# Patient Record
Sex: FEMALE | Race: BLACK OR AFRICAN AMERICAN | NOT HISPANIC OR LATINO | Employment: UNEMPLOYED | URBAN - METROPOLITAN AREA
[De-identification: names, ages, dates, MRNs, and addresses within clinical notes are randomized per-mention and may not be internally consistent; named-entity substitution may affect disease eponyms.]

---

## 2017-01-15 ENCOUNTER — HOSPITAL ENCOUNTER (EMERGENCY)
Facility: OTHER | Age: 52
Discharge: HOME | End: 2017-01-15
Attending: EMERGENCY MEDICINE
Payer: COMMERCIAL

## 2017-01-15 VITALS
DIASTOLIC BLOOD PRESSURE: 66 MMHG | OXYGEN SATURATION: 97 % | SYSTOLIC BLOOD PRESSURE: 141 MMHG | HEART RATE: 92 BPM | WEIGHT: 191 LBS | TEMPERATURE: 98 F | RESPIRATION RATE: 18 BRPM | HEIGHT: 64 IN | BODY MASS INDEX: 32.61 KG/M2

## 2017-01-15 DIAGNOSIS — M54.2 NECK PAIN: Primary | ICD-10-CM

## 2017-01-15 DIAGNOSIS — R07.9 CHEST PAIN: ICD-10-CM

## 2017-01-15 LAB
ALBUMIN SERPL BCP-MCNC: 4.1 G/DL
ALP SERPL-CCNC: 93 U/L
ALT SERPL W/O P-5'-P-CCNC: 28 U/L
ANION GAP SERPL CALC-SCNC: 12 MMOL/L
AST SERPL-CCNC: 20 U/L
BASOPHILS # BLD AUTO: 0.04 K/UL
BASOPHILS NFR BLD: 0.5 %
BILIRUB SERPL-MCNC: 0.4 MG/DL
BUN SERPL-MCNC: 9 MG/DL
CALCIUM SERPL-MCNC: 10 MG/DL
CHLORIDE SERPL-SCNC: 107 MMOL/L
CO2 SERPL-SCNC: 22 MMOL/L
CREAT SERPL-MCNC: 0.7 MG/DL
DIFFERENTIAL METHOD: ABNORMAL
EOSINOPHIL # BLD AUTO: 0.1 K/UL
EOSINOPHIL NFR BLD: 1.5 %
ERYTHROCYTE [DISTWIDTH] IN BLOOD BY AUTOMATED COUNT: 14.7 %
EST. GFR  (AFRICAN AMERICAN): >60 ML/MIN/1.73 M^2
EST. GFR  (NON AFRICAN AMERICAN): >60 ML/MIN/1.73 M^2
GLUCOSE SERPL-MCNC: 87 MG/DL
HCT VFR BLD AUTO: 40.7 %
HGB BLD-MCNC: 13.5 G/DL
LYMPHOCYTES # BLD AUTO: 4.1 K/UL
LYMPHOCYTES NFR BLD: 47.7 %
MCH RBC QN AUTO: 28.2 PG
MCHC RBC AUTO-ENTMCNC: 33.2 %
MCV RBC AUTO: 85 FL
MONOCYTES # BLD AUTO: 0.4 K/UL
MONOCYTES NFR BLD: 5.1 %
NEUTROPHILS # BLD AUTO: 3.8 K/UL
NEUTROPHILS NFR BLD: 44.8 %
PLATELET # BLD AUTO: 306 K/UL
PMV BLD AUTO: 9.1 FL
POTASSIUM SERPL-SCNC: 3.4 MMOL/L
PROT SERPL-MCNC: 8.2 G/DL
RBC # BLD AUTO: 4.79 M/UL
SODIUM SERPL-SCNC: 141 MMOL/L
TROPONIN I SERPL DL<=0.01 NG/ML-MCNC: <0.006 NG/ML
WBC # BLD AUTO: 8.51 K/UL

## 2017-01-15 PROCEDURE — 93005 ELECTROCARDIOGRAM TRACING: CPT

## 2017-01-15 PROCEDURE — 63600175 PHARM REV CODE 636 W HCPCS: Performed by: PHYSICIAN ASSISTANT

## 2017-01-15 PROCEDURE — 93010 ELECTROCARDIOGRAM REPORT: CPT | Mod: ,,, | Performed by: INTERNAL MEDICINE

## 2017-01-15 PROCEDURE — 80053 COMPREHEN METABOLIC PANEL: CPT

## 2017-01-15 PROCEDURE — 99284 EMERGENCY DEPT VISIT MOD MDM: CPT | Mod: 25

## 2017-01-15 PROCEDURE — 25000003 PHARM REV CODE 250: Performed by: PHYSICIAN ASSISTANT

## 2017-01-15 PROCEDURE — 84484 ASSAY OF TROPONIN QUANT: CPT

## 2017-01-15 PROCEDURE — 85025 COMPLETE CBC W/AUTO DIFF WBC: CPT

## 2017-01-15 PROCEDURE — 96374 THER/PROPH/DIAG INJ IV PUSH: CPT

## 2017-01-15 RX ORDER — ASPIRIN 325 MG
325 TABLET, DELAYED RELEASE (ENTERIC COATED) ORAL
Status: DISCONTINUED | OUTPATIENT
Start: 2017-01-15 | End: 2017-01-15 | Stop reason: HOSPADM

## 2017-01-15 RX ORDER — HYDROCODONE BITARTRATE AND ACETAMINOPHEN 5; 325 MG/1; MG/1
1 TABLET ORAL
Status: COMPLETED | OUTPATIENT
Start: 2017-01-15 | End: 2017-01-15

## 2017-01-15 RX ORDER — KETOROLAC TROMETHAMINE 30 MG/ML
15 INJECTION, SOLUTION INTRAMUSCULAR; INTRAVENOUS
Status: COMPLETED | OUTPATIENT
Start: 2017-01-15 | End: 2017-01-15

## 2017-01-15 RX ORDER — NAPROXEN 375 MG/1
375 TABLET ORAL 2 TIMES DAILY PRN
Qty: 30 TABLET | Refills: 0 | Status: SHIPPED | OUTPATIENT
Start: 2017-01-15 | End: 2018-07-18

## 2017-01-15 RX ADMIN — HYDROCODONE BITARTRATE AND ACETAMINOPHEN 1 TABLET: 5; 325 TABLET ORAL at 06:01

## 2017-01-15 RX ADMIN — KETOROLAC TROMETHAMINE 15 MG: 30 INJECTION, SOLUTION INTRAMUSCULAR at 06:01

## 2017-01-15 NOTE — ED PROVIDER NOTES
Encounter Date: 1/15/2017       History     Chief Complaint   Patient presents with    Chest Pain     pt with  pain in chest  right arm and  shoulder  x one week.     Review of patient's allergies indicates:   Allergen Reactions    Morphine Itching     HPI Comments: Patient is a 51-year-old female with history of diabetes, hypertension, and hyperlipidemia who presents to the emergency department with chest pain.  Patient states over the last stay she has had a pain in the left side of her neck.  Patient states the pain radiates down her left shoulder.  Patient states the pain radiates into her chest.  Patient states the chest pain feels like a pulsatile feeling that is worse with movement of her neck.  Patient rates the pain 8 out of 10.  Patient denies any associated shortness of breath.  Patient states she did have one episode of sweating today, that she attributed to hot flashes with going through menopause.  Patient denies any lower extremity edema.  Patient denies recent travel, recent surgery, previous blood clot, or exogenous estrogen.  Patient states she went to urgent care who instructed her to come here for a cardiac workup.  Patient denies any recent illness.  Patient denies coughing.  Patient denies any fevers or chills.  Patient states she had a stress test last year and was told everything went well.    The history is provided by the patient.     Past Medical History   Diagnosis Date    Diabetes mellitus     Glaucoma 2012    Hypertension     Retinal tear of right eye      Past Medical History Pertinent Negatives   Diagnosis Date Noted    Abnormal Pap smear of vagina 7/16/2015     Past Surgical History   Procedure Laterality Date    Eye surgery      Hysterectomy       NILES, BSO secondary to ovarian cyst     Family History   Problem Relation Age of Onset    Ovarian cancer Other     Breast cancer Neg Hx     Colon cancer Neg Hx      Social History   Substance Use Topics    Smoking status: Never  Smoker    Smokeless tobacco: None    Alcohol use 0.0 oz/week     0 Standard drinks or equivalent per week      Comment: occasional     Review of Systems   Constitutional: Negative for activity change, appetite change, chills, fatigue and fever.   HENT: Negative for congestion, ear discharge, ear pain, postnasal drip, rhinorrhea, sore throat and trouble swallowing.    Eyes: Negative for photophobia and visual disturbance.   Respiratory: Negative for cough and shortness of breath.    Cardiovascular: Positive for chest pain.   Gastrointestinal: Negative for abdominal pain, blood in stool, constipation, diarrhea, nausea and vomiting.   Genitourinary: Negative for dysuria, flank pain and hematuria.   Musculoskeletal: Negative for back pain, neck pain and neck stiffness.   Skin: Negative for rash and wound.   Neurological: Negative for dizziness, seizures, syncope, speech difficulty, weakness, light-headedness, numbness and headaches.       Physical Exam   Initial Vitals   BP Pulse Resp Temp SpO2   01/15/17 1717 01/15/17 1717 01/15/17 1717 01/15/17 1717 01/15/17 1717   177/89 100 18 97.9 °F (36.6 °C) 98 %     Physical Exam    Nursing note and vitals reviewed.  Constitutional: She appears well-developed and well-nourished. She is not diaphoretic.  Non-toxic appearance. No distress.   HENT:   Head: Normocephalic.   Right Ear: Hearing and external ear normal.   Left Ear: Hearing and external ear normal.   Nose: Nose normal.   Mouth/Throat: Uvula is midline and oropharynx is clear and moist. No trismus in the jaw. No uvula swelling. No oropharyngeal exudate.   Eyes: Conjunctivae are normal. Pupils are equal, round, and reactive to light.   Neck: Normal range of motion. Neck supple. Muscular tenderness present. No spinous process tenderness present. Normal range of motion present. No rigidity.   Pain with flexion and extension   Cardiovascular: Normal rate, regular rhythm and normal heart sounds. Exam reveals no gallop and  no friction rub.    No murmur heard.  No lower extremity edema   Pulmonary/Chest: Breath sounds normal. No respiratory distress. She has no wheezes. She has no rhonchi. She has no rales. She exhibits tenderness (over sternum).   Abdominal: Soft. Bowel sounds are normal. She exhibits no distension and no mass. There is no tenderness. There is no rebound and no guarding.   Lymphadenopathy:     She has no cervical adenopathy.   Neurological: She is alert and oriented to person, place, and time. She has normal strength.   Skin: Skin is warm and dry.   Psychiatric: She has a normal mood and affect.         ED Course   Procedures  Labs Reviewed   CBC W/ AUTO DIFFERENTIAL - Abnormal; Notable for the following:        Result Value    RDW 14.7 (*)     MPV 9.1 (*)     All other components within normal limits   COMPREHENSIVE METABOLIC PANEL - Abnormal; Notable for the following:     Potassium 3.4 (*)     CO2 22 (*)     All other components within normal limits   TROPONIN I     EKG Readings: (Independently Interpreted)   Initial Reading: No STEMI. Rhythm: Normal Sinus Rhythm. Heart Rate: 92.       X-Rays:   Independently Interpreted Readings:   Other Readings:  No acute findings    Imaging Results         X-Ray Chest PA And Lateral (Final result) Result time:  01/15/17 17:52:28    Final result by Tristin Thomas MD (01/15/17 17:52:28)    Impression:        No acute cardiothoracic disease evident.       Electronically signed by: Dr. Tristin Thomas MD  Date:     01/15/17  Time:    17:52     Narrative:    TWO VIEW CHEST:     Comparison: None.    Findings:    The cardiac silhouette and the pulmonary vasculature are normal in size.  The mediastinal and hilar contours are unremarkable.  There is no pneumothorax.  No pleural effusion.  The lungs are clear.  No acute bony abnormality.              Medical Decision Making:   Initial Assessment:   Urgent evaluation of a 51-year-old female with history of diabetes, hypertension, and  hyperlipidemia who presents to the emergency department with neck pain and chest pain.  Patient is afebrile, nontoxic appearing, and hemodynamically stable.  On exam, there is tenderness over the sternum.  There is also tenderness in the left paraspinal region cervical spine.  Pain with extension and flexion of neck.  Patient's pain is reproducible on exam.  I suspect cervical radiculopathy.  With patient's coronary artery disease risk factors, I will perform cardiac workup.  EKG is obtained and shows no signs of acute ischemia.  Clinical Tests:   Lab Tests: Ordered and Reviewed  Radiological Study: Ordered and Reviewed  ED Management:  7:03 PM  Labs reveal significant abnormality.  Troponin is negative.  Chest x-ray is unremarkable.  I suspect patient is experiencing cervical radiculopathy.  Patient will be discharged with anti-inflammatories.  Patient is advised to follow-up with PCP or return to the emergency department with any worsening symptoms or concerns.  Other:   I have discussed this case with another health care provider.       <> Summary of the Discussion: Dr. Villarreal                   ED Course     Clinical Impression:   The primary encounter diagnosis was Neck pain. A diagnosis of Chest pain was also pertinent to this visit.          Lucille Marley PA-C  01/15/17 1904

## 2017-01-15 NOTE — ED AVS SNAPSHOT
[facility]  [address]  [facility] [address]               Shu Mendoza   1/15/2017  5:27 PM   ED    Description:  Female : 1965   Department:  [facility]           Your Care was Coordinated By:     Provider Role From To    Ashwini Villarreal MD Attending Provider 01/15/17 5242 --    Lucille Marley PA-C Physician Assistant 01/15/17 418 --      Reason for Visit     Chest Pain           Diagnoses this Visit        Comments    Neck pain    -  Primary     Chest pain           ED Disposition     None           To Do List           Follow-up Information     Follow up with Diane Laughlin MD In 2 days.    Specialty:  Internal Medicine    Contact information:    [facility] [address]  [phone]         These Medications        Disp Refills Start End    naproxen (NAPROSYN) 375 MG tablet 30 tablet 0 1/15/2017     Take 1 tablet (375 mg total) by mouth 2 (two) times daily as needed (pain). - Oral    [facility] [address] [place] [address] [facility]  AT [facility] Ph #: [phone]         [facility]     [facility] Care [facility] -  Assistance  Registered nurses in the [facility] provide clinical advisement, health education, appointment booking, and other advisory services.  Call for this free service at [phone].             Medications           Message regarding Medications     Verify the changes and/or additions to your medication regime listed below are the same as discussed with your clinician today.  If any of these changes or additions are incorrect, please notify your healthcare provider.        START taking these NEW medications        Refills    naproxen (NAPROSYN) 375 MG tablet 0    Sig: Take 1 tablet (375 mg total) by mouth 2 (two) times daily as needed (pain).    Class: Print    Route: Oral      These medications were  "administered today        Dose Freq    aspirin EC tablet 325 mg 325 mg ED 1 Time    Sig: Take 1 tablet (325 mg total) by mouth ED 1 Time.    Class: Normal    Route: Oral    ketorolac injection 15 mg 15 mg ED 1 Time    Sig: Inject 15 mg into the vein ED 1 Time.    Class: Normal    Route: Intravenous    hydrocodone-acetaminophen 5-325mg per tablet 1 tablet 1 tablet ED 1 Time    Sig: Take 1 tablet by mouth ED 1 Time.    Class: Normal    Route: Oral           Verify that the below list of medications is an accurate representation of the medications you are currently taking.  If none reported, the list may be blank. If incorrect, please contact your healthcare provider. Carry this list with you in case of emergency.           Current Medications     amlodipine-benazepril (LOTREL) 5-40 mg per capsule Take 1 capsule by mouth once daily.    atorvastatin (LIPITOR) 10 MG tablet Take 1 tablet (10 mg total) by mouth once daily.    dorzolamide (TRUSOPT) 2 % ophthalmic solution 1 drop 3 (three) times daily.    estradiol (VIVELLE-DOT) 0.025 mg/24 hr APPLY 1 PATCH ON SKIN TWICE A WEEK    fluticasone (FLONASE) 50 mcg/actuation nasal spray 1 spray by Each Nare route once daily.    hydrochlorothiazide (HYDRODIURIL) 12.5 MG Tab Take 12.5 mg by mouth once daily.    latanoprost 0.005 % ophthalmic solution 1 drop every evening.    metformin (GLUCOPHAGE) 500 MG tablet Take 500 mg by mouth 2 (two) times daily with meals.    aspirin EC tablet 325 mg Take 1 tablet (325 mg total) by mouth ED 1 Time.    naproxen (NAPROSYN) 375 MG tablet Take 1 tablet (375 mg total) by mouth 2 (two) times daily as needed (pain).           Clinical Reference Information           Your Vitals Were     BP Pulse Temp Resp Height Weight    165/80 86 97.9 °F (36.6 °C) (Oral) 20 5' 4" (1.626 m) 86.6 kg (191 lb)    SpO2 BMI             99% 32.79 kg/m2         Allergies as of 1/15/2017        Reactions    Morphine Itching      Immunizations Administered on Date of " Encounter - 1/15/2017     None      ED Micro, Lab, POCT     Start Ordered       Status Ordering Provider    01/15/17 1741 01/15/17 1741  CBC auto differential  STAT      Final result     01/15/17 1741 01/15/17 1741  Comprehensive metabolic panel  STAT      Final result     01/15/17 1741 01/15/17 1741  Troponin I  STAT      Final result       ED Imaging Orders     Start Ordered       Status Ordering Provider    01/15/17 1741 01/15/17 1741  X-Ray Chest PA And Lateral  1 time imaging      Final result         Discharge Instructions         Noncardiac Chest Pain    Based on your visit today, the health care provider doesnt know what is causing your chest pain. In most cases, people who come to the emergency department with chest pain dont have a problem with their heart. Instead, the pain is caused by other conditions. These may be problems with the lungs, muscles, bones, digestive tract, nerves, or mental health.  Lung problems  · Inflammation around the lungs (pleurisy)  · Collapsed lung (pneumothorax)  · Fluid around the lungs (pleural effusion)  · Lung cancer. This is a rare cause of chest pain.  Muscle or bone problems  · Inflamed cartilage between the ribs (pleurisy)  · Fibromyalgia  · Rheumatoid arthritis  Digestive system problems  · Reflux  · Stomach ulcer  · Spasms of the esophagus  · Gall stones  · Gallbladder inflammation  Mental health conditions  · Panic or anxiety attacks  · Emotional distress  Your condition doesnt seem serious and your pain doesnt appear to be coming from your heart. But sometimes the signs of a serious problem take more time to appear. Watch for the warning signs listed below.  Home care  Follow these guidelines when caring for yourself at home:  · Rest today and avoid strenuous activity.  · Take any prescribed medicine as directed.  Follow-up care  Follow up with your health care provider, or as advised, if you dont start to feel better within 24 hours.  When to seek medical  advice  Call your health care provider right away if any of these occur:  · A change in the type of pain. Call if it feels different, becomes more serious, lasts longer, or begins to spread into your shoulder, arm, neck, jaw, or back.  · Shortness of breath  · You feel more pain when you breathe  · Cough with dark-colored mucus or blood  · Weakness, dizziness, or fainting  · Fever of 100.4ºF (38ºC) or higher, or as directed by your health care provider  · Swelling, pain, or redness in one leg  © 7083-7285 [employer]. [address] [place] [address]. All rights reserved. This information is not intended as a substitute for professional medical care. Always follow your healthcare professional's instructions.          Neck Pain    There are several possible causes of neck pain when there is no injury:  · You can get a minor ligament sprain or muscle strain from a sudden minor neck movement. Sleeping with your neck in an awkward position can also cause this.  · Some people respond to emotional stress by tensing the muscles of their neck, shoulders, and upper back. Chronic spasm in these muscles can cause neck pain and sometimes headaches.  · Gradual wear and tear of the joints in the spine can cause degenerative arthritis. This can be a source of occasional or chronic neck pain.  · The spinal disks may bulge and put pressure on a nearby spinal nerve. This can happen as a natural result of aging or repeated small injuries to the neck. The spinal disks are the cushions between each spinal bone. This causes tingling, pain, or numbness that spreads from the neck to the shoulder, arm, or hand on one side.  Acute neck pain usually gets better in 1 to 2 weeks. Neck pain related to disk disease, arthritis in the spinal joints, or spinal stenosis can become chronic and last for months or years. Spinal stenosis is narrowing of the spinal canal.  X-rays are usually not ordered for the initial evaluation of  neck pain. However, X-rays may be done if you had a forceful physical injury, such as a car accident or fall. If pain continues and doesnt respond to medical treatment, X-rays and other tests may be done at a later time.  Home care  · Rest and relax the muscles. Use a comfortable pillow that supports the head. It should also help keep the spine in a neutral position. The position of the head should not be tilted forward or backward. A rolled up towel may help for a custom fit.  · Some people find relief with heat. Heat can be applied with either a warm shower or bath or a moist towel heated in the microwave and massage. Others prefer cold packs. You can make an ice pack by filling a plastic bag that seals at the top with ice cubes or crushed ice and then wrapping it with a thin towel. Try both and use the method that feels best for 15 to 20 minutes, several times a day.  · Whether using ice or heat, be careful that you do not injure your skin. Never put ice directly on the skin. Always wrap the ice in a towel or other type of cloth.This is very important, especially in people with poor skin sensations.   · Try to reduce your stress level. Emotional stress can lead to neck muscle tension and get in the way of or delay the healing process.  · You may use over-the-counter pain medicine to control pain, unless another medicine was prescribed. If you have chronic liver or kidney disease or ever had a stomach ulcer or GI bleeding, talk with your healthcare provider before using these medicines.  Follow-up care  Follow up with your healthcare provider if your symptoms do not show signs of improvement after one week. Physical therapy or further tests may be needed.  If X-rays, CT scans, or MRI scans were taken, you will be told of any new findings that may affect your care.  Call 911  Call 911 if you have:  · Sudden weakness or numbness in one or both arms  · Neck swelling, difficulty or painful swallowing  · Difficulty  breathing  · Chest pain  When to seek medical advice  Call your healthcare provider right away if any of these occur:  · Pain becomes worse or spreads into one or both arm  · Increasing headache  · Fever of 100.4°F (38°C) or above lasting for 24 to 48 hours  © 0621-2761 [employer]. [address] [place] [address]. All rights reserved. This information is not intended as a substitute for professional medical care. Always follow your healthcare professional's instructions.          Discharge References/Attachments     CERVICAL RADICULOPATHY, UNDERSTANDING (ENGLISH)       [facility] complies with applicable Federal civil rights laws and does not discriminate on the basis of race, color, national origin, age, disability, or sex.        Language Assistance Services     ATTENTION: Language assistance services are available, free of charge. Please call [phone].      ATENCIÓN: Si habla zina, tiene a hardwick disposición servicios gratuitos de asistencia lingüística. Llame al [phone].     CHÚ Ý: N?u b?n nói Ti?ng Vi?t, có các d?ch v? h? tr? ngôn ng? mi?n phí dành cho b?n. G?i s? [phone].

## 2017-01-16 NOTE — ED NOTES
Bedside report received from BLAINE May. Rounding on the patient has been done. she has been updated on the plan of care and her current status. Pt denies chest pain at this time, states left arm and shoulder pain is 8/10. Comfort positioning and restroom needs were addressed. Necessary items were placed with in her reach and she was advised when a reassessment would take place. The call bell remains at the bedside for any additional patient needs. The patient is resting comfortably on the stretcher, respirations are even and unlabored, skin warm and dry. Will continue to monitor.

## 2017-01-16 NOTE — DISCHARGE INSTRUCTIONS
Noncardiac Chest Pain    Based on your visit today, the health care provider doesnt know what is causing your chest pain. In most cases, people who come to the emergency department with chest pain dont have a problem with their heart. Instead, the pain is caused by other conditions. These may be problems with the lungs, muscles, bones, digestive tract, nerves, or mental health.  Lung problems  · Inflammation around the lungs (pleurisy)  · Collapsed lung (pneumothorax)  · Fluid around the lungs (pleural effusion)  · Lung cancer. This is a rare cause of chest pain.  Muscle or bone problems  · Inflamed cartilage between the ribs (pleurisy)  · Fibromyalgia  · Rheumatoid arthritis  Digestive system problems  · Reflux  · Stomach ulcer  · Spasms of the esophagus  · Gall stones  · Gallbladder inflammation  Mental health conditions  · Panic or anxiety attacks  · Emotional distress  Your condition doesnt seem serious and your pain doesnt appear to be coming from your heart. But sometimes the signs of a serious problem take more time to appear. Watch for the warning signs listed below.  Home care  Follow these guidelines when caring for yourself at home:  · Rest today and avoid strenuous activity.  · Take any prescribed medicine as directed.  Follow-up care  Follow up with your health care provider, or as advised, if you dont start to feel better within 24 hours.  When to seek medical advice  Call your health care provider right away if any of these occur:  · A change in the type of pain. Call if it feels different, becomes more serious, lasts longer, or begins to spread into your shoulder, arm, neck, jaw, or back.  · Shortness of breath  · You feel more pain when you breathe  · Cough with dark-colored mucus or blood  · Weakness, dizziness, or fainting  · Fever of 100.4ºF (38ºC) or higher, or as directed by your health care provider  · Swelling, pain, or redness in one leg  © 8653-9754 [employer] [address]  [place] [address]. All rights reserved. This information is not intended as a substitute for professional medical care. Always follow your healthcare professional's instructions.          Neck Pain    There are several possible causes of neck pain when there is no injury:  · You can get a minor ligament sprain or muscle strain from a sudden minor neck movement. Sleeping with your neck in an awkward position can also cause this.  · Some people respond to emotional stress by tensing the muscles of their neck, shoulders, and upper back. Chronic spasm in these muscles can cause neck pain and sometimes headaches.  · Gradual wear and tear of the joints in the spine can cause degenerative arthritis. This can be a source of occasional or chronic neck pain.  · The spinal disks may bulge and put pressure on a nearby spinal nerve. This can happen as a natural result of aging or repeated small injuries to the neck. The spinal disks are the cushions between each spinal bone. This causes tingling, pain, or numbness that spreads from the neck to the shoulder, arm, or hand on one side.  Acute neck pain usually gets better in 1 to 2 weeks. Neck pain related to disk disease, arthritis in the spinal joints, or spinal stenosis can become chronic and last for months or years. Spinal stenosis is narrowing of the spinal canal.  X-rays are usually not ordered for the initial evaluation of neck pain. However, X-rays may be done if you had a forceful physical injury, such as a car accident or fall. If pain continues and doesnt respond to medical treatment, X-rays and other tests may be done at a later time.  Home care  · Rest and relax the muscles. Use a comfortable pillow that supports the head. It should also help keep the spine in a neutral position. The position of the head should not be tilted forward or backward. A rolled up towel may help for a custom fit.  · Some people find relief with heat. Heat can be applied  with either a warm shower or bath or a moist towel heated in the microwave and massage. Others prefer cold packs. You can make an ice pack by filling a plastic bag that seals at the top with ice cubes or crushed ice and then wrapping it with a thin towel. Try both and use the method that feels best for 15 to 20 minutes, several times a day.  · Whether using ice or heat, be careful that you do not injure your skin. Never put ice directly on the skin. Always wrap the ice in a towel or other type of cloth.This is very important, especially in people with poor skin sensations.   · Try to reduce your stress level. Emotional stress can lead to neck muscle tension and get in the way of or delay the healing process.  · You may use over-the-counter pain medicine to control pain, unless another medicine was prescribed. If you have chronic liver or kidney disease or ever had a stomach ulcer or GI bleeding, talk with your healthcare provider before using these medicines.  Follow-up care  Follow up with your healthcare provider if your symptoms do not show signs of improvement after one week. Physical therapy or further tests may be needed.  If X-rays, CT scans, or MRI scans were taken, you will be told of any new findings that may affect your care.  Call 911  Call 911 if you have:  · Sudden weakness or numbness in one or both arms  · Neck swelling, difficulty or painful swallowing  · Difficulty breathing  · Chest pain  When to seek medical advice  Call your healthcare provider right away if any of these occur:  · Pain becomes worse or spreads into one or both arm  · Increasing headache  · Fever of 100.4°F (38°C) or above lasting for 24 to 48 hours  © 3071-9643 [employer]. [address] [place] [address]. All rights reserved. This information is not intended as a substitute for professional medical care. Always follow your healthcare professional's instructions.

## 2017-07-23 RX ORDER — ESTRADIOL 0.03 MG/D
FILM, EXTENDED RELEASE TRANSDERMAL
Qty: 8 PATCH | Refills: 0 | Status: SHIPPED | OUTPATIENT
Start: 2017-07-23 | End: 2018-07-18

## 2017-08-16 ENCOUNTER — HOSPITAL ENCOUNTER (EMERGENCY)
Facility: OTHER | Age: 52
Discharge: HOME | End: 2017-08-16
Attending: EMERGENCY MEDICINE
Payer: MEDICAID

## 2017-08-16 VITALS
SYSTOLIC BLOOD PRESSURE: 179 MMHG | HEART RATE: 88 BPM | HEIGHT: 64 IN | WEIGHT: 195 LBS | TEMPERATURE: 98 F | OXYGEN SATURATION: 99 % | RESPIRATION RATE: 18 BRPM | BODY MASS INDEX: 33.29 KG/M2 | DIASTOLIC BLOOD PRESSURE: 81 MMHG

## 2017-08-16 DIAGNOSIS — R21 RASH AND NONSPECIFIC SKIN ERUPTION: Primary | ICD-10-CM

## 2017-08-16 PROCEDURE — 99283 EMERGENCY DEPT VISIT LOW MDM: CPT

## 2017-08-16 RX ORDER — MULTIVITAMIN
1 TABLET ORAL DAILY
COMMUNITY

## 2017-08-16 NOTE — ED TRIAGE NOTES
Pt presents to the ED with c/o rash near her left inner ankle x2 days. Pt states initially it went away but has come back. The only thing new that she is taking is vitamin E and fish oil supplements. Denies itching at site. Pt also states she has a hx of restless leg syndrome and does not know if that is a contributing factor.     LOC: The patient is awake, alert and aware of environment with an appropriate affect, the patient is oriented x 4 with appropriate speech.  APPEARANCE: Patient sitting comfortably and in no acute distress, clean and well groomed.  SKIN: The skin is warm and dry, patient has normal skin turgor, skin is intact, no breakdown or brusing noted. Rash near inner left ankle.   MUSKULOSKELETAL: Patient observed ambulating with a steady gait. Patient moving all extremities well, no obvious deformities noted.  RESPIRATORY: Airway is open and patent, respirations are spontaneous, patient has a normal effort and rate.   CARDIAC: Heart rate normal, capillary refill < 3 seconds, no edema noted.

## 2017-08-17 NOTE — ED PROVIDER NOTES
Encounter Date: 8/16/2017       History     Chief Complaint   Patient presents with    Rash     pt reports when waking up this morning with a rash to left lower extremity and some starting on right lower extremity from unknown source     Patient is 52-year-old female who presents with complaints of rash to left lower extremity that has been present for 2 days prior to arrival.  She reports rash started off small and has progressively enlarged over the course of 2 days.  She does admit that now she is seeing a similar rash develop on her right leg.  She has no report of pain, itching, skin sloughing, bleeding or purulence to the use lesions.  She has no report of leg swelling, trauma or injury.  No history of skin rashes.  Denies bleeding, fever, chills, nausea, vomiting, chest pain or shortness of breath.  Denies new foods or detergents.  Currently accompanied by her  is at bedside.  Of note, patient admittedly is concerned that she has a blood clot.          Review of patient's allergies indicates:   Allergen Reactions    Morphine Itching     Past Medical History:   Diagnosis Date    Diabetes mellitus     Glaucoma 2012    Hypertension     Retinal tear of right eye      Past Surgical History:   Procedure Laterality Date    EYE SURGERY      HYSTERECTOMY      NILES, BSO secondary to ovarian cyst     Family History   Problem Relation Age of Onset    Ovarian cancer Other     Breast cancer Neg Hx     Colon cancer Neg Hx      Social History   Substance Use Topics    Smoking status: Never Smoker    Smokeless tobacco: Never Used    Alcohol use 0.0 oz/week      Comment: occasional     Review of Systems   Constitutional: Negative for fever.   HENT: Negative for sore throat.    Respiratory: Negative for shortness of breath.    Cardiovascular: Negative for chest pain.   Gastrointestinal: Negative for nausea.   Genitourinary: Negative for dysuria.   Musculoskeletal: Negative for back pain.   Skin: Positive for  rash.   Neurological: Negative for weakness.   Hematological: Does not bruise/bleed easily.       Physical Exam     Initial Vitals [08/16/17 1756]   BP Pulse Resp Temp SpO2   (!) 180/86 88 18 98.4 °F (36.9 °C) 98 %      MAP       117.33         Physical Exam    Nursing note and vitals reviewed.  Constitutional: She appears well-developed and well-nourished. She is not diaphoretic. No distress.   Healthy appearing -American female in no acute distress or apparent pain.  She makes good eye contact, speaks in clear full sentences and ambulates with ease.   HENT:   Head: Normocephalic and atraumatic.   Eyes: Conjunctivae and EOM are normal. Pupils are equal, round, and reactive to light. Right eye exhibits no discharge. Left eye exhibits no discharge. No scleral icterus.   Neck: Normal range of motion.   Cardiovascular: Normal rate, regular rhythm and normal heart sounds. Exam reveals no gallop and no friction rub.    No murmur heard.  Pulmonary/Chest: Breath sounds normal. She has no wheezes. She has no rhonchi. She has no rales.   Abdominal: Soft. Bowel sounds are normal. There is no tenderness. There is no rebound and no guarding.   Musculoskeletal: Normal range of motion. She exhibits no edema or tenderness.   Lymphadenopathy:     She has no cervical adenopathy.   Neurological: She is alert and oriented to person, place, and time. She has normal strength. No cranial nerve deficit or sensory deficit.   Skin: Skin is warm and dry. Capillary refill takes less than 2 seconds. Rash noted. No abscess noted. No erythema.   As seen in photo attached to chart which was obtained with consent from patient.   Psychiatric: She has a normal mood and affect. Her behavior is normal. Thought content normal.         ED Course   Procedures  Labs Reviewed - No data to display          Medical Decision Making:   ED Management:  Urgent evaluation a 52-year-old female who presents with complaints of menstrual erythematous macular  rash to the medial aspect of the left  Lower leg without clear etiology.  She is afebrile, nontoxic appearing, hemodynamically stable.  Physical exam outlined above and reveals patchy erythematous macular rash that is blanchable with no bleeding, skin sloughing, vesicular lesions or purulence.  No induration tenderness or itching.  Do not suspect cellulitis or abscess. See photo. Do not suspect DVT especially in the setting of no pain or swelling.  No intervention felt warranted at this time.  She is reassured that this does not appear to be in acute dermatological emergency including SJS or TEN. Patient encouraged to follow-up with primary care provider in one to 2 days for symptom recheck.  She is educated him return precautions and verbalizes understanding.  Case discussed with attending who agrees with plan.  Other:   I have discussed this case with another health care provider.       <> Summary of the Discussion: [place]                    ED Course     Clinical Impression:   The encounter diagnosis was Rash and nonspecific skin eruption.                          The above photo represents Left lower extremity medial aspect        Above photo represents medial aspect of the right lower extremity     Elena Ontiveros PA-C  08/16/17 [id number]

## 2017-09-10 RX ORDER — ESTRADIOL 0.03 MG/D
FILM, EXTENDED RELEASE TRANSDERMAL
Qty: 8 PATCH | Refills: 0 | Status: SHIPPED | OUTPATIENT
Start: 2017-09-10 | End: 2018-07-18

## 2017-10-16 RX ORDER — ESTRADIOL 0.03 MG/D
FILM, EXTENDED RELEASE TRANSDERMAL
Qty: 8 PATCH | Refills: 0 | Status: SHIPPED | OUTPATIENT
Start: 2017-10-16 | End: 2018-07-18

## 2017-11-14 RX ORDER — ESTRADIOL 0.03 MG/D
FILM, EXTENDED RELEASE TRANSDERMAL
Qty: 8 PATCH | Refills: 0 | Status: SHIPPED | OUTPATIENT
Start: 2017-11-14 | End: 2018-07-18

## 2017-12-12 RX ORDER — ESTRADIOL 0.03 MG/D
FILM, EXTENDED RELEASE TRANSDERMAL
Qty: 8 PATCH | Refills: 0 | Status: SHIPPED | OUTPATIENT
Start: 2017-12-12 | End: 2018-07-18

## 2018-04-18 ENCOUNTER — HOSPITAL ENCOUNTER (OUTPATIENT)
Dept: RADIOLOGY | Facility: OTHER | Age: 53
Discharge: HOME | End: 2018-04-18
Attending: ORTHOPAEDIC SURGERY
Payer: COMMERCIAL

## 2018-04-18 DIAGNOSIS — M25.512 LEFT SHOULDER PAIN: ICD-10-CM

## 2018-04-18 DIAGNOSIS — M19.012 ARTHRITIS OF LEFT SHOULDER REGION: ICD-10-CM

## 2018-04-18 PROCEDURE — 73221 MRI JOINT UPR EXTREM W/O DYE: CPT | Mod: 26,LT,, | Performed by: RADIOLOGY

## 2018-04-18 PROCEDURE — 73221 MRI JOINT UPR EXTREM W/O DYE: CPT | Mod: TC,LT

## 2018-06-03 RX ORDER — ESTRADIOL 0.03 MG/D
FILM, EXTENDED RELEASE TRANSDERMAL
Qty: 8 PATCH | Refills: 0 | Status: SHIPPED | OUTPATIENT
Start: 2018-06-03 | End: 2018-07-18

## 2018-07-10 RX ORDER — ESTRADIOL 0.03 MG/D
FILM, EXTENDED RELEASE TRANSDERMAL
Qty: 8 PATCH | Refills: 0 | Status: SHIPPED | OUTPATIENT
Start: 2018-07-10 | End: 2018-07-18

## 2018-07-18 ENCOUNTER — OFFICE VISIT (OUTPATIENT)
Dept: UROLOGY | Facility: CLINIC | Age: 53
End: 2018-07-18
Attending: UROLOGY
Payer: COMMERCIAL

## 2018-07-18 VITALS
SYSTOLIC BLOOD PRESSURE: 161 MMHG | WEIGHT: 192 LBS | HEART RATE: 94 BPM | DIASTOLIC BLOOD PRESSURE: 93 MMHG | HEIGHT: 64 IN | BODY MASS INDEX: 32.78 KG/M2

## 2018-07-18 DIAGNOSIS — R31.29 MICROHEMATURIA: ICD-10-CM

## 2018-07-18 DIAGNOSIS — R35.0 FREQUENCY OF URINATION: ICD-10-CM

## 2018-07-18 DIAGNOSIS — N32.81 OAB (OVERACTIVE BLADDER): Primary | ICD-10-CM

## 2018-07-18 LAB
BILIRUB SERPL-MCNC: ABNORMAL MG/DL
BLOOD URINE, POC: 250
COLOR, POC UA: YELLOW
GLUCOSE UR QL STRIP: 500
KETONES UR QL STRIP: ABNORMAL
LEUKOCYTE ESTERASE URINE, POC: ABNORMAL
NITRITE, POC UA: ABNORMAL
PH, POC UA: 5
PROTEIN, POC: ABNORMAL
SPECIFIC GRAVITY, POC UA: 1.01
UROBILINOGEN, POC UA: ABNORMAL

## 2018-07-18 PROCEDURE — 99244 OFF/OP CNSLTJ NEW/EST MOD 40: CPT | Mod: 25,S$GLB,, | Performed by: UROLOGY

## 2018-07-18 PROCEDURE — 81002 URINALYSIS NONAUTO W/O SCOPE: CPT | Mod: S$GLB,,, | Performed by: UROLOGY

## 2018-07-18 RX ORDER — AMLODIPINE AND BENAZEPRIL HYDROCHLORIDE 5; 40 MG/1; MG/1
CAPSULE ORAL
COMMUNITY
Start: 2018-05-03 | End: 2018-07-18

## 2018-07-18 RX ORDER — OXYBUTYNIN CHLORIDE 5 MG/1
5 TABLET, EXTENDED RELEASE ORAL DAILY
Qty: 30 TABLET | Refills: 11 | Status: SHIPPED | OUTPATIENT
Start: 2018-07-18 | End: 2018-08-29

## 2018-07-18 RX ORDER — ATORVASTATIN CALCIUM 10 MG/1
1 TABLET, FILM COATED ORAL
COMMUNITY
Start: 2018-03-19 | End: 2018-07-18

## 2018-07-18 RX ORDER — FLUTICASONE PROPIONATE 50 MCG
SPRAY, SUSPENSION (ML) NASAL
COMMUNITY
Start: 2018-03-19 | End: 2018-07-18

## 2018-07-18 RX ORDER — ESTRADIOL 0.03 MG/D
FILM, EXTENDED RELEASE TRANSDERMAL
COMMUNITY
End: 2018-07-18 | Stop reason: SDUPTHER

## 2018-07-18 RX ORDER — TRAVOPROST OPHTHALMIC SOLUTION 0.04 MG/ML
SOLUTION OPHTHALMIC
COMMUNITY
End: 2019-04-25

## 2018-07-18 RX ORDER — EMPAGLIFLOZIN 10 MG/1
TABLET, FILM COATED ORAL
Refills: 3 | COMMUNITY
Start: 2018-06-06 | End: 2018-08-29 | Stop reason: SDUPTHER

## 2018-07-18 RX ORDER — METFORMIN HYDROCHLORIDE 500 MG/1
TABLET, EXTENDED RELEASE ORAL
Refills: 2 | COMMUNITY
Start: 2018-07-03 | End: 2019-04-25

## 2018-07-18 NOTE — PROGRESS NOTES
"  Subjective:       Shu Mendoza is a 53 y.o. female who is a new patient who was referred by Dr Laughlin for evaluation of OAB, renal cysts.      She is a previous pt of Dr Shepherd and Lorraine. She presents with c/o urge/freq. [facility] records show long standing microhematuria s/p workup. Prior h/o recurrent UTIs, used to take Macrobid prophy. Last UTI in 12/16 per her report. S/p NILES-BSO. Multiple reports of flank pain in the past.     She reports her bladder is "overactive" for about 3-4 months. Frequency up to [id number]. She reports starting Jardiance around that time.     Abd US from  (7/15) - parapelvic cyst with calcification, stable since 2010 per their records.     Significant glucosuria today (on Jardiance). She reports glucose control as fair - last A1c 8.0 per her report. Also takes HCTZ.    PVR (bladder scan) today - 31cc      The following portions of the patient's history were reviewed and updated as appropriate: allergies, current medications, past family history, past medical history, past social history, past surgical history and problem list.    Review of Systems  Constitutional: no fever or chills  ENT: no nasal congestion or sore throat  Respiratory: no cough or shortness of breath  Cardiovascular: no chest pain or palpitations  Gastrointestinal: no nausea or vomiting, tolerating diet  Genitourinary: as per HPI  Hematologic/Lymphatic: no easy bruising or lymphadenopathy  Musculoskeletal: no arthralgias or myalgias  Skin: no rashes or lesions  Neurological: no seizures or tremors  Behavioral/Psych: no auditory or visual hallucinations        Objective:    Vitals: BP (!) 161/93 (BP Location: Right arm, Patient Position: Sitting, BP Method: Large (Automatic))   Pulse 94   Ht 5' 4" (1.626 m)   Wt 87.1 kg (192 lb)   BMI 32.96 kg/m²     Physical Exam   General: well developed, well nourished in no acute distress  Head: normocephalic, atraumatic  Neck: supple, trachea midline, no obvious " enlargement of thyroid  HEENT: EOMI, mucus membranes moist, sclera anicteric, no hearing impairment  Lungs: symmetric expansion, non-labored breathing  Cardiovascular: regular rate and rhythm, normal pulses  Abdomen: soft, non tender, non distended, no palpable masses, no hepatosplenomegaly, no hernias, no CVA tenderness  Musculoskeletal: no peripheral edema, normal ROM in bilateral upper and lower extremities  Lymphatics: no cervical or inguinal lymphadenopathy  Skin: no rashes or lesions  Neuro: alert and oriented x 3, no gross deficits  Psych: normal judgment and insight, normal mood/affect and non-anxious  Genitourinary:   patient declined exam      Lab Review   Urine analysis today in clinic shows positive for 250 red blood cells, glucose 500    Lab Results   Component Value Date    WBC 8.51 01/15/2017    HGB 13.5 01/15/2017    HCT 40.7 01/15/2017    MCV 85 01/15/2017     01/15/2017     Lab Results   Component Value Date    CREATININE 0.7 01/15/2017    BUN 9 01/15/2017       Imaging  Images and reports were personally reviewed by me and discussed with patient       Assessment/Plan:      1. OAB (overactive bladder)    - Behavioral changes, PFPT, anticholinergics, mirabegron. Botox/InterStim for refractory UUI.   - Likely worsened by Jardiance and HCTZ   - Trial Ditropan 5mg     2. Microhematuria    - Long history   - Prior workup   - Will monitor, okay to hold on repeat w/u     3. Frequency of urination    - Ditropan         Follow up in 6 weeks with [facility]

## 2018-08-01 DIAGNOSIS — Z12.31 ENCOUNTER FOR SCREENING MAMMOGRAM FOR BREAST CANCER: Primary | ICD-10-CM

## 2018-08-11 RX ORDER — ESTRADIOL 0.03 MG/D
FILM, EXTENDED RELEASE TRANSDERMAL
Qty: 8 PATCH | Refills: 0 | Status: SHIPPED | OUTPATIENT
Start: 2018-08-11 | End: 2018-08-29

## 2018-08-13 ENCOUNTER — TELEPHONE (OUTPATIENT)
Dept: OBSTETRICS AND GYNECOLOGY | Facility: CLINIC | Age: 53
End: 2018-08-13

## 2018-08-13 NOTE — TELEPHONE ENCOUNTER
Hello, this is Nida calling from the OBGYN clinic at [facility]. Returning your call to schedule annual exam. Can you come in on 9/20 at 10:30AM? (No answer, left VM message for the pt to call the clinic back.)

## 2018-08-13 NOTE — TELEPHONE ENCOUNTER
Returned the pt's call to the clinic. Pt agreed to an appointment on 9/20 at 10:30AM. Pt verbalized understanding.

## 2018-08-13 NOTE — TELEPHONE ENCOUNTER
----- Message from Nohemi Cordon sent at 8/13/2018  3:04 PM CDT -----  Contact: SHALINI DIANE [[id number]]      Name of Who is Calling: SHALINI DIANE [[id number]]    What is the request in detail: Returning a call.    Can the clinic reply by [facility]: no    [facility] [phone]

## 2018-08-13 NOTE — TELEPHONE ENCOUNTER
----- Message from Mariel Melgoza sent at 8/13/2018  2:38 PM CDT -----  Name of Who is Calling: SHALINI DIANE [[id number]]    What is the request in detail: Pt would like to schedule an annual visit       Can the clinic reply by [facility]:  Yes       [facility][phone]

## 2018-08-15 ENCOUNTER — HOSPITAL ENCOUNTER (OUTPATIENT)
Dept: RADIOLOGY | Facility: OTHER | Age: 53
Discharge: HOME | End: 2018-08-15
Attending: INTERNAL MEDICINE
Payer: COMMERCIAL

## 2018-08-15 VITALS — HEIGHT: 64 IN | WEIGHT: 192 LBS | BODY MASS INDEX: 32.78 KG/M2

## 2018-08-15 DIAGNOSIS — Z12.31 ENCOUNTER FOR SCREENING MAMMOGRAM FOR BREAST CANCER: ICD-10-CM

## 2018-08-15 PROCEDURE — 77063 BREAST TOMOSYNTHESIS BI: CPT | Mod: 26,,, | Performed by: RADIOLOGY

## 2018-08-15 PROCEDURE — 77067 SCR MAMMO BI INCL CAD: CPT | Mod: 26,,, | Performed by: RADIOLOGY

## 2018-08-15 PROCEDURE — 77067 SCR MAMMO BI INCL CAD: CPT | Mod: TC

## 2018-08-29 ENCOUNTER — OFFICE VISIT (OUTPATIENT)
Dept: UROLOGY | Facility: CLINIC | Age: 53
End: 2018-08-29
Attending: UROLOGY
Payer: COMMERCIAL

## 2018-08-29 VITALS
HEART RATE: 74 BPM | HEIGHT: 64 IN | WEIGHT: 192 LBS | BODY MASS INDEX: 32.78 KG/M2 | SYSTOLIC BLOOD PRESSURE: 132 MMHG | DIASTOLIC BLOOD PRESSURE: 75 MMHG

## 2018-08-29 DIAGNOSIS — N32.81 OAB (OVERACTIVE BLADDER): Primary | ICD-10-CM

## 2018-08-29 DIAGNOSIS — R35.0 FREQUENCY OF URINATION: ICD-10-CM

## 2018-08-29 DIAGNOSIS — R31.29 MICROHEMATURIA: ICD-10-CM

## 2018-08-29 LAB
BILIRUB SERPL-MCNC: ABNORMAL MG/DL
BLOOD URINE, POC: 50
COLOR, POC UA: YELLOW
GLUCOSE UR QL STRIP: 1000
KETONES UR QL STRIP: ABNORMAL
LEUKOCYTE ESTERASE URINE, POC: ABNORMAL
NITRITE, POC UA: ABNORMAL
PH, POC UA: 6
POC RESIDUAL URINE VOLUME: 64 ML (ref 0–100)
PROTEIN, POC: ABNORMAL
SPECIFIC GRAVITY, POC UA: 1.01
UROBILINOGEN, POC UA: ABNORMAL

## 2018-08-29 PROCEDURE — 81002 URINALYSIS NONAUTO W/O SCOPE: CPT | Mod: S$GLB,,, | Performed by: UROLOGY

## 2018-08-29 PROCEDURE — 99214 OFFICE O/P EST MOD 30 MIN: CPT | Mod: 25,S$GLB,, | Performed by: UROLOGY

## 2018-08-29 PROCEDURE — 3008F BODY MASS INDEX DOCD: CPT | Mod: CPTII,S$GLB,, | Performed by: UROLOGY

## 2018-08-29 RX ORDER — FLUTICASONE PROPIONATE 50 MCG
2 SPRAY, SUSPENSION (ML) NASAL
COMMUNITY
Start: 2018-03-19 | End: 2018-09-18

## 2018-08-29 RX ORDER — ESTRADIOL 0.03 MG/D
1 FILM, EXTENDED RELEASE TRANSDERMAL
COMMUNITY
End: 2019-04-25

## 2018-08-29 RX ORDER — AMLODIPINE AND BENAZEPRIL HYDROCHLORIDE 5; 40 MG/1; MG/1
CAPSULE ORAL
COMMUNITY
Start: 2018-08-08 | End: 2019-04-25

## 2018-08-29 RX ORDER — OXYBUTYNIN CHLORIDE 5 MG/1
5 TABLET, EXTENDED RELEASE ORAL DAILY
Qty: 90 TABLET | Refills: 3 | Status: SHIPPED | OUTPATIENT
Start: 2018-08-29 | End: 2019-10-04 | Stop reason: SDUPTHER

## 2018-08-29 RX ORDER — OXYBUTYNIN CHLORIDE 5 MG/1
1 TABLET, EXTENDED RELEASE ORAL
COMMUNITY
End: 2018-08-29 | Stop reason: SDUPTHER

## 2018-08-29 RX ORDER — ASPIRIN 81 MG/1
1 TABLET ORAL
COMMUNITY
Start: 2018-07-31 | End: 2019-01-28

## 2018-08-29 NOTE — PROGRESS NOTES
"  Subjective:       Shu Mendoza is a 53 y.o. female who is an established patient who was referred by Dr Laughlin for evaluation of OAB, renal cysts.      She is a previous pt of Dr Shepherd and Lorraine. She presents with c/o urge/freq. [facility] records show long standing microhematuria s/p workup. Prior h/o recurrent UTIs, used to take Macrobid prophy. Last UTI in 12/16 per her report. S/p NILES-BSO. Multiple reports of flank pain in the past.     She reports her bladder is "overactive" for about 3-4 months. Frequency up to [id number]. She reports starting Jardiance around that time.     Abd US from  (7/15) - parapelvic cyst with calcification, stable since 2010 per their records.     Significant glucosuria today (on Jardiance). She reports glucose control as fair - last A1c 8.0 per her report. Also takes HCTZ.    PVR (bladder scan) initial visit - 31cc    She was given trial of Ditropan - she has been doing great with this. Now able to hold for q3hrs (rather than [id number]). Nocturia x 0 (was x 2).       The following portions of the patient's history were reviewed and updated as appropriate: allergies, current medications, past family history, past medical history, past social history, past surgical history and problem list.    Review of Systems  Constitutional: no fever or chills  ENT: no nasal congestion or sore throat  Respiratory: no cough or shortness of breath  Cardiovascular: no chest pain or palpitations  Gastrointestinal: no nausea or vomiting, tolerating diet  Genitourinary: as per HPI  Hematologic/Lymphatic: no easy bruising or lymphadenopathy  Musculoskeletal: no arthralgias or myalgias  Skin: no rashes or lesions  Neurological: no seizures or tremors  Behavioral/Psych: no auditory or visual hallucinations        Objective:    Vitals: /75 (BP Location: Left arm, Patient Position: Sitting, BP Method: Large (Automatic))   Pulse 74   Ht 5' 4" (1.626 m)   Wt 87.1 kg (192 lb 0.3 oz)   BMI 32.96 kg/m² "     Physical Exam   General: well developed, well nourished in no acute distress  Head: normocephalic, atraumatic  Neck: supple, trachea midline, no obvious enlargement of thyroid  HEENT: EOMI, mucus membranes moist, sclera anicteric, no hearing impairment  Lungs: symmetric expansion, non-labored breathing  Cardiovascular: regular rate and rhythm, normal pulses  Abdomen: soft, non tender, non distended, no palpable masses, no hepatosplenomegaly, no hernias, no CVA tenderness  Musculoskeletal: no peripheral edema, normal ROM in bilateral upper and lower extremities  Lymphatics: no cervical or inguinal lymphadenopathy  Skin: no rashes or lesions  Neuro: alert and oriented x 3, no gross deficits  Psych: normal judgment and insight, normal mood/affect and non-anxious  Genitourinary:   patient declined exam      Lab Review   Urine analysis today in clinic shows positive for 50 red blood cells, glucose 1000    Lab Results   Component Value Date    WBC 8.51 01/15/2017    HGB 13.5 01/15/2017    HCT 40.7 01/15/2017    MCV 85 01/15/2017     01/15/2017     Lab Results   Component Value Date    CREATININE 0.7 01/15/2017    BUN 9 01/15/2017       Imaging  Images and reports were personally reviewed by me and discussed with patient       Assessment/Plan:      1. OAB (overactive bladder)    - Behavioral changes, PFPT, anticholinergics, mirabegron. Botox/InterStim for refractory UUI.   - Likely worsened by Jardiance and HCTZ   - Ditropan 5mg     2. Microhematuria    - Long history   - Prior workup   - Will monitor, okay to hold on repeat w/u     3. Frequency of urination    - Ditropan - doing great         Follow up in 6 months with P[facility]

## 2018-12-26 RX ORDER — ESTRADIOL 0.03 MG/D
FILM, EXTENDED RELEASE TRANSDERMAL
Qty: 8 PATCH | Refills: 0 | Status: SHIPPED | OUTPATIENT
Start: 2018-12-26 | End: 2019-05-30

## 2018-12-28 ENCOUNTER — OFFICE VISIT (OUTPATIENT)
Dept: URGENT CARE | Facility: CLINIC | Age: 53
End: 2018-12-28
Payer: COMMERCIAL

## 2018-12-28 VITALS
OXYGEN SATURATION: 96 % | SYSTOLIC BLOOD PRESSURE: 172 MMHG | HEART RATE: 94 BPM | DIASTOLIC BLOOD PRESSURE: 96 MMHG | RESPIRATION RATE: 18 BRPM | TEMPERATURE: 98 F

## 2018-12-28 DIAGNOSIS — R05.9 COUGH: ICD-10-CM

## 2018-12-28 DIAGNOSIS — J02.9 SORE THROAT: ICD-10-CM

## 2018-12-28 DIAGNOSIS — J06.9 UPPER RESPIRATORY TRACT INFECTION, UNSPECIFIED TYPE: Primary | ICD-10-CM

## 2018-12-28 LAB
CTP QC/QA: YES
CTP QC/QA: YES
FLUAV AG NPH QL: NEGATIVE
FLUBV AG NPH QL: NEGATIVE
S PYO RRNA THROAT QL PROBE: NEGATIVE

## 2018-12-28 PROCEDURE — 87880 STREP A ASSAY W/OPTIC: CPT | Mod: QW,S$GLB,, | Performed by: NURSE PRACTITIONER

## 2018-12-28 PROCEDURE — 99214 PR OFFICE/OUTPT VISIT, EST, LEVL IV, 30-39 MIN: ICD-10-PCS | Mod: S$GLB,,, | Performed by: NURSE PRACTITIONER

## 2018-12-28 PROCEDURE — 99214 OFFICE O/P EST MOD 30 MIN: CPT | Mod: S$GLB,,, | Performed by: NURSE PRACTITIONER

## 2018-12-28 PROCEDURE — 87804 INFLUENZA ASSAY W/OPTIC: CPT | Mod: QW,S$GLB,, | Performed by: NURSE PRACTITIONER

## 2018-12-28 PROCEDURE — 87880 POCT RAPID STREP A: ICD-10-PCS | Mod: QW,S$GLB,, | Performed by: NURSE PRACTITIONER

## 2018-12-28 PROCEDURE — 87804 POCT INFLUENZA A/B: ICD-10-PCS | Mod: 59,QW,S$GLB, | Performed by: NURSE PRACTITIONER

## 2018-12-29 NOTE — PATIENT INSTRUCTIONS
Viral Upper Respiratory Illness (Adult)  You have a viral upper respiratory illness (URI), which is another term for the common cold. This illness is contagious during the first few days. It is spread through the air by coughing and sneezing. It may also be spread by direct contact (touching the sick person and then touching your own eyes, nose, or mouth). Frequent handwashing will decrease risk of spread. Most viral illnesses go away within 7 to 10 days with rest and simple home remedies. Sometimes the illness may last for several weeks. Antibiotics will not kill a virus, and they are generally not prescribed for this condition.    Home care  · If symptoms are severe, rest at home for the first 2 to 3 days. When you resume activity, don't let yourself get too tired.  · Avoid being exposed to cigarette smoke (yours or others).  · You may use acetaminophen or ibuprofen to control pain and fever, unless another medicine was prescribed. (Note: If you have chronic liver or kidney disease, have ever had a stomach ulcer or gastrointestinal bleeding, or are taking blood-thinning medicines, talk with your healthcare provider before using these medicines.) Aspirin should never be given to anyone under 18 years of age who is ill with a viral infection or fever. It may cause severe liver or brain damage.  · Your appetite may be poor, so a light diet is fine. Avoid dehydration by drinking 6 to 8 glasses of fluids per day (water, soft drinks, juices, tea, or soup). Extra fluids will help loosen secretions in the nose and lungs.  · Over-the-counter cold medicines will not shorten the length of time youre sick, but they may be helpful for the following symptoms: cough, sore throat, and nasal and sinus congestion. (Note: Do not use decongestants if you have high blood pressure.)  Follow-up care  Follow up with your healthcare provider, or as advised.  When to seek medical advice  Call your healthcare provider right away if any  "of these occur:  · Cough with lots of colored sputum (mucus)  · Severe headache; face, neck, or ear pain  · Difficulty swallowing due to throat pain  · Fever of 100.4°F (38°C)  Call 911, or get immediate medical care  Call emergency services right away if any of these occur:  · Chest pain, shortness of breath, wheezing, or difficulty breathing  · Coughing up blood  · Inability to swallow due to throat pain  Date Last Reviewed: 9/13/2015 © 2000-2017 [employer]. [address] [place] [address]. All rights reserved. This information is not intended as a substitute for professional medical care. Always follow your healthcare professional's instructions.    Symptomatic treatment:    Tylenol every 4 hours  Ibuprofen ever 6 hours  salt water gargles  Cold-eeze helps to reduce the duration of sore throat symptoms  Cepachol helps to numb the discomfort  Chloroseptic spray  Nasal saline spray reduces inflammation and dryness  Warm face compresses as often as you can  Vicks vapor rub at night  Flonase OTC or Nasacort OTC  Simple foods like chicken noodle soup help  Delsym helps with coughing at night  Zyrtec/Claritin during the day and Benadryl at night may help if allergy component   Rest as much as you can                                                          If we discussed that I think your illness is viral it will not respond to antibiotics and it will last 10-14 days.  Flonase (fluticasone) is a nasal spray which is available over the counter and may help with your symptoms   Zyrtec D, Claritin D or allegra D can also help with symptoms of congestion and drainage.   If you have hypertension avoid using the "D" which is the decongestant   If you just have drainage you can take plain zyrtec, claritin or allegra   If you just have a congested feeling you can take pseudoephedrine (unless you have high blood pressure) which you have to sign for behind the counter. Do not buy the phenylephrine " which is on the shelf as it is not effective   Tylenol or ibuprofen can also be used as directed for pain unless you have an allergy to them or medical condition such as stomach ulcers, kidney or liver disease or blood thinners etc for which you should not be taking these type of medications.   If you are flying in the next few days Afrin nose drops for the airplane flight upon take off and landing may help. Other than at those times refrain from using afrin.       Please arrange follow up with your primary medical clinic as soon as possible. You must understand that you've received an Urgent Care treatment only and that you may be released before all of your medical problems are known or treated. You, the patient, will arrange for follow up as instructed. If your symptoms worsen or fail to improve you should go to the Emergency Room.

## 2018-12-29 NOTE — PROGRESS NOTES
Subjective:       Patient ID: Shu Mendoza is a 53 y.o. female.    Vitals:  oral temperature is 97.6 °F (36.4 °C). Her blood pressure is 172/96 (abnormal) and her pulse is 94. Her respiration is 18 and oxygen saturation is 96%.     Chief Complaint: Cough    Patient presents with c/o cough x 1 day. Cough started yesterday and is non productive. Patient notes sore throat, runny nose, and congestion. Patient with chills, fever, and body aches. Patient did get her flu shot this season.       Cough   This is a new problem. The problem has been unchanged. The problem occurs every few minutes. The cough is non-productive. Associated symptoms include chills, ear pain, a fever, myalgias, a sore throat and wheezing. Pertinent negatives include no eye redness, hemoptysis, rash or shortness of breath. Treatments tried: tylenol. The treatment provided no relief. There is no history of asthma.       Constitution: Positive for chills, sweating, fatigue and fever.   HENT: Positive for ear pain, congestion, sinus pain, sinus pressure, sore throat and voice change.    Neck: Positive for painful lymph nodes.   Eyes: Negative for eye redness.   Respiratory: Positive for cough and wheezing. Negative for chest tightness, sputum production, bloody sputum, COPD, shortness of breath, stridor and asthma.    Gastrointestinal: Positive for nausea. Negative for vomiting.   Musculoskeletal: Positive for muscle ache.   Skin: Negative for rash.   Allergic/Immunologic: Negative for seasonal allergies and asthma.   Hematologic/Lymphatic: Positive for swollen lymph nodes.       Objective:      Physical Exam   Constitutional: She is oriented to person, place, and time. She appears well-developed and well-nourished. She is cooperative.  Non-toxic appearance. She does not appear ill. No distress.   HENT:   Head: Normocephalic and atraumatic.   Right Ear: Hearing, tympanic membrane, external ear and ear canal normal.   Left Ear: Hearing, tympanic  membrane, external ear and ear canal normal.   Nose: Nose normal. No mucosal edema, rhinorrhea or nasal deformity. No epistaxis. Right sinus exhibits no maxillary sinus tenderness and no frontal sinus tenderness. Left sinus exhibits no maxillary sinus tenderness and no frontal sinus tenderness.   Mouth/Throat: Uvula is midline and mucous membranes are normal. No trismus in the jaw. Normal dentition. No uvula swelling. Posterior oropharyngeal erythema present.   Eyes: Conjunctivae and lids are normal. No scleral icterus.   Sclera clear bilat   Neck: Trachea normal, full passive range of motion without pain and phonation normal. Neck supple.   Cardiovascular: Normal rate, regular rhythm, normal heart sounds, intact distal pulses and normal pulses.   Pulmonary/Chest: Effort normal and breath sounds normal. No respiratory distress.   Abdominal: Soft. Normal appearance and bowel sounds are normal. She exhibits no distension. There is no tenderness.   Musculoskeletal: Normal range of motion. She exhibits no edema or deformity.   Neurological: She is alert and oriented to person, place, and time. She exhibits normal muscle tone. Coordination normal.   Skin: Skin is warm, dry and intact. She is not diaphoretic. No pallor.   Psychiatric: She has a normal mood and affect. Her speech is normal and behavior is normal. Judgment and thought content normal. Cognition and memory are normal.   Nursing note and vitals reviewed.      Assessment:       1. Upper respiratory tract infection, unspecified type    2. Cough    3. Sore throat        Plan:         Upper respiratory tract infection, unspecified type    Cough  -     POCT Influenza A/B    Sore throat  -     POCT rapid strep A  -     CULTURE, STREP A,  THROAT

## 2019-01-01 LAB — S PYO THROAT QL CULT: NEGATIVE

## 2019-01-02 ENCOUNTER — TELEPHONE (OUTPATIENT)
Dept: URGENT CARE | Facility: CLINIC | Age: 54
End: 2019-01-02

## 2019-01-03 NOTE — TELEPHONE ENCOUNTER
----- Message from Susan Mcintosh NP sent at 1/2/2019  3:28 PM CST -----  Please call the patient regarding her normal result. Strep culture negative/

## 2019-01-04 ENCOUNTER — TELEPHONE (OUTPATIENT)
Dept: URGENT CARE | Facility: CLINIC | Age: 54
End: 2019-01-04

## 2019-01-04 NOTE — TELEPHONE ENCOUNTER
Spoke with patient who is feeling better but still has cough, advise to follow up with PCP----- Message from Susan Mcintosh NP sent at 1/2/2019  3:28 PM CST -----  Please call the patient regarding her normal result. Strep culture negative/

## 2019-03-16 ENCOUNTER — HOSPITAL ENCOUNTER (EMERGENCY)
Facility: OTHER | Age: 54
Discharge: HOME | End: 2019-03-16
Attending: EMERGENCY MEDICINE
Payer: COMMERCIAL

## 2019-03-16 VITALS
SYSTOLIC BLOOD PRESSURE: 150 MMHG | HEIGHT: 64 IN | RESPIRATION RATE: 17 BRPM | DIASTOLIC BLOOD PRESSURE: 91 MMHG | WEIGHT: 190 LBS | BODY MASS INDEX: 32.44 KG/M2 | OXYGEN SATURATION: 99 % | TEMPERATURE: 98 F | HEART RATE: 89 BPM

## 2019-03-16 DIAGNOSIS — H92.02 LEFT EAR PAIN: ICD-10-CM

## 2019-03-16 DIAGNOSIS — B34.9 ACUTE VIRAL SYNDROME: Primary | ICD-10-CM

## 2019-03-16 DIAGNOSIS — R51.9 NONINTRACTABLE HEADACHE, UNSPECIFIED CHRONICITY PATTERN, UNSPECIFIED HEADACHE TYPE: ICD-10-CM

## 2019-03-16 DIAGNOSIS — J02.9 PHARYNGITIS, UNSPECIFIED ETIOLOGY: ICD-10-CM

## 2019-03-16 LAB — DEPRECATED S PYO AG THROAT QL EIA: NEGATIVE

## 2019-03-16 PROCEDURE — 96372 THER/PROPH/DIAG INJ SC/IM: CPT

## 2019-03-16 PROCEDURE — 25000003 PHARM REV CODE 250: Performed by: NURSE PRACTITIONER

## 2019-03-16 PROCEDURE — 87880 STREP A ASSAY W/OPTIC: CPT

## 2019-03-16 PROCEDURE — 99284 EMERGENCY DEPT VISIT MOD MDM: CPT | Mod: 25

## 2019-03-16 PROCEDURE — 87081 CULTURE SCREEN ONLY: CPT

## 2019-03-16 PROCEDURE — 63600175 PHARM REV CODE 636 W HCPCS: Performed by: NURSE PRACTITIONER

## 2019-03-16 RX ORDER — METOCLOPRAMIDE HYDROCHLORIDE 5 MG/ML
10 INJECTION INTRAMUSCULAR; INTRAVENOUS
Status: COMPLETED | OUTPATIENT
Start: 2019-03-16 | End: 2019-03-16

## 2019-03-16 RX ORDER — METOCLOPRAMIDE 10 MG/1
10 TABLET ORAL EVERY 6 HOURS PRN
Qty: 20 TABLET | Refills: 0 | Status: SHIPPED | OUTPATIENT
Start: 2019-03-16 | End: 2019-04-25

## 2019-03-16 RX ORDER — ETODOLAC 200 MG/1
200 CAPSULE ORAL 2 TIMES DAILY PRN
Qty: 30 CAPSULE | Refills: 0 | Status: SHIPPED | OUTPATIENT
Start: 2019-03-16 | End: 2019-04-25

## 2019-03-16 RX ORDER — KETOROLAC TROMETHAMINE 30 MG/ML
15 INJECTION, SOLUTION INTRAMUSCULAR; INTRAVENOUS
Status: COMPLETED | OUTPATIENT
Start: 2019-03-16 | End: 2019-03-16

## 2019-03-16 RX ORDER — CETIRIZINE HYDROCHLORIDE 10 MG/1
10 TABLET ORAL DAILY
Qty: 30 TABLET | Refills: 0 | Status: SHIPPED | OUTPATIENT
Start: 2019-03-16 | End: 2020-02-06

## 2019-03-16 RX ORDER — DIPHENHYDRAMINE HCL 25 MG
25 CAPSULE ORAL
Status: COMPLETED | OUTPATIENT
Start: 2019-03-16 | End: 2019-03-16

## 2019-03-16 RX ADMIN — DIPHENHYDRAMINE HYDROCHLORIDE 25 MG: 25 CAPSULE ORAL at 02:03

## 2019-03-16 RX ADMIN — KETOROLAC TROMETHAMINE 15 MG: 30 INJECTION, SOLUTION INTRAMUSCULAR; INTRAVENOUS at 03:03

## 2019-03-16 RX ADMIN — METOCLOPRAMIDE 10 MG: 5 INJECTION, SOLUTION INTRAMUSCULAR; INTRAVENOUS at 03:03

## 2019-03-16 NOTE — ED PROVIDER NOTES
"Encounter Date: 3/16/2019       History     Chief Complaint   Patient presents with    Otalgia     L side, since Thursday    Sore Throat     since Thursday, denies any fever    Headache     and neck pain since Wednesday     The patient is a 53 year-old female with a past medical history of diabetes, hypertension, and glaucoma who presents to the ED complaining of left ear pain, sore throat, and headache since Monday.  The patient is afebrile, non-toxic appearing, and hemodynamically stable in triage.  Elevated blood pressure noted, however patient states she did not yet take her blood pressure medication today.  Patient describes left ear pain as "pressure" with decreased hearing.  She believes that it is "clogged" and "is afraid something might have crawled up there".  Denies fever and ear drainage.  Endorses "tender glands" in her neck and pain when swallowing, but no throat swelling.  Headache is frontal "in my temples" and is characteristic of one of her typical headaches.  Headache has been constant since Wednesday.  Transient nausea with no vomiting.  No visual changes.  Patient reports that her most recent H A1c was 7.3.  She has tried Allegra, Flonase, and Naproxen without relief of symptoms.      The history is provided by the patient.     Review of patient's allergies indicates:   Allergen Reactions    Morphine Itching    Percocet [oxycodone-acetaminophen]      Past Medical History:   Diagnosis Date    Diabetes mellitus     Glaucoma 2012    Hypertension     Renal cyst     Retinal tear of right eye      Past Surgical History:   Procedure Laterality Date    EYE SURGERY      HYSTERECTOMY      NILES, BSO secondary to ovarian cyst    OOPHORECTOMY       Family History   Problem Relation Age of Onset    Ovarian cancer Other     Breast cancer Neg Hx     Colon cancer Neg Hx      Social History     Tobacco Use    Smoking status: Never Smoker    Smokeless tobacco: Never Used   Substance Use Topics    " Alcohol use: Yes     Alcohol/week: 0.0 oz     Comment: occasional    Drug use: No     Review of Systems   Constitutional: Negative for chills and fever.   HENT: Positive for ear pain and sore throat.    Respiratory: Negative for shortness of breath.    Cardiovascular: Negative for chest pain.   Gastrointestinal: Positive for nausea (resolved). Negative for abdominal pain and vomiting.   Genitourinary: Negative for dysuria.   Musculoskeletal: Negative for back pain and gait problem.   Skin: Negative for rash.   Neurological: Positive for headaches. Negative for dizziness and weakness.   Hematological: Does not bruise/bleed easily.       Physical Exam     Initial Vitals [03/16/19 1258]   BP Pulse Resp Temp SpO2   (!) 191/90 100 16 97.5 °F (36.4 °C) 97 %      MAP       --         Physical Exam    Nursing note and vitals reviewed.  Constitutional: She appears well-developed and well-nourished. She is Obese .  Non-toxic appearance. No distress.   Patient is alert, oriented, and speaking in complete sentences.  No apparent distress.   HENT:   Head: Normocephalic and atraumatic.   Right Ear: Hearing, tympanic membrane, external ear and ear canal normal.   Left Ear: Hearing, external ear and ear canal normal.   Nose: Nose abnormal.   No facial asymmetry. No tenderness to left ear on exam.  No drainage, erythema, or swelling noted to left ear.  Left ear canal appears curved making visualization of TM difficult.  No oropharyngeal edema or erythema.   Eyes: Conjunctivae and EOM are normal. Right eye exhibits normal extraocular motion and no nystagmus. Left eye exhibits normal extraocular motion and no nystagmus. Right pupil is round and reactive. Left pupil is round and reactive. Pupils are equal.   Neck: Full passive range of motion without pain. Neck supple. No neck rigidity.   No meningismus   Cardiovascular: Normal rate, normal heart sounds and normal pulses. Exam reveals no gallop and no friction rub.    No murmur  heard.  Pulses:       Radial pulses are 2+ on the right side, and 2+ on the left side.        Dorsalis pedis pulses are 2+ on the right side, and 2+ on the left side.   Pulmonary/Chest: Effort normal. No respiratory distress. She has no decreased breath sounds. She has no wheezes. She has no rhonchi. She has no rales.   Abdominal: Soft. Bowel sounds are normal. She exhibits no distension. There is no tenderness. There is no rigidity, no rebound and no guarding.   Musculoskeletal: Normal range of motion.   Lymphadenopathy:     She has cervical adenopathy (Bilateral).   Neurological: She is alert and oriented to person, place, and time. She has normal strength. No cranial nerve deficit or sensory deficit. She exhibits normal muscle tone. She displays no seizure activity. Gait normal. GCS eye subscore is 4. GCS verbal subscore is 5. GCS motor subscore is 6.   No sensory deficits.  Muscular strength is normal and equal bilaterally. No pronator drift.  Gait is steady and even.   Skin: Skin is warm, dry and intact. Capillary refill takes less than 2 seconds. No rash noted.   Psychiatric: She has a normal mood and affect. Her speech is normal and behavior is normal. Judgment and thought content normal. Cognition and memory are normal.         ED Course   Procedures  Labs Reviewed   THROAT SCREEN, RAPID   CULTURE, STREP A,  THROAT          Imaging Results    None          Medical Decision Making:   History:   Old Medical Records: I decided to obtain old medical records.  ED Management:  Symptoms improved following Reglan, Toradol, and Benadryl administration.  The patient appears to have the flu or another viral syndrome.  Based upon the history and physical exam the patient does not appear to have a serious bacterial infection such as pneumonia, acute abdomen, severe dehdyration, sepsis, celllulitis, pyelonephritis, otitis media, bacterial sinusitis, strep pharyngitis, parapharyngeal or peritonsillar abscess, or  meningitis.  I do not think the patient needs antibiotics as their illness likely has a viral etiology.  Patient appears very well and I have given specific return precautions to the patient and/or family members.  I have prescribed Zyrtec, Lodine, and Reglan for symptomatic relief.  Patient instructed to follow up with her primary care provider in the coming week.  All questions answered.  Return precautions given.  Case discussed with supervising physician who agrees with plan.                      Clinical Impression:       ICD-10-CM ICD-9-CM   1. Acute viral syndrome B34.9 079.99   2. Left ear pain H92.02 388.70   3. Pharyngitis, unspecified etiology J02.9 462   4. Nonintractable headache, unspecified chronicity pattern, unspecified headache type R51 784.0                                Tena Cameron NP  03/16/19 5251

## 2019-03-16 NOTE — ED NOTES
Pt to er with c/o left ear thoat pain x three days the patient aaox3 skin warm and dry pt has taken allegra , Flonase and throat lozenges with limited relief.  Pt resperation unlabored pt with siligt nasal sound to voice.

## 2019-03-16 NOTE — DISCHARGE INSTRUCTIONS
Thank you for allowing me to care for you today.  I hope our treatment plan will make you feel better in the next few days.  In order for me to take better care of my future patients and improve our Emergency Department, I would appreciate if you can provide us with feedback.  In the next few days, you may receive a survey in the mail.  If you do, it would mean a great deal to me if you would please take the time to complete it.    Thank you and I hope you feel better.  Tena Cameron NP

## 2019-03-18 LAB — BACTERIA THROAT CULT: NORMAL

## 2019-04-25 ENCOUNTER — OFFICE VISIT (OUTPATIENT)
Dept: OBSTETRICS AND GYNECOLOGY | Facility: CLINIC | Age: 54
End: 2019-04-25
Payer: COMMERCIAL

## 2019-04-25 VITALS
WEIGHT: 196.44 LBS | BODY MASS INDEX: 33.54 KG/M2 | DIASTOLIC BLOOD PRESSURE: 82 MMHG | HEIGHT: 64 IN | SYSTOLIC BLOOD PRESSURE: 120 MMHG

## 2019-04-25 DIAGNOSIS — Z01.419 WELL WOMAN EXAM WITH ROUTINE GYNECOLOGICAL EXAM: ICD-10-CM

## 2019-04-25 DIAGNOSIS — N60.19 FIBROCYSTIC BREAST CHANGES, UNSPECIFIED LATERALITY: Primary | ICD-10-CM

## 2019-04-25 DIAGNOSIS — N63.20 LEFT BREAST MASS: ICD-10-CM

## 2019-04-25 DIAGNOSIS — E89.41 HOT FLASHES DUE TO SURGICAL MENOPAUSE: ICD-10-CM

## 2019-04-25 PROCEDURE — 99999 PR PBB SHADOW E&M-EST. PATIENT-LVL III: ICD-10-PCS | Mod: PBBFAC,,, | Performed by: OBSTETRICS & GYNECOLOGY

## 2019-04-25 PROCEDURE — 99396 PR PREVENTIVE VISIT,EST,40-64: ICD-10-PCS | Mod: 25,S$GLB,, | Performed by: OBSTETRICS & GYNECOLOGY

## 2019-04-25 PROCEDURE — 99999 PR PBB SHADOW E&M-EST. PATIENT-LVL III: CPT | Mod: PBBFAC,,, | Performed by: OBSTETRICS & GYNECOLOGY

## 2019-04-25 PROCEDURE — 99396 PREV VISIT EST AGE 40-64: CPT | Mod: 25,S$GLB,, | Performed by: OBSTETRICS & GYNECOLOGY

## 2019-04-25 RX ORDER — AMLODIPINE AND BENAZEPRIL HYDROCHLORIDE 5; 40 MG/1; MG/1
CAPSULE ORAL
COMMUNITY
Start: 2019-04-16 | End: 2022-08-02

## 2019-04-25 RX ORDER — METFORMIN HYDROCHLORIDE 500 MG/1
2 TABLET, EXTENDED RELEASE ORAL
COMMUNITY
Start: 2019-03-29 | End: 2019-06-11 | Stop reason: SDUPTHER

## 2019-04-25 RX ORDER — ATORVASTATIN CALCIUM 10 MG/1
TABLET, FILM COATED ORAL
COMMUNITY
Start: 2019-03-11 | End: 2022-08-02 | Stop reason: SDUPTHER

## 2019-04-25 RX ORDER — LATANOPROST 50 UG/ML
SOLUTION/ DROPS OPHTHALMIC
Refills: 11 | COMMUNITY
Start: 2019-04-16

## 2019-04-25 RX ORDER — HYDROCHLOROTHIAZIDE 12.5 MG/1
CAPSULE ORAL
Refills: 2 | COMMUNITY
Start: 2019-02-23 | End: 2022-08-02 | Stop reason: SDUPTHER

## 2019-04-25 NOTE — PROGRESS NOTES
SUBJECTIVE:     Chief Complaint: Well Woman       History of Present Illness:  Annual Exam  Patient presents for annual exam.   She c/o hot flashes today. She was on -.025mg vivelle dot for years but then stopped it and recently restarted one week ago but has not seen effect yet. She would like to increase dose to 0.050mg.  Also c/o left breast lump.  LMP:  s/p NILES   She denies any vd, vb, dyspareunia, dysuria, depression, anxiety.  Last pap was neg per patient.   Birth Control:     GYN screening history: denies  Mammogram history: neg   Colonoscopy history: abnl, f/u with Dr. Galan   Dexa history: na    FH:   Breast cancer: none  Colon cancer: none  Ovarian cancer: maternal great ga    Review of patient's allergies indicates:   Allergen Reactions    Morphine Itching    Percocet [oxycodone-acetaminophen]        Past Medical History:   Diagnosis Date    Diabetes mellitus     Glaucoma     Hypertension     Renal cyst     Retinal tear of right eye      Past Surgical History:   Procedure Laterality Date    EYE SURGERY      HERNIA REPAIR      HYSTERECTOMY      NILES, BSO secondary to ovarian cyst    OOPHORECTOMY       OB History        3    Para   3    Term   3            AB        Living   3       SAB        TAB        Ectopic        Multiple        Live Births   3               Family History   Problem Relation Age of Onset    Ovarian cancer Other     Breast cancer Neg Hx     Colon cancer Neg Hx      Social History     Tobacco Use    Smoking status: Never Smoker    Smokeless tobacco: Never Used   Substance Use Topics    Alcohol use: Yes     Alcohol/week: 0.0 oz     Comment: occasional    Drug use: No       Current Outpatient Medications   Medication Sig    amlodipine-benazepril (LOTREL) 5-40 mg per capsule TAKE 1 CAPSULE BY ORAL ROUTE EVERY DAY    atorvastatin (LIPITOR) 10 MG tablet TAKE 1 TABLET BY ORAL ROUTE EVERY DAY    cetirizine (ZYRTEC) 10 MG tablet Take  1 tablet (10 mg total) by mouth once daily.    dorzolamide (TRUSOPT) 2 % ophthalmic solution 1 drop 3 (three) times daily.    empagliflozin (JARDIANCE) 10 mg Tab TAKE 1 TABLET BY ORAL ROUTE EVERY DAY IN THE MORNING    ERGOCALCIFEROL, VITAMIN D2, (VITAMIN D ORAL) Take by mouth.    estradiol (VIVELLE-DOT) 0.025 mg/24 hr APPLY 1 PATCH ON SKIN TWICE A WEEK    hydroCHLOROthiazide (MICROZIDE) 12.5 mg capsule TK 1 C PO D    latanoprost 0.005 % ophthalmic solution INT 1 GTT IN OU QD HS    metFORMIN (GLUCOPHAGE-XR) 500 MG 24 hr tablet Take 2 tablets by mouth.    multivitamin (ONE DAILY MULTIVITAMIN) per tablet Take 1 tablet by mouth once daily.    oxybutynin (DITROPAN-XL) 5 MG TR24 Take 1 tablet (5 mg total) by mouth once daily.     No current facility-administered medications for this visit.        Review of Systems:  GENERAL: No fever, chills, fatigability or weight loss. + Hot flashes, worse at night.   CARDIOVASCULAR: No chest pain. No palpitations.  RESPIRATORY: No SOB, no wheezing.  BREAST: Denies pain. + lumps. No discharge.  VULVAR: No pain, no lesions and no itching.  VAGINAL: No relaxation, no itching, no discharge, no abnormal bleeding and no lesions.  ABDOMEN: No abdominal pain. Denies nausea. Denies vomiting. No diarrhea. No constipation  URINARY: No incontinence, no nocturia, no frequency and no dysuria.  NEUROLOGICAL: No headaches. No vision changes.       OBJECTIVE:     Vitals:    04/25/19 1444   BP: 120/82       Physical Exam:  Gen: NAD, well developed, well-nourished  HEENT: Normocephalic, atraumatic  Eyes: EOM nl, conjuntivae normal  Neck: ROM normal, no thyromegaly  Respiratory: Effort normal   Abd: soft, nontender, no masses palpated  Breast: fibrocystic tissue palpated BL no masses, lesions or tenderness. No nipple discharge on expression, no lymphadenopathy bilaterally.  SSE:  Vulva: no lesions or rashes  Cervix: No lesions noted, nonfriable, no vaginal discharge or vaginal bleeding  noted  BME:   Cervix: No CMT  Adnexa: nl bilaterally, no masses or fullness palpated  Uterus: normal, nonenlarged  Musculoskeletal: normal ROM  Neuro: alert, AAOx3  Skin: warm and dry  Psych: mood/affect nl, behavior normal, judgement normal, thought content normal        ASSESSMENT:       ICD-10-CM ICD-9-CM    1. Fibrocystic breast changes, unspecified laterality N60.19 610.1 Mammo Digital Diagnostic Bilateral With CAD   2. Left breast mass N63.20 611.72 Mammo Digital Diagnostic Bilateral With CAD   3. Well woman exam with routine gynecological exam Z01.419 V72.31    4. Hot flashes due to surgical menopause E89.41 627.4           Plan:      Shu was seen today for well woman.    Diagnoses and all orders for this visit:    Fibrocystic breast changes, unspecified laterality  -     Mammo Digital Diagnostic Bilateral With CAD; Future    Left breast mass  -     Mammo Digital Diagnostic Bilateral With CAD; Future    Well woman exam with routine gynecological exam    Hot flashes due to surgical menopause        Orders Placed This Encounter   Procedures    Mammo Digital Diagnostic Bilateral With CAD     Mammo ordered to assess left breast and do screening on right as almost due for annual. Discussed that she should give this dose of vivelle at least 2 more weeks to work. She will message after 2 weeks and let us know how she's doing. Risks of hormone replacement discussed. All questions answered.   Follow up in one year for annual, or prn.    Julie R Jeansonne

## 2019-05-20 ENCOUNTER — HOSPITAL ENCOUNTER (OUTPATIENT)
Dept: RADIOLOGY | Facility: OTHER | Age: 54
Discharge: HOME | End: 2019-05-20
Attending: OBSTETRICS & GYNECOLOGY
Payer: COMMERCIAL

## 2019-05-20 DIAGNOSIS — Z12.39 BREAST CANCER SCREENING: Primary | ICD-10-CM

## 2019-05-20 DIAGNOSIS — N60.19 FIBROCYSTIC BREAST CHANGES, UNSPECIFIED LATERALITY: ICD-10-CM

## 2019-05-20 DIAGNOSIS — N63.20 LEFT BREAST MASS: ICD-10-CM

## 2019-05-20 PROCEDURE — 77061 BREAST TOMOSYNTHESIS UNI: CPT | Mod: 26,LT,, | Performed by: RADIOLOGY

## 2019-05-20 PROCEDURE — 77065 DX MAMMO INCL CAD UNI: CPT | Mod: 26,LT,, | Performed by: RADIOLOGY

## 2019-05-20 PROCEDURE — 77065 MAMMO DIGITAL DIAGNOSTIC LEFT WITH TOMOSYNTHESIS_CAD: ICD-10-PCS | Mod: 26,LT,, | Performed by: RADIOLOGY

## 2019-05-20 PROCEDURE — 77061 MAMMO DIGITAL DIAGNOSTIC LEFT WITH TOMOSYNTHESIS_CAD: ICD-10-PCS | Mod: 26,LT,, | Performed by: RADIOLOGY

## 2019-05-20 PROCEDURE — 77065 DX MAMMO INCL CAD UNI: CPT | Mod: TC,LT

## 2019-05-30 ENCOUNTER — PATIENT MESSAGE (OUTPATIENT)
Dept: OBSTETRICS AND GYNECOLOGY | Facility: CLINIC | Age: 54
End: 2019-05-30

## 2019-05-30 RX ORDER — ESTRADIOL 0.05 MG/D
1 FILM, EXTENDED RELEASE TRANSDERMAL
Qty: 8 PATCH | Refills: 11 | Status: SHIPPED | OUTPATIENT
Start: 2019-05-30 | End: 2019-06-19 | Stop reason: SDUPTHER

## 2019-06-11 ENCOUNTER — HOSPITAL ENCOUNTER (EMERGENCY)
Facility: OTHER | Age: 54
Discharge: HOME | End: 2019-06-11
Attending: EMERGENCY MEDICINE
Payer: COMMERCIAL

## 2019-06-11 VITALS
RESPIRATION RATE: 20 BRPM | SYSTOLIC BLOOD PRESSURE: 166 MMHG | WEIGHT: 201.06 LBS | HEIGHT: 64 IN | BODY MASS INDEX: 34.33 KG/M2 | HEART RATE: 106 BPM | OXYGEN SATURATION: 99 % | DIASTOLIC BLOOD PRESSURE: 79 MMHG | TEMPERATURE: 98 F

## 2019-06-11 DIAGNOSIS — R73.9 HYPERGLYCEMIA: ICD-10-CM

## 2019-06-11 DIAGNOSIS — N64.52 NIPPLE DISCHARGE, BLOODY: Primary | ICD-10-CM

## 2019-06-11 LAB
ALBUMIN SERPL BCP-MCNC: 3.9 G/DL (ref 3.5–5.2)
ALP SERPL-CCNC: 86 U/L (ref 55–135)
ALT SERPL W/O P-5'-P-CCNC: 28 U/L (ref 10–44)
ANION GAP SERPL CALC-SCNC: 8 MMOL/L (ref 8–16)
APTT BLDCRRT: 30.1 SEC (ref 21–32)
AST SERPL-CCNC: 21 U/L (ref 10–40)
BASOPHILS # BLD AUTO: 0.02 K/UL (ref 0–0.2)
BASOPHILS NFR BLD: 0.3 % (ref 0–1.9)
BILIRUB SERPL-MCNC: 0.2 MG/DL (ref 0.1–1)
BUN SERPL-MCNC: 16 MG/DL (ref 6–20)
CALCIUM SERPL-MCNC: 10 MG/DL (ref 8.7–10.5)
CHLORIDE SERPL-SCNC: 107 MMOL/L (ref 95–110)
CO2 SERPL-SCNC: 28 MMOL/L (ref 23–29)
CREAT SERPL-MCNC: 0.8 MG/DL (ref 0.5–1.4)
DIFFERENTIAL METHOD: ABNORMAL
EOSINOPHIL # BLD AUTO: 0.1 K/UL (ref 0–0.5)
EOSINOPHIL NFR BLD: 1.7 % (ref 0–8)
ERYTHROCYTE [DISTWIDTH] IN BLOOD BY AUTOMATED COUNT: 15 % (ref 11.5–14.5)
EST. GFR  (AFRICAN AMERICAN): >60 ML/MIN/1.73 M^2
EST. GFR  (NON AFRICAN AMERICAN): >60 ML/MIN/1.73 M^2
GLUCOSE SERPL-MCNC: 285 MG/DL (ref 70–110)
HCT VFR BLD AUTO: 40.1 % (ref 37–48.5)
HGB BLD-MCNC: 13 G/DL (ref 12–16)
INR PPP: 0.9 (ref 0.8–1.2)
LYMPHOCYTES # BLD AUTO: 3.4 K/UL (ref 1–4.8)
LYMPHOCYTES NFR BLD: 43.5 % (ref 18–48)
MAGNESIUM SERPL-MCNC: 2.2 MG/DL (ref 1.6–2.6)
MCH RBC QN AUTO: 28.2 PG (ref 27–31)
MCHC RBC AUTO-ENTMCNC: 32.4 G/DL (ref 32–36)
MCV RBC AUTO: 87 FL (ref 82–98)
MONOCYTES # BLD AUTO: 0.4 K/UL (ref 0.3–1)
MONOCYTES NFR BLD: 4.6 % (ref 4–15)
NEUTROPHILS # BLD AUTO: 3.9 K/UL (ref 1.8–7.7)
NEUTROPHILS NFR BLD: 49.5 % (ref 38–73)
PHOSPHATE SERPL-MCNC: 2.8 MG/DL (ref 2.7–4.5)
PLATELET # BLD AUTO: 311 K/UL (ref 150–350)
PMV BLD AUTO: 9.1 FL (ref 9.2–12.9)
POCT GLUCOSE: 189 MG/DL (ref 70–110)
POTASSIUM SERPL-SCNC: 3.6 MMOL/L (ref 3.5–5.1)
PROT SERPL-MCNC: 7.6 G/DL (ref 6–8.4)
PROTHROMBIN TIME: 10 SEC (ref 9–12.5)
RBC # BLD AUTO: 4.61 M/UL (ref 4–5.4)
SODIUM SERPL-SCNC: 143 MMOL/L (ref 136–145)
WBC # BLD AUTO: 7.82 K/UL (ref 3.9–12.7)

## 2019-06-11 PROCEDURE — 99284 EMERGENCY DEPT VISIT MOD MDM: CPT | Mod: 25

## 2019-06-11 PROCEDURE — 96360 HYDRATION IV INFUSION INIT: CPT

## 2019-06-11 PROCEDURE — 85025 COMPLETE CBC W/AUTO DIFF WBC: CPT

## 2019-06-11 PROCEDURE — 83735 ASSAY OF MAGNESIUM: CPT

## 2019-06-11 PROCEDURE — 85730 THROMBOPLASTIN TIME PARTIAL: CPT

## 2019-06-11 PROCEDURE — 80053 COMPREHEN METABOLIC PANEL: CPT

## 2019-06-11 PROCEDURE — 25000003 PHARM REV CODE 250: Performed by: EMERGENCY MEDICINE

## 2019-06-11 PROCEDURE — 84100 ASSAY OF PHOSPHORUS: CPT

## 2019-06-11 PROCEDURE — 82962 GLUCOSE BLOOD TEST: CPT

## 2019-06-11 PROCEDURE — 85610 PROTHROMBIN TIME: CPT

## 2019-06-11 RX ORDER — METFORMIN HYDROCHLORIDE 500 MG/1
1000 TABLET, EXTENDED RELEASE ORAL 2 TIMES DAILY WITH MEALS
Qty: 120 TABLET | Refills: 6 | Status: SHIPPED | OUTPATIENT
Start: 2019-06-11 | End: 2022-08-02

## 2019-06-11 RX ADMIN — SODIUM CHLORIDE 1000 ML: 0.9 INJECTION, SOLUTION INTRAVENOUS at 10:06

## 2019-06-12 NOTE — ED TRIAGE NOTES
Pt presents with c/o nipple discharge onset today. Pt states she was watching TV and experienced some sharp pain then noted bleeding from nipple. Pt describes discharge as dark red. while in shower pt states she was able to express some blood. Pt reports cough, states took zyrtec for it. Pt reports fatigue, states she took 5 hour nap today. Pt reports hx of DM, not taking metformin at this time because she thought the pills smelled bad. Pt states saw Dr avendaño for nodule a few months back and had mammogram 2.5 weeks ago.  Pt states she was told mammogram was unremarkable.

## 2019-06-12 NOTE — DISCHARGE INSTRUCTIONS
Called the breast Center tomorrow to schedule follow-up appointment.  If you have any trouble, call Dr. Laughlin to facilitate or return to the ED for new or worsening symptoms. Take your metformin as prescribed.  Follow diabetic diet.

## 2019-06-12 NOTE — ED PROVIDER NOTES
"Encounter Date: 6/11/2019    SCRIBE #1 NOTE: I, Rosemary Eisenberg, am scribing for, and in the presence of, Dr. Schaefer .       History     Chief Complaint   Patient presents with    nipple bleeding     L breast nipple w/ sudden bleeding     Time seen by provider: 9:52 PM    This is a 53 y.o. female with a hx of DM  presents with complaint of left breast pain intermittently since 3 months ago.  She saw Dr. Laughlin at that time and she felt a lump on left breast. She was then referred to get a mammogram. Patient reports normal results of mammogram.She reports that the left breast has been "bleeding a dark red color" from the nipple tonight which brought her to the ED. She rates the pain at a 2/10.  Pt reports feeling thirst and fatigue, and admits she has not been taking her prescribed metformin because the last time I filled the prescription the pills smelled funny.  She admits to frequent urination but denies painful urination. Pt has not experienced runny nose, coughing, or sore throat. She has been taking Zyrtec for allergies. She does not smoke or drink.     The history is provided by the patient.     Review of patient's allergies indicates:   Allergen Reactions    Morphine Itching    Percocet [oxycodone-acetaminophen]      Past Medical History:   Diagnosis Date    Diabetes mellitus     Glaucoma 2012    Hypertension     Renal cyst     Retinal tear of right eye      Past Surgical History:   Procedure Laterality Date    EYE SURGERY      HERNIA REPAIR      HYSTERECTOMY      NILES, BSO secondary to ovarian cyst    OOPHORECTOMY       Family History   Problem Relation Age of Onset    Ovarian cancer Other     Breast cancer Neg Hx     Colon cancer Neg Hx      Social History     Tobacco Use    Smoking status: Never Smoker    Smokeless tobacco: Never Used   Substance Use Topics    Alcohol use: Yes     Alcohol/week: 0.0 oz     Comment: occasional    Drug use: No     Review of Systems   Constitutional: Positive " for fatigue. Negative for chills and fever.   HENT: Negative for congestion, rhinorrhea and sore throat.    Eyes: Negative for visual disturbance.   Respiratory: Negative for cough and shortness of breath.    Cardiovascular: Negative for chest pain and palpitations.   Gastrointestinal: Negative for abdominal pain, diarrhea and vomiting.   Endocrine: Positive for polydipsia.   Genitourinary: Negative for decreased urine volume, difficulty urinating, dysuria and vaginal discharge.   Musculoskeletal: Negative for joint swelling, neck pain and neck stiffness.   Skin:        Left breast with bloody discharge from the nipple tonight.   Neurological: Negative for weakness, numbness and headaches.   Psychiatric/Behavioral: Negative for confusion.       Physical Exam     Initial Vitals [06/11/19 2107]   BP Pulse Resp Temp SpO2   (!) 178/84 106 20 98.1 °F (36.7 °C) (!) 94 %      MAP       --         Physical Exam    Nursing note and vitals reviewed.  Constitutional: She appears well-developed and well-nourished. No distress.   Dry blood in patients bra.    HENT:   Head: Normocephalic and atraumatic.   Mouth/Throat: Oropharynx is clear and moist.   Eyes: Conjunctivae and EOM are normal. Pupils are equal, round, and reactive to light.   Neck: Normal range of motion. Neck supple.   Cardiovascular:   Tachycardia. No murmur, regular rhythm.    Pulmonary/Chest: Breath sounds normal. No respiratory distress. She has no wheezes. She has no rhonchi. She has no rales.   Lungs are clear.    Abdominal: Soft. Bowel sounds are normal. There is no tenderness.   Musculoskeletal: Normal range of motion.   Neurological: She is alert and oriented to person, place, and time. She has normal strength. No cranial nerve deficit or sensory deficit.   Skin: Skin is warm and dry. No rash noted.   Tenderness to palpation and palpable nodule at 2 oclock on left subareola area.    Psychiatric: She has a normal mood and affect. Her behavior is normal.          ED Course   Procedures  Labs Reviewed   CBC W/ AUTO DIFFERENTIAL - Abnormal; Notable for the following components:       Result Value    RDW 15.0 (*)     MPV 9.1 (*)     All other components within normal limits   COMPREHENSIVE METABOLIC PANEL - Abnormal; Notable for the following components:    Glucose 285 (*)     All other components within normal limits   POCT GLUCOSE - Abnormal; Notable for the following components:    POCT Glucose 189 (*)     All other components within normal limits   MAGNESIUM   PHOSPHORUS   PROTIME-INR   APTT          Imaging Results    None          Medical Decision Making:   ED Management:  Emergent evaluation 53-year-old female who presents with complaint of bloody nipple discharge from the left breast tonight, breast pain times months.  She also reports noncompliance with diabetic medications and symptoms of hyperglycemia.  Vital signs reveal mild tachycardia, afebrile.  Physical exam is benign other than a palpable tender nodule underneath the left subareolar area, no overlying skin changes to suggest cellulitis or abscess.  Exam is otherwise normal.  Workup reveals hyperglycemia but no ketosis.  Tachycardia resolved with IV fluids.  She will follow up at the breast center for further evaluation.  She is also encouraged to follow closely with her PCP or to return here for new or worsening symptoms.            Scribe Attestation:   Scribe #1: I performed the above scribed service and the documentation accurately describes the services I performed. I attest to the accuracy of the note.    Attending Attestation:           Physician Attestation for Scribe:  Physician Attestation Statement for Scribe #1: I, Dr. Schaefer , reviewed documentation, as scribed by Rosemary Eisenberg  in my presence, and it is both accurate and complete.                    Clinical Impression:   The primary encounter diagnosis was Nipple discharge, bloody. A diagnosis of Hyperglycemia was also pertinent to this  visit.                               Shikha Schaefer MD  06/12/19 9584

## 2019-06-12 NOTE — ED NOTES
NEURO: Pt AAO x 4. Behavior and speech appropriate to situation.   CARDIAC: Regular rhythm auscultated, tachy cardic  RESPIRATORY: Respirations even and unlabored. Breath sounds clear to all lung fields.   MUSCULOSKELETAL: Active ROM noted to extremities    Tenderness to palpation, nodule to 2 oclock region outside areola. No expressible material. Dried blood noted in pts bra.

## 2019-06-17 ENCOUNTER — TELEPHONE (OUTPATIENT)
Dept: SURGERY | Facility: CLINIC | Age: 54
End: 2019-06-17

## 2019-06-17 NOTE — TELEPHONE ENCOUNTER
Returned the patient call regarding the message below.  The patient is rescheduled to be seen on Wednesday 6/19/19 at 9:30 am with Pilar Corey PA-C at [facility].  The patient voiced understanding of appointment date, time, and location.  Reminder letter mailed to the patient.  My name and direct number given to the patient.

## 2019-06-17 NOTE — TELEPHONE ENCOUNTER
----- Message from Diane Florence RN sent at 6/17/2019  8:55 AM CDT -----  Contact: Pt.Self   Please call patient to schedule.  Bloody nipple discharge.  Negative mammo 5-20-19 after c/o left breast mass and pain.  Went to ED for nipple dc.  Can see Pilar for work up, but will probably need to see a surgeon for duct excision.  Could see Mahendra next week if she is willing to wait until then.    Diane    ----- Message -----  From: Tena Rojsa  Sent: 6/17/2019   8:35 AM  To: Mahendra [facility] Staff    Patient Requesting Sooner Appointment.     Reason for sooner appt.:F/U from ED    When is the first available appointment?  no access to schedule @ time of call     Communication Preference:  [phone] (Work available until 5:00Pm) when calling please let  know it is a urgent call so they can transfer call directly to pt. Desk phone     [phone] (Pt.Cell leave please leave detailed voicemail if no answer)     Additional Information:  Pt. Would like soonest available appt. If possible     Thank You

## 2019-06-19 ENCOUNTER — OFFICE VISIT (OUTPATIENT)
Dept: SURGERY | Facility: CLINIC | Age: 54
End: 2019-06-19
Attending: SURGERY
Payer: COMMERCIAL

## 2019-06-19 VITALS
HEART RATE: 78 BPM | WEIGHT: 195.88 LBS | TEMPERATURE: 97 F | HEIGHT: 64 IN | BODY MASS INDEX: 33.44 KG/M2 | DIASTOLIC BLOOD PRESSURE: 67 MMHG | SYSTOLIC BLOOD PRESSURE: 155 MMHG

## 2019-06-19 DIAGNOSIS — N64.52 BLOODY DISCHARGE FROM LEFT NIPPLE: Primary | ICD-10-CM

## 2019-06-19 PROCEDURE — 99203 OFFICE O/P NEW LOW 30 MIN: CPT | Mod: S$GLB,,, | Performed by: PHYSICIAN ASSISTANT

## 2019-06-19 PROCEDURE — 3008F PR BODY MASS INDEX (BMI) DOCUMENTED: ICD-10-PCS | Mod: CPTII,S$GLB,, | Performed by: PHYSICIAN ASSISTANT

## 2019-06-19 PROCEDURE — 3008F BODY MASS INDEX DOCD: CPT | Mod: CPTII,S$GLB,, | Performed by: PHYSICIAN ASSISTANT

## 2019-06-19 PROCEDURE — 99203 PR OFFICE/OUTPT VISIT, NEW, LEVL III, 30-44 MIN: ICD-10-PCS | Mod: S$GLB,,, | Performed by: PHYSICIAN ASSISTANT

## 2019-06-19 RX ORDER — ESTRADIOL 0.03 MG/D
FILM, EXTENDED RELEASE TRANSDERMAL
Refills: 1 | COMMUNITY
Start: 2019-06-12 | End: 2019-08-27

## 2019-06-19 NOTE — PROGRESS NOTES
[facility]  [facility]  6/19/2019      REFERRING PROVIDER: Shikha Schaefer MD  [address] [place] [address]  [place] [address]    SUBJECTIVE:   Ms. Shu Mendoza is a 53 y.o. year old female who presents today with her mother, Keila, complaining of bloody nipple discharge at her LEFT breast.      History of Present Illness: Patient states she first noticed the bloody nipple discharge from her LEFT breast on 6/11/19 and presented to the [facility].  A mammogram was ordered and nothing concerning was seen (BIRADS 1).    She describes the discharge as dark red, occurring spontaneously, in a pea size amount.  She has had only 2 episodes of this discharge in total.  It is associated with a left breast heaviness and fullness.  She has also had LEFT breast pain for the past 3 months that occurs intermittently.  Nothing seems to make it better or worse.  She has a caffeine intake of 2-3 sodas per day.      She denies any history of breast biopsies.  She denies palpating any breast masses bilaterally.     Past Medical History:   Diagnosis Date    Diabetes mellitus     Glaucoma 2012    Hypertension     Renal cyst     Retinal tear of right eye      Past Surgical History:   Procedure Laterality Date    EYE SURGERY      HERNIA REPAIR      HYSTERECTOMY      NILES, BSO secondary to ovarian cyst    OOPHORECTOMY       Social History     Socioeconomic History    Marital status: Single     Spouse name: Not on file    Number of children: Not on file    Years of education: Not on file    Highest education level: Not on file   Occupational History    Not on file   Social Needs    Financial resource strain: Not on file    Food insecurity:     Worry: Not on file     Inability: Not on file    Transportation needs:     Medical: Not on file     Non-medical: Not on file   Tobacco Use    Smoking status: Never Smoker    Smokeless tobacco: Never Used   Substance and Sexual Activity    Alcohol  use: Yes     Alcohol/week: 0.0 oz     Comment: occasional    Drug use: No    Sexual activity: Yes     Partners: Male     Birth control/protection: Post-menopausal, Surgical   Lifestyle    Physical activity:     Days per week: Not on file     Minutes per session: Not on file    Stress: Not on file   Relationships    Social connections:     Talks on phone: Not on file     Gets together: Not on file     Attends Jainism service: Not on file     Active member of club or organization: Not on file     Attends meetings of clubs or organizations: Not on file     Relationship status: Not on file   Other Topics Concern    Not on file   Social History Narrative    Not on file     Review of patient's allergies indicates:   Allergen Reactions    Morphine Itching    Percocet [oxycodone-acetaminophen]      Family History   Problem Relation Age of Onset    Ovarian cancer Other     Breast cancer Neg Hx     Colon cancer Neg Hx      Tyrer-Cuzick Score: 9.6%    Review of Systems: Denies any fever, chills, chest pain, or shortness of breath.  See HPI for other systems reviewed.      OBJECTIVE:   Vitals:    06/19/19 0909   BP: (!) 155/67   Pulse: 78   Temp: 97.2 °F (36.2 °C)      Physical Exam:  HEENT: Normocephalic, atraumatic.    General: alert and oriented; no acute distress.    Breast: ~.5 cm mobile, subdominant, tender mass at the 1 o'clock position of the left breast adjacent to the nipple areolar complex.  Tenderness along the entire inframammary fold and lower left breast. No masses present bilaterally.  Normal color and contour of right and left breast.  No nipple inversion bilaterally.  No nipple discharge expressed on left when pressure was applied.   Lymph: No palpable adjacent axillary lymph nodes.      ASSESSMENT:  Ms. Shu Mendoza is a 53 y.o. year old female with 2 episodes of bloody nipple discharge at her LEFT breast and left breast pain.      PLAN:   We discussed that the discharge is likely attributed  to duct ectasia or an intraductal papilloma.  We will further assess this as well as the small lesion palpated with an ultrasound of the left breast.  I also recommended that she try reducing her caffeine intake to alleviate her breast pain.  If this doesn't help she can try over the counter evening primrose oil and flaxseed tablets.  I will call her with the ultrasound results and she can see me back in clinic as needed.    ~Pilar Corey PA-C      Surgical Oncology            6/19/2019

## 2019-06-19 NOTE — LETTER
June 19, 2019      Shikha Schaefer MD  [address] [place] [address]           [place] [address], [address]  [place] [address]  Phone: [phone]  Fax: [phone]          Patient: Shu Mendoza   MR Number: [id number]   YOB: 1965   Date of Visit: 6/19/2019       Dear Dr. Shikha Schaefer:    Thank you for referring Shu Mendoza to me for evaluation. Attached you will find relevant portions of my assessment and plan of care.    If you have questions, please do not hesitate to call me. I look forward to following Shu Mendoza along with you.    Sincerely,    NED Hendricks    Enclosure  CC:  No Recipients    If you would like to receive this communication electronically, please contact [email or web address] or [phone] to request more information on [facility] access.    For providers and/or their staff who would like to refer a patient to [facility], please contact us through our one-stop-shop provider referral line, [facility], at [phone].    If you feel you have received this communication in error or would no longer like to receive these types of communications, please e-mail [email or web address]

## 2019-06-27 ENCOUNTER — HOSPITAL ENCOUNTER (OUTPATIENT)
Dept: RADIOLOGY | Facility: OTHER | Age: 54
Discharge: HOME | End: 2019-06-27
Attending: PHYSICIAN ASSISTANT
Payer: COMMERCIAL

## 2019-06-27 DIAGNOSIS — N64.52 BLOODY DISCHARGE FROM LEFT NIPPLE: ICD-10-CM

## 2019-06-27 PROCEDURE — 76642 ULTRASOUND BREAST LIMITED: CPT | Mod: TC,LT

## 2019-06-27 PROCEDURE — 76642 US BREAST LEFT LIMITED: ICD-10-PCS | Mod: 26,LT,, | Performed by: RADIOLOGY

## 2019-06-27 PROCEDURE — 76642 ULTRASOUND BREAST LIMITED: CPT | Mod: 26,LT,, | Performed by: RADIOLOGY

## 2019-07-08 ENCOUNTER — HOSPITAL ENCOUNTER (OUTPATIENT)
Dept: RADIOLOGY | Facility: OTHER | Age: 54
Discharge: HOME | End: 2019-07-08
Attending: PHYSICIAN ASSISTANT
Payer: COMMERCIAL

## 2019-07-08 DIAGNOSIS — N63.0 LUMP OR MASS IN BREAST: Primary | ICD-10-CM

## 2019-07-08 DIAGNOSIS — R92.8 ABNORMAL MAMMOGRAM: ICD-10-CM

## 2019-07-08 PROCEDURE — 25000003 PHARM REV CODE 250: Performed by: PHYSICIAN ASSISTANT

## 2019-07-08 PROCEDURE — 19083 BX BREAST 1ST LESION US IMAG: CPT | Mod: LT,,, | Performed by: RADIOLOGY

## 2019-07-08 PROCEDURE — 88305 TISSUE EXAM BY PATHOLOGIST: CPT | Mod: 26,,, | Performed by: PATHOLOGY

## 2019-07-08 PROCEDURE — 88305 TISSUE EXAM BY PATHOLOGIST: CPT | Performed by: PATHOLOGY

## 2019-07-08 PROCEDURE — 19083 US BREAST BIOPSY WITH IMAGING 1ST SITE LEFT: ICD-10-PCS | Mod: LT,,, | Performed by: RADIOLOGY

## 2019-07-08 PROCEDURE — 88360 TISSUE SPECIMEN TO PATHOLOGY, RADIOLOGY: ICD-10-PCS | Mod: 26,,, | Performed by: PATHOLOGY

## 2019-07-08 PROCEDURE — 27201068 US BREAST BIOPSY WITH IMAGING 1ST SITE LEFT

## 2019-07-08 PROCEDURE — 88305 TISSUE SPECIMEN TO PATHOLOGY, RADIOLOGY: ICD-10-PCS | Mod: 26,,, | Performed by: PATHOLOGY

## 2019-07-08 PROCEDURE — 88342 TISSUE SPECIMEN TO PATHOLOGY, RADIOLOGY: ICD-10-PCS | Mod: 26,59,, | Performed by: PATHOLOGY

## 2019-07-08 PROCEDURE — 88360 TUMOR IMMUNOHISTOCHEM/MANUAL: CPT | Mod: 26,,, | Performed by: PATHOLOGY

## 2019-07-08 PROCEDURE — 88342 IMHCHEM/IMCYTCHM 1ST ANTB: CPT | Mod: 26,59,, | Performed by: PATHOLOGY

## 2019-07-08 PROCEDURE — 88360 TUMOR IMMUNOHISTOCHEM/MANUAL: CPT | Mod: 59 | Performed by: PATHOLOGY

## 2019-07-08 RX ORDER — LIDOCAINE HCL/EPINEPHRINE/PF 2%-1:200K
10 VIAL (ML) INJECTION ONCE
Status: COMPLETED | OUTPATIENT
Start: 2019-07-08 | End: 2019-07-08

## 2019-07-08 RX ORDER — LIDOCAINE HYDROCHLORIDE 10 MG/ML
5 INJECTION INFILTRATION; PERINEURAL ONCE
Status: COMPLETED | OUTPATIENT
Start: 2019-07-08 | End: 2019-07-08

## 2019-07-08 RX ADMIN — LIDOCAINE HYDROCHLORIDE,EPINEPHRINE BITARTRATE 10 ML: 20; .005 INJECTION, SOLUTION EPIDURAL; INFILTRATION; INTRACAUDAL; PERINEURAL at 09:07

## 2019-07-08 RX ADMIN — LIDOCAINE HYDROCHLORIDE 5 ML: 10 INJECTION, SOLUTION INFILTRATION; PERINEURAL at 09:07

## 2019-07-09 ENCOUNTER — TELEPHONE (OUTPATIENT)
Dept: RADIOLOGY | Facility: OTHER | Age: 54
End: 2019-07-09

## 2019-07-09 NOTE — TELEPHONE ENCOUNTER
Patient notified of left breast biopsy results per pathology reported on 7/9/19. Diagnosis: INVASIVE MAMMARY CARCINOMA. Explained to patient results are positive for breast cancer. Instructed on need for consultation with breast surgeon. Questions answered. Appointment with breast surgeon confirmed 7/18/19 4686 at [facility] [place], operated by [facility]. Patient voices understanding.

## 2019-07-18 ENCOUNTER — OFFICE VISIT (OUTPATIENT)
Dept: SURGERY | Facility: CLINIC | Age: 54
End: 2019-07-18
Attending: SURGERY
Payer: COMMERCIAL

## 2019-07-18 ENCOUNTER — LAB VISIT (OUTPATIENT)
Dept: LAB | Facility: OTHER | Age: 54
End: 2019-07-18
Attending: SURGERY
Payer: COMMERCIAL

## 2019-07-18 ENCOUNTER — SURGICAL CONSULT (OUTPATIENT)
Dept: PLASTIC SURGERY | Facility: CLINIC | Age: 54
End: 2019-07-18
Attending: PLASTIC SURGERY
Payer: COMMERCIAL

## 2019-07-18 VITALS
HEIGHT: 64 IN | SYSTOLIC BLOOD PRESSURE: 180 MMHG | HEART RATE: 74 BPM | BODY MASS INDEX: 33.46 KG/M2 | DIASTOLIC BLOOD PRESSURE: 84 MMHG | WEIGHT: 196 LBS

## 2019-07-18 VITALS
SYSTOLIC BLOOD PRESSURE: 180 MMHG | BODY MASS INDEX: 33.46 KG/M2 | DIASTOLIC BLOOD PRESSURE: 84 MMHG | HEART RATE: 74 BPM | WEIGHT: 196 LBS | HEIGHT: 64 IN

## 2019-07-18 DIAGNOSIS — Z17.0 MALIGNANT NEOPLASM OF CENTRAL PORTION OF LEFT BREAST IN FEMALE, ESTROGEN RECEPTOR POSITIVE: ICD-10-CM

## 2019-07-18 DIAGNOSIS — N64.52 BLOODY DISCHARGE FROM LEFT NIPPLE: Primary | ICD-10-CM

## 2019-07-18 DIAGNOSIS — Z17.0 MALIGNANT NEOPLASM OF CENTRAL PORTION OF LEFT BREAST IN FEMALE, ESTROGEN RECEPTOR POSITIVE: Primary | ICD-10-CM

## 2019-07-18 DIAGNOSIS — C50.112 MALIGNANT NEOPLASM OF CENTRAL PORTION OF LEFT BREAST IN FEMALE, ESTROGEN RECEPTOR POSITIVE: Primary | ICD-10-CM

## 2019-07-18 DIAGNOSIS — C50.112 MALIGNANT NEOPLASM OF CENTRAL PORTION OF LEFT BREAST IN FEMALE, ESTROGEN RECEPTOR POSITIVE: ICD-10-CM

## 2019-07-18 PROCEDURE — 99215 OFFICE O/P EST HI 40 MIN: CPT | Mod: S$GLB,,, | Performed by: SURGERY

## 2019-07-18 PROCEDURE — 36415 COLL VENOUS BLD VENIPUNCTURE: CPT

## 2019-07-18 PROCEDURE — 99243 PR OFFICE CONSULTATION,LEVEL III: ICD-10-PCS | Mod: S$GLB,,, | Performed by: PLASTIC SURGERY

## 2019-07-18 PROCEDURE — 99215 PR OFFICE/OUTPT VISIT, EST, LEVL V, 40-54 MIN: ICD-10-PCS | Mod: S$GLB,,, | Performed by: SURGERY

## 2019-07-18 PROCEDURE — 99243 OFF/OP CNSLTJ NEW/EST LOW 30: CPT | Mod: S$GLB,,, | Performed by: PLASTIC SURGERY

## 2019-07-18 PROCEDURE — 3008F PR BODY MASS INDEX (BMI) DOCUMENTED: ICD-10-PCS | Mod: CPTII,S$GLB,, | Performed by: SURGERY

## 2019-07-18 PROCEDURE — 3008F BODY MASS INDEX DOCD: CPT | Mod: CPTII,S$GLB,, | Performed by: SURGERY

## 2019-07-18 RX ORDER — FLAXSEED OIL 1000 MG
CAPSULE ORAL
COMMUNITY
Start: 2019-06-19 | End: 2019-08-27

## 2019-07-18 RX ORDER — EMPAGLIFLOZIN 10 MG/1
10 TABLET, FILM COATED ORAL DAILY
COMMUNITY
Start: 2019-06-20 | End: 2022-06-14

## 2019-07-18 NOTE — PROGRESS NOTES
This patient was referred by  for breast reconstruction.  She has unilateral breast cancer in the sub areolar area.  She is considering a mastectomy.  Genetic testing is to be done although there is no strong family history.  The patient was seen and examined. The patient would desire autogenous reconstruction.  The abdomen would be the most optimal donor site. Preoperative imaging would be requested.  We would need to coordinate the reconstruction with the breast surgeon.  If I am not available 1 of my associates could do the reconstruction or I could possibly assist.    Marquita will help coordinate the management of this patient.

## 2019-07-18 NOTE — PROGRESS NOTES
New Breast Cancer  History and Physical  [facility]  Department of Surgery    REFERRING PROVIDER: Aaareferral Self  No address on file    CHIEF COMPLAINT: left breast cancer    Subjective:      Suh Mendoza is a 54 y.o. postmenopausal female referred for evaluation of recently diagnosed carcinoma of the left breast. The patient was initially referred for surgical evaluation of nipple discharge first noted 2019. Does report left breast pain  Follow-up imaging showed mass in the left breast subareolar. A ultrasound guided biopsy was performed on 2019 with pathology revealing infiltrating ductal carcinoma of the breast.     Patient does routinely do self breast exams.  Patient has noted a change on breast exam.  Patient reports nipple discharge. Patient denies to previous breast biopsy. Patient denies a personal history of breast cancer.    Findings at that time were the following:   Tumor size: 1 cm   Tumor rdgrdrrdarddrderd:rd rd3rd Estrogen Receptor: +   Progesterone Receptor: +   Her-2 royal: 1+, negative   Lymph node status: clinically negative       GYN History:  Age of menarche was 11. Age of menopause was 34.  Patient reports hormonal therapy. Patient is . Age of first live birth was 16. Patient did not breast feed.    FAMILY History:  Maternal GGM ovarian cancer  Unknown paternal history    Past Medical History:   Diagnosis Date    Diabetes mellitus     Glaucoma     Hypertension     Renal cyst     Retinal tear of right eye      Past Surgical History:   Procedure Laterality Date    BREAST BIOPSY      EYE SURGERY      HERNIA REPAIR  2009    HYSTERECTOMY  2000    NILES, BSO secondary to ovarian cyst    OOPHORECTOMY       Current Outpatient Medications on File Prior to Visit   Medication Sig Dispense Refill    amlodipine-benazepril (LOTREL) 5-40 mg per capsule TAKE 1 CAPSULE BY ORAL ROUTE EVERY DAY      atorvastatin (LIPITOR) 10 MG tablet TAKE 1 TABLET BY ORAL ROUTE EVERY DAY      cetirizine  (ZYRTEC) 10 MG tablet Take 1 tablet (10 mg total) by mouth once daily. 30 tablet 0    dorzolamide (TRUSOPT) 2 % ophthalmic solution 1 drop 3 (three) times daily.      empagliflozin (JARDIANCE) 10 mg Tab TAKE 1 TABLET BY ORAL ROUTE EVERY DAY IN THE MORNING      ERGOCALCIFEROL, VITAMIN D2, (VITAMIN D ORAL) Take by mouth.      flaxseed oil 1,000 mg Cap       hydroCHLOROthiazide (MICROZIDE) 12.5 mg capsule TK 1 C PO D  2    latanoprost 0.005 % ophthalmic solution INT 1 GTT IN OU QD HS  11    metFORMIN (GLUCOPHAGE-XR) 500 MG 24 hr tablet Take 2 tablets (1,000 mg total) by mouth 2 (two) times daily with meals. 120 tablet 6    multivitamin (ONE DAILY MULTIVITAMIN) per tablet Take 1 tablet by mouth once daily.      oxybutynin (DITROPAN-XL) 5 MG TR24 Take 1 tablet (5 mg total) by mouth once daily. 90 tablet 3    estradiol (VIVELLE-DOT) 0.025 mg/24 hr ELROY 1 PATCH ON SKIN TWICE A WEEK  1     No current facility-administered medications on file prior to visit.      Social History     Socioeconomic History    Marital status: Single     Spouse name: Not on file    Number of children: Not on file    Years of education: Not on file    Highest education level: Not on file   Occupational History    Not on file   Social Needs    Financial resource strain: Not on file    Food insecurity:     Worry: Not on file     Inability: Not on file    Transportation needs:     Medical: Not on file     Non-medical: Not on file   Tobacco Use    Smoking status: Never Smoker    Smokeless tobacco: Never Used   Substance and Sexual Activity    Alcohol use: Yes     Alcohol/week: 0.0 oz     Comment: occasional    Drug use: No    Sexual activity: Yes     Partners: Male     Birth control/protection: Post-menopausal, Surgical   Lifestyle    Physical activity:     Days per week: Not on file     Minutes per session: Not on file    Stress: Not on file   Relationships    Social connections:     Talks on phone: Not on file     Gets  "together: Not on file     Attends Mormonism service: Not on file     Active member of club or organization: Not on file     Attends meetings of clubs or organizations: Not on file     Relationship status: Not on file   Other Topics Concern    Not on file   Social History Narrative    Not on file     Family History   Problem Relation Age of Onset    Ovarian cancer Other     Breast cancer Neg Hx     Colon cancer Neg Hx         Review of Systems  Review of Systems   Constitutional: Negative for fatigue and fever.   HENT: Negative for sore throat and trouble swallowing.    Eyes: Negative for visual disturbance.   Respiratory: Negative for cough and shortness of breath.    Cardiovascular: Negative for chest pain and palpitations.   Gastrointestinal: Negative for abdominal pain, constipation, diarrhea and nausea.   Genitourinary: Negative for difficulty urinating and dysuria.   Musculoskeletal: Negative for arthralgias and back pain.   Neurological: Negative for dizziness, weakness and headaches.   Hematological: Negative for adenopathy.        Objective:   PHYSICAL EXAM:  BP (!) 180/84 (BP Location: Right arm, Patient Position: Sitting, BP Method: Large (Automatic))   Pulse 74   Ht 5' 4" (1.626 m)   Wt 88.9 kg (195 lb 15.8 oz)   LMP  (LMP Unknown)   BMI 33.64 kg/m²     Physical Exam   Constitutional: She is oriented to person, place, and time. She appears well-developed.   HENT:   Head: Normocephalic.   Eyes: Pupils are equal, round, and reactive to light.   Neck: Normal range of motion.   Cardiovascular: Normal rate.    Pulmonary/Chest: Effort normal. She exhibits no tenderness and no deformity. Right breast exhibits no inverted nipple, no mass, no nipple discharge, no skin change and no tenderness. Left breast exhibits mass. Left breast exhibits no inverted nipple, no nipple discharge, no skin change and no tenderness.       Abdominal: Soft. She exhibits no distension.   Musculoskeletal: She exhibits no " edema.   Lymphadenopathy:     She has no cervical adenopathy.     She has no axillary adenopathy.        Right: No supraclavicular adenopathy present.        Left: No supraclavicular adenopathy present.   Neurological: She is alert and oriented to person, place, and time.   Psychiatric: She has a normal mood and affect.         Radiology review: Images personally reviewed by me in the clinic.       Assessment:      Shu Mendoza is a 54 y.o. postmenopausal female with recently diagnosed carcinoma of the left breast.      Plan:    Options for management were discussed with the patient and her family. We reviewed the existing data noting the equivalency of breast conserving surgery with radiation therapy and mastectomy. We also reviewed the guidelines of the National Comprehensive Cancer Network for Stage 1 breast carcinoma. We discussed the need for lumpectomy margins to be negative for carcinoma, the necessity for postoperative radiation therapy after breast conservation in most cases, the possibility of a failed or false negative sentinel lymph node biopsy and the potential need for complete lymphadenectomy for a failed or positive sentinel lymph node biopsy were fully discussed. In the setting of mastectomy, delayed or immediate reconstruction options are available and were discussed.     In the setting of lumpectomy, radiation therapy would be recommended majority of the time.  The duration and treatment side effects were discussed with the patient.  This will coordinated with the radiation oncologist pending final pathology.    We also discussed the role of systemic therapy in the treatment of early stage breast cancer.  We discussed that this is based on tumor biology and joaquin status and will be determined based on final pathology.  We discussed that if the cancer is hormone positive, endocrine therapy would be recommended in most cases and its use can reduce the risk of recurrence as well as improve  survival. Side effects of treatment were briefly discussed. We also discussed the potential role for chemotherapy based on a number of factors such as tumor phenotype (ER+ vs. triple negative vs. Ihk5pah+) and this would be determined in coordination with the medical oncologist.    Discussed central lumpectomy with SLNB vs. Mastectomy.  Given nipple discharge and proximity of mass to the nipple, I do think the nipple areolar complex should be removed with lumpectomy.    Discussed genetics and will do today  Discussed role of oncotype pending final pathology.    Will have her see plastic surgery to discuss reconstruction options.    Patient was educated on breast cancer, receptors, wire localization lumpectomy, mastectomy, sentinel lymph node mapping and biopsy, axillary lymph node dissection, reconstruction, breast prosthesis with post-mastectomy bra and radiation therapy. Patient was given patient information binder [facility].  All her questions were answered.    Total time spent with the patient: 60 minutes.  45 minutes of face to face consultation and 15 minutes of chart review and coordination of care.

## 2019-07-19 DIAGNOSIS — C50.112 MALIGNANT NEOPLASM OF CENTRAL PORTION OF LEFT BREAST IN FEMALE, ESTROGEN RECEPTOR POSITIVE: Primary | ICD-10-CM

## 2019-07-19 DIAGNOSIS — Z17.0 MALIGNANT NEOPLASM OF CENTRAL PORTION OF LEFT BREAST IN FEMALE, ESTROGEN RECEPTOR POSITIVE: Primary | ICD-10-CM

## 2019-07-22 NOTE — PROGRESS NOTES
OUTPATIENT PHYSICAL THERAPY   EVALUATION    Name: Shu Mendoza  Clinic Number: [id number]    Therapy Diagnosis:   Encounter Diagnoses   Name Primary?    Malignant neoplasm of central portion of left breast in female, estrogen receptor positive     At risk for lymphedema      Physician: Shikha Mccray MD    Physician Orders: PT Eval and Treat   Medical Diagnosis: Left breast cancer  Evaluation Date: 7/23/2019  Authorization period Expiration: 12/31/19  Plan of Care Certification Period: 7/23/19  Insurance: [facility]     Visit #: 1/ Visits authorized: 20  Time In:3:25 PM  Time Out: 4:10 PM  Total Billable Time: 45 minutes    Precautions: cancer    History   History of Present Illness: Shu is a 54 y.o. female that presents to  [facility] at the [facility] secondary to dx of left breast cancer.    Dx: Left breast IDC, grade 2, ER (+), MT (+), HER2 (-)  Surgery date: pending-left mastectomy with SALEEM Flap reconstruction      Pt presents today for baseline measurements to aid in the early detection of lymphedema, UE muscle testing, postural and ROM assessment along with education of risk of lymphedema and surgical precautions post surgery. Circumferential measurements will be taken today of BL UEs for early detection of lymphedema post surgery. Pt will also be instructed in exercises to perform pre and post-surgery to insure best outcomes.     Past Medical History:   Past Medical History:   Diagnosis Date    Diabetes mellitus     Glaucoma 2012    Hypertension     Malignant neoplasm of central portion of left breast in female, estrogen receptor positive 7/23/2019    Renal cyst     Retinal tear of right eye        Past Surgical History:   Shu Mendoza  has a past surgical history that includes Eye surgery; Oophorectomy; Breast biopsy; Hysterectomy (2000); and Hernia repair (2009).    Medications:  Shu has a current medication list which includes the following  "prescription(s): amlodipine-benazepril, atorvastatin, cetirizine, dorzolamide, empagliflozin, ergocalciferol (vitamin d2), estradiol, flaxseed oil, hydrochlorothiazide, latanoprost, metformin, multivitamin, and oxybutynin.    Allergies:  Review of patient's allergies indicates:   Allergen Reactions    Morphine Itching    Percocet [oxycodone-acetaminophen] Itching          Hand dominance: right handed  Prior Therapy: left shoulder pain about a year ago  Nutrition:  Overweight  Social History: Lives with fiance  Place of Residence (Steps/Adaptations): one story  Current functional status:  Independent with all aDL's  Exercise routine prior to onset : Walking  DME owned: None  Work:  /coordinator at [employer]                         Subjective   Pt states: states having slight pain inleft breast  Pain: 1/10 on VAS.     Objective   Mental status :oriented    Posture/Alignment   Postural examination/scapula alignment: forward head and rounded shoulders  Joint integrity: WFLs  Skin integrity: intact  Edema: none noted    Sensation: Light Touch: Intact           Proprioception: Intact  - appearance: well groomed     ROM:   UPPER EXTREMITY--AROM/PROM  (R) UE: WNLs  (L) UE: WNLs     Shoulder Range of Motion:   ACTIVE ROM LEFT RIGHT   Flexion 150 170   Abduction 150 170   Horizontal Abd 60 60   Extension 65 65   IR/90deg 85 85   ER/90deg 90 90     Strength: manual muscle test grades below   Upper Extremity Strength   (L) UE (R) UE   Shoulder flexion: 5/5 5/5   Shoulder Abduction: 5/5 5/5   Shoulder IR 5/5 5/5   Shoulder ER 5/5 5/5   Elbow flexion: 5/5 5/5   Elbow extension: 5/5 5/5   Lower Trap: 5/5 5/5   Middle Trap: 5/5 5/5    5/5 5/5       Baseline Measurements of BL UE's for early detection of Lymphedema:     LANDMARK RIGHT UE LEFT UE DIFFERENCE   E + 8" 39.5 cm 40 cm 0.5 cm   E + 6" 36 cm 37 cm 1 cm   E + 4" 34 cm 35 cm 1 cm   E + 2" 30 cm 31 cm 1 cm   Elbow 28 cm 28 cm 0 cm   W+ 8" 28.5 " "cm 28 cm 0.5 cm   W +  6" 26 cm 26 cm 0 cm   W + 4" 22 cm 22 cm 0 cm   Wrist 18 cm 17 cm 1 cm   DPC 21 cm 20 cm 1 cm   IP Thumb 6.5 cm 6.5 cm 0 cm         Coordination:   - fine motor: WFL  - UE coordination: intact     - LE coordination:  Not tested     Functional Mobility (Bed mobility, transfers)  Bed mobility: I =  independent   Roll to left: I  Roll to right: I  Supine to prone: I  Scooting to edge of bed: I  Supine to sit: I  Sit to supine: I  Transfers to bed: I  Transfers to toilet: I  Sit to stand:  I  Stand pivot:  I  Car transfers: I      ADL's:  Feeding: I = independent   Grooming: I  Hygiene: I  UB Dressing: I  LB Dressing: I  Toileting: I  Bathing: I    Gait Assessment:   - AD used: none  - Assistance: independent  - Distance: community distances       Endurance Deficit: none      Patient Education   - role of PT in multi - disciplinary team, goals for PT  - Pt was educated in lymphedema etiology and management plans.    - Pt was provided with written risk reductions and precautions for managing lymphedema.   - Reviewed TERRY drain care instructions.     ROM/lifting Precautions post surgery discussed -  until drains have been removed:  - do not lift affected arm above 90 degrees of shoulder flexion  - do not lift over 5 lbs  - do not pull or push heavy objects  - do not sleep on your stomach or surgery side     Written Home Exercises Provided and Patient Education: Handouts given   Pt was instructed in and performed therapeutic exercise for postural correction and alignment, stretching and soft tissue mobility, and strengthening.     Exercises included: handout given    - exaggerated deep breathing and relaxation  - scapular retractions  - wrist circles  - elbow flexion/extension      Pt was able to demonstrate and report understanding and performance    Pt has no cultural, educational or language barriers to learning provided.    Functional Limitations Reporting     Category: mobility  Score: 15% " Limitation       Assessment   This is a 54 y.o. female referred to outpatient physical therapy and presents with a medical diagnosis of left breast cancer and was seen today pre-operatively to assess strength and ROM of BL UEs, to take baseline circumferential measurements of BL UEs to aid in the early detection of lymphedema and provide pt education on exercises/precations post breast surgery. Pt does exhibit slight decreased ROM with flexion and abduction compared to right shoulder.  Pt educated in lymphedema risks/precautions as well as ROM/lifting precautions post surgery - pt demonstrated/verbalized understanding. No goals established this visit as goals for PT will be established post surgery at follow up.      Anticipated barriers to physical therapy: None     Pt's spiritual, cultural and educational needs considered and pt agreeable to plan of care and goals as stated below:     Medical necessity is demonstrated by the following IMPAIRMENTS/PROMBLEM LIST:  History  Co-morbidities and personal factors that may impact the plan of care Co-morbidities:   history of cancer    Personal Factors:   no deficits     moderate   Examination  Body Structures and Functions, activity limitations and participation restrictions that may impact the plan of care Body Regions:   upper extremities    Body Systems:    ROM    Participation Restrictions:   No Deficits    Activity limitations:   Learning and applying knowledge  no deficits    General Tasks and Commands  no deficits    Communication  no deficits    Mobility  no deficits    Self care  no deficits    Domestic Life  no deficits    Interactions/Relationships  no deficits    Life Areas  no deficits    Community and Social Life  no deficits         low   Clinical Presentation stable and uncomplicated low   Decision Making/ Complexity Score: low           Plan   Schedule patient for follow up with Physical therapy post surgery. Goals for therapy post surgery will be  established at that time.     Therapist: Lolly Torres, PT  7/23/2019

## 2019-07-23 ENCOUNTER — CLINICAL SUPPORT (OUTPATIENT)
Dept: REHABILITATION | Facility: HOSPITAL | Age: 54
End: 2019-07-23
Attending: SURGERY
Payer: COMMERCIAL

## 2019-07-23 DIAGNOSIS — C50.112 MALIGNANT NEOPLASM OF CENTRAL PORTION OF LEFT BREAST IN FEMALE, ESTROGEN RECEPTOR POSITIVE: ICD-10-CM

## 2019-07-23 DIAGNOSIS — Z17.0 MALIGNANT NEOPLASM OF CENTRAL PORTION OF LEFT BREAST IN FEMALE, ESTROGEN RECEPTOR POSITIVE: ICD-10-CM

## 2019-07-23 DIAGNOSIS — Z91.89 AT RISK FOR LYMPHEDEMA: ICD-10-CM

## 2019-07-23 PROCEDURE — 97161 PT EVAL LOW COMPLEX 20 MIN: CPT | Performed by: PHYSICAL MEDICINE & REHABILITATION

## 2019-07-23 NOTE — PATIENT INSTRUCTIONS
PRE/POST OP PATIENT EDUCATION    Post Operative Instructions     Range of Motion/lifting Precautions post surgery  The following activities should be avoided until your drain(s) have been removed  - do not lift affected arm above 90 degrees of shoulder flexion  - do not lift over 5 lbs  - do not pull or push heavy objects  - do not sleep on your stomach or surgery side       After surgery, you may begin self-care tasks including grooming, dressing, feeding and simple hygiene as soon as you feel up to it.    Schedule your post-op therapy follow-up after your drains have been removed     When to call your doctor   - if any part of your affected arm or axilla feels hot, is reddened or has increased swelling   - if you develop a temperature over 101 degrees Fahrenheit      Lymphedema - Identification and Prevention     Lymphedema - is the swelling of a body area or extremity caused by the accumulation of lymphatic fluid.  There is a risk for lymphedema with the removal of lymph nodes, trauma or radiation therapy.  Treatment of breast cancer often involves surgery: mastectomy or lumpectomy. Some of the lymph nodes in the underarm (called axillary lymph nodes) may be removed and checked to see if they contain cancer cells.     During breast surgery when axillary lymph nodes are removed (with sentinel node biopsy or axillary dissection) or are treated with radiation therapy, the lymphatic system may become impaired. This may prevent lymphatic fluid from leaving the area therefore, causing lymphedema.     Lymphatic fluid is a normal part of the circulatory system. Its function is to remove waste products and to produce cells vital to fighting infection. Swelling occurs when the vessels become restricted and the lymphatic fluid is unable to freely flow through them.  If lymphedema is left untreated, the affected limb could progressively become more swollen, which could lead to hardening  of the skin, bulkiness in the limb, infection and impaired wound healing.         There are things you can do to decrease the chance of developing lymphedema.                                          [email or web address]                                                                                                                                                  The information presented is intended for general information and educational purposes. It is not intended to replace the  advice of your health care provider. Contact your health care provider if you believe you have a health problem.                                                    POST OP EXERCISES - SAFE TO DO THE FIRST 2 WEEKS AFTER SURGERY UNTIL YOUR FOLLOW UP APPOINTMENT WITH PHYSICAL/OCCUPATIONAL THERAPY    Scapular Retraction (Standing)    With arms at sides, squeeze shoulder blades together. Do not shrug shoulders and do not hold your breath. Hold 5 seconds. Repeat 10 times 1 sessions 1-2 x day.       Exaggerated Breathing and Relaxation      Practice deep breathing frequently in the first few days following surgery even before you begin exercising. This exercise helps with tissue extensibility in the chest wall.  Inhale slowly and deeply through the nose and exhale through pursed lips. Concentrate on relaxing as you let the air out of your lungs. Repeat three (3) to four (4) times, remembering to breath in deeply and then relaxing. This exercise helps to ease the sensation of pulling and discomfort that may be experienced while exercising.      Ball Squeeze OR Hand pumps       Perform this exercise three (3) times a day for 10 reps.    The ball squeeze or hand pumps helps to prevent or reduce temporary swelling that may occur in the affected arm. This exercise may be performed standing, sitting or while lying in bed. During this exercise the affected arm should be slightly bent and held upward. Support your arm with a pillow if you  are uncomfortable holding it up.    a. Hold a rubber ball in your hand on the affected side and lift your arm upward.  b. Alternate squeezing and relaxing the ball.              AROM: Elbow Flexion / Extension        With left hand palm up, gently bend elbow as far as possible. Then straighten arm as far as possible. Do this in standing.   Repeat 10 times per set. Do 1 sets per session. Do 1-3 sessions per day.

## 2019-07-30 ENCOUNTER — HOSPITAL ENCOUNTER (OUTPATIENT)
Dept: RADIOLOGY | Facility: OTHER | Age: 54
Discharge: HOME | End: 2019-07-30
Attending: PLASTIC SURGERY
Payer: COMMERCIAL

## 2019-07-30 DIAGNOSIS — Z00.00 HEALTH MAINTENANCE EXAMINATION: Primary | ICD-10-CM

## 2019-07-30 DIAGNOSIS — C50.912 BREAST CANCER, LEFT: ICD-10-CM

## 2019-07-30 PROCEDURE — 25500020 PHARM REV CODE 255: Performed by: PLASTIC SURGERY

## 2019-07-30 PROCEDURE — 74174 CTA ABD&PLVS W/CONTRAST: CPT | Mod: 26,,, | Performed by: RADIOLOGY

## 2019-07-30 PROCEDURE — 74174 CTA ABD&PLVS W/CONTRAST: CPT | Mod: TC

## 2019-07-30 PROCEDURE — 74174: ICD-10-PCS | Mod: 26,,, | Performed by: RADIOLOGY

## 2019-07-30 RX ADMIN — IOHEXOL 100 ML: 350 INJECTION, SOLUTION INTRAVENOUS at 03:07

## 2019-08-06 DIAGNOSIS — C50.112 MALIGNANT NEOPLASM OF CENTRAL PORTION OF LEFT BREAST IN FEMALE, ESTROGEN RECEPTOR POSITIVE: Primary | ICD-10-CM

## 2019-08-06 DIAGNOSIS — Z17.0 MALIGNANT NEOPLASM OF CENTRAL PORTION OF LEFT BREAST IN FEMALE, ESTROGEN RECEPTOR POSITIVE: Primary | ICD-10-CM

## 2019-08-07 ENCOUNTER — TELEPHONE (OUTPATIENT)
Dept: SURGERY | Facility: CLINIC | Age: 54
End: 2019-08-07

## 2019-08-07 DIAGNOSIS — Z01.818 PRE-OP TESTING: Primary | ICD-10-CM

## 2019-08-07 NOTE — TELEPHONE ENCOUNTER
Left VM with my direct contact information, patient advised to give a return call with any questions or concerns rt confirmation of surgery date of 9-11-19

## 2019-08-07 NOTE — TELEPHONE ENCOUNTER
Spoke with patient regarding surgery date of 9-11-19, sx date scheduled and confirmed, pre-op scheduled and confirmed, all questions answered at this time

## 2019-08-09 ENCOUNTER — TELEPHONE (OUTPATIENT)
Dept: SURGERY | Facility: CLINIC | Age: 54
End: 2019-08-09

## 2019-08-09 NOTE — TELEPHONE ENCOUNTER
Spoke with patient regarding [facility] genetic test results, results negative, all questions answered at this time, patient mailed a hard copy of test results

## 2019-08-13 ENCOUNTER — HOSPITAL ENCOUNTER (OUTPATIENT)
Dept: PREADMISSION TESTING | Facility: OTHER | Age: 54
Discharge: HOME | End: 2019-08-13
Attending: PLASTIC SURGERY
Payer: COMMERCIAL

## 2019-08-13 ENCOUNTER — ANESTHESIA EVENT (OUTPATIENT)
Dept: SURGERY | Facility: OTHER | Age: 54
DRG: 580 | End: 2019-08-13
Payer: COMMERCIAL

## 2019-08-13 VITALS
TEMPERATURE: 98 F | HEIGHT: 64 IN | HEART RATE: 78 BPM | SYSTOLIC BLOOD PRESSURE: 133 MMHG | WEIGHT: 195 LBS | BODY MASS INDEX: 33.29 KG/M2 | OXYGEN SATURATION: 98 % | DIASTOLIC BLOOD PRESSURE: 76 MMHG

## 2019-08-13 DIAGNOSIS — Z01.818 PRE-OP TESTING: ICD-10-CM

## 2019-08-13 LAB
ALBUMIN SERPL BCP-MCNC: 4.1 G/DL (ref 3.5–5.2)
ALP SERPL-CCNC: 80 U/L (ref 55–135)
ALT SERPL W/O P-5'-P-CCNC: 30 U/L (ref 10–44)
ANION GAP SERPL CALC-SCNC: 10 MMOL/L (ref 8–16)
AST SERPL-CCNC: 22 U/L (ref 10–40)
BASOPHILS # BLD AUTO: 0.06 K/UL (ref 0–0.2)
BASOPHILS NFR BLD: 0.7 % (ref 0–1.9)
BILIRUB SERPL-MCNC: 0.6 MG/DL (ref 0.1–1)
BUN SERPL-MCNC: 11 MG/DL (ref 6–20)
CALCIUM SERPL-MCNC: 10.2 MG/DL (ref 8.7–10.5)
CHLORIDE SERPL-SCNC: 103 MMOL/L (ref 95–110)
CO2 SERPL-SCNC: 28 MMOL/L (ref 23–29)
CREAT SERPL-MCNC: 0.7 MG/DL (ref 0.5–1.4)
DIFFERENTIAL METHOD: ABNORMAL
EOSINOPHIL # BLD AUTO: 0.1 K/UL (ref 0–0.5)
EOSINOPHIL NFR BLD: 1.5 % (ref 0–8)
ERYTHROCYTE [DISTWIDTH] IN BLOOD BY AUTOMATED COUNT: 15.2 % (ref 11.5–14.5)
EST. GFR  (AFRICAN AMERICAN): >60 ML/MIN/1.73 M^2
EST. GFR  (NON AFRICAN AMERICAN): >60 ML/MIN/1.73 M^2
GLUCOSE SERPL-MCNC: 87 MG/DL (ref 70–110)
HCT VFR BLD AUTO: 39.7 % (ref 37–48.5)
HGB BLD-MCNC: 13 G/DL (ref 12–16)
IMM GRANULOCYTES # BLD AUTO: 0.03 K/UL (ref 0–0.04)
IMM GRANULOCYTES NFR BLD AUTO: 0.4 % (ref 0–0.5)
LYMPHOCYTES # BLD AUTO: 3.2 K/UL (ref 1–4.8)
LYMPHOCYTES NFR BLD: 39.2 % (ref 18–48)
MCH RBC QN AUTO: 27.9 PG (ref 27–31)
MCHC RBC AUTO-ENTMCNC: 32.7 G/DL (ref 32–36)
MCV RBC AUTO: 85 FL (ref 82–98)
MONOCYTES # BLD AUTO: 0.5 K/UL (ref 0.3–1)
MONOCYTES NFR BLD: 5.9 % (ref 4–15)
NEUTROPHILS # BLD AUTO: 4.3 K/UL (ref 1.8–7.7)
NEUTROPHILS NFR BLD: 52.3 % (ref 38–73)
NRBC BLD-RTO: 0 /100 WBC
PLATELET # BLD AUTO: 314 K/UL (ref 150–350)
PMV BLD AUTO: 9.5 FL (ref 9.2–12.9)
POTASSIUM SERPL-SCNC: 3.8 MMOL/L (ref 3.5–5.1)
PROT SERPL-MCNC: 7.7 G/DL (ref 6–8.4)
RBC # BLD AUTO: 4.66 M/UL (ref 4–5.4)
SODIUM SERPL-SCNC: 141 MMOL/L (ref 136–145)
WBC # BLD AUTO: 8.25 K/UL (ref 3.9–12.7)

## 2019-08-13 PROCEDURE — 85025 COMPLETE CBC W/AUTO DIFF WBC: CPT

## 2019-08-13 PROCEDURE — 36415 COLL VENOUS BLD VENIPUNCTURE: CPT

## 2019-08-13 PROCEDURE — 93005 ELECTROCARDIOGRAM TRACING: CPT

## 2019-08-13 PROCEDURE — 93010 ELECTROCARDIOGRAM REPORT: CPT | Mod: ,,, | Performed by: INTERNAL MEDICINE

## 2019-08-13 PROCEDURE — 80053 COMPREHEN METABOLIC PANEL: CPT

## 2019-08-13 PROCEDURE — 93010 EKG 12-LEAD: ICD-10-PCS | Mod: ,,, | Performed by: INTERNAL MEDICINE

## 2019-08-13 RX ORDER — ACETAMINOPHEN 500 MG
1000 TABLET ORAL
Status: CANCELLED | OUTPATIENT
Start: 2019-08-13 | End: 2019-08-13

## 2019-08-13 RX ORDER — FAMOTIDINE 20 MG/1
20 TABLET, FILM COATED ORAL
Status: CANCELLED | OUTPATIENT
Start: 2019-08-13 | End: 2019-08-13

## 2019-08-13 RX ORDER — PREGABALIN 75 MG/1
75 CAPSULE ORAL
Status: CANCELLED | OUTPATIENT
Start: 2019-08-13 | End: 2019-08-13

## 2019-08-13 RX ORDER — FLUTICASONE PROPIONATE 50 MCG
1 SPRAY, SUSPENSION (ML) NASAL NIGHTLY PRN
COMMUNITY

## 2019-08-13 RX ORDER — SODIUM CHLORIDE, SODIUM LACTATE, POTASSIUM CHLORIDE, CALCIUM CHLORIDE 600; 310; 30; 20 MG/100ML; MG/100ML; MG/100ML; MG/100ML
INJECTION, SOLUTION INTRAVENOUS CONTINUOUS
Status: CANCELLED | OUTPATIENT
Start: 2019-08-13

## 2019-08-13 NOTE — DISCHARGE INSTRUCTIONS
PRE-ADMIT TESTING -  [phone]    [address]  [place] [facility]          Your surgery has been scheduled at [facility]. We are pleased to have the opportunity to serve you. For Further Information please call [phone].    On the day of surgery please report to the Information Desk on the 1st floor.    · CONTACT YOUR PHYSICIAN'S OFFICE THE DAY PRIOR TO YOUR SURGERY TO OBTAIN YOUR ARRIVAL TIME.     · The evening before surgery do not eat anything after 9 p.m. ( this includes hard candy, chewing gum and mints).  You may only have GATORADE, POWERADE AND WATER  from 9 p.m. until you leave your home.   DO NOT DRINK ANY LIQUIDS ON THE WAY TO THE HOSPITAL.      SPECIAL MEDICATION INSTRUCTIONS: TAKE medications checked off by the Anesthesiologist on your Medication List.    Angiogram Patients: Take medications as instructed by your physician, including aspirin.     Surgery Patients:    If you take ASPIRIN - Your PHYSICIAN/SURGEON will need to inform you IF/OR when you need to stop taking aspirin prior to your surgery.     Do Not take any medications containing IBUPROFEN.  Do Not Wear any make-up or dark nail polish   (especially eye make-up) to surgery. If you come to surgery with makeup on you will be required to remove the makeup or nail polish.    Do not shave your surgical area at least 5 days prior to your surgery. The surgical prep will be performed at the hospital according to Infection Control regulations.    Leave all valuables at home.   Do Not wear any jewelry or watches, including any metal in body piercings. Jewelry must be removed prior to coming to the hospital.  There is a possibility that rings that are unable to be removed may be cut off if they are on the surgical extremity.    Contact Lens must be removed before surgery. Either do not wear the contact lens or bring a case and solution for storage.  Please bring a container for eyeglasses or dentures as required.  Bring  any paperwork your physician has provided, such as consent forms,  history and physicals, doctor's orders, etc.   Bring comfortable clothes that are loose fitting to wear upon discharge. Take into consideration the type of surgery being performed.  Maintain your diet as advised per your physician the day prior to surgery.      Adequate rest the night before surgery is advised.   Park in the Parking lot behind the hospital or in the [facility] across the street from the parking lot. Parking is complimentary.  If you will be discharged the same day as your procedure, please arrange for a responsible adult to drive you home or to accompany you if traveling by taxi.   YOU WILL NOT BE PERMITTED TO DRIVE OR TO LEAVE THE HOSPITAL ALONE AFTER SURGERY.   It is strongly recommended that you arrange for someone to remain with you for the first 24 hrs following your surgery.    The Surgeon will speak to your family/visitor after your surgery regarding the outcome of your surgery and post op care.  The Surgeon may speak to you after your surgery, but there is a possibility you may not remember the details.  Please check with your family members regarding the conversation with the Surgeon.    We strongly recommend whoever is bringing you home be present for discharge instructions.  This will ensure a thorough understanding for your post op home care.      Thank you for your cooperation.  The Staff of [facility].                Bathing Instructions with Hibiclens     Shower the evening before and morning of your procedure with Hibiclens:   Wash your face with water and your regular face wash/soap   Apply Hibiclens directly on your skin or on a wet washcloth and wash gently. When showering: Move away from the shower stream when applying Hibiclens to avoid rinsing off too soon.   Rinse thoroughly with warm water   Do not dilute Hibiclens         Dry off as usual, do not use any deodorant,  powder, body lotions, perfume, after shave or cologne.

## 2019-08-13 NOTE — ANESTHESIA PREPROCEDURE EVALUATION
08/13/2019  Shu Mendoza is a 54 y.o., female.    Anesthesia Evaluation    I have reviewed the Patient Summary Reports.    I have reviewed the Nursing Notes.   I have reviewed the Medications.     Review of Systems  Anesthesia Hx:  No problems with previous Anesthesia  Denies Family Hx of Anesthesia complications.   Denies Personal Hx of Anesthesia complications.   Social:  Non-Smoker    Hematology/Oncology:  Hematology Normal      Current/Recent Cancer. Breast left   Cardiovascular:   Hypertension, well controlled    Pulmonary:  Pulmonary Normal    Renal/:  Renal/ Normal     Hepatic/GI:  Hepatic/GI Normal    Musculoskeletal:  Musculoskeletal Normal    Neurological:   Diabetic neuropathy of left foot   Endocrine:   Diabetes, well controlled, type 2        Physical Exam  General:  Well nourished    Airway/Jaw/Neck:  Airway Findings: Mouth Opening: Normal Tongue: Normal  General Airway Assessment: Adult  Mallampati: II  TM Distance: Normal, at least 6 cm         Dental:  Dental Findings: In tact             Anesthesia Plan  Type of Anesthesia, risks & benefits discussed:  Anesthesia Type:  general  Patient's Preference:   Intra-op Monitoring Plan: standard ASA monitors  Intra-op Monitoring Plan Comments:   Post Op Pain Control Plan: multimodal analgesia  Post Op Pain Control Plan Comments:   Induction:   IV  Beta Blocker:         Informed Consent: Patient understands risks and agrees with Anesthesia plan.  Questions answered. Anesthesia consent signed with patient.  ASA Score: 3     Day of Surgery Review of History & Physical:    H&P update referred to the surgeon.     Anesthesia Plan Notes: IV and  devices on right    Day of surgery update: recent labs WNL, glucose 121 today        Ready For Surgery From Anesthesia Perspective.

## 2019-08-20 ENCOUNTER — TELEPHONE (OUTPATIENT)
Dept: UROLOGY | Facility: CLINIC | Age: 54
End: 2019-08-20

## 2019-08-20 LAB — INTEGRATED BRAC ANALYSIS: NORMAL

## 2019-08-20 NOTE — TELEPHONE ENCOUNTER
----- Message from Carolina Feng sent at 8/20/2019  4:19 PM CDT -----  Contact: Patient   Type:  Sooner Appointment Request    Patient is requesting a sooner appointment.  Patient declined first available appointment listed as well as another facility and provider .  Patient will not accept being placed on the waitlist and is requesting a message be sent to doctor.    Name of Caller: Patient     When is the first available appointment? 10/9/2019    Symptoms: Medical Clearance    Would the patient rather a call back or a response via [facility]? Call back     Best Call Back Number: [phone]    Work: [phone]    Additional Information:

## 2019-08-26 NOTE — PROGRESS NOTES
"Subjective:      Shu Mendoza is a 54 y.o. female who returns today to "check her urine." She is an established patient of Dr. Almanzar; however has not been seen since 2018.     The patient has a history of recurrent UTIs, OAB, and microhematuria.     Scheduled for mastectomy on September 11th and states that she wants to check her urine before surgery. Denies bothersome urinary symptoms today including dysuria, frequency and urgency. Doing well on ditropan 5 mg XL, denies SE. Denies flank pain and gross hematuria. No recent UTIs.      The following portions of the patient's history were reviewed and updated as appropriate: allergies, current medications, past family history, past medical history, past social history, past surgical history and problem list.    Review of Systems  Constitutional: no fever or chills  ENT: no nasal congestion or sore throat  Respiratory: no cough or shortness of breath  Cardiovascular: no chest pain or palpitations  Gastrointestinal: no nausea or vomiting, tolerating diet  Genitourinary: as per HPI  Hematologic/Lymphatic: no easy bruising or lymphadenopathy  Musculoskeletal: no arthralgias or myalgias  Neurological: no seizures or tremors  Behavioral/Psych: no auditory or visual hallucinations     Objective:   Vital Signs:   Vitals:    08/27/19 0834   BP: (!) 153/88   Pulse: 84       Physical Exam   General: alert and oriented, no acute distress  Head: normocephalic, atraumatic  Neck: supple, no lymphadenopathy, normal ROM, no masses  Respiratory: Symmetric expansion, non-labored breathing  Cardiovascular: regular rate and rhythm, nomal pulses, no peripheral edema  Abdomen: soft, non tender, non distended, no palpable masses, no hernias, no hepatomegaly or splenomegaly  Pelvic: not examined   Lymphatic: no inguinal nodes  Skin: normal coloration and turgor, no rashes, no suspicious skin lesions noted  Neuro: alert and oriented x3, no gross deficits  Psych: normal judgment and " insight, normal mood/affect and non-anxious  No CVA tenderness    Lab Review   Urinalysis demonstrates positive for red blood cells (250 hailey/mL), glucose (500 mg /dL)  Lab Results   Component Value Date    WBC 8.25 08/13/2019    HGB 13.0 08/13/2019    HCT 39.7 08/13/2019    MCV 85 08/13/2019     08/13/2019     Lab Results   Component Value Date    CREATININE 0.7 08/13/2019    BUN 11 08/13/2019     Imaging   None    Assessment:   Abnormal urinalysis  Microhematuria   OAB    Plan:   Shu was seen today for follow-up.    Diagnoses and all orders for this visit:    Abnormal urinalysis  -     Urine culture  -     Urinalysis Microscopic    Microhematuria    OAB (overactive bladder)    Plan:  --Urine culture and micro UA today  --If >3 RBC/HPF may proceed with repeat hematuria workup   --Continue ditropan   --Will call with results and determine plan

## 2019-08-27 ENCOUNTER — OFFICE VISIT (OUTPATIENT)
Dept: UROLOGY | Facility: CLINIC | Age: 54
End: 2019-08-27
Payer: COMMERCIAL

## 2019-08-27 VITALS
HEIGHT: 64 IN | WEIGHT: 190 LBS | SYSTOLIC BLOOD PRESSURE: 153 MMHG | HEART RATE: 84 BPM | DIASTOLIC BLOOD PRESSURE: 88 MMHG | BODY MASS INDEX: 32.44 KG/M2

## 2019-08-27 DIAGNOSIS — R31.29 MICROHEMATURIA: ICD-10-CM

## 2019-08-27 DIAGNOSIS — R82.90 ABNORMAL URINALYSIS: Primary | ICD-10-CM

## 2019-08-27 DIAGNOSIS — N32.81 OAB (OVERACTIVE BLADDER): ICD-10-CM

## 2019-08-27 LAB
BACTERIA #/AREA URNS AUTO: NORMAL /HPF
MICROSCOPIC COMMENT: NORMAL
RBC #/AREA URNS AUTO: 1 /HPF (ref 0–4)
SQUAMOUS #/AREA URNS AUTO: 0 /HPF
WBC #/AREA URNS AUTO: 0 /HPF (ref 0–5)

## 2019-08-27 PROCEDURE — 81001 URINALYSIS AUTO W/SCOPE: CPT

## 2019-08-27 PROCEDURE — 3008F BODY MASS INDEX DOCD: CPT | Mod: CPTII,S$GLB,, | Performed by: NURSE PRACTITIONER

## 2019-08-27 PROCEDURE — 99213 PR OFFICE/OUTPT VISIT, EST, LEVL III, 20-29 MIN: ICD-10-PCS | Mod: S$GLB,,, | Performed by: NURSE PRACTITIONER

## 2019-08-27 PROCEDURE — 87086 URINE CULTURE/COLONY COUNT: CPT

## 2019-08-27 PROCEDURE — 3008F PR BODY MASS INDEX (BMI) DOCUMENTED: ICD-10-PCS | Mod: CPTII,S$GLB,, | Performed by: NURSE PRACTITIONER

## 2019-08-27 PROCEDURE — 99213 OFFICE O/P EST LOW 20 MIN: CPT | Mod: S$GLB,,, | Performed by: NURSE PRACTITIONER

## 2019-08-28 LAB — BACTERIA UR CULT: NORMAL

## 2019-09-10 ENCOUNTER — TELEPHONE (OUTPATIENT)
Dept: SURGERY | Facility: CLINIC | Age: 54
End: 2019-09-10

## 2019-09-10 NOTE — TELEPHONE ENCOUNTER
Spoke with pt regarding surgery, pt advised to arrive to [facility] at 0600 for 0800 surgery, pt verbalized understanding, pre-op education reinforced, all questions answered at this time, pt given reassurance

## 2019-09-11 ENCOUNTER — HOSPITAL ENCOUNTER (OUTPATIENT)
Dept: RADIOLOGY | Facility: OTHER | Age: 54
Discharge: HOME | DRG: 580 | End: 2019-09-11
Attending: SURGERY | Admitting: PLASTIC SURGERY
Payer: COMMERCIAL

## 2019-09-11 ENCOUNTER — ANESTHESIA (OUTPATIENT)
Dept: SURGERY | Facility: OTHER | Age: 54
DRG: 580 | End: 2019-09-11
Payer: COMMERCIAL

## 2019-09-11 ENCOUNTER — HOSPITAL ENCOUNTER (INPATIENT)
Facility: OTHER | Age: 54
LOS: 3 days | Discharge: HOME | DRG: 580 | End: 2019-09-14
Attending: PLASTIC SURGERY | Admitting: PLASTIC SURGERY
Payer: COMMERCIAL

## 2019-09-11 DIAGNOSIS — C50.112 MALIGNANT NEOPLASM OF CENTRAL PORTION OF LEFT BREAST IN FEMALE, ESTROGEN RECEPTOR POSITIVE: ICD-10-CM

## 2019-09-11 DIAGNOSIS — C50.912 MALIGNANT NEOPLASM OF LEFT FEMALE BREAST, UNSPECIFIED ESTROGEN RECEPTOR STATUS, UNSPECIFIED SITE OF BREAST: Primary | ICD-10-CM

## 2019-09-11 DIAGNOSIS — Z17.0 MALIGNANT NEOPLASM OF CENTRAL PORTION OF LEFT BREAST IN FEMALE, ESTROGEN RECEPTOR POSITIVE: ICD-10-CM

## 2019-09-11 LAB
POCT GLUCOSE: 121 MG/DL (ref 70–110)
POCT GLUCOSE: 132 MG/DL (ref 70–110)

## 2019-09-11 PROCEDURE — 88341 IMHCHEM/IMCYTCHM EA ADD ANTB: CPT | Mod: 26,59,, | Performed by: PATHOLOGY

## 2019-09-11 PROCEDURE — 88342 IMHCHEM/IMCYTCHM 1ST ANTB: CPT | Mod: 26,59,, | Performed by: PATHOLOGY

## 2019-09-11 PROCEDURE — 37000008 HC ANESTHESIA 1ST 15 MINUTES: Performed by: PLASTIC SURGERY

## 2019-09-11 PROCEDURE — 63600175 PHARM REV CODE 636 W HCPCS: Performed by: ANESTHESIOLOGY

## 2019-09-11 PROCEDURE — S2068 BREAST DIEP OR SIEA FLAP: HCPCS | Mod: 82,LT,, | Performed by: PLASTIC SURGERY

## 2019-09-11 PROCEDURE — S2068 PR  BREAST DIEP OR SIEA FLAP: ICD-10-PCS | Mod: 82,LT,, | Performed by: PLASTIC SURGERY

## 2019-09-11 PROCEDURE — 88360 PR  TUMOR IMMUNOHISTOCHEM/MANUAL: ICD-10-PCS | Mod: 26,,, | Performed by: PATHOLOGY

## 2019-09-11 PROCEDURE — 88360 TUMOR IMMUNOHISTOCHEM/MANUAL: CPT | Mod: 26,,, | Performed by: PATHOLOGY

## 2019-09-11 PROCEDURE — 88331 PATH CONSLTJ SURG 1 BLK 1SPC: CPT | Mod: 26,,, | Performed by: PATHOLOGY

## 2019-09-11 PROCEDURE — C1729 CATH, DRAINAGE: HCPCS | Performed by: PLASTIC SURGERY

## 2019-09-11 PROCEDURE — 11000001 HC ACUTE MED/SURG PRIVATE ROOM

## 2019-09-11 PROCEDURE — 27201423 OPTIME MED/SURG SUP & DEVICES STERILE SUPPLY: Performed by: PLASTIC SURGERY

## 2019-09-11 PROCEDURE — 63600175 PHARM REV CODE 636 W HCPCS: Performed by: SURGERY

## 2019-09-11 PROCEDURE — 27800903 OPTIME MED/SURG SUP & DEVICES OTHER IMPLANTS: Performed by: PLASTIC SURGERY

## 2019-09-11 PROCEDURE — 38900 IO MAP OF SENT LYMPH NODE: CPT | Mod: LT,,, | Performed by: SURGERY

## 2019-09-11 PROCEDURE — 36000709 HC OR TIME LEV III EA ADD 15 MIN: Performed by: PLASTIC SURGERY

## 2019-09-11 PROCEDURE — 88342 IMHCHEM/IMCYTCHM 1ST ANTB: CPT | Performed by: PATHOLOGY

## 2019-09-11 PROCEDURE — C9290 INJ, BUPIVACAINE LIPOSOME: HCPCS | Performed by: PLASTIC SURGERY

## 2019-09-11 PROCEDURE — 25000003 PHARM REV CODE 250: Performed by: ANESTHESIOLOGY

## 2019-09-11 PROCEDURE — 19307 MAST MOD RAD: CPT | Mod: LT,,, | Performed by: SURGERY

## 2019-09-11 PROCEDURE — 25000003 PHARM REV CODE 250: Performed by: PLASTIC SURGERY

## 2019-09-11 PROCEDURE — 25000003 PHARM REV CODE 250: Performed by: NURSE ANESTHETIST, CERTIFIED REGISTERED

## 2019-09-11 PROCEDURE — 82962 GLUCOSE BLOOD TEST: CPT | Performed by: PLASTIC SURGERY

## 2019-09-11 PROCEDURE — 88307 TISSUE EXAM BY PATHOLOGIST: CPT | Mod: 26,,, | Performed by: PATHOLOGY

## 2019-09-11 PROCEDURE — 88341 TISSUE SPECIMEN TO PATHOLOGY - SURGERY: ICD-10-PCS | Mod: 26,59,, | Performed by: PATHOLOGY

## 2019-09-11 PROCEDURE — 19307 PR MASTECTOMY, MODIFIED RADICAL: ICD-10-PCS | Mod: LT,,, | Performed by: SURGERY

## 2019-09-11 PROCEDURE — 88305 TISSUE SPECIMEN TO PATHOLOGY - SURGERY: ICD-10-PCS | Mod: 26,,, | Performed by: PATHOLOGY

## 2019-09-11 PROCEDURE — 27000221 HC OXYGEN, UP TO 24 HOURS

## 2019-09-11 PROCEDURE — 63600175 PHARM REV CODE 636 W HCPCS: Performed by: NURSE ANESTHETIST, CERTIFIED REGISTERED

## 2019-09-11 PROCEDURE — 25000003 PHARM REV CODE 250: Performed by: SURGERY

## 2019-09-11 PROCEDURE — 88305 TISSUE EXAM BY PATHOLOGIST: CPT | Performed by: PATHOLOGY

## 2019-09-11 PROCEDURE — 88305 TISSUE EXAM BY PATHOLOGIST: CPT | Mod: 26,,, | Performed by: PATHOLOGY

## 2019-09-11 PROCEDURE — 63600175 PHARM REV CODE 636 W HCPCS: Performed by: PLASTIC SURGERY

## 2019-09-11 PROCEDURE — 88307 TISSUE SPECIMEN TO PATHOLOGY - SURGERY: ICD-10-PCS | Mod: 26,,, | Performed by: PATHOLOGY

## 2019-09-11 PROCEDURE — 88331 TISSUE SPECIMEN TO PATHOLOGY - SURGERY: ICD-10-PCS | Mod: 26,,, | Performed by: PATHOLOGY

## 2019-09-11 PROCEDURE — 88342 TISSUE SPECIMEN TO PATHOLOGY - SURGERY: ICD-10-PCS | Mod: 26,59,, | Performed by: PATHOLOGY

## 2019-09-11 PROCEDURE — 71000033 HC RECOVERY, INTIAL HOUR: Performed by: PLASTIC SURGERY

## 2019-09-11 PROCEDURE — 63600175 PHARM REV CODE 636 W HCPCS: Mod: JG | Performed by: SURGERY

## 2019-09-11 PROCEDURE — A9520 TC99 TILMANOCEPT DIAG 0.5MCI: HCPCS

## 2019-09-11 PROCEDURE — 38900 PR INTRAOPERATIVE SENTINEL LYMPH NODE ID W DYE INJECTION: ICD-10-PCS | Mod: LT,,, | Performed by: SURGERY

## 2019-09-11 PROCEDURE — 94761 N-INVAS EAR/PLS OXIMETRY MLT: CPT

## 2019-09-11 PROCEDURE — 37000009 HC ANESTHESIA EA ADD 15 MINS: Performed by: PLASTIC SURGERY

## 2019-09-11 PROCEDURE — 94799 UNLISTED PULMONARY SVC/PX: CPT

## 2019-09-11 PROCEDURE — 88331 PATH CONSLTJ SURG 1 BLK 1SPC: CPT | Performed by: PATHOLOGY

## 2019-09-11 PROCEDURE — P9045 ALBUMIN (HUMAN), 5%, 250 ML: HCPCS | Mod: JG | Performed by: NURSE ANESTHETIST, CERTIFIED REGISTERED

## 2019-09-11 PROCEDURE — 36000708 HC OR TIME LEV III 1ST 15 MIN: Performed by: PLASTIC SURGERY

## 2019-09-11 PROCEDURE — 71000039 HC RECOVERY, EACH ADD'L HOUR: Performed by: PLASTIC SURGERY

## 2019-09-11 DEVICE — COUPLER MICROVAS ANSTMS 2.5MM: Type: IMPLANTABLE DEVICE | Site: BREAST | Status: FUNCTIONAL

## 2019-09-11 RX ORDER — ESMOLOL HYDROCHLORIDE 10 MG/ML
INJECTION INTRAVENOUS
Status: DISCONTINUED | OUTPATIENT
Start: 2019-09-11 | End: 2019-09-11

## 2019-09-11 RX ORDER — ACETAMINOPHEN 500 MG
1000 TABLET ORAL
Status: COMPLETED | OUTPATIENT
Start: 2019-09-11 | End: 2019-09-11

## 2019-09-11 RX ORDER — GLYCOPYRROLATE 0.2 MG/ML
INJECTION INTRAMUSCULAR; INTRAVENOUS
Status: DISCONTINUED | OUTPATIENT
Start: 2019-09-11 | End: 2019-09-11

## 2019-09-11 RX ORDER — SODIUM CHLORIDE 0.9 % (FLUSH) 0.9 %
3 SYRINGE (ML) INJECTION
Status: DISCONTINUED | OUTPATIENT
Start: 2019-09-11 | End: 2019-09-11

## 2019-09-11 RX ORDER — SODIUM CHLORIDE, SODIUM LACTATE, POTASSIUM CHLORIDE, CALCIUM CHLORIDE 600; 310; 30; 20 MG/100ML; MG/100ML; MG/100ML; MG/100ML
INJECTION, SOLUTION INTRAVENOUS CONTINUOUS
Status: DISCONTINUED | OUTPATIENT
Start: 2019-09-11 | End: 2019-09-11

## 2019-09-11 RX ORDER — CEFAZOLIN SODIUM 2 G/50ML
2 SOLUTION INTRAVENOUS
Status: COMPLETED | OUTPATIENT
Start: 2019-09-11 | End: 2019-09-12

## 2019-09-11 RX ORDER — GLUCAGON 1 MG
1 KIT INJECTION
Status: DISCONTINUED | OUTPATIENT
Start: 2019-09-11 | End: 2019-09-16 | Stop reason: HOSPADM

## 2019-09-11 RX ORDER — DORZOLAMIDE HCL 20 MG/ML
1 SOLUTION/ DROPS OPHTHALMIC 3 TIMES DAILY
Status: DISCONTINUED | OUTPATIENT
Start: 2019-09-11 | End: 2019-09-16 | Stop reason: HOSPADM

## 2019-09-11 RX ORDER — HYDROXYZINE HYDROCHLORIDE 25 MG/1
25 TABLET, FILM COATED ORAL 3 TIMES DAILY PRN
Status: DISCONTINUED | OUTPATIENT
Start: 2019-09-11 | End: 2019-09-16 | Stop reason: HOSPADM

## 2019-09-11 RX ORDER — ACETAMINOPHEN 500 MG
1000 TABLET ORAL EVERY 8 HOURS
Status: DISCONTINUED | OUTPATIENT
Start: 2019-09-11 | End: 2019-09-16 | Stop reason: HOSPADM

## 2019-09-11 RX ORDER — ROCURONIUM BROMIDE 10 MG/ML
INJECTION, SOLUTION INTRAVENOUS
Status: DISCONTINUED | OUTPATIENT
Start: 2019-09-11 | End: 2019-09-11

## 2019-09-11 RX ORDER — FENTANYL CITRATE 50 UG/ML
INJECTION, SOLUTION INTRAMUSCULAR; INTRAVENOUS
Status: DISCONTINUED | OUTPATIENT
Start: 2019-09-11 | End: 2019-09-11

## 2019-09-11 RX ORDER — PROPOFOL 10 MG/ML
VIAL (ML) INTRAVENOUS
Status: DISCONTINUED | OUTPATIENT
Start: 2019-09-11 | End: 2019-09-11

## 2019-09-11 RX ORDER — ISOSULFAN BLUE 50 MG/5ML
INJECTION, SOLUTION SUBCUTANEOUS
Status: DISCONTINUED | OUTPATIENT
Start: 2019-09-11 | End: 2019-09-11 | Stop reason: HOSPADM

## 2019-09-11 RX ORDER — ATORVASTATIN CALCIUM 10 MG/1
10 TABLET, FILM COATED ORAL NIGHTLY
Status: DISCONTINUED | OUTPATIENT
Start: 2019-09-11 | End: 2019-09-16 | Stop reason: HOSPADM

## 2019-09-11 RX ORDER — CEFAZOLIN SODIUM 1 G/3ML
2 INJECTION, POWDER, FOR SOLUTION INTRAMUSCULAR; INTRAVENOUS
Status: COMPLETED | OUTPATIENT
Start: 2019-09-11 | End: 2019-09-11

## 2019-09-11 RX ORDER — NEOSTIGMINE METHYLSULFATE 1 MG/ML
INJECTION, SOLUTION INTRAVENOUS
Status: DISCONTINUED | OUTPATIENT
Start: 2019-09-11 | End: 2019-09-11

## 2019-09-11 RX ORDER — HYDROMORPHONE HYDROCHLORIDE 2 MG/ML
0.4 INJECTION, SOLUTION INTRAMUSCULAR; INTRAVENOUS; SUBCUTANEOUS EVERY 5 MIN PRN
Status: DISCONTINUED | OUTPATIENT
Start: 2019-09-11 | End: 2019-09-11 | Stop reason: HOSPADM

## 2019-09-11 RX ORDER — INSULIN ASPART 100 [IU]/ML
0-5 INJECTION, SOLUTION INTRAVENOUS; SUBCUTANEOUS
Status: DISCONTINUED | OUTPATIENT
Start: 2019-09-11 | End: 2019-09-16 | Stop reason: HOSPADM

## 2019-09-11 RX ORDER — DIPHENHYDRAMINE HYDROCHLORIDE 50 MG/ML
12.5 INJECTION INTRAMUSCULAR; INTRAVENOUS EVERY 30 MIN PRN
Status: COMPLETED | OUTPATIENT
Start: 2019-09-11 | End: 2019-09-11

## 2019-09-11 RX ORDER — ALBUMIN HUMAN 50 G/1000ML
SOLUTION INTRAVENOUS CONTINUOUS PRN
Status: DISCONTINUED | OUTPATIENT
Start: 2019-09-11 | End: 2019-09-11

## 2019-09-11 RX ORDER — LIDOCAINE HYDROCHLORIDE AND EPINEPHRINE 10; 10 MG/ML; UG/ML
INJECTION, SOLUTION INFILTRATION; PERINEURAL
Status: DISCONTINUED | OUTPATIENT
Start: 2019-09-11 | End: 2019-09-11 | Stop reason: HOSPADM

## 2019-09-11 RX ORDER — PAPAVERINE HYDROCHLORIDE 30 MG/ML
INJECTION INTRAMUSCULAR; INTRAVENOUS
Status: DISCONTINUED | OUTPATIENT
Start: 2019-09-11 | End: 2019-09-11 | Stop reason: HOSPADM

## 2019-09-11 RX ORDER — LABETALOL HYDROCHLORIDE 5 MG/ML
INJECTION, SOLUTION INTRAVENOUS
Status: DISCONTINUED | OUTPATIENT
Start: 2019-09-11 | End: 2019-09-11

## 2019-09-11 RX ORDER — CYCLOBENZAPRINE HCL 10 MG
10 TABLET ORAL 3 TIMES DAILY
Status: DISCONTINUED | OUTPATIENT
Start: 2019-09-11 | End: 2019-09-16 | Stop reason: HOSPADM

## 2019-09-11 RX ORDER — HYDROCODONE BITARTRATE AND ACETAMINOPHEN 5; 325 MG/1; MG/1
1 TABLET ORAL
Status: DISCONTINUED | OUTPATIENT
Start: 2019-09-11 | End: 2019-09-11 | Stop reason: HOSPADM

## 2019-09-11 RX ORDER — HYDROMORPHONE HYDROCHLORIDE 1 MG/ML
1 INJECTION, SOLUTION INTRAMUSCULAR; INTRAVENOUS; SUBCUTANEOUS EVERY 4 HOURS PRN
Status: DISCONTINUED | OUTPATIENT
Start: 2019-09-11 | End: 2019-09-16 | Stop reason: HOSPADM

## 2019-09-11 RX ORDER — MIDAZOLAM HYDROCHLORIDE 1 MG/ML
INJECTION INTRAMUSCULAR; INTRAVENOUS
Status: DISCONTINUED | OUTPATIENT
Start: 2019-09-11 | End: 2019-09-11

## 2019-09-11 RX ORDER — MUPIROCIN 20 MG/G
1 OINTMENT TOPICAL 2 TIMES DAILY
Status: DISCONTINUED | OUTPATIENT
Start: 2019-09-11 | End: 2019-09-16 | Stop reason: HOSPADM

## 2019-09-11 RX ORDER — LIDOCAINE HCL/PF 100 MG/5ML
SYRINGE (ML) INTRAVENOUS
Status: DISCONTINUED | OUTPATIENT
Start: 2019-09-11 | End: 2019-09-11

## 2019-09-11 RX ORDER — ONDANSETRON 2 MG/ML
4 INJECTION INTRAMUSCULAR; INTRAVENOUS EVERY 12 HOURS PRN
Status: DISCONTINUED | OUTPATIENT
Start: 2019-09-11 | End: 2019-09-16 | Stop reason: HOSPADM

## 2019-09-11 RX ORDER — OXYCODONE HYDROCHLORIDE 5 MG/1
5 TABLET ORAL EVERY 4 HOURS PRN
Status: DISCONTINUED | OUTPATIENT
Start: 2019-09-11 | End: 2019-09-16 | Stop reason: HOSPADM

## 2019-09-11 RX ORDER — IBUPROFEN 200 MG
16 TABLET ORAL
Status: DISCONTINUED | OUTPATIENT
Start: 2019-09-11 | End: 2019-09-16 | Stop reason: HOSPADM

## 2019-09-11 RX ORDER — ONDANSETRON 2 MG/ML
4 INJECTION INTRAMUSCULAR; INTRAVENOUS DAILY PRN
Status: DISCONTINUED | OUTPATIENT
Start: 2019-09-11 | End: 2019-09-11 | Stop reason: HOSPADM

## 2019-09-11 RX ORDER — BACITRACIN 50000 [IU]/1
INJECTION, POWDER, FOR SOLUTION INTRAMUSCULAR
Status: DISCONTINUED | OUTPATIENT
Start: 2019-09-11 | End: 2019-09-11 | Stop reason: HOSPADM

## 2019-09-11 RX ORDER — MEPERIDINE HYDROCHLORIDE 25 MG/ML
12.5 INJECTION INTRAMUSCULAR; INTRAVENOUS; SUBCUTANEOUS ONCE AS NEEDED
Status: DISCONTINUED | OUTPATIENT
Start: 2019-09-11 | End: 2019-09-11 | Stop reason: HOSPADM

## 2019-09-11 RX ORDER — HEPARIN SODIUM 10000 [USP'U]/ML
INJECTION, SOLUTION INTRAVENOUS; SUBCUTANEOUS
Status: DISCONTINUED | OUTPATIENT
Start: 2019-09-11 | End: 2019-09-11 | Stop reason: HOSPADM

## 2019-09-11 RX ORDER — IBUPROFEN 200 MG
24 TABLET ORAL
Status: DISCONTINUED | OUTPATIENT
Start: 2019-09-11 | End: 2019-09-16 | Stop reason: HOSPADM

## 2019-09-11 RX ORDER — PREGABALIN 75 MG/1
75 CAPSULE ORAL
Status: COMPLETED | OUTPATIENT
Start: 2019-09-11 | End: 2019-09-11

## 2019-09-11 RX ORDER — AMOXICILLIN 250 MG
1 CAPSULE ORAL 2 TIMES DAILY
Status: DISCONTINUED | OUTPATIENT
Start: 2019-09-11 | End: 2019-09-16 | Stop reason: HOSPADM

## 2019-09-11 RX ORDER — FAMOTIDINE 20 MG/1
20 TABLET, FILM COATED ORAL
Status: COMPLETED | OUTPATIENT
Start: 2019-09-11 | End: 2019-09-11

## 2019-09-11 RX ORDER — LATANOPROST 50 UG/ML
1 SOLUTION/ DROPS OPHTHALMIC NIGHTLY
Status: DISCONTINUED | OUTPATIENT
Start: 2019-09-11 | End: 2019-09-16 | Stop reason: HOSPADM

## 2019-09-11 RX ORDER — SODIUM CHLORIDE, SODIUM LACTATE, POTASSIUM CHLORIDE, CALCIUM CHLORIDE 600; 310; 30; 20 MG/100ML; MG/100ML; MG/100ML; MG/100ML
INJECTION, SOLUTION INTRAVENOUS CONTINUOUS
Status: DISCONTINUED | OUTPATIENT
Start: 2019-09-11 | End: 2019-09-12

## 2019-09-11 RX ORDER — OXYBUTYNIN CHLORIDE 5 MG/1
5 TABLET, EXTENDED RELEASE ORAL DAILY
Status: DISCONTINUED | OUTPATIENT
Start: 2019-09-12 | End: 2019-09-16 | Stop reason: HOSPADM

## 2019-09-11 RX ADMIN — HYDROMORPHONE HYDROCHLORIDE 1 MG: 1 INJECTION, SOLUTION INTRAMUSCULAR; INTRAVENOUS; SUBCUTANEOUS at 06:09

## 2019-09-11 RX ADMIN — ROCURONIUM BROMIDE 20 MG: 10 INJECTION INTRAVENOUS at 08:09

## 2019-09-11 RX ADMIN — FENTANYL CITRATE 100 MCG: 50 INJECTION, SOLUTION INTRAMUSCULAR; INTRAVENOUS at 08:09

## 2019-09-11 RX ADMIN — ALBUMIN (HUMAN): 2.5 SOLUTION INTRAVENOUS at 10:09

## 2019-09-11 RX ADMIN — ROCURONIUM BROMIDE 20 MG: 10 INJECTION INTRAVENOUS at 09:09

## 2019-09-11 RX ADMIN — PREGABALIN 75 MG: 75 CAPSULE ORAL at 06:09

## 2019-09-11 RX ADMIN — FENTANYL CITRATE 50 MCG: 50 INJECTION, SOLUTION INTRAMUSCULAR; INTRAVENOUS at 09:09

## 2019-09-11 RX ADMIN — FENTANYL CITRATE 50 MCG: 50 INJECTION, SOLUTION INTRAMUSCULAR; INTRAVENOUS at 10:09

## 2019-09-11 RX ADMIN — FENTANYL CITRATE 50 MCG: 50 INJECTION, SOLUTION INTRAMUSCULAR; INTRAVENOUS at 08:09

## 2019-09-11 RX ADMIN — MIDAZOLAM HYDROCHLORIDE 2 MG: 1 INJECTION, SOLUTION INTRAMUSCULAR; INTRAVENOUS at 07:09

## 2019-09-11 RX ADMIN — CEFAZOLIN 2 G: 330 INJECTION, POWDER, FOR SOLUTION INTRAMUSCULAR; INTRAVENOUS at 08:09

## 2019-09-11 RX ADMIN — PROPOFOL 40 MG: 10 INJECTION, EMULSION INTRAVENOUS at 09:09

## 2019-09-11 RX ADMIN — ROCURONIUM BROMIDE 10 MG: 10 INJECTION INTRAVENOUS at 08:09

## 2019-09-11 RX ADMIN — PROPOFOL 50 MG: 10 INJECTION, EMULSION INTRAVENOUS at 10:09

## 2019-09-11 RX ADMIN — HYDROMORPHONE HYDROCHLORIDE 0.4 MG: 2 INJECTION, SOLUTION INTRAMUSCULAR; INTRAVENOUS; SUBCUTANEOUS at 02:09

## 2019-09-11 RX ADMIN — DIPHENHYDRAMINE HYDROCHLORIDE 12.5 MG: 50 INJECTION INTRAMUSCULAR; INTRAVENOUS at 04:09

## 2019-09-11 RX ADMIN — PROPOFOL 200 MG: 10 INJECTION, EMULSION INTRAVENOUS at 08:09

## 2019-09-11 RX ADMIN — ESMOLOL HYDROCHLORIDE 20 MG: 10 INJECTION INTRAVENOUS at 09:09

## 2019-09-11 RX ADMIN — FENTANYL CITRATE 50 MCG: 50 INJECTION, SOLUTION INTRAMUSCULAR; INTRAVENOUS at 11:09

## 2019-09-11 RX ADMIN — CEFAZOLIN 2 G: 330 INJECTION, POWDER, FOR SOLUTION INTRAMUSCULAR; INTRAVENOUS at 10:09

## 2019-09-11 RX ADMIN — ROCURONIUM BROMIDE 20 MG: 10 INJECTION INTRAVENOUS at 10:09

## 2019-09-11 RX ADMIN — LIDOCAINE HYDROCHLORIDE 50 MG: 20 INJECTION, SOLUTION INTRAVENOUS at 08:09

## 2019-09-11 RX ADMIN — CARBOXYMETHYLCELLULOSE SODIUM 2 DROP: 2.5 SOLUTION/ DROPS OPHTHALMIC at 08:09

## 2019-09-11 RX ADMIN — ACETAMINOPHEN 1000 MG: 500 TABLET, FILM COATED ORAL at 06:09

## 2019-09-11 RX ADMIN — ROCURONIUM BROMIDE 20 MG: 10 INJECTION INTRAVENOUS at 11:09

## 2019-09-11 RX ADMIN — LATANOPROST 1 DROP: 50 SOLUTION OPHTHALMIC at 08:09

## 2019-09-11 RX ADMIN — HYDROMORPHONE HYDROCHLORIDE 1 MG: 1 INJECTION, SOLUTION INTRAMUSCULAR; INTRAVENOUS; SUBCUTANEOUS at 10:09

## 2019-09-11 RX ADMIN — MUPIROCIN 1 G: 20 OINTMENT TOPICAL at 08:09

## 2019-09-11 RX ADMIN — SODIUM CHLORIDE, SODIUM LACTATE, POTASSIUM CHLORIDE, AND CALCIUM CHLORIDE: .6; .31; .03; .02 INJECTION, SOLUTION INTRAVENOUS at 06:09

## 2019-09-11 RX ADMIN — HYDROMORPHONE HYDROCHLORIDE 0.4 MG: 2 INJECTION, SOLUTION INTRAMUSCULAR; INTRAVENOUS; SUBCUTANEOUS at 03:09

## 2019-09-11 RX ADMIN — ATORVASTATIN CALCIUM 10 MG: 10 TABLET, FILM COATED ORAL at 08:09

## 2019-09-11 RX ADMIN — HYDROXYZINE HYDROCHLORIDE 25 MG: 25 TABLET, FILM COATED ORAL at 06:09

## 2019-09-11 RX ADMIN — GLYCOPYRROLATE 0.8 MG: 0.2 INJECTION, SOLUTION INTRAMUSCULAR; INTRAVENOUS at 12:09

## 2019-09-11 RX ADMIN — DIPHENHYDRAMINE HYDROCHLORIDE 12.5 MG: 50 INJECTION INTRAMUSCULAR; INTRAVENOUS at 03:09

## 2019-09-11 RX ADMIN — NEOSTIGMINE METHYLSULFATE 5 MG: 1 INJECTION INTRAVENOUS at 12:09

## 2019-09-11 RX ADMIN — ACETAMINOPHEN 1000 MG: 500 TABLET ORAL at 09:09

## 2019-09-11 RX ADMIN — ALBUMIN (HUMAN): 2.5 SOLUTION INTRAVENOUS at 08:09

## 2019-09-11 RX ADMIN — CYCLOBENZAPRINE HYDROCHLORIDE 10 MG: 10 TABLET, FILM COATED ORAL at 08:09

## 2019-09-11 RX ADMIN — DORZOLAMIDE HYDROCHLORIDE 1 DROP: 20 SOLUTION/ DROPS OPHTHALMIC at 09:09

## 2019-09-11 RX ADMIN — CEFAZOLIN SODIUM 2 G: 2 SOLUTION INTRAVENOUS at 06:09

## 2019-09-11 RX ADMIN — FENTANYL CITRATE 100 MCG: 50 INJECTION, SOLUTION INTRAMUSCULAR; INTRAVENOUS at 01:09

## 2019-09-11 RX ADMIN — SODIUM CHLORIDE, SODIUM LACTATE, POTASSIUM CHLORIDE, AND CALCIUM CHLORIDE: 600; 310; 30; 20 INJECTION, SOLUTION INTRAVENOUS at 07:09

## 2019-09-11 RX ADMIN — FAMOTIDINE 20 MG: 20 TABLET, FILM COATED ORAL at 06:09

## 2019-09-11 RX ADMIN — LABETALOL HYDROCHLORIDE 10 MG: 5 INJECTION, SOLUTION INTRAVENOUS at 10:09

## 2019-09-11 RX ADMIN — SENNOSIDES,DOCUSATE SODIUM 1 TABLET: 8.6; 5 TABLET, FILM COATED ORAL at 08:09

## 2019-09-11 RX ADMIN — OXYCODONE HYDROCHLORIDE 5 MG: 5 TABLET ORAL at 08:09

## 2019-09-11 RX ADMIN — PROPOFOL 50 MG: 10 INJECTION, EMULSION INTRAVENOUS at 12:09

## 2019-09-11 RX ADMIN — SODIUM CHLORIDE, SODIUM LACTATE, POTASSIUM CHLORIDE, AND CALCIUM CHLORIDE: 600; 310; 30; 20 INJECTION, SOLUTION INTRAVENOUS at 12:09

## 2019-09-11 RX ADMIN — PROPOFOL 50 MG: 10 INJECTION, EMULSION INTRAVENOUS at 08:09

## 2019-09-11 NOTE — BRIEF OP NOTE
"[facility]  Surgery Department  Operative Note    SUMMARY     Date of Procedure: 9/11/2019     Procedure: Procedure(s) (LRB):  RECONSTRUCTION, BREAST, USING SALEEM FREE FLAP - UNILATERAL (Left)  MASTECTOMY (Left)  MASTECTOMY, WITH SENTINEL NODE BIOPSY AND AXILLARY LYMPHADENECTOMY LEFT (Left)     Surgeon(s) and Role:  Panel 1:     * Derek Colin MD - Primary     * Joe Holden MD - Assisting     * Vee Angel MD - Resident - Assisting  Panel 2:     * Shikha Mccray MD - Primary        Pre-Operative Diagnosis: Malignant neoplasm of left female breast, unspecified estrogen receptor status, unspecified site of breast [C50.912]    Post-Operative Diagnosis: Post-Op Diagnosis Codes:     * Malignant neoplasm of left female breast, unspecified estrogen receptor status, unspecified site of breast [C50.912]    Anesthesia: General    Technical Procedures Used: open/micro    Description of the Findings of the Procedure:   Micro Information    For each flap (any arterial anastomosis is a new flap):    · Recipient site (eg "Right Breast"): Left breast   · Weight: 858 g   · Number of perforators: 1  · Name arterial pedicle: DIEA  · Recipient artery: ZIYAD  · Estimate size of arterial anastomosis: 3mm  · Pedicle vein, donor/ recipien,  sizet: Donor -DIEV, Recipient - IMV, 2.5mm  · Additional veins, donor/recipient: Donor - DIEV, Recipient - IMV, 2.5mm        Significant Surgical Tasks Conducted by the Assistant(s), if Applicable: n/a    Complications: No    Estimated Blood Loss (EBL): 100 mL           Implants:   Implant Name Type Inv. Item Serial No.  Lot No. LRB No. Used    MICROVAS ANSTMS 2.5MM - [id number]   MICROVAS ANSTMS 2.5MM  BreadS MICRO COMPANIES [id number] Left 2       Specimens:   Specimen (12h ago, onward)    Start     Ordered    09/11/19 1019  Specimen to Pathology - Surgery  Once     Comments:  4) Left Axillary Contents     Start Status     09/11/19 " 1019 Collected (09/11/19 1019) Order ID: [id number]       09/11/19 1019    09/11/19 0954  Specimen to Pathology - Surgery  Once     Comments:  3) Left Breast, Short Superior, Long Lateral (Fresh)     Start Status     09/11/19 0954 Collected (09/11/19 0953) Order ID: [id number]       09/11/19 0953 09/11/19 0847  Specimen to Pathology - Surgery  Once     Comments:  1) Left Axillary [place] Lymph Node # 1 Hot, Blue, 416 (Frozen)2) Left Axillary [place] Lymph Node # 2 Hot, Blue 329 (Frozen)     Start Status     09/11/19 0847 Collected (09/11/19 0854) Order ID: [id number]       09/11/19 0853                  Condition: Good    Disposition: PACU - hemodynamically stable.    Attestation: I was present and scrubbed for the entire procedure.

## 2019-09-11 NOTE — ANESTHESIA POSTPROCEDURE EVALUATION
Anesthesia Post Evaluation    Patient: Shu Mendoza    Procedure(s) Performed: Procedure(s) (LRB):  RECONSTRUCTION, BREAST, USING SALEEM FREE FLAP - UNILATERAL (Left)  MASTECTOMY (Left)  MASTECTOMY, WITH SENTINEL NODE BIOPSY AND AXILLARY LYMPHADENECTOMY LEFT (Left)    Final Anesthesia Type: general  Patient location during evaluation: PACU  Patient participation: Yes- Able to Participate  Level of consciousness: awake and alert  Post-procedure vital signs: reviewed and stable  Pain management: adequate  Airway patency: patent  PONV status at discharge: No PONV  Anesthetic complications: no      Cardiovascular status: blood pressure returned to baseline  Respiratory status: unassisted  Hydration status: euvolemic  Follow-up not needed.          Vitals Value Taken Time   /58 9/11/2019  2:57 PM   Temp 36.8 °C (98.3 °F) 9/11/2019  1:44 PM   Pulse 100 9/11/2019  3:01 PM   Resp 12 9/11/2019  2:15 PM   SpO2 98 % 9/11/2019  3:01 PM   Vitals shown include unvalidated device data.      No case tracking events are documented in the log.      Pain/Min Score: Pain Rating Prior to Med Admin: 8 (9/11/2019  2:47 PM)  Min Score: 8 (9/11/2019  2:14 PM)

## 2019-09-11 NOTE — NURSING
Received patient to room 387 from Recovery. Pt AAO x 4. Resp. Even and unlabored. Dermabond and abd pads CDI to Lt breast. Flap soft with strong doppler noted. Dermabond and abd pads and abd binder CDI to abdomen. F/C patent and draining clear green urine to g/u bag. SCD's in place bilateral. Family @ bedside. Safety maintained. Call light in reach.

## 2019-09-11 NOTE — TRANSFER OF CARE
"Anesthesia Transfer of Care Note    Patient: Shu Mendoza    Procedure(s) Performed: Procedure(s) (LRB):  RECONSTRUCTION, BREAST, USING SALEEM FREE FLAP - UNILATERAL (Left)  MASTECTOMY (Left)  MASTECTOMY, WITH SENTINEL NODE BIOPSY AND AXILLARY LYMPHADENECTOMY LEFT (Left)    Patient location: PACU    Anesthesia Type: general    Transport from OR: Transported from OR on 2-3 L/min O2 by NC with adequate spontaneous ventilation    Post pain: adequate analgesia    Post assessment: no apparent anesthetic complications    Post vital signs: stable    Level of consciousness: awake    Nausea/Vomiting: no nausea/vomiting    Complications: none    Transfer of care protocol was followed      Last vitals:   Visit Vitals  BP (!) 166/94   Pulse 90   Temp 36.7 °C (98 °F)   Resp 18   Ht 5' 4" (1.626 m)   Wt 88.5 kg (195 lb 1.7 oz)   LMP  (LMP Unknown)   SpO2 98%   Breastfeeding? No   BMI 33.49 kg/m²     "

## 2019-09-12 LAB
ALBUMIN SERPL BCP-MCNC: 3.7 G/DL (ref 3.5–5.2)
ALP SERPL-CCNC: 57 U/L (ref 55–135)
ALT SERPL W/O P-5'-P-CCNC: 29 U/L (ref 10–44)
ANION GAP SERPL CALC-SCNC: 8 MMOL/L (ref 8–16)
AST SERPL-CCNC: 30 U/L (ref 10–40)
BILIRUB SERPL-MCNC: 0.6 MG/DL (ref 0.1–1)
BUN SERPL-MCNC: 11 MG/DL (ref 6–20)
CALCIUM SERPL-MCNC: 8.9 MG/DL (ref 8.7–10.5)
CHLORIDE SERPL-SCNC: 104 MMOL/L (ref 95–110)
CO2 SERPL-SCNC: 27 MMOL/L (ref 23–29)
CREAT SERPL-MCNC: 0.8 MG/DL (ref 0.5–1.4)
EST. GFR  (AFRICAN AMERICAN): >60 ML/MIN/1.73 M^2
EST. GFR  (NON AFRICAN AMERICAN): >60 ML/MIN/1.73 M^2
GLUCOSE SERPL-MCNC: 151 MG/DL (ref 70–110)
POCT GLUCOSE: 111 MG/DL (ref 70–110)
POCT GLUCOSE: 111 MG/DL (ref 70–110)
POCT GLUCOSE: 161 MG/DL (ref 70–110)
POCT GLUCOSE: 168 MG/DL (ref 70–110)
POTASSIUM SERPL-SCNC: 3.7 MMOL/L (ref 3.5–5.1)
PROT SERPL-MCNC: 6.5 G/DL (ref 6–8.4)
SODIUM SERPL-SCNC: 139 MMOL/L (ref 136–145)

## 2019-09-12 PROCEDURE — 99900035 HC TECH TIME PER 15 MIN (STAT)

## 2019-09-12 PROCEDURE — 80053 COMPREHEN METABOLIC PANEL: CPT

## 2019-09-12 PROCEDURE — 63600175 PHARM REV CODE 636 W HCPCS: Performed by: SURGERY

## 2019-09-12 PROCEDURE — 25000003 PHARM REV CODE 250: Performed by: SURGERY

## 2019-09-12 PROCEDURE — 94761 N-INVAS EAR/PLS OXIMETRY MLT: CPT

## 2019-09-12 PROCEDURE — 27000221 HC OXYGEN, UP TO 24 HOURS

## 2019-09-12 PROCEDURE — 11000001 HC ACUTE MED/SURG PRIVATE ROOM

## 2019-09-12 PROCEDURE — 36415 COLL VENOUS BLD VENIPUNCTURE: CPT

## 2019-09-12 PROCEDURE — 94799 UNLISTED PULMONARY SVC/PX: CPT

## 2019-09-12 RX ORDER — ENOXAPARIN SODIUM 100 MG/ML
40 INJECTION SUBCUTANEOUS EVERY 24 HOURS
Status: DISCONTINUED | OUTPATIENT
Start: 2019-09-12 | End: 2019-09-13

## 2019-09-12 RX ADMIN — HYDROXYZINE HYDROCHLORIDE 25 MG: 25 TABLET, FILM COATED ORAL at 04:09

## 2019-09-12 RX ADMIN — ACETAMINOPHEN 1000 MG: 500 TABLET ORAL at 06:09

## 2019-09-12 RX ADMIN — HYDROMORPHONE HYDROCHLORIDE 1 MG: 1 INJECTION, SOLUTION INTRAMUSCULAR; INTRAVENOUS; SUBCUTANEOUS at 12:09

## 2019-09-12 RX ADMIN — LATANOPROST 1 DROP: 50 SOLUTION OPHTHALMIC at 09:09

## 2019-09-12 RX ADMIN — SODIUM CHLORIDE, SODIUM LACTATE, POTASSIUM CHLORIDE, AND CALCIUM CHLORIDE: .6; .31; .03; .02 INJECTION, SOLUTION INTRAVENOUS at 02:09

## 2019-09-12 RX ADMIN — DORZOLAMIDE HYDROCHLORIDE 1 DROP: 20 SOLUTION/ DROPS OPHTHALMIC at 02:09

## 2019-09-12 RX ADMIN — HYDROXYZINE HYDROCHLORIDE 25 MG: 25 TABLET, FILM COATED ORAL at 08:09

## 2019-09-12 RX ADMIN — MUPIROCIN 1 G: 20 OINTMENT TOPICAL at 09:09

## 2019-09-12 RX ADMIN — CYCLOBENZAPRINE HYDROCHLORIDE 10 MG: 10 TABLET, FILM COATED ORAL at 09:09

## 2019-09-12 RX ADMIN — OXYCODONE HYDROCHLORIDE 5 MG: 5 TABLET ORAL at 03:09

## 2019-09-12 RX ADMIN — HYDROMORPHONE HYDROCHLORIDE 1 MG: 1 INJECTION, SOLUTION INTRAMUSCULAR; INTRAVENOUS; SUBCUTANEOUS at 08:09

## 2019-09-12 RX ADMIN — ACETAMINOPHEN 1000 MG: 500 TABLET ORAL at 01:09

## 2019-09-12 RX ADMIN — CYCLOBENZAPRINE HYDROCHLORIDE 10 MG: 10 TABLET, FILM COATED ORAL at 08:09

## 2019-09-12 RX ADMIN — SENNOSIDES,DOCUSATE SODIUM 1 TABLET: 8.6; 5 TABLET, FILM COATED ORAL at 08:09

## 2019-09-12 RX ADMIN — HYDROXYZINE HYDROCHLORIDE 25 MG: 25 TABLET, FILM COATED ORAL at 09:09

## 2019-09-12 RX ADMIN — MUPIROCIN 1 G: 20 OINTMENT TOPICAL at 08:09

## 2019-09-12 RX ADMIN — ACETAMINOPHEN 1000 MG: 500 TABLET ORAL at 09:09

## 2019-09-12 RX ADMIN — SENNOSIDES,DOCUSATE SODIUM 1 TABLET: 8.6; 5 TABLET, FILM COATED ORAL at 09:09

## 2019-09-12 RX ADMIN — HYDROMORPHONE HYDROCHLORIDE 1 MG: 1 INJECTION, SOLUTION INTRAMUSCULAR; INTRAVENOUS; SUBCUTANEOUS at 04:09

## 2019-09-12 RX ADMIN — DORZOLAMIDE HYDROCHLORIDE 1 DROP: 20 SOLUTION/ DROPS OPHTHALMIC at 09:09

## 2019-09-12 RX ADMIN — DORZOLAMIDE HYDROCHLORIDE 1 DROP: 20 SOLUTION/ DROPS OPHTHALMIC at 08:09

## 2019-09-12 RX ADMIN — CEFAZOLIN SODIUM 2 G: 2 SOLUTION INTRAVENOUS at 03:09

## 2019-09-12 RX ADMIN — HYDROXYZINE HYDROCHLORIDE 25 MG: 25 TABLET, FILM COATED ORAL at 01:09

## 2019-09-12 RX ADMIN — ENOXAPARIN SODIUM 40 MG: 100 INJECTION SUBCUTANEOUS at 08:09

## 2019-09-12 RX ADMIN — ATORVASTATIN CALCIUM 10 MG: 10 TABLET, FILM COATED ORAL at 09:09

## 2019-09-12 RX ADMIN — OXYCODONE HYDROCHLORIDE 5 MG: 5 TABLET ORAL at 04:09

## 2019-09-12 RX ADMIN — OXYCODONE HYDROCHLORIDE 5 MG: 5 TABLET ORAL at 09:09

## 2019-09-12 RX ADMIN — OXYBUTYNIN CHLORIDE 5 MG: 5 TABLET, EXTENDED RELEASE ORAL at 08:09

## 2019-09-12 RX ADMIN — CYCLOBENZAPRINE HYDROCHLORIDE 10 MG: 10 TABLET, FILM COATED ORAL at 02:09

## 2019-09-12 NOTE — PLAN OF CARE
Off the unit  Will assess later today or tomorrow       09/12/19 6291   Discharge Assessment   Assessment Type Discharge Planning Assessment   Confirmed/corrected address and phone number on facesheet? No   Assessment information obtained from? Medical Record   Patient/Family in Agreement with Plan unable to assess

## 2019-09-12 NOTE — PLAN OF CARE
Problem: Adult Inpatient Plan of Care  Goal: Plan of Care Review  Outcome: Ongoing (interventions implemented as appropriate)        Plan of Care reviewed with patient and questions answered. Pt up with assist in room. Pt voiding yellow urine without difficulty noted.  Pt free from falls or injury. Pain controlled with iv/po pain medications as ordered. Flap checks performed q4h as ordered. Lt breast soft and warm. Flap with strong doppler noted. Abd pads and dermabond CDI to Lt breast and abdomen. Abd binder in place. Blood sugars monitored and sliding scale insulin given as needed. Purposeful rounding performed. O2@2L/NC. Tolerating well. Tolerating diet well. Safety maintained. Bed in lowest position and locked. Call light in reach. No acute distress noted. Family @ bedside. VSS.          Georgina Mckoy RN

## 2019-09-12 NOTE — PLAN OF CARE
Problem: Adult Inpatient Plan of Care  Goal: Plan of Care Review  Outcome: Ongoing (interventions implemented as appropriate)  Patient on 2L nc.  Sats 99%. IS done. Pt. in no distress, will continue to monitor.

## 2019-09-12 NOTE — PROGRESS NOTES
[facility]  Plastic Surgery  Progress Note    Subjective:     Interval History:   Pod 1 flap,   IS < 500 still on O2  Great uop from colon  Vs stable    Post-Op Info:  Procedure(s) (LRB):  RECONSTRUCTION, BREAST, USING SALEEM FREE FLAP - UNILATERAL (Left)  MASTECTOMY (Left)  MASTECTOMY, WITH SENTINEL NODE BIOPSY AND AXILLARY LYMPHADENECTOMY LEFT (Left)   1 Day Post-Op      Medications:  Continuous Infusions:  Scheduled Meds:   acetaminophen  1,000 mg Oral Q8H    atorvastatin  10 mg Oral QHS    cyclobenzaprine  10 mg Oral TID    dorzolamide  1 drop Both Eyes TID    latanoprost  1 drop Left Eye QHS    mupirocin  1 g Nasal BID    oxybutynin  5 mg Oral Daily    senna-docusate 8.6-50 mg  1 tablet Oral BID     PRN Meds:dextrose 50%, dextrose 50%, glucagon (human recombinant), glucose, glucose, HYDROmorphone, hydrOXYzine HCl, insulin aspart U-100, ondansetron, oxyCODONE     Objective:     Vital Signs (Most Recent):  Temp: 98.5 °F (36.9 °C) (09/12/19 0351)  Pulse: 69 (09/12/19 0414)  Resp: 18 (09/12/19 0351)  BP: (!) 141/63 (09/12/19 0414)  SpO2: (!) 94 % (09/12/19 0601) Vital Signs (24h Range):  Temp:  [97.9 °F (36.6 °C)-98.8 °F (37.1 °C)] 98.5 °F (36.9 °C)  Pulse:  [] 69  Resp:  [12-18] 18  SpO2:  [94 %-100 %] 94 %  BP: (129-177)/(58-77) 141/63       Intake/Output Summary (Last 24 hours) at 9/12/2019 0655  Last data filed at 9/12/2019 0636  Gross per 24 hour   Intake 5106.25 ml   Output 3880 ml   Net 1226.25 ml       Physical Exam  Left flap warm well perfused, strong signal of artery  Drains thin sanguinous    Significant Labs:  CBC: No results for input(s): WBC, RBC, HGB, HCT, PLT, MCV, MCH, MCHC in the last 48 hours.  CMP:   Recent Labs   Lab 09/12/19  0409   *   CALCIUM 8.9   ALBUMIN 3.7   PROT 6.5      K 3.7   CO2 27      BUN 11   CREATININE 0.8   ALKPHOS 57   ALT 29   AST 30   BILITOT 0.6       Significant Diagnostics:  None    Assessment/Plan:     Active Diagnoses:     Diagnosis Date Noted POA    Malignant neoplasm of left female breast [C50.912] 09/11/2019 Yes      Problems Resolved During this Admission:     Diet, INT fluids  Po pain meds  IS pulm toilet  oob to chair today  Dc colon  Ancef off  Daily lovenox  glc control with SSi  Re-introduce antihypertensives once bp allows    Vee Angel MD [facility]  [employer] [facility], Fellow  [phone]  6:55 AM

## 2019-09-12 NOTE — PLAN OF CARE
Problem: Adult Inpatient Plan of Care  Goal: Plan of Care Review  Outcome: Ongoing (interventions implemented as appropriate)  Patient in no apparent distress. Sat's  99 % on 2 lpm. IS done per patient . Will continue to monitor.

## 2019-09-13 LAB
POCT GLUCOSE: 116 MG/DL (ref 70–110)
POCT GLUCOSE: 136 MG/DL (ref 70–110)
POCT GLUCOSE: 137 MG/DL (ref 70–110)
POCT GLUCOSE: 234 MG/DL (ref 70–110)

## 2019-09-13 PROCEDURE — 25000003 PHARM REV CODE 250: Performed by: SURGERY

## 2019-09-13 PROCEDURE — 94799 UNLISTED PULMONARY SVC/PX: CPT

## 2019-09-13 PROCEDURE — 63600175 PHARM REV CODE 636 W HCPCS: Performed by: SURGERY

## 2019-09-13 PROCEDURE — 27000221 HC OXYGEN, UP TO 24 HOURS

## 2019-09-13 PROCEDURE — 11000001 HC ACUTE MED/SURG PRIVATE ROOM

## 2019-09-13 PROCEDURE — 94761 N-INVAS EAR/PLS OXIMETRY MLT: CPT

## 2019-09-13 RX ORDER — ENOXAPARIN SODIUM 100 MG/ML
40 INJECTION SUBCUTANEOUS EVERY 24 HOURS
Status: DISCONTINUED | OUTPATIENT
Start: 2019-09-14 | End: 2019-09-16 | Stop reason: HOSPADM

## 2019-09-13 RX ORDER — BENAZEPRIL HYDROCHLORIDE 10 MG/1
40 TABLET ORAL DAILY
Status: DISCONTINUED | OUTPATIENT
Start: 2019-09-13 | End: 2019-09-16 | Stop reason: HOSPADM

## 2019-09-13 RX ORDER — AMLODIPINE BESYLATE 5 MG/1
5 TABLET ORAL DAILY
Status: DISCONTINUED | OUTPATIENT
Start: 2019-09-13 | End: 2019-09-16 | Stop reason: HOSPADM

## 2019-09-13 RX ORDER — HYDROCHLOROTHIAZIDE 12.5 MG/1
12.5 TABLET ORAL DAILY
Status: DISCONTINUED | OUTPATIENT
Start: 2019-09-13 | End: 2019-09-16 | Stop reason: HOSPADM

## 2019-09-13 RX ADMIN — OXYCODONE HYDROCHLORIDE 5 MG: 5 TABLET ORAL at 02:09

## 2019-09-13 RX ADMIN — ENOXAPARIN SODIUM 40 MG: 100 INJECTION SUBCUTANEOUS at 08:09

## 2019-09-13 RX ADMIN — OXYCODONE HYDROCHLORIDE 5 MG: 5 TABLET ORAL at 03:09

## 2019-09-13 RX ADMIN — AMLODIPINE BESYLATE 5 MG: 5 TABLET ORAL at 08:09

## 2019-09-13 RX ADMIN — LATANOPROST 1 DROP: 50 SOLUTION OPHTHALMIC at 09:09

## 2019-09-13 RX ADMIN — DORZOLAMIDE HYDROCHLORIDE 1 DROP: 20 SOLUTION/ DROPS OPHTHALMIC at 09:09

## 2019-09-13 RX ADMIN — INSULIN ASPART 1 UNITS: 100 INJECTION, SOLUTION INTRAVENOUS; SUBCUTANEOUS at 10:09

## 2019-09-13 RX ADMIN — CYCLOBENZAPRINE HYDROCHLORIDE 10 MG: 10 TABLET, FILM COATED ORAL at 09:09

## 2019-09-13 RX ADMIN — ACETAMINOPHEN 1000 MG: 500 TABLET ORAL at 09:09

## 2019-09-13 RX ADMIN — OXYBUTYNIN CHLORIDE 5 MG: 5 TABLET, EXTENDED RELEASE ORAL at 08:09

## 2019-09-13 RX ADMIN — ATORVASTATIN CALCIUM 10 MG: 10 TABLET, FILM COATED ORAL at 09:09

## 2019-09-13 RX ADMIN — HYDROCHLOROTHIAZIDE 12.5 MG: 12.5 TABLET ORAL at 08:09

## 2019-09-13 RX ADMIN — ACETAMINOPHEN 1000 MG: 500 TABLET ORAL at 06:09

## 2019-09-13 RX ADMIN — MUPIROCIN 1 G: 20 OINTMENT TOPICAL at 08:09

## 2019-09-13 RX ADMIN — ACETAMINOPHEN 1000 MG: 500 TABLET ORAL at 02:09

## 2019-09-13 RX ADMIN — DORZOLAMIDE HYDROCHLORIDE 1 DROP: 20 SOLUTION/ DROPS OPHTHALMIC at 08:09

## 2019-09-13 RX ADMIN — BENAZEPRIL HYDROCHLORIDE 40 MG: 10 TABLET, FILM COATED ORAL at 08:09

## 2019-09-13 RX ADMIN — CYCLOBENZAPRINE HYDROCHLORIDE 10 MG: 10 TABLET, FILM COATED ORAL at 08:09

## 2019-09-13 RX ADMIN — SENNOSIDES,DOCUSATE SODIUM 1 TABLET: 8.6; 5 TABLET, FILM COATED ORAL at 09:09

## 2019-09-13 RX ADMIN — OXYCODONE HYDROCHLORIDE 5 MG: 5 TABLET ORAL at 09:09

## 2019-09-13 RX ADMIN — MUPIROCIN 1 G: 20 OINTMENT TOPICAL at 09:09

## 2019-09-13 RX ADMIN — DORZOLAMIDE HYDROCHLORIDE 1 DROP: 20 SOLUTION/ DROPS OPHTHALMIC at 02:09

## 2019-09-13 RX ADMIN — SENNOSIDES,DOCUSATE SODIUM 1 TABLET: 8.6; 5 TABLET, FILM COATED ORAL at 08:09

## 2019-09-13 RX ADMIN — CYCLOBENZAPRINE HYDROCHLORIDE 10 MG: 10 TABLET, FILM COATED ORAL at 02:09

## 2019-09-13 NOTE — PROGRESS NOTES
[facility]  Plastic Surgery  Progress Note    Subjective:     Interval History:  No events    Post-Op Info:  Procedure(s) (LRB):  RECONSTRUCTION, BREAST, USING SALEEM FREE FLAP - UNILATERAL (Left)  MASTECTOMY (Left)  MASTECTOMY, WITH SENTINEL NODE BIOPSY AND AXILLARY LYMPHADENECTOMY LEFT (Left)   2 Days Post-Op      Medications:  Continuous Infusions:  Scheduled Meds:   acetaminophen  1,000 mg Oral Q8H    amLODIPine  5 mg Oral Daily    atorvastatin  10 mg Oral QHS    benazepril  40 mg Oral Daily    cyclobenzaprine  10 mg Oral TID    dorzolamide  1 drop Both Eyes TID    enoxaparin  40 mg Subcutaneous Daily    hydroCHLOROthiazide  12.5 mg Oral Daily    latanoprost  1 drop Left Eye QHS    mupirocin  1 g Nasal BID    oxybutynin  5 mg Oral Daily    senna-docusate 8.6-50 mg  1 tablet Oral BID     PRN Meds:dextrose 50%, dextrose 50%, glucagon (human recombinant), glucose, glucose, HYDROmorphone, hydrOXYzine HCl, insulin aspart U-100, ondansetron, oxyCODONE     Objective:     Vital Signs (Most Recent):  Temp: 98.6 °F (37 °C) (09/13/19 0605)  Pulse: 95 (09/13/19 0606)  Resp: 18 (09/13/19 0605)  BP: (!) 156/68 (09/13/19 0606)  SpO2: 99 % (09/13/19 0605) Vital Signs (24h Range):  Temp:  [97.8 °F (36.6 °C)-98.6 °F (37 °C)] 98.6 °F (37 °C)  Pulse:  [84-96] 95  Resp:  [16-20] 18  SpO2:  [93 %-99 %] 99 %  BP: (130-172)/(65-76) 156/68       Intake/Output Summary (Last 24 hours) at 9/13/2019 0644  Last data filed at 9/13/2019 0559  Gross per 24 hour   Intake 1560 ml   Output 1611 ml   Net -51 ml       Physical Exam   Constitutional: She is well-developed, well-nourished, and in no distress. No distress.   Cardiovascular: Normal rate and intact distal pulses.   Pulmonary/Chest: Effort normal. No respiratory distress.   Skin: She is not diaphoretic.   incisions c/d/i  Left breast edematous but soft, no collections  Breast drains decreasing, serosanguinous    Significant Labs:  CBC: No results for  input(s): WBC, RBC, HGB, HCT, PLT, MCV, MCH, MCHC in the last 48 hours.  CMP:   Recent Labs   Lab 09/12/19  0409   *   CALCIUM 8.9   ALBUMIN 3.7   PROT 6.5      K 3.7   CO2 27      BUN 11   CREATININE 0.8   ALKPHOS 57   ALT 29   AST 30   BILITOT 0.6       Significant Diagnostics:  None    Assessment/Plan:     Active Diagnoses:    Diagnosis Date Noted POA    Malignant neoplasm of left female breast [C50.912] 09/11/2019 Yes      Problems Resolved During this Admission:     On O2, needs to increase mobilization today and pulm toilet with IS  lovenox daily  Diet, INT fluids  Pain control  abx off      Vee Angel MD FACS  [employer] [facility], Fellow  [phone]  6:44 AM

## 2019-09-13 NOTE — PLAN OF CARE
Problem: Adult Inpatient Plan of Care  Goal: Plan of Care Review  Outcome: Ongoing (interventions implemented as appropriate)        Plan of Care reviewed with patient and questions answered. Pt up with assist in room. Pt voiding yellow urine without difficulty noted. Pt free from falls or injury. Pain controlled with pain medications as ordered. Lt breast flap warm and strong doppler noted. Abd pads and surgical bra in place. Abd pads and abd binder in place to abdomen. TERRY drain x 3 with small amount of sanguineous drainage noted.  Purposeful rounding performed. O2@2L/NC. Tolerating well. Tolerating diet well. Safety maintained. Bed in lowest position and locked. Call light in reach. No acute distress noted. Family @ bedside.          Georgina Mckoy RN

## 2019-09-13 NOTE — OP NOTE
Operative Note     9/11/2019    PRE-OP DIAGNOSIS: Malignant neoplasm of left female breast, unspecified estrogen receptor status, unspecified site of breast [C50.912]      POST-OP DIAGNOSIS: Post-Op Diagnosis Codes:     * Malignant neoplasm of left female breast, unspecified estrogen receptor status, unspecified site of breast [C50.912]    Procedure(s):  MASTECTOMY left skin sparing  Left axillary SLN biopsy  Injection of blue dye and radioactive technetrium for SLN  ID of SLN  conpletion left axillary lymph node disection     SURGEON: Surgeon(s) and Role:  Panel 1:     * Derek Colin MD - Primary     * Joe Holden MD - Assisting     * Vee Angel MD - Resident - Assisting  Panel 2:     * Shikha Mccray MD - Primary    ANESTHESIA: General     OPERATIVE FINDINGS: 2 SLN, #1 positive on frozen section.    INDICATION FOR PROCEDURE: This patient presents with a history of cancer of the left breast    PROCEDURE IN DETAIL:  Shu Mendoza is a 54 y.o. female brought to the operating room for definitive surgery of cancer of the left breast.  The patient has elected to undergo left simple mastectomy with sentinel lymph node biopsy for joaquin assessment. The patient was informed of the possible risks and complications of the procedure, including but not limited to anesthetic risks, bleeding, infection, and need for additional surgery.  The patient concurred with the proposed plan, and has given informed consent.  The site of surgery was properly noted/marked in the preoperative holding area.    Prior to arriving in the operating room, the patient's left breast was injected with technetium to facilitate sentinel lymph node identification. The patient was then brought to the operating room and placed in the supine position with both upper extremities extended.  general anesthesia was administered. Perioperative antibiotics were administered consisting of Ancef and a time out was performed confirming the patient,  site, and procedure.  The bilateral chest and axilla was then prepped and draped in the usual sterile fashion.    We then turned our attention to the left breast where a circumarolear incision was fashioned to incorporate the nipple areolar complex.  The incision was made with a plasmablade and extended through the subcutaneous tissues with plasmablade.  Skin flaps were raised to the clavicle superiorly.  We then  turned our attention to the left axilla.  Blue dye was injected prior to the incision in the usual subareolar fashion. The gamma probe was used to identify an area of increased radioactivity within the lower axilla. The clavipectoral sheath was sharply incised to reveal the level I axillary lymph nodes. The probe was used to identify a single node with increased radioactivity.  This node was brought into the operative field and carefully dissected free of the surrounding lymphovascular structures.  The node was then sent to pathology for frozen section evaluation, labeled as sentinel node #1.  A total of 2 axillary sentinel nodes and 0 axillary non-sentinel nodes were identified, excised and submitted to pathology.  Bed counts were obtained to confirm that the 10% rule had not been violated.   The wound was irrigated with normal saline, and all bleeding points were secured with Bovie electrocautery.     We then proceeded to raise the remainder of the flaps to the lateral border of the sternum medially, to the inframammary fold inferiorly, and to the anterior border of the latissimus dorsi muscle laterally. The breast tissue was sharply excised off the chest wall taking care to incorporate the pectoralis fascia while leaving the serratus fascia behind.  The resulting mastectomy specimen was marked using a short stitch superiorly and long stitch laterally.  The breast was sent to pathology for permanent evaluation.      Frozen section joaquin evaluation revealed evidence of metastatic disease. An axillary  dissection was performed with removal of the associated lymph nodes and surrounding adipose tissue. This included levels I and II. This was accomplished by exposing the axillary vein superiorly. Small venous tributaries, lymphatics, and vessels were clipped and ligated or cauterized and divided. The subscapularis muscle was skeletonized. The long thoracic and thoracodorsal neurovascular bundles were identified and preserved. The tissue between the long thoracic and thoracodorsal bundle was removed.  Of note, at the end of the dissection, level 3 nodes were palpated and no abnormal nodes were noted.      The specimen was submitted to pathology. The wound was irrigated. Therefore, the operative field was irrigated with normal saline and all bleeding points were secured with Bovie electrocautery.  Incision will be closed by plastic surgery.    Dermabond was applied. A post surgical bra was placed on the patient. At the end of the operation, all sponge, instrument, and needle counts x 2 were correct.    ESTIMATED BLOOD LOSS: Minimal    COMPLICATIONS: none    DISPOSITION: PACU - hemodynamically stable.    ATTESTATION:   I performed the procedure.

## 2019-09-13 NOTE — PLAN OF CARE
D/c plan:  D/c soon  tomorrow or maybe Sunday  Self reports- needs a 3-1 commode and shower chair       09/13/19 7463   Discharge Assessment   Assessment Type Discharge Planning Assessment   Confirmed/corrected address and phone number on facesheet? Yes   Assessment information obtained from? Patient;Caregiver;Medical Record   Prior to hospitilization cognitive status: Alert/Oriented   Prior to hospitalization functional status: Independent   Current cognitive status: Alert/Oriented   Current Functional Status: Assistive Equipment;Needs Assistance   Lives With significant other   Able to Return to Prior Arrangements yes   Is patient able to care for self after discharge? Yes   Equipment Currently Used at Home other (see comments)  (wedge pad)   Do you have any problems affording any of your prescribed medications? No   Is the patient taking medications as prescribed? yes   Does the patient have transportation home? Yes   Transportation Anticipated family or friend will provide   DME Needed Upon Discharge  commode;bath bench   Patient/Family in Agreement with Plan yes

## 2019-09-14 LAB — POCT GLUCOSE: 123 MG/DL (ref 70–110)

## 2019-09-14 PROCEDURE — 90686 IIV4 VACC NO PRSV 0.5 ML IM: CPT | Performed by: PLASTIC SURGERY

## 2019-09-14 PROCEDURE — 63600175 PHARM REV CODE 636 W HCPCS: Performed by: PLASTIC SURGERY

## 2019-09-14 PROCEDURE — 90471 IMMUNIZATION ADMIN: CPT | Performed by: PLASTIC SURGERY

## 2019-09-14 PROCEDURE — 11000001 HC ACUTE MED/SURG PRIVATE ROOM

## 2019-09-14 PROCEDURE — 25000003 PHARM REV CODE 250: Performed by: SURGERY

## 2019-09-14 RX ORDER — HYDROCODONE BITARTRATE AND ACETAMINOPHEN 5; 325 MG/1; MG/1
1 TABLET ORAL EVERY 6 HOURS PRN
Qty: 21 TABLET | Refills: 0 | Status: SHIPPED | OUTPATIENT
Start: 2019-09-14 | End: 2019-09-14 | Stop reason: SDUPTHER

## 2019-09-14 RX ORDER — CEPHALEXIN 500 MG/1
500 CAPSULE ORAL EVERY 6 HOURS
Qty: 28 CAPSULE | Refills: 0 | Status: SHIPPED | OUTPATIENT
Start: 2019-09-14 | End: 2019-09-21

## 2019-09-14 RX ORDER — CEPHALEXIN 500 MG/1
500 CAPSULE ORAL EVERY 6 HOURS
Qty: 28 CAPSULE | Refills: 0 | OUTPATIENT
Start: 2019-09-14 | End: 2019-09-14 | Stop reason: SDUPTHER

## 2019-09-14 RX ORDER — HYDROCODONE BITARTRATE AND ACETAMINOPHEN 5; 325 MG/1; MG/1
1 TABLET ORAL EVERY 6 HOURS PRN
Qty: 21 TABLET | Refills: 0 | Status: SHIPPED | OUTPATIENT
Start: 2019-09-14 | End: 2019-11-27

## 2019-09-14 RX ADMIN — DORZOLAMIDE HYDROCHLORIDE 1 DROP: 20 SOLUTION/ DROPS OPHTHALMIC at 08:09

## 2019-09-14 RX ADMIN — BENAZEPRIL HYDROCHLORIDE 40 MG: 10 TABLET, FILM COATED ORAL at 08:09

## 2019-09-14 RX ADMIN — INFLUENZA VIRUS VACCINE 0.5 ML: 15; 15; 15; 15 SUSPENSION INTRAMUSCULAR at 01:09

## 2019-09-14 RX ADMIN — ACETAMINOPHEN 1000 MG: 500 TABLET ORAL at 05:09

## 2019-09-14 RX ADMIN — ENOXAPARIN SODIUM 40 MG: 100 INJECTION SUBCUTANEOUS at 08:09

## 2019-09-14 RX ADMIN — MUPIROCIN 1 G: 20 OINTMENT TOPICAL at 08:09

## 2019-09-14 RX ADMIN — OXYBUTYNIN CHLORIDE 5 MG: 5 TABLET, EXTENDED RELEASE ORAL at 08:09

## 2019-09-14 RX ADMIN — ACETAMINOPHEN 1000 MG: 500 TABLET ORAL at 02:09

## 2019-09-14 RX ADMIN — OXYCODONE HYDROCHLORIDE 5 MG: 5 TABLET ORAL at 06:09

## 2019-09-14 RX ADMIN — SENNOSIDES,DOCUSATE SODIUM 1 TABLET: 8.6; 5 TABLET, FILM COATED ORAL at 08:09

## 2019-09-14 RX ADMIN — HYDROCHLOROTHIAZIDE 12.5 MG: 12.5 TABLET ORAL at 08:09

## 2019-09-14 RX ADMIN — AMLODIPINE BESYLATE 5 MG: 5 TABLET ORAL at 08:09

## 2019-09-14 RX ADMIN — CYCLOBENZAPRINE HYDROCHLORIDE 10 MG: 10 TABLET, FILM COATED ORAL at 08:09

## 2019-09-14 NOTE — PROGRESS NOTES
Plastic Surgery Progress Note    HD #  LOS: 3 days   POD #3 Days Post-Op    Subjective:   NAEON  AFVSS  Pain controlled  Patient would like to go home  Denies f,c,n,v,sob,cp    Objective:  Vitals:   Temp:  [98.1 °F (36.7 °C)-98.7 °F (37.1 °C)] 98.4 °F (36.9 °C)  Pulse:  [] 85  Resp:  [15-20] 18  SpO2:  [94 %-99 %] 96 %  BP: (116-150)/(60-73) 135/73  I/O last 3 completed shifts:  In: 1680 [P.O.:1680]  Out: 1191 [Urine:950; Drains:241]    Intake/Output Summary (Last 24 hours) at 9/14/2019 1236  Last data filed at 9/14/2019 0000  Gross per 24 hour   Intake 600 ml   Output 120 ml   Net 480 ml       PE:  Gen: NAD  HEENT: NC/AT, EOMI  CV: RRR  Resp: non-labored breathing  L breast: flap - warm, soft, cap refill<2s, no venous congestion, no erythema or drainage, incision c/d/i   -Drains - 20 and 40 ml serosang OP  Abd: binder in place, incision c/d/i, no erythema or drainage, umbilicus viable   -Drain - 60 ml serosang OP      A/P: 54 y.o. female s/p left mastectomy and immediate recon with SALEEM free flap    Pain control  Monitor strip and record drains Q4hrs  Flap checks Q4hrs  Home today    Isha Contreras MD  [facility], [facility]  [phone]  12:36 PM  9/14/2019

## 2019-09-14 NOTE — DISCHARGE SUMMARY
[facility]  Plastic Surgery  Discharge Summary      Patient Name: Shu Mendoza  MRN: [id number]  Admission Date: 9/11/2019  Hospital Length of Stay: 3 days  Discharge Date and Time:  09/14/2019 12:41 PM  Attending Physician: Derek Colin MD   Discharging Provider: Same  Primary Care Provider: Diane Laughlin MD     Procedure(s) (LRB):  RECONSTRUCTION, BREAST, USING SALEEM FREE FLAP - UNILATERAL (Left)  MASTECTOMY (Left)  MASTECTOMY, WITH SENTINEL NODE BIOPSY AND AXILLARY LYMPHADENECTOMY LEFT (Left)     Hospital Course:   Pt presented to the preoperative holding area for operative management of underlying condition. Pt tolerated the procedure well without complications (see operative note for further details). Patient was taken to the PACU in Stable condition.  She was admitted to the plastic surgery service for flap monitoring.  She progressed as expected with good flap checks (flap was warm, soft, cap refill < 2s, strong arterial doppler signal) each day.  Patient is ambulating, tolerating PO diet, pain is controlled with PO pain medication, and the patient has showered.  On POD3, she was discharged and will follow up with Dr. Colin in 1 week.          Significant Diagnostic Studies: Labs: CMP No results for input(s): NA, K, CL, CO2, GLU, BUN, CREATININE, CALCIUM, PROT, ALBUMIN, BILITOT, ALKPHOS, AST, ALT, ANIONGAP, ESTGFRAFRICA, EGFRNONAA in the last 48 hours. and CBC No results for input(s): WBC, HGB, HCT, PLT in the last 48 hours.    Pending Diagnostic Studies:     None        Final Active Diagnoses:    Diagnosis Date Noted POA    PRINCIPAL PROBLEM:  Malignant neoplasm of left female breast [C50.912] 09/11/2019 Yes      Problems Resolved During this Admission:      Discharged Condition: good    Disposition: Home or Self Care    Follow Up:  Follow-up Information     AMANDA Ray. Schedule an appointment as soon as possible for a visit in 1 week.    Specialty:  Plastic  Surgery  Contact information:  [address] [address]  [phone]                 Patient Instructions:      Diet Adult Regular     Notify your health care provider if you experience any of the following:  temperature >100.4     Notify your health care provider if you experience any of the following:  severe uncontrolled pain     Notify your health care provider if you experience any of the following:  redness, tenderness, or signs of infection (pain, swelling, redness, odor or green/yellow discharge around incision site)     Lifting restrictions   Order Comments: Do not lift more than 10 lbs     Change dressing (specify)   Order Comments: Dressing change: as needed using ABD pads.     Shower on day dressing removed (No bath)     Activity as tolerated     Medications:  Reconciled Home Medications:      Medication List      START taking these medications    cephALEXin 500 MG capsule  Commonly known as:  KEFLEX  Take 1 capsule (500 mg total) by mouth every 6 (six) hours. for 7 days     HYDROcodone-acetaminophen 5-325 mg per tablet  Commonly known as:  NORCO  Take 1 tablet by mouth every 6 (six) hours as needed for Pain.        CONTINUE taking these medications    amlodipine-benazepril 5-40 mg per capsule  Commonly known as:  LOTREL  TAKE 1 CAPSULE BY ORAL ROUTE EVERY DAY     cetirizine 10 MG tablet  Commonly known as:  ZYRTEC  Take 1 tablet (10 mg total) by mouth once daily.     dorzolamide 2 % ophthalmic solution  Commonly known as:  TRUSOPT  Place 1 drop into both eyes 3 (three) times daily.     fluticasone propionate 50 mcg/actuation nasal spray  Commonly known as:  FLONASE  1 spray by Each Nostril route nightly as needed for Rhinitis.     hydroCHLOROthiazide 12.5 mg capsule  Commonly known as:  MICROZIDE  TK 1 C PO D     JARDIANCE 10 mg Tab  Generic drug:  empagliflozin  TAKE 1 TABLET BY ORAL ROUTE EVERY DAY IN THE MORNING     latanoprost 0.005 % ophthalmic solution  INT 1 GTT IN OU QD HS      LIPITOR 10 MG tablet  Generic drug:  atorvastatin  TAKE 1 TABLET BY ORAL ROUTE EVERY DAY     metFORMIN 500 MG 24 hr tablet  Commonly known as:  GLUCOPHAGE-XR  Take 2 tablets (1,000 mg total) by mouth 2 (two) times daily with meals.     ONE DAILY MULTIVITAMIN per tablet  Generic drug:  multivitamin  Take 1 tablet by mouth once daily.     oxybutynin 5 MG Tr24  Commonly known as:  DITROPAN-XL  Take 1 tablet (5 mg total) by mouth once daily.     VITAMIN D ORAL  Take by mouth.            Isha Contreras MD  Plastic Surgery  [facility]

## 2019-09-14 NOTE — NURSING
Eager & in agreement w/ DC. VU of DC instructions--paperwork & prescriptions passed & explained.  IV removed w/ cath tip intact, WNL.To be DCd home w/ family-- will be escorted downstairs via  transport team once dressed, ready & ride arrives. Free from falls, injury, or skin breakdown this hospital admission.

## 2019-09-14 NOTE — PLAN OF CARE
Problem: Adult Inpatient Plan of Care  Goal: Plan of Care Review  Outcome: Ongoing (interventions implemented as appropriate)  VSS and Afebrile. AAOx4 Right Flap q4h checks WNL. Doppler Checks strong. Cap refill less than 3 seconds. Flap remains appropriate for ethnicity. Breast incisions CDI. Abdominal incisions CDI with ABD pads and abdominal binder. Suture lines well approximated. TERRY drains to bulb suction, with serosanguineous drainage. Diet tolerated well. Appetite Good. Pain controlled with PO medications. Ambulating without assistance. Pt is voiding adequarely. Plan of care reviewed with patient. Purposeful hourly rounding done. Call light at bedside. Bed at lowest position. Brakes on. Nonskid socks on. Will continue to monitor

## 2019-09-14 NOTE — PLAN OF CARE
Problem: Adult Inpatient Plan of Care  Goal: Plan of Care Review  Outcome: Ongoing (interventions implemented as appropriate)  Patient in no apparent distress. Sat's 97  % on room air. IS done . Will continue to monitor.

## 2019-09-15 PROCEDURE — 11000001 HC ACUTE MED/SURG PRIVATE ROOM

## 2019-09-16 VITALS
SYSTOLIC BLOOD PRESSURE: 135 MMHG | HEART RATE: 85 BPM | DIASTOLIC BLOOD PRESSURE: 73 MMHG | RESPIRATION RATE: 18 BRPM | OXYGEN SATURATION: 96 % | BODY MASS INDEX: 32.27 KG/M2 | WEIGHT: 189 LBS | TEMPERATURE: 98 F | HEIGHT: 64 IN

## 2019-09-16 PROCEDURE — 99900035 HC TECH TIME PER 15 MIN (STAT)

## 2019-09-16 PROCEDURE — 94761 N-INVAS EAR/PLS OXIMETRY MLT: CPT

## 2019-09-16 NOTE — PLAN OF CARE
D/C plan;  todays date- 9-16-19  Chart reviewed  Mrs Logan on todays census  Mrs logan discharged on 9-14-19.  Await for Dr Saucedo to sign admit and discharge orders.   Mrs Logan is not in bed 387.  See d/c summary        09/16/19 0529   Final Note   Assessment Type Final Discharge Note   Discharge plans and expectations educations in teach back method with documentation complete? Yes   Right Care Referral Info   Referral Type see AVS

## 2019-09-26 ENCOUNTER — OFFICE VISIT (OUTPATIENT)
Dept: SURGERY | Facility: CLINIC | Age: 54
End: 2019-09-26
Payer: COMMERCIAL

## 2019-09-26 VITALS
BODY MASS INDEX: 31.79 KG/M2 | SYSTOLIC BLOOD PRESSURE: 151 MMHG | HEIGHT: 64 IN | DIASTOLIC BLOOD PRESSURE: 80 MMHG | WEIGHT: 186.19 LBS | TEMPERATURE: 99 F | HEART RATE: 97 BPM

## 2019-09-26 DIAGNOSIS — Z17.0 MALIGNANT NEOPLASM OF CENTRAL PORTION OF LEFT BREAST IN FEMALE, ESTROGEN RECEPTOR POSITIVE: Primary | ICD-10-CM

## 2019-09-26 DIAGNOSIS — C50.112 MALIGNANT NEOPLASM OF CENTRAL PORTION OF LEFT BREAST IN FEMALE, ESTROGEN RECEPTOR POSITIVE: Primary | ICD-10-CM

## 2019-09-26 PROCEDURE — 99024 POSTOP FOLLOW-UP VISIT: CPT | Mod: S$GLB,,, | Performed by: SURGERY

## 2019-09-26 PROCEDURE — 99999 PR PBB SHADOW E&M-EST. PATIENT-LVL III: ICD-10-PCS | Mod: PBBFAC,,, | Performed by: SURGERY

## 2019-09-26 PROCEDURE — 99024 PR POST-OP FOLLOW-UP VISIT: ICD-10-PCS | Mod: S$GLB,,, | Performed by: SURGERY

## 2019-09-26 PROCEDURE — 99999 PR PBB SHADOW E&M-EST. PATIENT-LVL III: CPT | Mod: PBBFAC,,, | Performed by: SURGERY

## 2019-09-30 NOTE — PROGRESS NOTES
[facility]  Physical Therapy RE- Evaluation    Name: Shu Mendoza  Clinic Number: [id number]    Therapy Diagnosis:   Encounter Diagnoses   Name Primary?    Malignant neoplasm of central portion of left breast in female, estrogen receptor positive     At risk for lymphedema     Decreased shoulder mobility, left      Physician: Sihkha Mccray MD    Physician Orders: PT Eval and Treat   Medical Diagnosis: Left breast cancer  Evaluation Date: 10/2/2019  Authorization Period Expiration: 12/31/19  Plan of Care Certification Period: 11/27/19 (8 weeks)  Visit # / Visits authorized: 2/ 20  Insurance:  BC    Time In: 9:00 AM  Time Out: 9:55 AM  Total Billable Time: 55 minutes    Precautions: cancer      History   History of Present Illness: Shu is a 54 y.o. female that presents to  [facility] at the [facility] s/p left breast surgery.   Diagnosis: Left breasr IDC, grade 2, ER (+), ID (+), HER2 (-)   Chief complaint: feeling soreness in left chest, arm and in abdomen  Surgical History: 9/11/19 left breast mastectomy with SLNB and SALEEM flap reconstruction  Shu Mendoza  has a past surgical history that includes Oophorectomy; Breast biopsy; Hysterectomy (2000); Hernia repair (2009); Eye surgery (Bilateral); Reconstruction of breast with deep inferior epigastric artery  (SALEEM) free flap (Left, 9/11/2019); Mastectomy (Left, 9/11/2019); and Mastectomy with sentinel node biopsy and axillary lymph node dissection (Left, 9/11/2019).    Chemotherapy: pending--sees oncology 10/18/19  Radiation: none    Past Medical History:   Diagnosis Date    Breast cancer     Diabetes mellitus     Encounter for blood transfusion     Glaucoma 2012    Hypertension     Malignant neoplasm of central portion of left breast in female, estrogen receptor positive 7/23/2019    Renal cyst     Retinal tear of right eye           Medications:  Shu has a current  medication list which includes the following prescription(s): amlodipine-benazepril, atorvastatin, cetirizine, dorzolamide, empagliflozin, ergocalciferol (vitamin d2), fluticasone propionate, hydrochlorothiazide, hydrocodone-acetaminophen, latanoprost, metformin, multivitamin, and oxybutynin.    Allergies:  Review of patient's allergies indicates:   Allergen Reactions    Morphine Itching    Percocet [oxycodone-acetaminophen] Itching        Prior Therapy: Pre-op BRCA eval 7/23/19  Social History:  lives with their partner  Occupation: /coordinator at [employer]                       Prior Level of Function: Independent  Current Level of Function: difficulty raising and using LUE with ADL's    Other Past Medical History: See Above    Patient's Goals: I want to get my motion back in my left arm    Hand dominance: Right handed    Subjective   Pt states: having soreness in left chest, arm and abdomen  Pain: 7/10 on VAS currently.   Best: 4/10  Worst: 9/10   Pain location: left chest, arm and abdomen   Objective   Mental status :oriented    Postural examination/scapula alignment: Rounded shoulder and Head forward    Skin Integrity:   Scar Location: left breast and abdomen  Appearance: healing --very small superficial 1 cm long open area right side of abdominal incision with red granulation tissue  Signs of infection: No  Drainage:blood tinged  Color:red    Edema: Mild  Location: Abdominal incision    Axillary Web Syndrome/Cording:   Location: left axilla and medial upper arm  Degree of Cording: mild (mild, moderate etc...)   Number of cords present: one-two    Sensation: numbness noted left breast and upper arm        Range of Motion:      Shoulder Range of Motion:   Active /Passive ROM Right Left   Flexion 170 120   Abduction 170 90   Extension 70 50   IR/90deg 85 85   ER/90deg 90 65          Strength: manual muscle test grades below   Upper Extremity Strength   (R) UE (L) UE   Shoulder  "flexion: 5/5 4-/5   Shoulder Abduction: 5/5 4-/5   Shoulder IR 5/5 4-/5   Shoulder ER 5/5 4-/5   Elbow flexion: 5/5 4/5   Elbow extension: 5/5 4/5   Wrist flexion: 5/5 4/5   Wrist extension: 5/5 4/5    5/5 4/5       Baseline Measurements of BL UE's for early detection of Lymphedema:     LANDMARK RIGHT UE LEFT UE DIFFERENCE   E + 8" 36 cm 37 cm 1 cm   E + 6" 34 cm 35 cm 1 cm   E + 4" 31 cm 32 cm 1 cm   E + 2" 29 cm 29.5 cm 0.5 cm   Elbow 28 cm 27cm 1 cm   W+ 8" 27 cm 27 cm 0 cm   W +  6" 24.5 cm 25 cm 0.5 cm   W + 4" 21 cm 21 cm 0 cm   Wrist 16.5 cm 16.5 cm 0 cm   DPC 20 cm 20 cm 0 cm   IP Thumb 6.5 cm 6.5 cm 0 cm     Functional Mobility (Bed mobility, transfers)  Independent with sit to stand transfers  Requires min assist with sit to supine and supine to sit transfers.      ADL's:  Decreased use of LUE with ADL's    Gait Assessment:   - AD used: none  - Assistance: independent  - Distance: community distances     Patient Education Provided   - role of therapy in multi - disciplinary team, goals for therapy  - patient instructed in log roll for sit to supine and supine to sit transfers  - Pt was educated in lymphedema etiology and management plans.--given pre-op eval 7/23/19 and reviewed today  - Pt was provided with written risk reductions and precautions for managing lymphedema. --given pre-op eval 7/23/19     Pt has no cultural, educational or language barriers to learning provided.  Treatment and Instruction of Home Exercise Program    Time In: 9:25 AM  Time Out: 9:55 AM    Shu received individual therapeutic exercises to improve postural correction and alignment, stretching and soft tissue mobility, and strengthening for 15 minutes including the following:   Supine Shld Flexion with Wand       1 x 5  Butterflies                                         5 x 5"  Supine Shld ER with wand              1 x 5  Supine Active Shld Abd                    1 x 5  Seated Scap Retractions                 10 x " "5"    Shu received the following manual therapy techniques were performed to increased myofascial/soft tissue length, mobility and pliability, increase PROM, AROM and function as well as to decrease pain for 15 minutes  Stretching, bending, twisting for LUE cording  Median Nerve glides  Passive stretch left elbow and shoulder      Written Home Exercises Provided and Patient Education: Handouts given   See EMR under patient instructions for program given  Pt demonstrated good understanding of the education provided. Patient demo good return demo of skill of exercises.    Functional Limitations Reporting      CMS Impairment/Limitation/Restriction for FOTO Outcome Survey    Therapist reviewed FOTO scores for Shu Mendoza on 10/2/2019.   FOTO documents entered into EPIC - see Media section.    Limitations Score: 46%  Category: Carrying             Assessment   This is a 54 y.o. female referred to outpatient physical therapy and presents with a medical diagnosis of left breast cancer and was seen today post-operatively to establish PT plan of care for impairments following surgery including: decreased left shoulder AROM and strength; cording left axilla and medial upper arm.     Pt instructed in HEP this session and was able to perform all exercises given independently. Pt instructed to follow up with therapist if any concerns arise with program established. Pt will continue to benefit from skilled physical therapy to address the impairments stated in chart below, provide patient/family education and to maximize pt's level of independence in home and community environment     Anticipated barriers to physical therapy: None     Pt's spiritual, cultural and educational needs considered and pt agreeable to plan of care and goals as stated below:     Medical necessity is demonstrated by the following IMPAIRMENTS/PROMBLEM LIST:    History  Co-morbidities and personal factors that may impact the plan of care " Co-morbidities:   history of cancer    Personal Factors:   no deficits     moderate   Examination  Body Structures and Functions, activity limitations and participation restrictions that may impact the plan of care Body Regions:   upper extremities    Body Systems:    ROM  strength  scar formation    Participation Restrictions:   Difficulty using LUE with ADL's    Activity limitations:   Learning and applying knowledge  no deficits    General Tasks and Commands  no deficits    Communication  no deficits    Mobility  lifting and carrying objects    Self care  caring for body parts (brushing teeth, shaving, grooming)  dressing    Domestic Life  cooking  doing house work (cleaning house, washing dishes, laundry)    Interactions/Relationships  no deficits    Life Areas  no deficits    Community and Social Life  no deficits         moderate   Clinical Presentation evolving clinical presentation with changing clinical characteristics moderate   Decision Making/ Complexity Score: moderate       Goals: Pt agrees with goals set    Short Term goals: 4 weeks  1. Patient will demonstrate 100% understanding of lymphedema risk reduction practices to include self monitoring for lymphedema. (progressing, not met)  2. Patient will demonstrate independence with Home Exercise program established. (progressing, not met)  3. Pt will increase AROM/PROM in shoulder abduction ROM to 140 degrees on left to improve functional reach, carry, push, pull pain free. (progressing, not met)  4. Pt will increase AROM/PROM in shoulder flexion to 150 degrees on left to improve functional reach, carry, push, pull pain free.(progressing, not met)  5. Strength will be 4/5 in LUE in order for patient to perform functional activities. (progressing, not met)    Long Term Goals: 8 weeks   1.  Pt will increase AROM/PROM in shoulder flexion to 170 degrees on left to improve functional reach, carry, push, pull pain free. (progressing, not met)  2. Pt will  increase strength to 4+/5 in gross UE musculature to improve tolerance to all functional activities pain free. (progressing, not met)  3. Pt will demonstrate full/maximized tissue mobility to increase ROM and promote healthy tissue to be pain free at discharge. (progressing, not met)  4. Pt will report decrease in overall worst pain to 2/10 at discharge. (progressing, not met)  5. Pt will increase AROM/PROM in shoulder abduction ROM to 170 degrees on left to improve functional reach, carry, push, pull pain free. (progressing, not met)  6. Pt will increase AROM/PROM in shoulder ER to 90 degrees on left in order to perform all grooming activities (progressing, not met)  6. Patient will report compliance with walking program 5x week for 10 min each day to improve overall cardiovascular function and decrease cancer related fatigue at discharge. (progressing, not met)      Plan   Outpatient physical therapy 2x week for 8 weeks to include the following:   Manual Therapy, Patient Education and Therapeutic Exercise.    Plan of care Certification Period: 10/2/2019 to 11/27/19.    Therapist: Lolly Torres, PT  10/2/2019

## 2019-10-02 ENCOUNTER — CLINICAL SUPPORT (OUTPATIENT)
Dept: REHABILITATION | Facility: HOSPITAL | Age: 54
End: 2019-10-02
Attending: SURGERY
Payer: COMMERCIAL

## 2019-10-02 DIAGNOSIS — C50.112 MALIGNANT NEOPLASM OF CENTRAL PORTION OF LEFT BREAST IN FEMALE, ESTROGEN RECEPTOR POSITIVE: ICD-10-CM

## 2019-10-02 DIAGNOSIS — Z91.89 AT RISK FOR LYMPHEDEMA: ICD-10-CM

## 2019-10-02 DIAGNOSIS — M25.612 DECREASED SHOULDER MOBILITY, LEFT: ICD-10-CM

## 2019-10-02 DIAGNOSIS — Z17.0 MALIGNANT NEOPLASM OF CENTRAL PORTION OF LEFT BREAST IN FEMALE, ESTROGEN RECEPTOR POSITIVE: ICD-10-CM

## 2019-10-02 PROCEDURE — 97140 MANUAL THERAPY 1/> REGIONS: CPT | Performed by: PHYSICAL MEDICINE & REHABILITATION

## 2019-10-02 PROCEDURE — 97164 PT RE-EVAL EST PLAN CARE: CPT | Mod: 59 | Performed by: PHYSICAL MEDICINE & REHABILITATION

## 2019-10-02 PROCEDURE — 97110 THERAPEUTIC EXERCISES: CPT | Performed by: PHYSICAL MEDICINE & REHABILITATION

## 2019-10-02 NOTE — OP NOTE
Preop diagnosis:  Left breast cancer    Postop diagnosis:  Same    Procedure:  Left breast reconstruction with D IEP flap    Surgeon:  Stacey    Assistant: Dr Joe Holden    Anesthesia:  General through oral endotracheal intubation    Estimated blood Loss:  100 cc    Complication:  None    Disposition:  The patient tolerated procedure well was extubated in the operative room transferred to the PACU in stable condition    Description of operative procedure    The patient was taken to the operative room placement operating table.  Following induction general anesthesia and successful oral endotracheal intubation patient was prepped and draped in usual fashion. Dr. vizcaino proceeded to spread Procedures be dictated in separate operative report.  Attention was directed to the right in the abdomen the patient had been marked according to CT a findings which was done preoperatively.  Flap was incised its periphery lateral medial to medial dissection was performed. Dominant perforators as easily identified circumferentially dissected the fascia was opened muscle split along its fibers retrograde dissection was taken to deep inferior epigastric to point radical pedicle and vessel caliber was obtained. Following this attention was diverted to the left chest.  The pocket was defined hemostasis was achieved.  The intercostal space identified pectoralis muscle split along its fibers.  Perichondrium SP inferior rim scored subperichondrial dissection was performed [place] elevator.  Partial rib resection SP inferior ribs and performed large dorsal.  The intercostal musculature was excise excess the internal mammary artery and vein tissue per microvascular transfer    The flap was then harvested thickened left testicular temporally.  Under the microscope 2 venous anastomosis were performed using 2.0 venous  all the arterial anastomosis performed with lateral suture tabs interrupted fashion. Upon release vascular  anastomotic labs good form lateral to flap in left and partially de-epithelialized inset is accompanied by II Quill over drains.  The joint was then redirected to the abdomen the fascia was reconstructed using oa PDS sutures bilaterally.  The contralateral timi of the abdomen was a excised and discarded.  Minimal of central undermining was performed to allow relocation of the umbilicus.  The placed patient was then placed in beach chair position and the wound was closed using 2 PDS 3-0 Monocryl 2 Quill over drains.  The umbilicus translocated 6 years for Monocryl.  Patient residual good perfusion of flap and procedure was transferred to PACUin stable condition.

## 2019-10-02 NOTE — PATIENT INSTRUCTIONS
ROM: Flexion - Wand (Supine)        Lie on back holding wand. Raise arms over head.   Repeat _5-10___ times per set. Do __1__ sets per session. Do __2__ sessions per day.  Copyright © [employer]. All rights reserved.          Butterflies    Lie on your back with your hands behind your head. Open your chest by  your elbows apart and gently down. Hold for 5 seconds. Then, bring  your elbows back together. Repeat 2x daily  5-10 reps   Copyright © 7413-4733 [employer]       [email or web address]    ROM: External Rotation - Wand (supine)    Lie on back holding wand with elbows bent to 90°. Rotate forearms over head as far as possible.   Repeat _5-10___ times per set. Do _1___ sets per session. Do __2__ sessions per day.     [email or web address]     Copyright © [employer]. All rights reserved.          ROM: Abduction (Lying on your back)    Bring arms straight out from sides and raise as high as possible without pain.  Repeat 5-10  times. Do 1 sessions 2x per day.    Scapular Retraction (Standing)    With arms at sides, squeeze shoulder blades together. Do not shrug and do not hold your breath. Hold 5 seconds. Repeat 5-10 times 1 sessions 2 x day.

## 2019-10-02 NOTE — PLAN OF CARE
[facility]  Physical Therapy RE- Evaluation    Name: Shu Mendoza  Clinic Number: [id number]    Therapy Diagnosis:   Encounter Diagnoses   Name Primary?    Malignant neoplasm of central portion of left breast in female, estrogen receptor positive     At risk for lymphedema     Decreased shoulder mobility, left      Physician: Shikha Mccray MD    Physician Orders: PT Eval and Treat   Medical Diagnosis: Left breast cancer  Evaluation Date: 10/2/2019  Authorization Period Expiration: 12/31/19  Plan of Care Certification Period: 11/27/19 (8 weeks)  Visit # / Visits authorized: 2/ 20  Insurance:  BC    Time In: 9:00 AM  Time Out: 9:55 AM  Total Billable Time: 55 minutes    Precautions: cancer      History   History of Present Illness: Shu is a 54 y.o. female that presents to  [facility] s/p left breast surgery.   Diagnosis: Left breasr IDC, grade 2, ER (+), NM (+), HER2 (-)   Chief complaint: feeling soreness in left chest, arm and in abdomen  Surgical History: 9/11/19 left breast mastectomy with SLNB and SALEEM flap reconstruction  Shu Mendoza  has a past surgical history that includes Oophorectomy; Breast biopsy; Hysterectomy (2000); Hernia repair (2009); Eye surgery (Bilateral); Reconstruction of breast with deep inferior epigastric artery  (SALEEM) free flap (Left, 9/11/2019); Mastectomy (Left, 9/11/2019); and Mastectomy with sentinel node biopsy and axillary lymph node dissection (Left, 9/11/2019).    Chemotherapy: pending--sees oncology 10/18/19  Radiation: none    Past Medical History:   Diagnosis Date    Breast cancer     Diabetes mellitus     Encounter for blood transfusion     Glaucoma 2012    Hypertension     Malignant neoplasm of central portion of left breast in female, estrogen receptor positive 7/23/2019    Renal cyst     Retinal tear of right eye           Medications:  Shu has a current  medication list which includes the following prescription(s): amlodipine-benazepril, atorvastatin, cetirizine, dorzolamide, empagliflozin, ergocalciferol (vitamin d2), fluticasone propionate, hydrochlorothiazide, hydrocodone-acetaminophen, latanoprost, metformin, multivitamin, and oxybutynin.    Allergies:  Review of patient's allergies indicates:   Allergen Reactions    Morphine Itching    Percocet [oxycodone-acetaminophen] Itching        Prior Therapy: Pre-op BRCA eval 7/23/19  Social History:  lives with their partner  Occupation: /coordinator at [employer]                       Prior Level of Function: Independent  Current Level of Function: difficulty raising and using LUE with ADL's    Other Past Medical History: See Above    Patient's Goals: I want to get my motion back in my left arm    Hand dominance: Right handed    Subjective   Pt states: having soreness in left chest, arm and abdomen  Pain: 7/10 on VAS currently.   Best: 4/10  Worst: 9/10   Pain location: left chest, arm and abdomen   Objective   Mental status :oriented    Postural examination/scapula alignment: Rounded shoulder and Head forward    Skin Integrity:   Scar Location: left breast and abdomen  Appearance: healing --very small superficial 1 cm long open area right side of abdominal incision with red granulation tissue  Signs of infection: No  Drainage:blood tinged  Color:red    Edema: Mild  Location: Abdominal incision    Axillary Web Syndrome/Cording:   Location: left axilla and medial upper arm  Degree of Cording: mild (mild, moderate etc...)   Number of cords present: one-two    Sensation: numbness noted left breast and upper arm        Range of Motion:      Shoulder Range of Motion:   Active /Passive ROM Right Left   Flexion 170 120   Abduction 170 90   Extension 70 50   IR/90deg 85 85   ER/90deg 90 65          Strength: manual muscle test grades below   Upper Extremity Strength   (R) UE (L) UE   Shoulder  "flexion: 5/5 4-/5   Shoulder Abduction: 5/5 4-/5   Shoulder IR 5/5 4-/5   Shoulder ER 5/5 4-/5   Elbow flexion: 5/5 4/5   Elbow extension: 5/5 4/5   Wrist flexion: 5/5 4/5   Wrist extension: 5/5 4/5    5/5 4/5       Baseline Measurements of BL UE's for early detection of Lymphedema:     LANDMARK RIGHT UE LEFT UE DIFFERENCE   E + 8" 36 cm 37 cm 1 cm   E + 6" 34 cm 35 cm 1 cm   E + 4" 31 cm 32 cm 1 cm   E + 2" 29 cm 29.5 cm 0.5 cm   Elbow 28 cm 27cm 1 cm   W+ 8" 27 cm 27 cm 0 cm   W +  6" 24.5 cm 25 cm 0.5 cm   W + 4" 21 cm 21 cm 0 cm   Wrist 16.5 cm 16.5 cm 0 cm   DPC 20 cm 20 cm 0 cm   IP Thumb 6.5 cm 6.5 cm 0 cm     Functional Mobility (Bed mobility, transfers)  Independent with sit to stand transfers  Requires min assist with sit to supine and supine to sit transfers.      ADL's:  Decreased use of LUE with ADL's    Gait Assessment:   - AD used: none  - Assistance: independent  - Distance: community distances     Patient Education Provided   - role of therapy in multi - disciplinary team, goals for therapy  - patient instructed in log roll for sit to supine and supine to sit transfers  - Pt was educated in lymphedema etiology and management plans.--given pre-op eval 7/23/19 and reviewed today  - Pt was provided with written risk reductions and precautions for managing lymphedema. --given pre-op eval 7/23/19     Pt has no cultural, educational or language barriers to learning provided.  Treatment and Instruction of Home Exercise Program    Time In: 9:25 AM  Time Out: 9:55 AM    Shu received individual therapeutic exercises to improve postural correction and alignment, stretching and soft tissue mobility, and strengthening for 15 minutes including the following:   Supine Shld Flexion with Wand       1 x 5  Butterflies                                         5 x 5"  Supine Shld ER with wand              1 x 5  Supine Active Shld Abd                    1 x 5  Seated Scap Retractions                 10 x " "5"    Shu received the following manual therapy techniques were performed to increased myofascial/soft tissue length, mobility and pliability, increase PROM, AROM and function as well as to decrease pain for 15 minutes  Stretching, bending, twisting for LUE cording  Median Nerve glides  Passive stretch left elbow and shoulder      Written Home Exercises Provided and Patient Education: Handouts given   See EMR under patient instructions for program given  Pt demonstrated good understanding of the education provided. Patient demo good return demo of skill of exercises.    Functional Limitations Reporting      CMS Impairment/Limitation/Restriction for FOTO Outcome Survey    Therapist reviewed FOTO scores for Shu Mendoza on 10/2/2019.   FOTO documents entered into [employer] - see Media section.    Limitations Score: 46%  Category: Carrying             Assessment   This is a 54 y.o. female referred to outpatient physical therapy and presents with a medical diagnosis of left breast cancer and was seen today post-operatively to establish PT plan of care for impairments following surgery including: decreased left shoulder AROM and strength; cording left axilla and medial upper arm.     Pt instructed in HEP this session and was able to perform all exercises given independently. Pt instructed to follow up with therapist if any concerns arise with program established. Pt will continue to benefit from skilled physical therapy to address the impairments stated in chart below, provide patient/family education and to maximize pt's level of independence in home and community environment     Anticipated barriers to physical therapy: None     Pt's spiritual, cultural and educational needs considered and pt agreeable to plan of care and goals as stated below:     Medical necessity is demonstrated by the following IMPAIRMENTS/PROMBLEM LIST:    History  Co-morbidities and personal factors that may impact the plan of care " Co-morbidities:   history of cancer    Personal Factors:   no deficits     moderate   Examination  Body Structures and Functions, activity limitations and participation restrictions that may impact the plan of care Body Regions:   upper extremities    Body Systems:    ROM  strength  scar formation    Participation Restrictions:   Difficulty using LUE with ADL's    Activity limitations:   Learning and applying knowledge  no deficits    General Tasks and Commands  no deficits    Communication  no deficits    Mobility  lifting and carrying objects    Self care  caring for body parts (brushing teeth, shaving, grooming)  dressing    Domestic Life  cooking  doing house work (cleaning house, washing dishes, laundry)    Interactions/Relationships  no deficits    Life Areas  no deficits    Community and Social Life  no deficits         moderate   Clinical Presentation evolving clinical presentation with changing clinical characteristics moderate   Decision Making/ Complexity Score: moderate       Goals: Pt agrees with goals set    Short Term goals: 4 weeks  1. Patient will demonstrate 100% understanding of lymphedema risk reduction practices to include self monitoring for lymphedema. (progressing, not met)  2. Patient will demonstrate independence with Home Exercise program established. (progressing, not met)  3. Pt will increase AROM/PROM in shoulder abduction ROM to 140 degrees on left to improve functional reach, carry, push, pull pain free. (progressing, not met)  4. Pt will increase AROM/PROM in shoulder flexion to 150 degrees on left to improve functional reach, carry, push, pull pain free.(progressing, not met)  5. Strength will be 4/5 in LUE in order for patient to perform functional activities. (progressing, not met)    Long Term Goals: 8 weeks   1.  Pt will increase AROM/PROM in shoulder flexion to 170 degrees on left to improve functional reach, carry, push, pull pain free. (progressing, not met)  2. Pt will  increase strength to 4+/5 in gross UE musculature to improve tolerance to all functional activities pain free. (progressing, not met)  3. Pt will demonstrate full/maximized tissue mobility to increase ROM and promote healthy tissue to be pain free at discharge. (progressing, not met)  4. Pt will report decrease in overall worst pain to 2/10 at discharge. (progressing, not met)  5. Pt will increase AROM/PROM in shoulder abduction ROM to 170 degrees on left to improve functional reach, carry, push, pull pain free. (progressing, not met)  6. Pt will increase AROM/PROM in shoulder ER to 90 degrees on left in order to perform all grooming activities (progressing, not met)  6. Patient will report compliance with walking program 5x week for 10 min each day to improve overall cardiovascular function and decrease cancer related fatigue at discharge. (progressing, not met)      Plan   Outpatient physical therapy 2x week for 8 weeks to include the following:   Manual Therapy, Patient Education and Therapeutic Exercise.    Plan of care Certification Period: 10/2/2019 to 11/27/19.    Therapist: Lolly Torres, PT  10/2/2019

## 2019-10-04 DIAGNOSIS — N32.81 OAB (OVERACTIVE BLADDER): Primary | ICD-10-CM

## 2019-10-04 RX ORDER — OXYBUTYNIN CHLORIDE 5 MG/1
5 TABLET, EXTENDED RELEASE ORAL DAILY
Qty: 90 TABLET | Refills: 3 | Status: SHIPPED | OUTPATIENT
Start: 2019-10-04 | End: 2020-08-12 | Stop reason: SDUPTHER

## 2019-10-09 NOTE — PROGRESS NOTES
Physical Therapy Daily Treatment Note     Name: Shu Mendoza  Clinic Number: [id number]  Diagnosis:   Encounter Diagnoses   Name Primary?    Decreased shoulder mobility, left     Malignant neoplasm of central portion of left breast in female, estrogen receptor positive     At risk for lymphedema      Physician: Shikha Mccray MD    Precautions: none  Visit #: 3 of 20  Time In: 9:00 AM  Time Out: 9:45 AM  Total Treatment Time 1:1: 45 minutes    Evaluation Date: 10/2/19  Visit # authorized: 20  Authorization period: 12/31/19  Plan of care Expiration: 11/27/19  MD referral: Dr. Shikha mccray  Insurance: [facility]      Subjective     Pt reports: left arm feels better and states still feels tightness and soreness in left axilla  Pain Scale: Shu rates pain on a scale of 6/10 on VAS.   Pain location: axilla left    Fatigue: Mild  Functional change: able to reach up better  Diagnosis: Left breasr IDC, grade 2, ER (+), WI (+), HER2 (-)   Chief complaint: feeling soreness in left chest, arm and in abdomen  Surgical History: 9/11/19 left breast mastectomy with SLNB and SALEEM flap reconstruction  Shu Mendoza  has a past surgical history that includes Oophorectomy; Breast biopsy; Hysterectomy (2000); Hernia repair (2009); Eye surgery (Bilateral); Reconstruction of breast with deep inferior epigastric artery  (SALEEM) free flap (Left, 9/11/2019); Mastectomy (Left, 9/11/2019); and Mastectomy with sentinel node biopsy and axillary lymph node dissection (Left, 9/11/2019).     Chemotherapy: pending--sees oncology 10/18/19  Radiation: none      Objective     Appearance: 5 cm x 1 cm and 2 cm x 1 cm superficial open areas along right side of abdominal incision with red granulation tissue an small amount of whitish tissue. Very slight blood tinged drainage on dressing.    Shu received individual therapeutic exercises to improve postural correction and alignment, stretching  "and soft tissue mobility, and strengthening for 30 minutes including the following:   Supine Shld Flexion with Wand       1 x 10  Butterflies                                         10 x 5"  Supine Shld ER with wand              1 x 10  Supine Active Shld Abd                    1 x 10  Seated Scap Retractions                 10 x 5"  Wall Climbs  Forward                     10 x 5"                        Sideways                    10 x 5"     Shu received the following manual therapy techniques were performed to increased myofascial/soft tissue length, mobility and pliability, increase PROM, AROM and function as well as to decrease pain for 15 minutes  Stretching, bending, twisting for LUE cording  Median Nerve glides  Passive stretch left elbow and shoulder  Grade II G-H joint mobs  Posterior capsule stretch  Passive stretch left shoulder    Shoulder Range of Motion:   Active /Passive ROM Right Left   Flexion 170 155   Abduction 170 130   Extension 70 50   IR/90deg 85 85   ER/90deg 90 90           Strength: manual muscle test grades below   Upper Extremity Strength    (R) UE (L) UE   Shoulder flexion: 5/5 4/5   Shoulder Abduction: 5/5 4/5   Shoulder IR 5/5 4/5   Shoulder ER 5/5 4/5   Elbow flexion: 5/5 4/5   Elbow extension: 5/5 4/5   Wrist flexion: 5/5 4/5   Wrist extension: 5/5 4/5    5/5 4/5     Functional Limitations Reporting       CMS Impairment/Limitation/Restriction for FOTO Outcome Survey     Therapist reviewed FOTO scores for Shu Mendoza on 10/11/2019.   FOTO documents entered into [employer] - see Media section.     Limitations Score: 39%  Category: Carrying         Home Exercise Program and Patient Education   Education provided re:  - role of PT in multi - disciplinary team, goals for PT  - progress towards goals     See EMR under notes/patient instructions for HEP given/taught this session - all sets and reps included. Pt received printed copy.     Pt was able to demonstrate and report " understanding and performance  Pt has no cultural, educational or language barriers to learning provided.    Assessment     Patient is responding well to physical therapy. Is at expected function and pain level for 4 weeks post operatively.   Pain after treatment: 1/10    Pt prognosis is Good. Patient received manual therapy as above to decrease cording and shoulder joint tightness. Patient performed above exercise programand tolerated new exercises added today. Improvements with left shoulder AROM: 35 degrees with flexion, 40 degrees with Abd, and 25 degrees with ER. Strength LUE now in 4/5/ range.  Patient instructed to continue with HEP.  Pt will continue to benefit from skilled outpatient physical therapy to address the deficits listed in the problem list chart on initial evaluation, provide pt/family education and to maximize pt's level of independence in the home and community environment. Patient met 4 STG this session.    Goals as follows:  Short Term goals: 4 weeks  1. Patient will demonstrate 100% understanding of lymphedema risk reduction practices to include self monitoring for lymphedema. (met, 10/11/19)  2. Patient will demonstrate independence with Home Exercise program established. (met, 10/11/19)  3. Pt will increase AROM/PROM in shoulder abduction ROM to 140 degrees on left to improve functional reach, carry, push, pull pain free. (progressing, not met)  4. Pt will increase AROM/PROM in shoulder flexion to 150 degrees on left to improve functional reach, carry, push, pull pain free.( met, 10/11/19)  5. Strength will be 4/5 in LUE in order for patient to perform functional activities.  (met, 10/11/19)     Long Term Goals: 8 weeks   1.  Pt will increase AROM/PROM in shoulder flexion to 170 degrees on left to improve functional reach, carry, push, pull pain free. (progressing, not met)  2. Pt will increase strength to 4+/5 in gross UE musculature to improve tolerance to all functional activities pain  free. (progressing, not met)  3. Pt will demonstrate full/maximized tissue mobility to increase ROM and promote healthy tissue to be pain free at discharge. (progressing, not met)  4. Pt will report decrease in overall worst pain to 2/10 at discharge. (progressing, not met)  5. Pt will increase AROM/PROM in shoulder abduction ROM to 170 degrees on left to improve functional reach, carry, push, pull pain free. (progressing, not met)  6. Pt will increase AROM/PROM in shoulder ER to 90 degrees on left in order to perform all grooming activities (progressing, not met)  6. Patient will report compliance with walking program 5x week for 10 min each day to improve overall cardiovascular function and decrease cancer related fatigue at discharge. (progressing, not met)        Plan     Continue with established POC toward physical therapy goals.    Therapist: Lolly Torres, PT  10/11/2019

## 2019-10-11 ENCOUNTER — CLINICAL SUPPORT (OUTPATIENT)
Dept: REHABILITATION | Facility: HOSPITAL | Age: 54
End: 2019-10-11
Attending: SURGERY
Payer: COMMERCIAL

## 2019-10-11 DIAGNOSIS — C50.112 MALIGNANT NEOPLASM OF CENTRAL PORTION OF LEFT BREAST IN FEMALE, ESTROGEN RECEPTOR POSITIVE: ICD-10-CM

## 2019-10-11 DIAGNOSIS — M25.612 DECREASED SHOULDER MOBILITY, LEFT: ICD-10-CM

## 2019-10-11 DIAGNOSIS — Z91.89 AT RISK FOR LYMPHEDEMA: ICD-10-CM

## 2019-10-11 DIAGNOSIS — Z17.0 MALIGNANT NEOPLASM OF CENTRAL PORTION OF LEFT BREAST IN FEMALE, ESTROGEN RECEPTOR POSITIVE: ICD-10-CM

## 2019-10-11 PROCEDURE — 97140 MANUAL THERAPY 1/> REGIONS: CPT | Performed by: PHYSICAL MEDICINE & REHABILITATION

## 2019-10-11 PROCEDURE — 97110 THERAPEUTIC EXERCISES: CPT | Performed by: PHYSICAL MEDICINE & REHABILITATION

## 2019-10-11 NOTE — PATIENT INSTRUCTIONS
Wall Climb    Perform this exercise two (2) times a day with 5-10 repetitions.    Stand and FACE THE WALL with your toes 10 to 12 inches away from the wall.    a. Place the fingers of your affected arm on the wall and slowly walk your fingers up the wall. Let your fingers climb the wall as high as possible without feeling pulling or pain.  b. Hold this stretch for 5 seconds then move your fingers back down the wall.  c. Try to go a little higher each time. It may relax you a bit if you rest your head against the wall.              Wall Walk (Shoulder Abduction)  Using your affected arm, walk your hand up  a wall straight out to the side, as high as you  are able and lean your body in toward the wall   And hold for count of 5. Perform 5-10 times.  Copyright © 3743-3211 [employer]

## 2019-10-15 ENCOUNTER — TUMOR BOARD CONFERENCE (OUTPATIENT)
Dept: SURGERY | Facility: CLINIC | Age: 54
End: 2019-10-15

## 2019-10-15 NOTE — PROGRESS NOTES
CC:  Stage I Breast cancer    HPI:  Ms Mendoza is a 55 yo postmenopausal woman with HTN, DM, who presents today for further management of pathologic stage IA ([id number]) invasive ductal carcinoma of the left breast. She was referred to Dr Mccray for bloody nipple discharge first noted in 2019. Before then she had a negative mammogram on 19. She had a left breast US on 19 in the left breast retroareolar region just behind the nipple, there is an irregular hypoechoic intraductal mass measuring 1.0 cm. She underwent a biopsy on 2019. It showed invasive ductal carcinoma, ER strongly positive 100%, NH positive 80%, HER2 negative 1+. Ki67 5% activity within invasive component. Patient underwent a left total mastectomy on 2019. Pathology showed a 1.3 cm invasive ductal carcinoma, grade 2. DCIS present, negative margin, 22 lymph nodes removed, one lymph node positive with macrometastases 13 mm, no extranodal extension. No lymphovascular invasion. Pathologic T1c N1a. Mammaprint high risk with chemo benefit >12%. 5-10 years recurrence risk without chemo 20-25%, with chemo and endocrine therapy 7%.. BRCAnalysis from 19 was negative. Case was discussed at tumor board. Adjuvant chemotherapy followed by endocrine therapy was recommended. Radiation not recommended. Patient presents today for further management. Feeling well. Works at [email or web address].     [employer]     GYN History:  Age of menarche was 11. Underwent NILES/BSO at age 34. +HRT since then, stopped in 2019 after she was diagnosed with breast cancer. Patient is . Age of first live birth was 16. Patient did not breast feed.    Family History:  Denies family history of cancer.       Past Medical History:   Diagnosis Date    Breast cancer     Diabetes mellitus     Encounter for blood transfusion     Glaucoma     Hypertension     Malignant neoplasm of central portion of left breast in female, estrogen receptor positive 2019    Renal cyst      Retinal tear of right eye         Past Surgical History:   Procedure Laterality Date    BREAST BIOPSY      EYE SURGERY Bilateral     as a child for cross eye    HERNIA REPAIR  2009    umbilical    HYSTERECTOMY  2000    NILES, BSO secondary to ovarian cyst    MASTECTOMY Left 9/11/2019    Procedure: MASTECTOMY;  Surgeon: Shikha Mccray MD;  Location: [facility];  Service: Plastics;  Laterality: Left;    MASTECTOMY WITH SENTINEL NODE BIOPSY AND AXILLARY LYMPH NODE DISSECTION Left 9/11/2019    Procedure: MASTECTOMY, WITH SENTINEL NODE BIOPSY AND AXILLARY LYMPHADENECTOMY LEFT;  Surgeon: Shikha Mccray MD;  Location: [facility];  Service: Plastics;  Laterality: Left;    OOPHORECTOMY      RECONSTRUCTION OF BREAST WITH DEEP INFERIOR EPIGASTRIC ARTERY  (SALEEM) FREE FLAP Left 9/11/2019    Procedure: RECONSTRUCTION, BREAST, USING SALEEM FREE FLAP - UNILATERAL;  Surgeon: Derek Colin MD;  Location: [facility];  Service: Plastics;  Laterality: Left;       Family History   Problem Relation Age of Onset    Ovarian cancer Other     Breast cancer Neg Hx     Colon cancer Neg Hx        Social History     Socioeconomic History    Marital status: Single     Spouse name: Not on file    Number of children: Not on file    Years of education: Not on file    Highest education level: Not on file   Occupational History    Not on file   Social Needs    Financial resource strain: Not on file    Food insecurity:     Worry: Not on file     Inability: Not on file    Transportation needs:     Medical: Not on file     Non-medical: Not on file   Tobacco Use    Smoking status: Never Smoker    Smokeless tobacco: Never Used   Substance and Sexual Activity    Alcohol use: Yes     Alcohol/week: 0.0 standard drinks     Comment: occasional    Drug use: No    Sexual activity: Not Currently     Partners: Male     Birth control/protection: Post-menopausal, Surgical   Lifestyle    Physical activity:     Days per week: Not on file      Minutes per session: Not on file    Stress: Not on file   Relationships    Social connections:     Talks on phone: Not on file     Gets together: Not on file     Attends [employer] service: Not on file     Active member of club or organization: Not on file     Attends meetings of clubs or organizations: Not on file     Relationship status: Not on file   Other Topics Concern    Not on file   Social History Narrative    Not on file       Review of Systems   Constitutional: Negative for appetite change, chills, fatigue, fever and unexpected weight change.   HENT: Negative for mouth sores, nosebleeds, tinnitus, trouble swallowing and voice change.    Eyes: Negative for pain, redness and visual disturbance.   Respiratory: Negative for cough, shortness of breath and wheezing.    Cardiovascular: Negative for chest pain, palpitations and leg swelling.   Gastrointestinal: Negative for abdominal distention, abdominal pain, blood in stool, constipation, diarrhea, nausea and vomiting.   Endocrine: Negative for polydipsia, polyphagia and polyuria.   Genitourinary: Negative for flank pain, frequency and hematuria.   Musculoskeletal: Negative for arthralgias, back pain, gait problem, joint swelling, myalgias, neck pain and neck stiffness.   Skin: Negative for color change, pallor, rash and wound.   Neurological: Negative for tremors, seizures, syncope, speech difficulty, weakness, light-headedness, numbness and headaches.   Hematological: Negative for adenopathy. Does not bruise/bleed easily.   Psychiatric/Behavioral: Negative for confusion, dysphoric mood and self-injury. The patient is not nervous/anxious.    All other systems reviewed and are negative.      Objective:  Physical Exam   Constitutional: She is oriented to person, place, and time. She appears well-developed and well-nourished. No distress.   HENT:   Head: Normocephalic and atraumatic.   Mouth/Throat: Oropharynx is clear and moist. No oropharyngeal exudate.    Eyes: Pupils are equal, round, and reactive to light. Conjunctivae and EOM are normal. No scleral icterus.   Neck: Normal range of motion. Neck supple. No JVD present. No thyromegaly present.   Cardiovascular: Normal rate, regular rhythm, normal heart sounds and intact distal pulses. Exam reveals no gallop and no friction rub.   No murmur heard.  Pulmonary/Chest: Effort normal and breath sounds normal. No respiratory distress. She has no wheezes. She has no rales. Right breast exhibits no inverted nipple, no mass, no nipple discharge, no skin change and no tenderness. Left breast exhibits no inverted nipple, no mass, no nipple discharge, no skin change and no tenderness.       S/p left mastectomy and reconstruction. Wound healed   Abdominal: Soft. Bowel sounds are normal. She exhibits no distension and no mass. There is no hepatosplenomegaly. There is no tenderness. There is no rebound. No hernia.       Wound healed   Musculoskeletal: Normal range of motion. She exhibits no edema, tenderness or deformity.   Lymphadenopathy:     She has no cervical adenopathy.     She has no axillary adenopathy.        Right: No supraclavicular adenopathy present.        Left: No supraclavicular adenopathy present.   Neurological: She is alert and oriented to person, place, and time. No cranial nerve deficit. She exhibits normal muscle tone.   Skin: Skin is warm and dry. No rash noted. She is not diaphoretic. No erythema. No pallor.   Psychiatric: She has a normal mood and affect. Her behavior is normal. Judgment and thought content normal.   Vitals reviewed.      Labs:  CBC, CMP today unremarkable    Imaging Data:   left breast US on 6/27/19 in the left breast retroareolar region just behind the nipple, there is an irregular hypoechoic intraductal mass measuring 1.0 cm.    Assessment and plan:    1. Malignant neoplasm of central portion of left breast in female, estrogen receptor positive    2. Malignant neoplasm metastatic to  lymph node of axilla    3. Essential hypertension    4. Type 2 diabetes mellitus without complication, without long-term current use of insulin    5. Advance care planning       1.2  - Ms Mendoza is a 55 yo postmenopausal woman with pathologic stage IA ([id number]) invasive ductal carcinoma, ER/IN positive, HER2 negative, grade 2. S/p left mastectomy and reconstruction on 9/11/2019. Mammaprint high risk with chemo benefit >12%.   - long discussion with patient re the diagnosis and the mammaprint result. Given high risk and significant benefits from chemo on mammaprint, I recommend adjuvant chemotherapy followed by adjuvant endocrine therapy with an AI  - discussed DDAC followed by weekly taxol.  - The side effects of AC/Taxol were discussed with patient, which include but are not limited to hair loss, fatigue, decreased appetite, weight loss, weakness, bone marrow suppression, increased risk of infection, sepsis, anemia that may require blood transfusion, low platelet counts with increased risk of bleeding that may require platelet transfusion, diarrhea, constipation, mucositis, damage to the brain, heart, liver, kidney, damage to the nerves that may not be reversible, severe allergic reaction, damage at the injection site, sterility, secondary cancer, death. Handouts provided to patient.  - we spent a long time discussing the risk of hair loss which may rarely be permanent. Discussed cold cap therapy and its potential copay. Offered patient opportunity to speak with the group for further discussion. After long discussion, patient decides that she does not want to have the cold cap therapy.   - get baseline echo  - emla, zofran, phenergan sent to pharmacy  - messaged Dr Mccray's office for port placement  - RTC on 10/31 to start DDAC    3.  - BP elevated today  - f/u with PCP    4.  - BS normal  - f/u with PCP    5  Advance Care Planning     Power of   I initiated the process of advance care planning today and  explained the importance of this process to the patient.  I introduced the concept of advance directives to the patient, as well. Then the patient received detailed information about the importance of designating a Health Care Power of  (HCPOA). She was also instructed to communicate with this person about their wishes for future healthcare, should she become sick and lose decision-making capacity. The patient has previously appointed a HCPOA. After our discussion, the patient has decided to complete a HCPOA and has appointed her mother and NAME: Keila Field.    ACP package provided to patient. Asked her to bring back on return.           60 minutes with the patient with at least 50% of the time on face-to-face counseling.

## 2019-10-15 NOTE — H&P (VIEW-ONLY)
CC:  Stage I Breast cancer    HPI:  Ms Mendoza is a 53 yo postmenopausal woman with HTN, DM, who presents today for further management of pathologic stage IA ([id number]) invasive ductal carcinoma of the left breast. She was referred to Dr Mccray for bloody nipple discharge first noted in 2019. Before then she had a negative mammogram on 19. She had a left breast US on 19 in the left breast retroareolar region just behind the nipple, there is an irregular hypoechoic intraductal mass measuring 1.0 cm. She underwent a biopsy on 2019. It showed invasive ductal carcinoma, ER strongly positive 100%, [place] positive 80%, HER2 negative 1+. Ki67 5% activity within invasive component. Patient underwent a left total mastectomy on 2019. Pathology showed a 1.3 cm invasive ductal carcinoma, grade 2. DCIS present, negative margin, 22 lymph nodes removed, one lymph node positive with macrometastases 13 mm, no extranodal extension. No lymphovascular invasion. Pathologic T1c N1a. Mammaprint high risk with chemo benefit >12%. 5-10 years recurrence risk without chemo 20-25%, with chemo and endocrine therapy 7%.. BRCAnalysis from 19 was negative. Case was discussed at tumor board. Adjuvant chemotherapy followed by endocrine therapy was recommended. Radiation not recommended. Patient presents today for further management. Feeling well. Works at [employer].     [employer]     GYN History:  Age of menarche was 11. Underwent NILES/BSO at age 34. +HRT since then, stopped in 2019 after she was diagnosed with breast cancer. Patient is . Age of first live birth was 16. Patient did not breast feed.    Family History:  Denies family history of cancer.       Past Medical History:   Diagnosis Date    Breast cancer     Diabetes mellitus     Encounter for blood transfusion     Glaucoma     Hypertension     Malignant neoplasm of central portion of left breast in female, estrogen receptor positive 2019    Renal cyst      Retinal tear of right eye         Past Surgical History:   Procedure Laterality Date    BREAST BIOPSY      EYE SURGERY Bilateral     as a child for cross eye    HERNIA REPAIR  2009    umbilical    HYSTERECTOMY  2000    NILES, BSO secondary to ovarian cyst    MASTECTOMY Left 9/11/2019    Procedure: MASTECTOMY;  Surgeon: Shikha Mccray MD;  Location: [facility];  Service: Plastics;  Laterality: Left;    MASTECTOMY WITH SENTINEL NODE BIOPSY AND AXILLARY LYMPH NODE DISSECTION Left 9/11/2019    Procedure: MASTECTOMY, WITH SENTINEL NODE BIOPSY AND AXILLARY LYMPHADENECTOMY LEFT;  Surgeon: Shikha Mccray MD;  Location: [facility];  Service: Plastics;  Laterality: Left;    OOPHORECTOMY      RECONSTRUCTION OF BREAST WITH DEEP INFERIOR EPIGASTRIC ARTERY  (SALEEM) FREE FLAP Left 9/11/2019    Procedure: RECONSTRUCTION, BREAST, USING SALEEM FREE FLAP - UNILATERAL;  Surgeon: Derek Colin MD;  Location: [facility];  Service: Plastics;  Laterality: Left;       Family History   Problem Relation Age of Onset    Ovarian cancer Other     Breast cancer Neg Hx     Colon cancer Neg Hx        Social History     Socioeconomic History    Marital status: Single     Spouse name: Not on file    Number of children: Not on file    Years of education: Not on file    Highest education level: Not on file   Occupational History    Not on file   Social Needs    Financial resource strain: Not on file    Food insecurity:     Worry: Not on file     Inability: Not on file    Transportation needs:     Medical: Not on file     Non-medical: Not on file   Tobacco Use    Smoking status: Never Smoker    Smokeless tobacco: Never Used   Substance and Sexual Activity    Alcohol use: Yes     Alcohol/week: 0.0 standard drinks     Comment: occasional    Drug use: No    Sexual activity: Not Currently     Partners: Male     Birth control/protection: Post-menopausal, Surgical   Lifestyle    Physical activity:     Days per week: Not on file      Minutes per session: Not on file    Stress: Not on file   Relationships    Social connections:     Talks on phone: Not on file     Gets together: Not on file     Attends [employer] service: Not on file     Active member of club or organization: Not on file     Attends meetings of clubs or organizations: Not on file     Relationship status: Not on file   Other Topics Concern    Not on file   Social History Narrative    Not on file       Review of Systems   Constitutional: Negative for appetite change, chills, fatigue, fever and unexpected weight change.   HENT: Negative for mouth sores, nosebleeds, tinnitus, trouble swallowing and voice change.    Eyes: Negative for pain, redness and visual disturbance.   Respiratory: Negative for cough, shortness of breath and wheezing.    Cardiovascular: Negative for chest pain, palpitations and leg swelling.   Gastrointestinal: Negative for abdominal distention, abdominal pain, blood in stool, constipation, diarrhea, nausea and vomiting.   Endocrine: Negative for polydipsia, polyphagia and polyuria.   Genitourinary: Negative for flank pain, frequency and hematuria.   Musculoskeletal: Negative for arthralgias, back pain, gait problem, joint swelling, myalgias, neck pain and neck stiffness.   Skin: Negative for color change, pallor, rash and wound.   Neurological: Negative for tremors, seizures, syncope, speech difficulty, weakness, light-headedness, numbness and headaches.   Hematological: Negative for adenopathy. Does not bruise/bleed easily.   Psychiatric/Behavioral: Negative for confusion, dysphoric mood and self-injury. The patient is not nervous/anxious.    All other systems reviewed and are negative.      Objective:  Physical Exam   Constitutional: She is oriented to person, place, and time. She appears well-developed and well-nourished. No distress.   HENT:   Head: Normocephalic and atraumatic.   Mouth/Throat: Oropharynx is clear and moist. No oropharyngeal exudate.    Eyes: Pupils are equal, round, and reactive to light. Conjunctivae and EOM are normal. No scleral icterus.   Neck: Normal range of motion. Neck supple. No JVD present. No thyromegaly present.   Cardiovascular: Normal rate, regular rhythm, normal heart sounds and intact distal pulses. Exam reveals no gallop and no friction rub.   No murmur heard.  Pulmonary/Chest: Effort normal and breath sounds normal. No respiratory distress. She has no wheezes. She has no rales. Right breast exhibits no inverted nipple, no mass, no nipple discharge, no skin change and no tenderness. Left breast exhibits no inverted nipple, no mass, no nipple discharge, no skin change and no tenderness.       S/p left mastectomy and reconstruction. Wound healed   Abdominal: Soft. Bowel sounds are normal. She exhibits no distension and no mass. There is no hepatosplenomegaly. There is no tenderness. There is no rebound. No hernia.       Wound healed   Musculoskeletal: Normal range of motion. She exhibits no edema, tenderness or deformity.   Lymphadenopathy:     She has no cervical adenopathy.     She has no axillary adenopathy.        Right: No supraclavicular adenopathy present.        Left: No supraclavicular adenopathy present.   Neurological: She is alert and oriented to person, place, and time. No cranial nerve deficit. She exhibits normal muscle tone.   Skin: Skin is warm and dry. No rash noted. She is not diaphoretic. No erythema. No pallor.   Psychiatric: She has a normal mood and affect. Her behavior is normal. Judgment and thought content normal.   Vitals reviewed.      Labs:  CBC, CMP today unremarkable    Imaging Data:   left breast US on 6/27/19 in the left breast retroareolar region just behind the nipple, there is an irregular hypoechoic intraductal mass measuring 1.0 cm.    Assessment and plan:    1. Malignant neoplasm of central portion of left breast in female, estrogen receptor positive    2. Malignant neoplasm metastatic to  lymph node of axilla    3. Essential hypertension    4. Type 2 diabetes mellitus without complication, without long-term current use of insulin    5. Advance care planning       1.2  - Ms Mendoza is a 55 yo postmenopausal woman with pathologic stage IA ([id number]) invasive ductal carcinoma, ER/AR positive, HER2 negative, grade 2. S/p left mastectomy and reconstruction on 9/11/2019. Mammaprint high risk with chemo benefit >12%.   - long discussion with patient re the diagnosis and the mammaprint result. Given high risk and significant benefits from chemo on mammaprint, I recommend adjuvant chemotherapy followed by adjuvant endocrine therapy with an AI  - discussed DDAC followed by weekly taxol.  - The side effects of AC/Taxol were discussed with patient, which include but are not limited to hair loss, fatigue, decreased appetite, weight loss, weakness, bone marrow suppression, increased risk of infection, sepsis, anemia that may require blood transfusion, low platelet counts with increased risk of bleeding that may require platelet transfusion, diarrhea, constipation, mucositis, damage to the brain, heart, liver, kidney, damage to the nerves that may not be reversible, severe allergic reaction, damage at the injection site, sterility, secondary cancer, death. Handouts provided to patient.  - we spent a long time discussing the risk of hair loss which may rarely be permanent. Discussed cold cap therapy and its potential copay. Offered patient opportunity to speak with the group for further discussion. After long discussion, patient decides that she does not want to have the cold cap therapy.   - get baseline echo  - emla, zofran, phenergan sent to pharmacy  - messaged Dr Mccray's office for port placement  - RTC on 10/31 to start DDAC    3.  - BP elevated today  - f/u with PCP    4.  - BS normal  - f/u with PCP    5  Advance Care Planning     Power of   I initiated the process of advance care planning today and  explained the importance of this process to the patient.  I introduced the concept of advance directives to the patient, as well. Then the patient received detailed information about the importance of designating a Health Care Power of  (HCPOA). She was also instructed to communicate with this person about their wishes for future healthcare, should she become sick and lose decision-making capacity. The patient has previously appointed a HCPOA. After our discussion, the patient has decided to complete a HCPOA and has appointed her mother and NAME: Keila Field.    ACP package provided to patient. Asked her to bring back on return.           60 minutes with the patient with at least 50% of the time on face-to-face counseling.

## 2019-10-15 NOTE — PROGRESS NOTES
Malignant neoplasm of central portion of left breast in female, estrogen receptor positive    6/27/2019 Imaging Significant Findings     Mammogram and US  Impression:  Left breast retroareolar intraductal mass is probably the cause of the patient's spontaneous bloody left nipple discharge. BI-RADS 4: Suspicious. Recommend ultrasound-guided biopsy and additional evaluation in breast surgery clinic.      BI-RADS Category:   Left: 4 - Suspicious  Overall: 4 - Suspicious     Recommendation:  Ultrasound guided biopsy for the left breast.  Additional evaluation in Breast Surgery Clinic.     Further management of any symptomatic or palpable concern should be determined clinically.         7/8/2019 Biopsy     BREAST (LEFT NEEDLE BIOPSY, CLINICAL): INVASIVE MAMMARY CARCINOMA      7/8/2019 Initial Diagnosis     Malignant neoplasm of central portion of left breast in female, estrogen receptor positive      7/8/2019 Breast Tumor Markers     Estrogen Receptor: Positive >90%  Progesterone Receptor: Positive 50-90%  HER2: Negative  Ki67: <10%      9/11/2019 Breast Surgery     Surgery: Dr Shikha Mccray, [phone] performed left mastectomy with sentinel lymph node biopsy  and ALND with pathology showing grade 2 invasive ductal carcinoma, 13 mm with 0 positive lymph nodes.         9/11/2019 Cancer Staged     T1c N1  Stage 1B per AJCC 8th ed. pathologic prognostic staging system        10/15/2019 Tumor Conference       Adjuvant chemotherapy (ACT) then endocrine therapy. Mammaprint high risk. No radiation therapy recommended.

## 2019-10-18 ENCOUNTER — OFFICE VISIT (OUTPATIENT)
Dept: HEMATOLOGY/ONCOLOGY | Facility: CLINIC | Age: 54
End: 2019-10-18
Payer: COMMERCIAL

## 2019-10-18 ENCOUNTER — LAB VISIT (OUTPATIENT)
Dept: LAB | Facility: OTHER | Age: 54
End: 2019-10-18
Attending: INTERNAL MEDICINE
Payer: COMMERCIAL

## 2019-10-18 ENCOUNTER — CLINICAL SUPPORT (OUTPATIENT)
Dept: REHABILITATION | Facility: HOSPITAL | Age: 54
End: 2019-10-18
Attending: SURGERY
Payer: COMMERCIAL

## 2019-10-18 VITALS
BODY MASS INDEX: 31.09 KG/M2 | OXYGEN SATURATION: 99 % | HEIGHT: 64 IN | SYSTOLIC BLOOD PRESSURE: 165 MMHG | TEMPERATURE: 98 F | WEIGHT: 182.13 LBS | DIASTOLIC BLOOD PRESSURE: 87 MMHG | RESPIRATION RATE: 16 BRPM | HEART RATE: 85 BPM

## 2019-10-18 DIAGNOSIS — I10 ESSENTIAL HYPERTENSION: ICD-10-CM

## 2019-10-18 DIAGNOSIS — C50.112 MALIGNANT NEOPLASM OF CENTRAL PORTION OF LEFT BREAST IN FEMALE, ESTROGEN RECEPTOR POSITIVE: ICD-10-CM

## 2019-10-18 DIAGNOSIS — Z17.0 MALIGNANT NEOPLASM OF CENTRAL PORTION OF LEFT BREAST IN FEMALE, ESTROGEN RECEPTOR POSITIVE: Primary | ICD-10-CM

## 2019-10-18 DIAGNOSIS — C50.112 MALIGNANT NEOPLASM OF CENTRAL PORTION OF LEFT BREAST IN FEMALE, ESTROGEN RECEPTOR POSITIVE: Primary | ICD-10-CM

## 2019-10-18 DIAGNOSIS — Z17.0 MALIGNANT NEOPLASM OF CENTRAL PORTION OF LEFT BREAST IN FEMALE, ESTROGEN RECEPTOR POSITIVE: ICD-10-CM

## 2019-10-18 DIAGNOSIS — E11.9 TYPE 2 DIABETES MELLITUS WITHOUT COMPLICATION, WITHOUT LONG-TERM CURRENT USE OF INSULIN: ICD-10-CM

## 2019-10-18 DIAGNOSIS — Z91.89 AT RISK FOR LYMPHEDEMA: ICD-10-CM

## 2019-10-18 DIAGNOSIS — M25.612 DECREASED SHOULDER MOBILITY, LEFT: ICD-10-CM

## 2019-10-18 DIAGNOSIS — Z71.89 ADVANCE CARE PLANNING: ICD-10-CM

## 2019-10-18 DIAGNOSIS — C77.3 MALIGNANT NEOPLASM METASTATIC TO LYMPH NODE OF AXILLA: ICD-10-CM

## 2019-10-18 PROBLEM — H40.9 GLAUCOMA: Status: ACTIVE | Noted: 2019-10-18

## 2019-10-18 LAB
ALBUMIN SERPL BCP-MCNC: 4 G/DL (ref 3.5–5.2)
ALP SERPL-CCNC: 79 U/L (ref 55–135)
ALT SERPL W/O P-5'-P-CCNC: 38 U/L (ref 10–44)
ANION GAP SERPL CALC-SCNC: 12 MMOL/L (ref 8–16)
AST SERPL-CCNC: 23 U/L (ref 10–40)
BASOPHILS # BLD AUTO: 0.05 K/UL (ref 0–0.2)
BASOPHILS NFR BLD: 0.7 % (ref 0–1.9)
BILIRUB SERPL-MCNC: 0.3 MG/DL (ref 0.1–1)
BUN SERPL-MCNC: 10 MG/DL (ref 6–20)
CALCIUM SERPL-MCNC: 10.5 MG/DL (ref 8.7–10.5)
CHLORIDE SERPL-SCNC: 105 MMOL/L (ref 95–110)
CO2 SERPL-SCNC: 27 MMOL/L (ref 23–29)
CREAT SERPL-MCNC: 0.7 MG/DL (ref 0.5–1.4)
DIFFERENTIAL METHOD: ABNORMAL
EOSINOPHIL # BLD AUTO: 0.1 K/UL (ref 0–0.5)
EOSINOPHIL NFR BLD: 1 % (ref 0–8)
ERYTHROCYTE [DISTWIDTH] IN BLOOD BY AUTOMATED COUNT: 15.4 % (ref 11.5–14.5)
EST. GFR  (AFRICAN AMERICAN): >60 ML/MIN/1.73 M^2
EST. GFR  (NON AFRICAN AMERICAN): >60 ML/MIN/1.73 M^2
GLUCOSE SERPL-MCNC: 96 MG/DL (ref 70–110)
HCT VFR BLD AUTO: 41.6 % (ref 37–48.5)
HGB BLD-MCNC: 13 G/DL (ref 12–16)
IMM GRANULOCYTES # BLD AUTO: 0.02 K/UL (ref 0–0.04)
IMM GRANULOCYTES NFR BLD AUTO: 0.3 % (ref 0–0.5)
LYMPHOCYTES # BLD AUTO: 2.9 K/UL (ref 1–4.8)
LYMPHOCYTES NFR BLD: 41.3 % (ref 18–48)
MCH RBC QN AUTO: 27.4 PG (ref 27–31)
MCHC RBC AUTO-ENTMCNC: 31.3 G/DL (ref 32–36)
MCV RBC AUTO: 88 FL (ref 82–98)
MONOCYTES # BLD AUTO: 0.4 K/UL (ref 0.3–1)
MONOCYTES NFR BLD: 5.8 % (ref 4–15)
NEUTROPHILS # BLD AUTO: 3.6 K/UL (ref 1.8–7.7)
NEUTROPHILS NFR BLD: 50.9 % (ref 38–73)
NRBC BLD-RTO: 0 /100 WBC
PLATELET # BLD AUTO: 359 K/UL (ref 150–350)
PMV BLD AUTO: 9.1 FL (ref 9.2–12.9)
POTASSIUM SERPL-SCNC: 3.7 MMOL/L (ref 3.5–5.1)
PROT SERPL-MCNC: 7.9 G/DL (ref 6–8.4)
RBC # BLD AUTO: 4.75 M/UL (ref 4–5.4)
SODIUM SERPL-SCNC: 144 MMOL/L (ref 136–145)
WBC # BLD AUTO: 7.07 K/UL (ref 3.9–12.7)

## 2019-10-18 PROCEDURE — 97140 MANUAL THERAPY 1/> REGIONS: CPT | Performed by: PHYSICAL MEDICINE & REHABILITATION

## 2019-10-18 PROCEDURE — 99999 PR PBB SHADOW E&M-EST. PATIENT-LVL III: ICD-10-PCS | Mod: PBBFAC,,, | Performed by: INTERNAL MEDICINE

## 2019-10-18 PROCEDURE — 97110 THERAPEUTIC EXERCISES: CPT | Performed by: PHYSICAL MEDICINE & REHABILITATION

## 2019-10-18 PROCEDURE — 3008F PR BODY MASS INDEX (BMI) DOCUMENTED: ICD-10-PCS | Mod: CPTII,S$GLB,, | Performed by: INTERNAL MEDICINE

## 2019-10-18 PROCEDURE — 3008F BODY MASS INDEX DOCD: CPT | Mod: CPTII,S$GLB,, | Performed by: INTERNAL MEDICINE

## 2019-10-18 PROCEDURE — 36415 COLL VENOUS BLD VENIPUNCTURE: CPT

## 2019-10-18 PROCEDURE — 85025 COMPLETE CBC W/AUTO DIFF WBC: CPT

## 2019-10-18 PROCEDURE — 99205 PR OFFICE/OUTPT VISIT, NEW, LEVL V, 60-74 MIN: ICD-10-PCS | Mod: S$GLB,,, | Performed by: INTERNAL MEDICINE

## 2019-10-18 PROCEDURE — 99205 OFFICE O/P NEW HI 60 MIN: CPT | Mod: S$GLB,,, | Performed by: INTERNAL MEDICINE

## 2019-10-18 PROCEDURE — 99999 PR PBB SHADOW E&M-EST. PATIENT-LVL III: CPT | Mod: PBBFAC,,, | Performed by: INTERNAL MEDICINE

## 2019-10-18 PROCEDURE — 80053 COMPREHEN METABOLIC PANEL: CPT

## 2019-10-18 RX ORDER — LIDOCAINE AND PRILOCAINE 25; 25 MG/G; MG/G
CREAM TOPICAL ONCE
Qty: 30 G | Refills: 2 | Status: SHIPPED | OUTPATIENT
Start: 2019-10-18 | End: 2019-10-18

## 2019-10-18 RX ORDER — PROMETHAZINE HYDROCHLORIDE 25 MG/1
25 TABLET ORAL EVERY 6 HOURS PRN
Qty: 30 TABLET | Refills: 2 | Status: SHIPPED | OUTPATIENT
Start: 2019-10-18 | End: 2021-05-16 | Stop reason: SDUPTHER

## 2019-10-18 RX ORDER — ONDANSETRON 8 MG/1
8 TABLET, ORALLY DISINTEGRATING ORAL EVERY 6 HOURS PRN
Qty: 30 TABLET | Refills: 2 | Status: SHIPPED | OUTPATIENT
Start: 2019-10-18 | End: 2021-05-17 | Stop reason: SDUPTHER

## 2019-10-18 RX ORDER — NYSTATIN 100000 U/G
CREAM TOPICAL
COMMUNITY
Start: 2019-09-04 | End: 2020-08-12

## 2019-10-18 NOTE — PLAN OF CARE
START ON PATHWAY REGIMEN - Breast    [id number]        Doxorubicin (Adriamycin(R))       Cyclophosphamide (Cytoxan(R))       [id number]     **Always confirm dose/schedule in your pharmacy ordering system**        Paclitaxel (Taxol(R))     **Always confirm dose/schedule in your pharmacy ordering system**    Patient Characteristics:  Postoperative without Neoadjuvant Therapy (Pathologic Staging), Invasive   Disease, Adjuvant Therapy, HER2 Negative/Unknown/Equivocal, ER Positive, Node   Positive, Node Positive (1-3), MammaPrint(R) Ordered, High Genomic Risk  Therapeutic Status: Postoperative without Neoadjuvant Therapy (Pathologic   Staging)  AJCC Grade: G2  AJCC N Category: pN1a  AJCC M Category: cM0  ER Status: Positive (+)  AJCC 8 Stage Grouping: IA  HER2 Status: Negative (-)  Oncotype Dx Recurrence Score: Ordered Other Genomic Test  AJCC T Category: pT1c  AR Status: Positive (+)  Has this patient completed genomic testing<= Yes - MammaPrint(R)  MammaPrint(R) Score: High Genomic Risk  Intent of Therapy:  Curative Intent, Discussed with Patient

## 2019-10-18 NOTE — LETTER
October 18, 2019      Shikha Mccray MD  [address] [facility] [address]           [facility] 2 [facility] [address]  [address], [address]  [facility] [address]  Phone: [phone]          Patient: Shu Mendoza   MR Number: [id number]   YOB: 1965   Date of Visit: 10/18/2019       Dear Dr. Shikha Mccray:    Thank you for referring Shu Mendoza to me for evaluation. Attached you will find relevant portions of my assessment and plan of care.    If you have questions, please do not hesitate to call me. I look forward to following Shu Mendoza along with you.    Sincerely,    Eliana Norris MD    Enclosure  CC:  No Recipients    If you would like to receive this communication electronically, please contact [email or web address] or [phone] to request more information on [employer] access.    For providers and/or their staff who would like to refer a patient to [facility], please contact us through our one-stop-shop provider referral line, [facility], at [phone].    If you feel you have received this communication in error or would no longer like to receive these types of communications, please e-mail [email or web address]

## 2019-10-18 NOTE — Clinical Note
Check CBC, CMP today. Please fax my office note from today to PCP. Verify with NED TAN. Schedule for echo. RTC on 10/31 with CBC, CMP, see me, then get AC, return on 11/1 to get neulasta

## 2019-10-18 NOTE — LETTER
October 18, 2019      [facility] 210  [address], [address]  [facility] [address]  Phone: [phone]       Patient: Shu Mendoza   YOB: 1965  Date of Visit: 10/18/2019    To Whom It May Concern:    KEELY Mendoza  was at [facility] on 10/18/2019. She has newly diagnosed breast cancer and needs to get chemotherapy. If you have any questions or concerns, or if I can be of further assistance, please do not hesitate to contact me.    Sincerely,    Eliana Norris MD

## 2019-10-18 NOTE — PROGRESS NOTES
Physical Therapy Daily Treatment Note     Name: Shu Mendoza  Clinic Number: [id number]  Diagnosis:   Encounter Diagnoses   Name Primary?    Decreased shoulder mobility, left     Malignant neoplasm of central portion of left breast in female, estrogen receptor positive     At risk for lymphedema      Physician: Shikha Mccray MD    Precautions: none  Visit #: 4 of 20  Time In: 2:00 PM  Time Out: 2:45 PM  Total Treatment Time 1:1: 45 minutes    Evaluation Date: 10/2/19  Visit # authorized: 20  Authorization period: 12/31/19  Plan of care Expiration: 11/27/19  MD referral: Dr. Shikha mccray  Insurance: [facility]      Subjective     Pt reports: left arm feels better and states moving LUE better.States cording decreasing left axilla and upper arm.  Pain Scale: Shu rates pain on a scale of 6/10 on VAS.   Pain location: axilla left    Fatigue: Mild  Functional change: able to reach up better  Diagnosis: Left breasr IDC, grade 2, ER (+), NE (+), HER2 (-)   Chief complaint: feeling soreness in left chest, arm and in abdomen  Surgical History: 9/11/19 left breast mastectomy with SLNB and SALEEM flap reconstruction  Shu Mendoza  has a past surgical history that includes Oophorectomy; Breast biopsy; Hysterectomy (2000); Hernia repair (2009); Eye surgery (Bilateral); Reconstruction of breast with deep inferior epigastric artery  (SALEEM) free flap (Left, 9/11/2019); Mastectomy (Left, 9/11/2019); and Mastectomy with sentinel node biopsy and axillary lymph node dissection (Left, 9/11/2019).     Chemotherapy: saw oncology today and will begin adjuvant chemotherapy with AC/Taxol in 2 weeks  Radiation: none      Objective     Appearance: 5 cm x 1 cm and 2 cm x 1 cm very superficial area along right side of abdominal incision closing in and dry.    Shu received individual therapeutic exercises to improve postural correction and alignment, stretching and soft tissue  "mobility, and strengthening for 30 minutes including the following:   Supine Shld Flexion with Wand       1 x 10  Butterflies                                         10 x 5"  Supine Shld ER with wand              1 x 10  Supine Active Shld Abd                    1 x 10  Seated Scap Retractions                 10 x 5"  Wall Climbs  Forward                     10 x 5"                        Sideways                    10 x 5"  LTR with ER                                   1 x 10     Shu received the following manual therapy techniques were performed to increased myofascial/soft tissue length, mobility and pliability, increase PROM, AROM and function as well as to decrease pain for 15 minutes  Stretching, bending, twisting for LUE cording  Median Nerve glides  Passive stretch left elbow and shoulder  Grade II G-H joint mobs  Posterior capsule stretch  Passive stretch left shoulder    Shoulder Range of Motion:   Active /Passive ROM Right Left   Flexion 170 160   Abduction 170 145   Extension 70 50   IR/90deg 85 85   ER/90deg 90 90           Strength: manual muscle test grades below   Upper Extremity Strength    (R) UE (L) UE   Shoulder flexion: 5/5 4/5   Shoulder Abduction: 5/5 4/5   Shoulder IR 5/5 4/5   Shoulder ER 5/5 4/5   Elbow flexion: 5/5 4/5   Elbow extension: 5/5 4/5   Wrist flexion: 5/5 4/5   Wrist extension: 5/5 4/5    5/5 4/5     Functional Limitations Reporting       CMS Impairment/Limitation/Restriction for FOTO Outcome Survey     Therapist reviewed FOTO scores for Shu Mendoza on 10/11/2019.   FOTO documents entered into [employer] - see Media section.     Limitations Score: 28%  Category: Carrying         Home Exercise Program and Patient Education   Education provided re:  - role of PT in multi - disciplinary team, goals for PT  - progress towards goals     See EMR under notes/patient instructions for HEP given/taught this session - all sets and reps included. Pt received printed copy.     Pt was " "able to demonstrate and report understanding and performance  Pt has no cultural, educational or language barriers to learning provided.    Assessment     Patient is responding well to physical therapy. Is at expected function and pain level for 4 weeks post operatively.   Pain after treatment: 1/10    Pt prognosis is Good. Patient received manual therapy as above to decrease cording and shoulder joint tightness. Palpable "popping" left upper arm cording noted today. Patient performed above exercise programand tolerated new exercise added today. Improvements with left shoulder AROM: 5 degrees with flexion, 15 degrees with Abd.   Patient instructed to continue with HEP.  Pt will continue to benefit from skilled outpatient physical therapy to address the deficits listed in the problem list chart on initial evaluation, provide pt/family education and to maximize pt's level of independence in the home and community environment. Patient met 1 STG this session.    Goals as follows:  Short Term goals: 4 weeks  1. Patient will demonstrate 100% understanding of lymphedema risk reduction practices to include self monitoring for lymphedema. (met, 10/11/19)  2. Patient will demonstrate independence with Home Exercise program established. (met, 10/11/19)  3. Pt will increase AROM/PROM in shoulder abduction ROM to 140 degrees on left to improve functional reach, carry, push, pull pain free. ( met, 10/18/19)  4. Pt will increase AROM/PROM in shoulder flexion to 150 degrees on left to improve functional reach, carry, push, pull pain free.( met, 10/11/19)  5. Strength will be 4/5 in LUE in order for patient to perform functional activities.  (met, 10/11/19)     Long Term Goals: 8 weeks   1.  Pt will increase AROM/PROM in shoulder flexion to 170 degrees on left to improve functional reach, carry, push, pull pain free. (progressing, not met)  2. Pt will increase strength to 4+/5 in gross UE musculature to improve tolerance to all " functional activities pain free. (progressing, not met)  3. Pt will demonstrate full/maximized tissue mobility to increase ROM and promote healthy tissue to be pain free at discharge. (progressing, not met)  4. Pt will report decrease in overall worst pain to 2/10 at discharge. (progressing, not met)  5. Pt will increase AROM/PROM in shoulder abduction ROM to 170 degrees on left to improve functional reach, carry, push, pull pain free. (progressing, not met)  6. Pt will increase AROM/PROM in shoulder ER to 90 degrees on left in order to perform all grooming activities (progressing, not met)  6. Patient will report compliance with walking program 5x week for 10 min each day to improve overall cardiovascular function and decrease cancer related fatigue at discharge. (progressing, not met)        Plan     Continue with established POC toward physical therapy goals.    Therapist: Lolly Torres, PT  10/18/2019

## 2019-10-21 ENCOUNTER — CLINICAL SUPPORT (OUTPATIENT)
Dept: REHABILITATION | Facility: HOSPITAL | Age: 54
End: 2019-10-21
Attending: SURGERY
Payer: COMMERCIAL

## 2019-10-21 ENCOUNTER — TELEPHONE (OUTPATIENT)
Dept: SURGERY | Facility: CLINIC | Age: 54
End: 2019-10-21

## 2019-10-21 DIAGNOSIS — C50.112 MALIGNANT NEOPLASM OF CENTRAL PORTION OF LEFT BREAST IN FEMALE, ESTROGEN RECEPTOR POSITIVE: ICD-10-CM

## 2019-10-21 DIAGNOSIS — M25.612 DECREASED SHOULDER MOBILITY, LEFT: ICD-10-CM

## 2019-10-21 DIAGNOSIS — Z17.0 MALIGNANT NEOPLASM OF CENTRAL PORTION OF LEFT BREAST IN FEMALE, ESTROGEN RECEPTOR POSITIVE: Primary | ICD-10-CM

## 2019-10-21 DIAGNOSIS — Z91.89 AT RISK FOR LYMPHEDEMA: ICD-10-CM

## 2019-10-21 DIAGNOSIS — C50.112 MALIGNANT NEOPLASM OF CENTRAL PORTION OF LEFT BREAST IN FEMALE, ESTROGEN RECEPTOR POSITIVE: Primary | ICD-10-CM

## 2019-10-21 DIAGNOSIS — Z17.0 MALIGNANT NEOPLASM OF CENTRAL PORTION OF LEFT BREAST IN FEMALE, ESTROGEN RECEPTOR POSITIVE: ICD-10-CM

## 2019-10-21 PROCEDURE — 97140 MANUAL THERAPY 1/> REGIONS: CPT | Performed by: PHYSICAL MEDICINE & REHABILITATION

## 2019-10-21 PROCEDURE — 97110 THERAPEUTIC EXERCISES: CPT | Performed by: PHYSICAL MEDICINE & REHABILITATION

## 2019-10-21 NOTE — TELEPHONE ENCOUNTER
Spoke with patient regarding port surgery for Friday 10-25-19, patient has chemo class scheduled for 2 pm at [facility], port surgery scheduled and confirmed at [facility] for 1100, pt verbalized understanding, all questions answered at this time

## 2019-10-21 NOTE — PATIENT INSTRUCTIONS
Doorway Pec Stretch:  Start by having the affected arm flush against  the side of the wall at ~90 degrees between  your arm and torso and also the arm and  elbow.  Have the opposite leg forward and the other  back in a modified lunging stance.  Slowly begin to lean forward and there will be  a stretching sensation through the pectoral  muscle group of the arm on the wall.  Perform 10 times/holding for count of 5.  Copyright © 0241-6634 [employer]

## 2019-10-21 NOTE — PROGRESS NOTES
"                                                    Physical Therapy Daily Treatment Note     Name: Shu Mendoza  Clinic Number: [id number]  Diagnosis:   Encounter Diagnoses   Name Primary?    Decreased shoulder mobility, left     Malignant neoplasm of central portion of left breast in female, estrogen receptor positive     At risk for lymphedema      Physician: Shikha Mccray MD    Precautions: none  Visit #: 4 of 20  Time In: 8:15 AM  Time Out: 9:00 AM  Total Treatment Time 1:1: 45 minutes    Evaluation Date: 10/2/19  Visit # authorized: 20  Authorization period: 12/31/19  Plan of care Expiration: 11/27/19  MD referral: Dr. Shikha mccray  Insurance: [facility]      Subjective     Pt reports: left arm feels better and states felt a "pop" in LUE this morning when stretching. States feeling soreness left upper arm.  Pain Scale: Shu rates pain on a scale of 4/10 on VAS.   Pain location: upper arm left    Fatigue: Mild  Functional change: able to reach up better  Diagnosis: Left breasr IDC, grade 2, ER (+), SD (+), HER2 (-)   Chief complaint: feeling soreness in left chest, arm and in abdomen  Surgical History: 9/11/19 left breast mastectomy with SLNB and SALEEM flap reconstruction  Shu Mendoza  has a past surgical history that includes Oophorectomy; Breast biopsy; Hysterectomy (2000); Hernia repair (2009); Eye surgery (Bilateral); Reconstruction of breast with deep inferior epigastric artery  (SALEEM) free flap (Left, 9/11/2019); Mastectomy (Left, 9/11/2019); and Mastectomy with sentinel node biopsy and axillary lymph node dissection (Left, 9/11/2019).     Chemotherapy: willl begin adjuvant chemotherapy with AC/Taxol 10/31/19.  Radiation: none      Objective     Appearance: 5 cm x 1 cm and 2 cm x 1 cm very superficial area along right side of abdominal incision closing in and dry.    Shu received individual therapeutic exercises to improve postural correction and alignment, stretching and soft tissue " "mobility, and strengthening for 30 minutes including the following:   Supine Shld Flexion with Wand       1 x 10  Supine Shld ER with wand              1 x 10  Supine Active Shld Abd                    1 x 10  Seated Scap Retractions                 10 x 5"  Wall Climbs  Forward                     10 x 5"                        Sideways                    10 x 5"  LTR with ER                                   1 x 10     Shu received the following manual therapy techniques were performed to increased myofascial/soft tissue length, mobility and pliability, increase PROM, AROM and function as well as to decrease pain for 15 minutes  Stretching, bending, twisting for LUE cording  Median Nerve glides  Passive stretch left elbow and shoulder  Grade II G-H joint mobs  Posterior capsule stretch  Passive stretch left shoulder    Shoulder Range of Motion:   Active /Passive ROM Right Left   Flexion 170 175   Abduction 170 155   Extension 70 50   IR/90deg 85 85   ER/90deg 90 90           Strength: manual muscle test grades below   Upper Extremity Strength    (R) UE (L) UE   Shoulder flexion: 5/5 4/5   Shoulder Abduction: 5/5 4/5   Shoulder IR 5/5 4/5   Shoulder ER 5/5 4/5   Elbow flexion: 5/5 4/5   Elbow extension: 5/5 4/5   Wrist flexion: 5/5 4/5   Wrist extension: 5/5 4/5    5/5 4/5     Functional Limitations Reporting       CMS Impairment/Limitation/Restriction for FOTO Outcome Survey     Therapist reviewed FOTO scores for Shu Mendoza on 10/11/2019.   FOTO documents entered into [employer] - see Media section.     Limitations Score: 28%  Category: Carrying         Home Exercise Program and Patient Education   Education provided re:  - role of PT in multi - disciplinary team, goals for PT  - progress towards goals     See EMR under notes/patient instructions for HEP given/taught this session - all sets and reps included. Pt received printed copy.     Pt was able to demonstrate and report understanding and " "performance  Pt has no cultural, educational or language barriers to learning provided.    Assessment     Patient is responding well to physical therapy. Is at expected function and pain level for 6 weeks post operatively.   Pain after treatment: 1/10    Pt prognosis is Good. Patient received manual therapy as above to decrease cording and shoulder joint tightness. Palpable "popping" left upper arm cording noted today. Patient performed above exercise programand tolerated new exercise added today. Improvements with left shoulder AROM: 15 degrees with flexion, 10 degrees with Abd.   Patient instructed to continue with HEP.  Pt will continue to benefit from skilled outpatient physical therapy to address the deficits listed in the problem list chart on initial evaluation, provide pt/family education and to maximize pt's level of independence in the home and community environment. Patient met 1 LTG this session.    Goals as follows:  Short Term goals: 4 weeks  1. Patient will demonstrate 100% understanding of lymphedema risk reduction practices to include self monitoring for lymphedema. (met, 10/11/19)  2. Patient will demonstrate independence with Home Exercise program established. (met, 10/11/19)  3. Pt will increase AROM/PROM in shoulder abduction ROM to 140 degrees on left to improve functional reach, carry, push, pull pain free. ( met, 10/18/19)  4. Pt will increase AROM/PROM in shoulder flexion to 150 degrees on left to improve functional reach, carry, push, pull pain free.( met, 10/11/19)  5. Strength will be 4/5 in LUE in order for patient to perform functional activities.  (met, 10/11/19)     Long Term Goals: 8 weeks   1.  Pt will increase AROM/PROM in shoulder flexion to 170 degrees on left to improve functional reach, carry, push, pull pain free. (met, 10/21/19)  2. Pt will increase strength to 4+/5 in gross UE musculature to improve tolerance to all functional activities pain free. (progressing, not met)  3. " Pt will demonstrate full/maximized tissue mobility to increase ROM and promote healthy tissue to be pain free at discharge. (progressing, not met)  4. Pt will report decrease in overall worst pain to 2/10 at discharge. (progressing, not met)  5. Pt will increase AROM/PROM in shoulder abduction ROM to 170 degrees on left to improve functional reach, carry, push, pull pain free. (progressing, not met)  6. Pt will increase AROM/PROM in shoulder ER to 90 degrees on left in order to perform all grooming activities (progressing, not met)  6. Patient will report compliance with walking program 5x week for 10 min each day to improve overall cardiovascular function and decrease cancer related fatigue at discharge. (progressing, not met)        Plan     Continue with established POC toward physical therapy goals. Will reassess next visit for possible discharge.     Therapist: Lolly Torres, PT  10/21/2019

## 2019-10-21 NOTE — TELEPHONE ENCOUNTER
----- Message from Eliana Norris MD sent at 10/18/2019 11:10 AM CDT -----  Deshawn Trivedi,     I plan to start DDAC on 10/31. Can you put in a port prior to that? thanks

## 2019-10-22 ENCOUNTER — HOSPITAL ENCOUNTER (OUTPATIENT)
Dept: CARDIOLOGY | Facility: OTHER | Age: 54
Discharge: HOME | End: 2019-10-22
Attending: INTERNAL MEDICINE
Payer: COMMERCIAL

## 2019-10-22 DIAGNOSIS — C50.112 MALIGNANT NEOPLASM OF CENTRAL PORTION OF LEFT BREAST IN FEMALE, ESTROGEN RECEPTOR POSITIVE: ICD-10-CM

## 2019-10-22 DIAGNOSIS — Z17.0 MALIGNANT NEOPLASM OF CENTRAL PORTION OF LEFT BREAST IN FEMALE, ESTROGEN RECEPTOR POSITIVE: ICD-10-CM

## 2019-10-22 PROCEDURE — 93306 ECHO (CUPID ONLY): ICD-10-PCS | Mod: 26,,, | Performed by: INTERNAL MEDICINE

## 2019-10-22 PROCEDURE — 93306 TTE W/DOPPLER COMPLETE: CPT | Mod: 26,,, | Performed by: INTERNAL MEDICINE

## 2019-10-22 PROCEDURE — 93306 TTE W/DOPPLER COMPLETE: CPT

## 2019-10-23 ENCOUNTER — TELEPHONE (OUTPATIENT)
Dept: GYNECOLOGIC ONCOLOGY | Facility: CLINIC | Age: 54
End: 2019-10-23

## 2019-10-23 LAB
AORTIC ROOT ANNULUS: 2.39 CM
AORTIC VALVE CUSP SEPERATION: 1.44 CM
ASCENDING AORTA: 2.76 CM
AV INDEX (PROSTH): 1.02
AV MEAN GRADIENT: 3 MMHG
AV PEAK GRADIENT: 7 MMHG
AV VALVE AREA: 3.08 CM2
AV VELOCITY RATIO: 0.74
CV ECHO LV RWT: 0.33 CM
DOP CALC AO PEAK VEL: 1.29 M/S
DOP CALC AO VTI: 25.18 CM
DOP CALC LVOT AREA: 3 CM2
DOP CALC LVOT DIAMETER: 1.96 CM
DOP CALC LVOT PEAK VEL: 0.95 M/S
DOP CALC LVOT STROKE VOLUME: 77.44 CM3
DOP CALCLVOT PEAK VEL VTI: 25.68 CM
E WAVE DECELERATION TIME: 92.99 MSEC
E/A RATIO: 1.61
E/E' RATIO: 9.22 M/S
ECHO LV POSTERIOR WALL: 0.87 CM (ref 0.6–1.1)
FRACTIONAL SHORTENING: 32 % (ref 28–44)
INTERVENTRICULAR SEPTUM: 0.95 CM (ref 0.6–1.1)
LA MAJOR: 3.73 CM
LA WIDTH: 3.92 CM
LEFT ATRIUM SIZE: 3.6 CM
LEFT INTERNAL DIMENSION IN SYSTOLE: 3.61 CM (ref 2.1–4)
LEFT VENTRICLE DIASTOLIC VOLUME: 134.18 ML
LEFT VENTRICLE SYSTOLIC VOLUME: 54.87 ML
LEFT VENTRICULAR INTERNAL DIMENSION IN DIASTOLE: 5.28 CM (ref 3.5–6)
LEFT VENTRICULAR MASS: 175.91 G
LV LATERAL E/E' RATIO: 8.83 M/S
LV SEPTAL E/E' RATIO: 9.64 M/S
MV PEAK A VEL: 0.66 M/S
MV PEAK E VEL: 1.06 M/S
PISA TR MAX VEL: 2.37 M/S
RA MAJOR: 4.11 CM
RA WIDTH: 3.96 CM
SINUS: 2.56 CM
STJ: 2.54 CM
TDI LATERAL: 0.12 M/S
TDI SEPTAL: 0.11 M/S
TDI: 0.12 M/S
TR MAX PG: 22 MMHG
TRICUSPID ANNULAR PLANE SYSTOLIC EXCURSION: 2.56 CM

## 2019-10-24 ENCOUNTER — TELEPHONE (OUTPATIENT)
Dept: SURGERY | Facility: CLINIC | Age: 54
End: 2019-10-24

## 2019-10-24 ENCOUNTER — CLINICAL SUPPORT (OUTPATIENT)
Dept: HEMATOLOGY/ONCOLOGY | Facility: CLINIC | Age: 54
End: 2019-10-24
Payer: COMMERCIAL

## 2019-10-24 PROCEDURE — 99999 PR PBB SHADOW E&M-EST. PATIENT-LVL I: CPT | Mod: PBBFAC,,,

## 2019-10-24 PROCEDURE — 99999 PR PBB SHADOW E&M-EST. PATIENT-LVL I: ICD-10-PCS | Mod: PBBFAC,,,

## 2019-10-24 NOTE — PRE ADMISSION SCREENING
Unable to reach patient,spoke with  piedad nicole,instructed to tell patient not to eat after 9pm,and water only,and to take blood pressure medication lotrel in AM,not to take any diabetic meds.phone number given if patient  Has any additional questions. verbalized understanding.

## 2019-10-24 NOTE — PRE ADMISSION SCREENING
Pre admit phone call completed.    Instructions given to patient about NPO status as follows:     The evening before surgery do not eat anything after 9 p.m. ( this includes hard candy, chewing gum and mints).  You may only have GATORADE, POWERADE AND WATER from 9 p.m. until you leave your home. DO NOT  DRINK ANY LIQUIDS ON THE WAY TO THE HOSPITAL.      Patient was also instructed on the below information:    Park in the Parking lot behind the hospital or in the [employer] [facility] across the street from the parking lot.  Parking is complimentary.  If you will be discharged the same day as your procedure, please arrange for a responsible adult to drive you home or  to accompany you if traveling by taxi.  YOU WILL NOT BE PERMITTED TO DRIVE OR TO LEAVE THE HOSPITAL ALONE AFTER SURGERY.  It is strongly recommended that you arrange for someone to remain with you for the first 24 hrs following your surgery.    Patient verbalized understanding of above instructions.

## 2019-10-24 NOTE — TELEPHONE ENCOUNTER
Called Patient's cell phone and reached a recording - Your call can not be completed at this time.  Spoke to Patient's .  He stated that her cell phone is turned off and he is taking her calls.  Arrival time of 0530 given to check in at [facility].  Instructed to remain NPO after 0000, to wash up tonight and in the morning with an antibacterial soap, not to wear anything metal or to apply any lotions, powders, or deodorant, and to wear something easy to remove.   stated that his Wife will be coming with her Mother.   verbalized understanding of instructions.    Patient called back.  Stated that her 's cell home which is listed as the Home phone is the best way to reach her.  Reviewed the above instructions.  Verified her arrival time of 0530.  Patient verified understanding of instructions.

## 2019-10-24 NOTE — PRE ADMISSION SCREENING
Pt received message from  and returned call.  States she is in process of getting her cell phone turned on.  Reviewed preop instructions and medication to take.  Verbalized understanding.  Will call Dr. Mccray's office for arrival time

## 2019-10-24 NOTE — PROGRESS NOTES
Pt attended chemotherapy class unaccompanied. Information was provided on specific regimen and patient was allowed to answer specific questions. Pt provided with all upcoming appts and provided provider's card.

## 2019-10-25 ENCOUNTER — ANESTHESIA EVENT (OUTPATIENT)
Dept: SURGERY | Facility: OTHER | Age: 54
End: 2019-10-25
Payer: COMMERCIAL

## 2019-10-25 ENCOUNTER — HOSPITAL ENCOUNTER (OUTPATIENT)
Facility: OTHER | Age: 54
Discharge: HOME | End: 2019-10-25
Attending: SURGERY | Admitting: SURGERY
Payer: COMMERCIAL

## 2019-10-25 ENCOUNTER — ANESTHESIA (OUTPATIENT)
Dept: SURGERY | Facility: OTHER | Age: 54
End: 2019-10-25
Payer: COMMERCIAL

## 2019-10-25 VITALS
HEART RATE: 80 BPM | BODY MASS INDEX: 30.73 KG/M2 | WEIGHT: 180 LBS | HEIGHT: 64 IN | RESPIRATION RATE: 16 BRPM | OXYGEN SATURATION: 100 % | SYSTOLIC BLOOD PRESSURE: 164 MMHG | TEMPERATURE: 98 F | DIASTOLIC BLOOD PRESSURE: 90 MMHG

## 2019-10-25 DIAGNOSIS — Z17.0 MALIGNANT NEOPLASM OF CENTRAL PORTION OF LEFT BREAST IN FEMALE, ESTROGEN RECEPTOR POSITIVE: Primary | ICD-10-CM

## 2019-10-25 DIAGNOSIS — C50.112 MALIGNANT NEOPLASM OF CENTRAL PORTION OF LEFT BREAST IN FEMALE, ESTROGEN RECEPTOR POSITIVE: Primary | ICD-10-CM

## 2019-10-25 LAB — POCT GLUCOSE: 125 MG/DL (ref 70–110)

## 2019-10-25 PROCEDURE — 37000008 HC ANESTHESIA 1ST 15 MINUTES: Performed by: SURGERY

## 2019-10-25 PROCEDURE — 82962 GLUCOSE BLOOD TEST: CPT | Performed by: SURGERY

## 2019-10-25 PROCEDURE — 36561 PR INSERT TUNNELED CV CATH WITH PORT: ICD-10-PCS | Mod: 79,RT,, | Performed by: SURGERY

## 2019-10-25 PROCEDURE — 63600175 PHARM REV CODE 636 W HCPCS: Performed by: NURSE ANESTHETIST, CERTIFIED REGISTERED

## 2019-10-25 PROCEDURE — 71000015 HC POSTOP RECOV 1ST HR: Performed by: SURGERY

## 2019-10-25 PROCEDURE — 25000003 PHARM REV CODE 250: Performed by: SURGERY

## 2019-10-25 PROCEDURE — 71000016 HC POSTOP RECOV ADDL HR: Performed by: SURGERY

## 2019-10-25 PROCEDURE — 36000706: Performed by: SURGERY

## 2019-10-25 PROCEDURE — 63600175 PHARM REV CODE 636 W HCPCS: Performed by: SURGERY

## 2019-10-25 PROCEDURE — 37000009 HC ANESTHESIA EA ADD 15 MINS: Performed by: SURGERY

## 2019-10-25 PROCEDURE — 36000707: Performed by: SURGERY

## 2019-10-25 PROCEDURE — C1788 PORT, INDWELLING, IMP: HCPCS | Performed by: SURGERY

## 2019-10-25 PROCEDURE — 77001 CHG FLUOROGUIDE CNTRL VEN ACCESS,PLACE,REPLACE,REMOVE: ICD-10-PCS | Mod: 26,,, | Performed by: SURGERY

## 2019-10-25 PROCEDURE — 77001 FLUOROGUIDE FOR VEIN DEVICE: CPT | Mod: 26,,, | Performed by: SURGERY

## 2019-10-25 PROCEDURE — 36561 INSERT TUNNELED CV CATH: CPT | Mod: 79,RT,, | Performed by: SURGERY

## 2019-10-25 DEVICE — KIT POWERPORT SINGLE 8FR: Type: IMPLANTABLE DEVICE | Site: CHEST | Status: FUNCTIONAL

## 2019-10-25 RX ORDER — FENTANYL CITRATE 50 UG/ML
INJECTION, SOLUTION INTRAMUSCULAR; INTRAVENOUS
Status: DISCONTINUED | OUTPATIENT
Start: 2019-10-25 | End: 2019-10-25

## 2019-10-25 RX ORDER — CEFAZOLIN SODIUM 1 G/3ML
2 INJECTION, POWDER, FOR SOLUTION INTRAMUSCULAR; INTRAVENOUS
Status: COMPLETED | OUTPATIENT
Start: 2019-10-25 | End: 2019-10-25

## 2019-10-25 RX ORDER — LIDOCAINE HYDROCHLORIDE 10 MG/ML
1 INJECTION, SOLUTION EPIDURAL; INFILTRATION; INTRACAUDAL; PERINEURAL ONCE
Status: DISCONTINUED | OUTPATIENT
Start: 2019-10-25 | End: 2019-10-25 | Stop reason: HOSPADM

## 2019-10-25 RX ORDER — LIDOCAINE HYDROCHLORIDE 10 MG/ML
INJECTION, SOLUTION EPIDURAL; INFILTRATION; INTRACAUDAL; PERINEURAL
Status: DISCONTINUED | OUTPATIENT
Start: 2019-10-25 | End: 2019-10-25 | Stop reason: HOSPADM

## 2019-10-25 RX ORDER — TRAMADOL HYDROCHLORIDE 50 MG/1
50 TABLET ORAL EVERY 4 HOURS PRN
Status: DISCONTINUED | OUTPATIENT
Start: 2019-10-25 | End: 2019-10-25 | Stop reason: HOSPADM

## 2019-10-25 RX ORDER — PROPOFOL 10 MG/ML
VIAL (ML) INTRAVENOUS CONTINUOUS PRN
Status: DISCONTINUED | OUTPATIENT
Start: 2019-10-25 | End: 2019-10-25

## 2019-10-25 RX ORDER — TRAMADOL HYDROCHLORIDE 50 MG/1
50 TABLET ORAL EVERY 6 HOURS PRN
Qty: 10 TABLET | Refills: 0 | Status: SHIPPED | OUTPATIENT
Start: 2019-10-25 | End: 2020-02-06

## 2019-10-25 RX ORDER — SODIUM CHLORIDE 0.9 % (FLUSH) 0.9 %
3 SYRINGE (ML) INJECTION
Status: DISCONTINUED | OUTPATIENT
Start: 2019-10-25 | End: 2019-10-25 | Stop reason: HOSPADM

## 2019-10-25 RX ORDER — HYDROCODONE BITARTRATE AND ACETAMINOPHEN 5; 325 MG/1; MG/1
1 TABLET ORAL EVERY 6 HOURS PRN
Qty: 5 TABLET | Refills: 0 | Status: SHIPPED | OUTPATIENT
Start: 2019-10-25 | End: 2020-08-12

## 2019-10-25 RX ORDER — SODIUM CHLORIDE 9 MG/ML
INJECTION, SOLUTION INTRAVENOUS CONTINUOUS
Status: DISCONTINUED | OUTPATIENT
Start: 2019-10-25 | End: 2019-10-25 | Stop reason: HOSPADM

## 2019-10-25 RX ORDER — SODIUM CHLORIDE, SODIUM LACTATE, POTASSIUM CHLORIDE, CALCIUM CHLORIDE 600; 310; 30; 20 MG/100ML; MG/100ML; MG/100ML; MG/100ML
INJECTION, SOLUTION INTRAVENOUS CONTINUOUS PRN
Status: DISCONTINUED | OUTPATIENT
Start: 2019-10-25 | End: 2019-10-25

## 2019-10-25 RX ORDER — HYDROMORPHONE HYDROCHLORIDE 2 MG/ML
0.2 INJECTION, SOLUTION INTRAMUSCULAR; INTRAVENOUS; SUBCUTANEOUS EVERY 5 MIN PRN
Status: DISCONTINUED | OUTPATIENT
Start: 2019-10-25 | End: 2019-10-25 | Stop reason: HOSPADM

## 2019-10-25 RX ORDER — HYDROCODONE BITARTRATE AND ACETAMINOPHEN 5; 325 MG/1; MG/1
1 TABLET ORAL EVERY 4 HOURS PRN
Status: DISCONTINUED | OUTPATIENT
Start: 2019-10-25 | End: 2019-10-25 | Stop reason: HOSPADM

## 2019-10-25 RX ORDER — ONDANSETRON 8 MG/1
8 TABLET, ORALLY DISINTEGRATING ORAL EVERY 8 HOURS PRN
Status: DISCONTINUED | OUTPATIENT
Start: 2019-10-25 | End: 2019-10-25 | Stop reason: HOSPADM

## 2019-10-25 RX ORDER — HEPARIN SODIUM (PORCINE) LOCK FLUSH IV SOLN 100 UNIT/ML 100 UNIT/ML
SOLUTION INTRAVENOUS
Status: DISCONTINUED | OUTPATIENT
Start: 2019-10-25 | End: 2019-10-25 | Stop reason: HOSPADM

## 2019-10-25 RX ORDER — PROPOFOL 10 MG/ML
VIAL (ML) INTRAVENOUS
Status: DISCONTINUED | OUTPATIENT
Start: 2019-10-25 | End: 2019-10-25

## 2019-10-25 RX ORDER — MIDAZOLAM HYDROCHLORIDE 1 MG/ML
INJECTION INTRAMUSCULAR; INTRAVENOUS
Status: DISCONTINUED | OUTPATIENT
Start: 2019-10-25 | End: 2019-10-25

## 2019-10-25 RX ADMIN — FENTANYL CITRATE 100 MCG: 50 INJECTION, SOLUTION INTRAMUSCULAR; INTRAVENOUS at 07:10

## 2019-10-25 RX ADMIN — CEFAZOLIN 2 G: 330 INJECTION, POWDER, FOR SOLUTION INTRAMUSCULAR; INTRAVENOUS at 07:10

## 2019-10-25 RX ADMIN — MIDAZOLAM HYDROCHLORIDE 2 MG: 1 INJECTION, SOLUTION INTRAMUSCULAR; INTRAVENOUS at 07:10

## 2019-10-25 RX ADMIN — PROPOFOL 40 MG: 10 INJECTION, EMULSION INTRAVENOUS at 07:10

## 2019-10-25 RX ADMIN — PROPOFOL 75 MCG/KG/MIN: 10 INJECTION, EMULSION INTRAVENOUS at 07:10

## 2019-10-25 RX ADMIN — SODIUM CHLORIDE, SODIUM LACTATE, POTASSIUM CHLORIDE, AND CALCIUM CHLORIDE: 600; 310; 30; 20 INJECTION, SOLUTION INTRAVENOUS at 06:10

## 2019-10-25 NOTE — DISCHARGE SUMMARY
[facility]  Discharge Summary  General Surgery      Admit Date: 10/25/2019    Discharge Date and Time:  10/25/2019 8:15 AM    Attending Physician: Shikha Mccray MD     Discharge Provider: Shikha Mccray    Reason for Admission: surgery    Procedures Performed: Procedure(s) (LRB):  YSROUMWAM-DPDC-S-CATH RIGHT (CONSENT AM OF) 1. 0 hr case (Right)    Final Diagnoses:   Principal Problem: left breast cancer     Discharged Condition: stable    Disposition: Home or Self Care    Follow Up/Patient Instructions: 7-14 days    Medications:  Reconciled Home Medications:      Medication List      CHANGE how you take these medications    * HYDROcodone-acetaminophen 5-325 mg per tablet  Commonly known as:  NORCO  Take 1 tablet by mouth every 6 (six) hours as needed for Pain.  What changed:  Another medication with the same name was added. Make sure you understand how and when to take each.     * HYDROcodone-acetaminophen 5-325 mg per tablet  Commonly known as:  NORCO  Take 1 tablet by mouth every 6 (six) hours as needed for Pain.  What changed:  You were already taking a medication with the same name, and this prescription was added. Make sure you understand how and when to take each.         * This list has 2 medication(s) that are the same as other medications prescribed for you. Read the directions carefully, and ask your doctor or other care provider to review them with you.            CONTINUE taking these medications    amlodipine-benazepril 5-40 mg per capsule  Commonly known as:  LOTREL  TAKE 1 CAPSULE BY ORAL ROUTE EVERY DAY     cetirizine 10 MG tablet  Commonly known as:  ZYRTEC  Take 1 tablet (10 mg total) by mouth once daily.     dorzolamide 2 % ophthalmic solution  Commonly known as:  TRUSOPT  Place 1 drop into both eyes 3 (three) times daily.     fluticasone propionate 50 mcg/actuation nasal spray  Commonly known as:  FLONASE  1 spray by Each Nostril route nightly as needed for Rhinitis.      hydroCHLOROthiazide 12.5 mg capsule  Commonly known as:  MICROZIDE  TK 1 C PO D     JARDIANCE 10 mg Tab  Generic drug:  empagliflozin     latanoprost 0.005 % ophthalmic solution  INT 1 GTT IN OU QD HS     LIPITOR 10 MG tablet  Generic drug:  atorvastatin  TAKE 1 TABLET BY ORAL ROUTE EVERY DAY     metFORMIN 500 MG 24 hr tablet  Commonly known as:  GLUCOPHAGE-XR  Take 2 tablets (1,000 mg total) by mouth 2 (two) times daily with meals.     nystatin cream  Commonly known as:  MYCOSTATIN  Apply topically.     ondansetron 8 MG Tbdl  Commonly known as:  ZOFRAN-ODT  Take 1 tablet (8 mg total) by mouth every 6 (six) hours as needed (nausea).     ONE DAILY MULTIVITAMIN per tablet  Generic drug:  multivitamin  Take 1 tablet by mouth once daily.     oxybutynin 5 MG Tr24  Commonly known as:  DITROPAN-XL  Take 1 tablet (5 mg total) by mouth once daily.     promethazine 25 MG tablet  Commonly known as:  PHENERGAN  Take 1 tablet (25 mg total) by mouth every 6 (six) hours as needed for Nausea.          Discharge Procedure Orders   Diet general     Lifting restrictions     Call MD for:  temperature >100.4     Call MD for:  persistent nausea and vomiting     Call MD for:  severe uncontrolled pain     Call MD for:  difficulty breathing, headache or visual disturbances     Call MD for:  redness, tenderness, or signs of infection (pain, swelling, redness, odor or green/yellow discharge around incision site)     Call MD for:  hives     Call MD for:  persistent dizziness or light-headedness     Call MD for:  extreme fatigue     No dressing needed         Diet: regular    Activity: as tolerated

## 2019-10-25 NOTE — ANESTHESIA POSTPROCEDURE EVALUATION
Anesthesia Post Evaluation    Patient: Shu Mendoza    Procedure(s) Performed: Procedure(s) (LRB):  [id number] RIGHT (CONSENT AM OF) 1. 0 hr case (Right)    Final Anesthesia Type: general  Patient participation: Yes- Able to Participate  Level of consciousness: awake and alert  Post-procedure vital signs: reviewed and stable  Pain management: adequate  Airway patency: patent  PONV status at discharge: No PONV  Anesthetic complications: no      Cardiovascular status: blood pressure returned to baseline  Respiratory status: unassisted, room air and spontaneous ventilation  Hydration status: euvolemic  Follow-up not needed.          Vitals Value Taken Time   /90 10/25/2019  5:46 AM   Temp 36.4 °C (97.6 °F) 10/25/2019  5:46 AM   Pulse 78 10/25/2019  5:46 AM   Resp 16 10/25/2019  5:46 AM   SpO2 98 % 10/25/2019  5:46 AM         No case tracking events are documented in the log.      Pain/Min Score: No data recorded

## 2019-10-25 NOTE — OP NOTE
Central Line Placement Procedure Note        Physician: Surgeon(s) and Role:     * Shikha Mccray MD - Primary    Procedure:   1.Central Line Placement port a cath right with ultrasound and fluoroscopy    Date: 10/25/2019    Location: right internal jugular vein    Preop diagnosis:  LEFT breast cancer    Postop Diagnosis: same    Anesthesia: Local and Moderate Sedation    Procedure:  After informed consent and timeout was performed, the patient was taken to the operating room. Patient was placed supine on the table and arms were tucked by her side. Ultrasound was used to ensure the right internal jugular vein was patent. Next, local anesthesia was injected and a small nick was made in the skin in the right neck. Ultrasound was used for guidance and an 18-gauge hollow needle was used to access the vessel. Wire was inserted through the access needle and fluoroscopy and ultrasound were used to confirm the wire was in the correct position. Next, a counter incision was made.  Following this local anesthetic was injected on the anterior right chest.  A small incision was made and the port pocket was created using electrocautery. Next,  the catheter was tunneled from the anterior right chest. Then over the wire, the vessel was dilated and the catheter was passed into the vessel via the Seldinger technique under direct fluoroscopic guidance. The catheter was confirmed to be in proper position in the SVC using fluoroscopy. The catheter was aspirated and blood return was good. Next, the catheter was cut to the appropriate length and attached to the port. Again, blood return was noted, and we then flushed the catheter with heparinized saline. The port was sutured in place using a 3-0 vicryl. The port incision was closed using interuppted 3-0 vicryl followed by running 4-0 monocryl subcuticular.  All skin incisions were closed with a 4-0 monocryl. Dressings were applied and all counts were correct at the end of the procedure.  Patient tolerated the procedure well. A chest x-ray will be performed in the recovery room.    Confirmation: Fluoroscopy intraop    Complications/Comments:  none    Estimated Blood Loss: 2 cc    Disposition: PACU in stable condition    Attestation: I performed the procedure.

## 2019-10-25 NOTE — INTERVAL H&P NOTE
The patient has been examined and the H&P has been reviewed:    I concur with the findings and no changes have occurred since H&P was written.    Anesthesia/Surgery risks, benefits and alternative options discussed and understood by patient/family.    Here for port placement for chemo.        There are no hospital problems to display for this patient.

## 2019-10-25 NOTE — PLAN OF CARE
Shu Mendoza has met all discharge criteria from Phase II. Vital Signs are stable, ambulating  without difficulty. Discharge instructions given, patient verbalized understanding. Discharged from facility via wheelchair in stable condition.

## 2019-10-29 ENCOUNTER — CLINICAL SUPPORT (OUTPATIENT)
Dept: REHABILITATION | Facility: HOSPITAL | Age: 54
End: 2019-10-29
Attending: SURGERY
Payer: COMMERCIAL

## 2019-10-29 DIAGNOSIS — Z91.89 AT RISK FOR LYMPHEDEMA: ICD-10-CM

## 2019-10-29 DIAGNOSIS — Z17.0 MALIGNANT NEOPLASM OF CENTRAL PORTION OF LEFT BREAST IN FEMALE, ESTROGEN RECEPTOR POSITIVE: ICD-10-CM

## 2019-10-29 DIAGNOSIS — C50.112 MALIGNANT NEOPLASM OF CENTRAL PORTION OF LEFT BREAST IN FEMALE, ESTROGEN RECEPTOR POSITIVE: ICD-10-CM

## 2019-10-29 DIAGNOSIS — M25.612 DECREASED SHOULDER MOBILITY, LEFT: ICD-10-CM

## 2019-10-29 PROCEDURE — 97110 THERAPEUTIC EXERCISES: CPT | Performed by: PHYSICAL MEDICINE & REHABILITATION

## 2019-10-29 PROCEDURE — 97140 MANUAL THERAPY 1/> REGIONS: CPT | Performed by: PHYSICAL MEDICINE & REHABILITATION

## 2019-10-29 NOTE — PROGRESS NOTES
Physical Therapy Daily Treatment Note     Name: Shu Mendoza  Clinic Number: [id number]  Diagnosis:   Encounter Diagnoses   Name Primary?    Decreased shoulder mobility, left     Malignant neoplasm of central portion of left breast in female, estrogen receptor positive     At risk for lymphedema      Physician: Shikha Mccray MD    Precautions: none  Visit #: 5 of 20  Time In: 8:40 AM  Time Out: 9:25 AM  Total Treatment Time 1:1: 45 minutes    Evaluation Date: 10/2/19  Visit # authorized: 20  Authorization period: 12/31/19  Plan of care Expiration: 11/27/19  MD referral: Dr. Shikha mccray  Insurance: [facility]      Subjective     Pt reports: had her port put in 10/25/19 and blood pressure cuff was put around LUE--and caused her soreness in left upper arm. States slight soreness remains. States moving her left arm better and has returned to work to desk duty.   Pain Scale: Shu rates pain on a scale of 4/10 on VAS.   Pain location: upper arm left    Fatigue: Mild  Functional change: able to reach up better  Diagnosis: Left breasr IDC, grade 2, ER (+), WI (+), HER2 (-)   Chief complaint: feeling soreness in left chest, arm and in abdomen  Surgical History: 9/11/19 left breast mastectomy with SLNB and SALEEM flap reconstruction  Shu Mendoza  has a past surgical history that includes Oophorectomy; Breast biopsy; Hysterectomy (2000); Hernia repair (2009); Eye surgery (Bilateral); Reconstruction of breast with deep inferior epigastric artery  (SALEEM) free flap (Left, 9/11/2019); Mastectomy (Left, 9/11/2019); and Mastectomy with sentinel node biopsy and axillary lymph node dissection (Left, 9/11/2019).     Chemotherapy: willl begin adjuvant chemotherapy with AC/Taxol 10/31/19.  Radiation: none      Objective     Appearance: Abdominal incision now closed with pink discolorationand dry.    Shu received individual therapeutic exercises to improve postural  "correction and alignment, stretching and soft tissue mobility, and strengthening for 30 minutes including the following:   Supine Shld Flexion with Wand       1 x 10  Supine Shld ER with wand              1 x 10  Sidelying  Shld Abd                       1 x 10  Seated Scap Retractions                10 x 5"  Wall Climbs  Forward                     10 x 5"                        Sideways                   10 x 5"  LTR with ER                                   1 x 10    Cervical Exs:  UT (S) (B)                 5 x 10"  Clasped Hand (S)    5 x 10"  Cervical Rotation    1 x 10  Cervical Flexion      1 x 10  Cervical Side Bends   1 x10     Shu received the following manual therapy techniques were performed to increased myofascial/soft tissue length, mobility and pliability, increase PROM, AROM and function as well as to decrease pain for 15 minutes  Stretching, bending, twisting for LUE cording  Median Nerve glides  Passive stretch left elbow and shoulder  Grade II G-H joint mobs  Posterior capsule stretch  Passive stretch left shoulder    Shoulder Range of Motion:   Active /Passive ROM Right Left   Flexion 170 175   Abduction 170 165   Extension 70 70   IR/90deg 85 85   ER/90deg 90 90           Strength: manual muscle test grades below   Upper Extremity Strength    (R) UE (L) UE   Shoulder flexion: 5/5 4+/5   Shoulder Abduction: 5/5 4+/5   Shoulder IR 5/5 4+/5   Shoulder ER 5/5 4+/5   Elbow flexion: 5/5 4+/5   Elbow extension: 5/5 4+/5   Wrist flexion: 5/5 4+/5   Wrist extension: 5/5 4+/5    5/5 4+/5     Functional Limitations Reporting       CMS Impairment/Limitation/Restriction for FOTO Outcome Survey     Therapist reviewed FOTO scores for Shu Mendoza on 10/29/2019.   FOTO documents entered into [employer] - see Media section.     Limitations Score: 25%  Category: Carrying         Home Exercise Program and Patient Education   Education provided re:  - role of PT in multi - disciplinary team, goals for PT  - " progress towards goals     See EMR under notes/patient instructions for HEP given/taught this session - all sets and reps included. Pt received printed copy.     Pt was able to demonstrate and report understanding and performance  Pt has no cultural, educational or language barriers to learning provided.    Assessment     Patient is responding well to physical therapy. Is at expected function and pain level for 7 weeks post operatively.   Pain after treatment: 1/10    Pt prognosis is Good. Patient received manual therapy as above to decrease cording and shoulder joint tightness. Mild cording remains in left upper arm.  Patient performed above exercise programand tolerated new exercise added today to decrease UT muscle tightness. Improvements with left shoulder AROM: 10 degrees with Abd & 20 degrees with Ext. Strength in LUE now in 4+/5 range.   Patient instructed to continue with HEP.  Pt will continue to benefit from skilled outpatient physical therapy to address the deficits listed in the problem list chart on initial evaluation, provide pt/family education and to maximize pt's level of independence in the home and community environment. Patient met 3 LTG this session.    Goals as follows:  Short Term goals: 4 weeks  1. Patient will demonstrate 100% understanding of lymphedema risk reduction practices to include self monitoring for lymphedema. (met, 10/11/19)  2. Patient will demonstrate independence with Home Exercise program established. (met, 10/11/19)  3. Pt will increase AROM/PROM in shoulder abduction ROM to 140 degrees on left to improve functional reach, carry, push, pull pain free. ( met, 10/18/19)  4. Pt will increase AROM/PROM in shoulder flexion to 150 degrees on left to improve functional reach, carry, push, pull pain free.( met, 10/11/19)  5. Strength will be 4/5 in LUE in order for patient to perform functional activities.  (met, 10/11/19)     Long Term Goals: 8 weeks   1.  Pt will increase  AROM/PROM in shoulder flexion to 170 degrees on left to improve functional reach, carry, push, pull pain free. (met, 10/21/19)  2. Pt will increase strength to 4+/5 in gross UE musculature to improve tolerance to all functional activities pain free. (met, 10/29/19)  3. Pt will demonstrate full/maximized tissue mobility to increase ROM and promote healthy tissue to be pain free at discharge. (progressing, not met)  4. Pt will report decrease in overall worst pain to 2/10 at discharge. (progressing, not met)  5. Pt will increase AROM/PROM in shoulder abduction ROM to 170 degrees on left to improve functional reach, carry, push, pull pain free. (progressing, not met)  6. Pt will increase AROM/PROM in shoulder ER to 90 degrees on left in order to perform all grooming activities (met, 10/29/19)  6. Patient will report compliance with walking program 5x week for 10 min each day to improve overall cardiovascular function and decrease cancer related fatigue at discharge. (met, 10/29/19)        Plan     Continue with established POC toward physical therapy goals.   Therapist: Lolly Torres, PT  10/29/2019

## 2019-10-29 NOTE — PATIENT INSTRUCTIONS
Copyright © 1401-6172 [employer]      Sidelying Shoulder Abduction    Keep your elbow straight. Lift your arm upward toward your head.  Perform 10 times. Perform 1 sets.   Perform 2 times per day.    Flexibility: Upper Trapezius Stretch    Sit up tall and place one hand behind your back or under your thigh and the other on top of your head.  Gently draw your head towards the side of your top hand. Do not over-stretch.  Hold 10 seconds.   Repeat 5 times each side per set. Do 1 sets per session. Do 2 sessions per day.    Copyright © [employer]. All rights reserved.           Clasped Hand Stretch  Clasp your hands together and keep elbows straight. Bend head down toward your chest and push your arms forward. Hold for 10 count. Perform 5 times.  2 times per day.    Copyright © 7238-1611 [employer]      AROM: Neck Flexion        Bend head forward  Repeat __10__ times per set. Do __1__ sets per session. Do ___2_ sessions per day.     Copyright © [employer]. All rights reserved.   https://[email or web address]     AROM: Neck Rotation        Turn head slowly to look over one shoulder, then the other. .  Repeat _10___ times per set. Do __1__ sets per session. Do ___2_ sessions per day.     Copyright © [employer]. All rights reserved.   https://[email or web address]     AROM: Lateral Neck Flexion        Slowly tilt head toward one shoulder, then the other.  Repeat _10___ times per set. Do __1__ sets per session. Do __2__ sessions per day.     Copyright © [employer]. All rights reserved.

## 2019-10-31 ENCOUNTER — INFUSION (OUTPATIENT)
Dept: INFUSION THERAPY | Facility: OTHER | Age: 54
End: 2019-10-31
Attending: INTERNAL MEDICINE
Payer: COMMERCIAL

## 2019-10-31 ENCOUNTER — LAB VISIT (OUTPATIENT)
Dept: LAB | Facility: OTHER | Age: 54
End: 2019-10-31
Attending: INTERNAL MEDICINE
Payer: COMMERCIAL

## 2019-10-31 ENCOUNTER — OFFICE VISIT (OUTPATIENT)
Dept: HEMATOLOGY/ONCOLOGY | Facility: CLINIC | Age: 54
End: 2019-10-31
Payer: COMMERCIAL

## 2019-10-31 VITALS
DIASTOLIC BLOOD PRESSURE: 78 MMHG | SYSTOLIC BLOOD PRESSURE: 154 MMHG | HEIGHT: 64 IN | OXYGEN SATURATION: 100 % | BODY MASS INDEX: 31.5 KG/M2 | HEART RATE: 77 BPM | RESPIRATION RATE: 18 BRPM | WEIGHT: 184.5 LBS | TEMPERATURE: 98 F

## 2019-10-31 DIAGNOSIS — C50.112 MALIGNANT NEOPLASM OF CENTRAL PORTION OF LEFT BREAST IN FEMALE, ESTROGEN RECEPTOR POSITIVE: Primary | ICD-10-CM

## 2019-10-31 DIAGNOSIS — Z17.0 MALIGNANT NEOPLASM OF CENTRAL PORTION OF LEFT BREAST IN FEMALE, ESTROGEN RECEPTOR POSITIVE: ICD-10-CM

## 2019-10-31 DIAGNOSIS — C77.3 CARCINOMA OF LEFT BREAST METASTATIC TO AXILLARY LYMPH NODE: ICD-10-CM

## 2019-10-31 DIAGNOSIS — C50.112 MALIGNANT NEOPLASM OF CENTRAL PORTION OF LEFT BREAST IN FEMALE, ESTROGEN RECEPTOR POSITIVE: ICD-10-CM

## 2019-10-31 DIAGNOSIS — I10 ESSENTIAL HYPERTENSION: ICD-10-CM

## 2019-10-31 DIAGNOSIS — C50.912 CARCINOMA OF LEFT BREAST METASTATIC TO AXILLARY LYMPH NODE: ICD-10-CM

## 2019-10-31 DIAGNOSIS — Z17.0 MALIGNANT NEOPLASM OF CENTRAL PORTION OF LEFT BREAST IN FEMALE, ESTROGEN RECEPTOR POSITIVE: Primary | ICD-10-CM

## 2019-10-31 DIAGNOSIS — E11.9 TYPE 2 DIABETES MELLITUS WITHOUT COMPLICATION, WITHOUT LONG-TERM CURRENT USE OF INSULIN: ICD-10-CM

## 2019-10-31 LAB
ALBUMIN SERPL BCP-MCNC: 3.7 G/DL (ref 3.5–5.2)
ALP SERPL-CCNC: 81 U/L (ref 55–135)
ALT SERPL W/O P-5'-P-CCNC: 25 U/L (ref 10–44)
ANION GAP SERPL CALC-SCNC: 11 MMOL/L (ref 8–16)
AST SERPL-CCNC: 17 U/L (ref 10–40)
BASOPHILS # BLD AUTO: 0.06 K/UL (ref 0–0.2)
BASOPHILS NFR BLD: 0.8 % (ref 0–1.9)
BILIRUB SERPL-MCNC: 0.3 MG/DL (ref 0.1–1)
BUN SERPL-MCNC: 12 MG/DL (ref 6–20)
CALCIUM SERPL-MCNC: 9.9 MG/DL (ref 8.7–10.5)
CHLORIDE SERPL-SCNC: 105 MMOL/L (ref 95–110)
CO2 SERPL-SCNC: 26 MMOL/L (ref 23–29)
CREAT SERPL-MCNC: 0.7 MG/DL (ref 0.5–1.4)
DIFFERENTIAL METHOD: ABNORMAL
EOSINOPHIL # BLD AUTO: 0.1 K/UL (ref 0–0.5)
EOSINOPHIL NFR BLD: 1.3 % (ref 0–8)
ERYTHROCYTE [DISTWIDTH] IN BLOOD BY AUTOMATED COUNT: 15 % (ref 11.5–14.5)
EST. GFR  (AFRICAN AMERICAN): >60 ML/MIN/1.73 M^2
EST. GFR  (NON AFRICAN AMERICAN): >60 ML/MIN/1.73 M^2
GLUCOSE SERPL-MCNC: 112 MG/DL (ref 70–110)
HCT VFR BLD AUTO: 37.9 % (ref 37–48.5)
HGB BLD-MCNC: 12 G/DL (ref 12–16)
IMM GRANULOCYTES # BLD AUTO: 0.02 K/UL (ref 0–0.04)
IMM GRANULOCYTES NFR BLD AUTO: 0.3 % (ref 0–0.5)
LYMPHOCYTES # BLD AUTO: 3.2 K/UL (ref 1–4.8)
LYMPHOCYTES NFR BLD: 42.3 % (ref 18–48)
MCH RBC QN AUTO: 27.3 PG (ref 27–31)
MCHC RBC AUTO-ENTMCNC: 31.7 G/DL (ref 32–36)
MCV RBC AUTO: 86 FL (ref 82–98)
MONOCYTES # BLD AUTO: 0.5 K/UL (ref 0.3–1)
MONOCYTES NFR BLD: 7.2 % (ref 4–15)
NEUTROPHILS # BLD AUTO: 3.6 K/UL (ref 1.8–7.7)
NEUTROPHILS NFR BLD: 48.1 % (ref 38–73)
NRBC BLD-RTO: 0 /100 WBC
PLATELET # BLD AUTO: 316 K/UL (ref 150–350)
PMV BLD AUTO: 9.4 FL (ref 9.2–12.9)
POTASSIUM SERPL-SCNC: 3.8 MMOL/L (ref 3.5–5.1)
PROT SERPL-MCNC: 7.4 G/DL (ref 6–8.4)
RBC # BLD AUTO: 4.39 M/UL (ref 4–5.4)
SODIUM SERPL-SCNC: 142 MMOL/L (ref 136–145)
WBC # BLD AUTO: 7.5 K/UL (ref 3.9–12.7)

## 2019-10-31 PROCEDURE — 96411 CHEMO IV PUSH ADDL DRUG: CPT

## 2019-10-31 PROCEDURE — 99215 OFFICE O/P EST HI 40 MIN: CPT | Mod: S$GLB,,, | Performed by: INTERNAL MEDICINE

## 2019-10-31 PROCEDURE — 96413 CHEMO IV INFUSION 1 HR: CPT

## 2019-10-31 PROCEDURE — 63600175 PHARM REV CODE 636 W HCPCS: Mod: JG | Performed by: INTERNAL MEDICINE

## 2019-10-31 PROCEDURE — 85025 COMPLETE CBC W/AUTO DIFF WBC: CPT

## 2019-10-31 PROCEDURE — 99999 PR PBB SHADOW E&M-EST. PATIENT-LVL III: CPT | Mod: PBBFAC,,, | Performed by: INTERNAL MEDICINE

## 2019-10-31 PROCEDURE — 99999 PR PBB SHADOW E&M-EST. PATIENT-LVL III: ICD-10-PCS | Mod: PBBFAC,,, | Performed by: INTERNAL MEDICINE

## 2019-10-31 PROCEDURE — 36415 COLL VENOUS BLD VENIPUNCTURE: CPT

## 2019-10-31 PROCEDURE — 3008F BODY MASS INDEX DOCD: CPT | Mod: CPTII,S$GLB,, | Performed by: INTERNAL MEDICINE

## 2019-10-31 PROCEDURE — 80053 COMPREHEN METABOLIC PANEL: CPT

## 2019-10-31 PROCEDURE — 3008F PR BODY MASS INDEX (BMI) DOCUMENTED: ICD-10-PCS | Mod: CPTII,S$GLB,, | Performed by: INTERNAL MEDICINE

## 2019-10-31 PROCEDURE — 99215 PR OFFICE/OUTPT VISIT, EST, LEVL V, 40-54 MIN: ICD-10-PCS | Mod: S$GLB,,, | Performed by: INTERNAL MEDICINE

## 2019-10-31 PROCEDURE — 96367 TX/PROPH/DG ADDL SEQ IV INF: CPT

## 2019-10-31 RX ORDER — DOXORUBICIN HYDROCHLORIDE 2 MG/ML
60 INJECTION, SOLUTION INTRAVENOUS
Status: COMPLETED | OUTPATIENT
Start: 2019-10-31 | End: 2019-10-31

## 2019-10-31 RX ORDER — HEPARIN 100 UNIT/ML
500 SYRINGE INTRAVENOUS
Status: DISCONTINUED | OUTPATIENT
Start: 2019-10-31 | End: 2019-10-31 | Stop reason: HOSPADM

## 2019-10-31 RX ORDER — DOXORUBICIN HYDROCHLORIDE 2 MG/ML
60 INJECTION, SOLUTION INTRAVENOUS
Status: CANCELLED | OUTPATIENT
Start: 2019-11-01

## 2019-10-31 RX ORDER — SODIUM CHLORIDE 0.9 % (FLUSH) 0.9 %
10 SYRINGE (ML) INJECTION
Status: CANCELLED | OUTPATIENT
Start: 2019-11-01

## 2019-10-31 RX ORDER — HEPARIN 100 UNIT/ML
500 SYRINGE INTRAVENOUS
Status: CANCELLED | OUTPATIENT
Start: 2019-11-01

## 2019-10-31 RX ORDER — SODIUM CHLORIDE 0.9 % (FLUSH) 0.9 %
10 SYRINGE (ML) INJECTION
Status: DISCONTINUED | OUTPATIENT
Start: 2019-10-31 | End: 2019-10-31 | Stop reason: HOSPADM

## 2019-10-31 RX ADMIN — SODIUM CHLORIDE: 0.9 INJECTION, SOLUTION INTRAVENOUS at 08:10

## 2019-10-31 RX ADMIN — DEXAMETHASONE SODIUM PHOSPHATE: 4 INJECTION, SOLUTION INTRA-ARTICULAR; INTRALESIONAL; INTRAMUSCULAR; INTRAVENOUS; SOFT TISSUE at 08:10

## 2019-10-31 RX ADMIN — HEPARIN 500 UNITS: 100 SYRINGE at 10:10

## 2019-10-31 RX ADMIN — APREPITANT 130 MG: 130 INJECTION, EMULSION INTRAVENOUS at 09:10

## 2019-10-31 RX ADMIN — DOXORUBICIN HYDROCHLORIDE 116 MG: 2 INJECTION, SOLUTION INTRAVENOUS at 09:10

## 2019-10-31 RX ADMIN — CYCLOPHOSPHAMIDE 1160 MG: 1 INJECTION, POWDER, FOR SOLUTION INTRAVENOUS; ORAL at 09:10

## 2019-10-31 NOTE — PROGRESS NOTES
PROGRESS NOTE    Subjective:       Patient ID: Shu Mendoza is a 54 y.o. female.    Chief Complaint: follow up for breast cancer    Diagnosis:  Stage IA ([id number]) invasive ductal carcinoma of the left breast, grade 2, ER/WI positive, HER2 positive, s/p left mastectomy and reconstruction on 9/11/2019. Mammaprint high risk    Oncologic History:  1. Ms Mendoza is a 53 yo postmenopausal woman with HTN, DM, who presents today for further management of pathologic stage IA ([id number]) invasive ductal carcinoma of the left breast. She was referred to Dr Mccray for bloody nipple discharge first noted in June 2019. Before then she had a negative mammogram on 5/20/19. She had a left breast US on 6/27/19 in the left breast retroareolar region just behind the nipple, there is an irregular hypoechoic intraductal mass measuring 1.0 cm. She underwent a biopsy on 7/8/2019. It showed invasive ductal carcinoma, ER strongly positive 100%, WI positive 80%, HER2 negative 1+. Ki67 5% activity within invasive component. Patient underwent a left total mastectomy and reconstruction on 9/11/2019. Pathology showed a 1.3 cm invasive ductal carcinoma, grade 2. DCIS present, negative margin, 22 lymph nodes removed, one lymph node positive with macrometastases 13 mm, no extranodal extension. No lymphovascular invasion. Pathologic T1c N1a. Mammaprint high risk with chemo benefit >12%. 5-10 years recurrence risk without chemo 20-25%, with chemo and endocrine therapy 7%. BRCAnalysis from 7/18/19 was negative. Case was discussed at tumor board. Adjuvant chemotherapy followed by endocrine therapy was recommended. Radiation not recommended. Patient presents today for further management. Feeling well. Works at [employer].   2. Port placed by Dr Mccray on 10/25/19. Echo on 10/22/19 showed normal LVEF 59%.   3. Adjuvant DDAC followed by weekly taxol to be started on 10/31/2019.     Interval History:    Ms Mendoza returns today to start chemotherapy. Feeling well. Does have headache from sinus congestion.     ECO    ROS:   A ten-point system review is obtained and negative except for what was stated in the Interval History.     Physical Examination:   Vital signs reviewed.   General: well hydrated, well developed, in no acute distress  HEENT: normocephalic, PERRLA, EOMI, anicteric sclerae, oropharynx clear  Neck: supple, no JVD, thyromegaly, cervical or supraclavicular lymphadenopathy  Lungs: clear breath sounds bilaterally, no wheezing, rales, or rhonchi  Heart: RRR, no M/R/G  Abdomen: soft, no tenderness, non-distended, no hepatosplenomegaly, mass, or hernia. BS present  Extremities: no clubbing, cyanosis, or edema  Skin: no rash, ulcer, or open wounds  Neuro: alert and oriented x 4, no focal neuro deficit  Psych: pleasant and appropriate mood and affect    Objective:     Laboratory Data:  Labs reviewed. CBC, CMP unremarkable    Imaging Data:  Echo on 10/22/19 showed normal LVEF 59%.    Assessment and Plan:     1. Malignant neoplasm of central portion of left breast in female, estrogen receptor positive    2. Carcinoma of left breast metastatic to axillary lymph node    3. Essential hypertension    4. Type 2 diabetes mellitus without complication, without long-term current use of insulin      1.2  - Ms Mendoza is a 55 yo postmenopausal woman with stage IA ([id number]) invasive ductal carcinoma of the left breast, ER/CO positive, HER2 negative, s/p left mastectomy and reconstruction on 2019. Mammaprint high risk with chemo benefit >12%. 5-10 years recurrence risk without chemo 20-25%, with chemo and endocrine therapy 7%.  - she presents today to start adjuvant chemotherapy with DDAC followed by weekly taxol  - The side effects of adriamycin, cytoxan, taxol were discussed with patient, which include but are not limited to hair loss, fatigue, decreased appetite, weight loss, weakness, bone marrow suppression,  increased risk of infection, sepsis, anemia that may require blood transfusion, low platelet counts with increased risk of bleeding that may require platelet transfusion, diarrhea, constipation, mucositis, damage to the brain, heart, liver, kidney, damage to the nerves that may not be reversible, severe allergic reaction, damage at the injection site, sterility, secondary cancer, death. She understands the risks and wants to proceed with chemotherapy. Consent signed in the office.   - cycle 1 AC today. neulasta tomorrow  - return in 2 weeks for cycle 2    3.  - BP slightly elevated. Asymptomatic  - asked patient to monitor BP at home daily  - f/u with PCP    4.   - BS good  - c/w current meds      Follow-up:     RTC in 2 weeks  Knows to call in the interval if any problems arise.    Electronically signed by Eliana Norris

## 2019-11-01 ENCOUNTER — INFUSION (OUTPATIENT)
Dept: INFUSION THERAPY | Facility: OTHER | Age: 54
End: 2019-11-01
Attending: INTERNAL MEDICINE
Payer: COMMERCIAL

## 2019-11-01 VITALS
RESPIRATION RATE: 18 BRPM | OXYGEN SATURATION: 99 % | SYSTOLIC BLOOD PRESSURE: 143 MMHG | WEIGHT: 184.5 LBS | HEART RATE: 82 BPM | HEIGHT: 64 IN | DIASTOLIC BLOOD PRESSURE: 84 MMHG | TEMPERATURE: 98 F | BODY MASS INDEX: 31.5 KG/M2

## 2019-11-01 DIAGNOSIS — Z17.0 MALIGNANT NEOPLASM OF CENTRAL PORTION OF LEFT BREAST IN FEMALE, ESTROGEN RECEPTOR POSITIVE: Primary | ICD-10-CM

## 2019-11-01 DIAGNOSIS — C50.112 MALIGNANT NEOPLASM OF CENTRAL PORTION OF LEFT BREAST IN FEMALE, ESTROGEN RECEPTOR POSITIVE: Primary | ICD-10-CM

## 2019-11-01 PROCEDURE — 96372 THER/PROPH/DIAG INJ SC/IM: CPT

## 2019-11-01 PROCEDURE — 63600175 PHARM REV CODE 636 W HCPCS: Mod: TB | Performed by: INTERNAL MEDICINE

## 2019-11-01 RX ADMIN — PEGFILGRASTIM-CBQV 6 MG: 6 INJECTION, SOLUTION SUBCUTANEOUS at 11:11

## 2019-11-01 NOTE — PLAN OF CARE
Udencya administered, patient tolerated well. VSS, NAD. Education provided. D/C'd home with self.

## 2019-11-08 ENCOUNTER — OFFICE VISIT (OUTPATIENT)
Dept: SURGERY | Facility: CLINIC | Age: 54
End: 2019-11-08
Payer: COMMERCIAL

## 2019-11-08 ENCOUNTER — CLINICAL SUPPORT (OUTPATIENT)
Dept: REHABILITATION | Facility: HOSPITAL | Age: 54
End: 2019-11-08
Attending: SURGERY
Payer: COMMERCIAL

## 2019-11-08 VITALS
WEIGHT: 182.31 LBS | BODY MASS INDEX: 31.12 KG/M2 | SYSTOLIC BLOOD PRESSURE: 156 MMHG | TEMPERATURE: 98 F | HEART RATE: 84 BPM | HEIGHT: 64 IN | DIASTOLIC BLOOD PRESSURE: 85 MMHG

## 2019-11-08 DIAGNOSIS — M25.612 DECREASED SHOULDER MOBILITY, LEFT: ICD-10-CM

## 2019-11-08 DIAGNOSIS — Z17.0 MALIGNANT NEOPLASM OF LEFT BREAST IN FEMALE, ESTROGEN RECEPTOR POSITIVE, UNSPECIFIED SITE OF BREAST: Primary | ICD-10-CM

## 2019-11-08 DIAGNOSIS — C50.112 MALIGNANT NEOPLASM OF CENTRAL PORTION OF LEFT BREAST IN FEMALE, ESTROGEN RECEPTOR POSITIVE: ICD-10-CM

## 2019-11-08 DIAGNOSIS — Z17.0 MALIGNANT NEOPLASM OF CENTRAL PORTION OF LEFT BREAST IN FEMALE, ESTROGEN RECEPTOR POSITIVE: ICD-10-CM

## 2019-11-08 DIAGNOSIS — Z91.89 AT RISK FOR LYMPHEDEMA: ICD-10-CM

## 2019-11-08 DIAGNOSIS — C50.912 MALIGNANT NEOPLASM OF LEFT BREAST IN FEMALE, ESTROGEN RECEPTOR POSITIVE, UNSPECIFIED SITE OF BREAST: Primary | ICD-10-CM

## 2019-11-08 PROCEDURE — 97140 MANUAL THERAPY 1/> REGIONS: CPT | Performed by: PHYSICAL MEDICINE & REHABILITATION

## 2019-11-08 PROCEDURE — 99024 PR POST-OP FOLLOW-UP VISIT: ICD-10-PCS | Mod: S$GLB,,, | Performed by: PHYSICIAN ASSISTANT

## 2019-11-08 PROCEDURE — 99999 PR PBB SHADOW E&M-EST. PATIENT-LVL IV: ICD-10-PCS | Mod: PBBFAC,,, | Performed by: PHYSICIAN ASSISTANT

## 2019-11-08 PROCEDURE — 99024 POSTOP FOLLOW-UP VISIT: CPT | Mod: S$GLB,,, | Performed by: PHYSICIAN ASSISTANT

## 2019-11-08 PROCEDURE — 99999 PR PBB SHADOW E&M-EST. PATIENT-LVL IV: CPT | Mod: PBBFAC,,, | Performed by: PHYSICIAN ASSISTANT

## 2019-11-08 PROCEDURE — 97110 THERAPEUTIC EXERCISES: CPT | Performed by: PHYSICAL MEDICINE & REHABILITATION

## 2019-11-08 RX ORDER — LIDOCAINE AND PRILOCAINE 25; 25 MG/G; MG/G
CREAM TOPICAL
Refills: 2 | COMMUNITY
Start: 2019-10-25 | End: 2021-03-11

## 2019-11-08 NOTE — PROGRESS NOTES
Physical Therapy Daily Treatment Note and Discharge Note     Name: Shu Mendoza  Clinic Number: [id number]  Diagnosis:   Encounter Diagnoses   Name Primary?    Decreased shoulder mobility, left     Malignant neoplasm of central portion of left breast in female, estrogen receptor positive     At risk for lymphedema      Physician: Shikha Mccray MD    Precautions: none  Visit #: 6 of 20  Time In: 2:00 PM  Time Out:  2:45 PM  Total Treatment Time 1:1: 45 minutes    Evaluation Date: 10/2/19  Visit # authorized: 20  Authorization period: 12/31/19  Plan of care Expiration: 11/27/19  MD referral: Dr. Shikha mccray  Insurance: [facility]      Subjective     Pt reports: Started chemotherapy 10/31/19. States moving her left arm better and has returned to work to desk duty.   Pain Scale: Shu rates pain on a scale of 4/10 on VAS.   Pain location: upper arm left    Fatigue: Mild  Functional change: able to reach up better  Diagnosis: Left breasr IDC, grade 2, ER (+), MO (+), HER2 (-)   Chief complaint: feeling soreness in left chest, arm and in abdomen  Surgical History: 9/11/19 left breast mastectomy with SLNB and SALEEM flap reconstruction  Shu Mendoza  has a past surgical history that includes Oophorectomy; Breast biopsy; Hysterectomy (2000); Hernia repair (2009); Eye surgery (Bilateral); Reconstruction of breast with deep inferior epigastric artery  (SALEEM) free flap (Left, 9/11/2019); Mastectomy (Left, 9/11/2019); and Mastectomy with sentinel node biopsy and axillary lymph node dissection (Left, 9/11/2019).     Chemotherapy: willl begin adjuvant chemotherapy with AC/Taxol 10/31/19.  Radiation: none      Objective     Appearance: Abdominal incision now closed with pink discolorationand dry.    Shu received individual therapeutic exercises to improve postural correction and alignment, stretching and soft tissue mobility, and strengthening for 30 minutes including the following:   Supine  "Shld Flexion with Wand       1 x 10  Supine Shld ER with wand              1 x 10  Sidelying  Shld Abd                       1 x 10  Seated Scap Retractions                10 x 5"  Wall Climbs  Forward                     10 x 5"                        Sideways                   10 x 5"  LTR with ER                                   1 x 10    Cervical Exs:  UT (S) (B)                 5 x 10"  Clasped Hand (S)    5 x 10"  Cervical Rotation    1 x 10  Cervical Flexion      1 x 10  Cervical Side Bends   1 x10     Shu received the following manual therapy techniques were performed to increased myofascial/soft tissue length, mobility and pliability, increase PROM, AROM and function as well as to decrease pain for 15 minutes  Stretching, bending, twisting for LUE cording  Median Nerve glides  Passive stretch left elbow and shoulder  Grade II G-H joint mobs  Posterior capsule stretch  Passive stretch left shoulder    Shoulder Range of Motion:   Active /Passive ROM Right Left   Flexion 170 170   Abduction 170 170   Extension 70 70   IR/90deg 85 85   ER/90deg 90 90           Strength: manual muscle test grades below   Upper Extremity Strength    (R) UE (L) UE   Shoulder flexion: 5/5 5/5   Shoulder Abduction: 5/5 5/5   Shoulder IR 5/5 5/5   Shoulder ER 5/5 5/5   Elbow flexion: 5/5 5/5   Elbow extension: 5/5 5/5   Wrist flexion: 5/5 5/5   Wrist extension: 5/5 5/5    5/5 5/5     Functional Limitations Reporting       CMS Impairment/Limitation/Restriction for FOTO Outcome Survey     Therapist reviewed FOTO scores for Shu Mendoza on 11/8/2019.   FOTO documents entered into [facility] - see Media section.     Limitations Score: 25%  Category: Carrying         Home Exercise Program and Patient Education   Education provided re:  - role of PT in multi - disciplinary team, goals for PT  - progress towards goals     See EMR under notes/patient instructions for HEP given/taught this session - all sets and reps included. Pt " received printed copy.     Pt was able to demonstrate and report understanding and performance  Pt has no cultural, educational or language barriers to learning provided.    Assessment     Patient is responding well to physical therapy. Is at expected function and pain level for 7 weeks post operatively.   Pain after treatment: 0/10    Pt prognosis is Good.Reassessment for discharge. Strength LUE 5/5 range and AROM left shoulder now WNL.  Patient received manual therapy as above to decrease cording and shoulder joint tightness. Mild cording remains in left upper arm,but  no longer restricting AROM and shoulder joint tightness no longer present. Cervical tightness has decreased and patient moving her neck much better. Reviewed above exercise program to continue on HEP and patient is independent with HEP. Discussed with patient importance of continuing her walking program while receiving chemotherapy to help minimize fatigue.  Patient has met all STG and LTG.    Goals as follows:  Short Term goals: 4 weeks  1. Patient will demonstrate 100% understanding of lymphedema risk reduction practices to include self monitoring for lymphedema. (met, 10/11/19)  2. Patient will demonstrate independence with Home Exercise program established. (met, 10/11/19)  3. Pt will increase AROM/PROM in shoulder abduction ROM to 140 degrees on left to improve functional reach, carry, push, pull pain free. ( met, 10/18/19)  4. Pt will increase AROM/PROM in shoulder flexion to 150 degrees on left to improve functional reach, carry, push, pull pain free.( met, 10/11/19)  5. Strength will be 4/5 in LUE in order for patient to perform functional activities.  (met, 10/11/19)     Long Term Goals: 8 weeks   1.  Pt will increase AROM/PROM in shoulder flexion to 170 degrees on left to improve functional reach, carry, push, pull pain free. (met, 10/21/19)  2. Pt will increase strength to 4+/5 in gross UE musculature to improve tolerance to all  functional activities pain free. (met, 10/29/19)  3. Pt will demonstrate full/maximized tissue mobility to increase ROM and promote healthy tissue to be pain free at discharge. (met, 11/8/19)  4. Pt will report decrease in overall worst pain to 2/10 at discharge. (met, 11/8/19)  5. Pt will increase AROM/PROM in shoulder abduction ROM to 170 degrees on left to improve functional reach, carry, push, pull pain free. (met, 11//19)  6. Pt will increase AROM/PROM in shoulder ER to 90 degrees on left in order to perform all grooming activities (met, 10/29/19)  6. Patient will report compliance with walking program 5x week for 10 min each day to improve overall cardiovascular function and decrease cancer related fatigue at discharge. (met, 10/29/19)        Plan     Patient is discharged from physical therapy    Therapist: Lolly Torres, PT  11/8/2019

## 2019-11-08 NOTE — PROGRESS NOTES
[facility]  [facility]  11/8/2019      SUBJECTIVE:   Ms. Shu Mendoza is a 54 y.o. female with LEFT breast cancer who presents today for post op check, s/p mediport placement at right chest.      History of Present Illness: Patient was originally seen by me on 6/19/19 for left sided bloody nipple discharge. An ultrasound was ordered and showed an intraductal mass at the left retroareolar region (BIRADS 4, suspicious.    A biopsy of this LEFT breast mass proved to be consistent with invasive mammary carcinoma, ER/WV+ Her2--.    She was then seen by Dr. Shikha Mccray to discuss treatment options.  ON 9/11/19 she had a left skin sparing mastectomy and SLNB with left SALEEM flap reconstruction.  Final pathology showed grade IDC, 13mm with 0 positive lymph nodes, stage T1cN1 (stage 1B).    Her mammaprint score was high risk so adjuvant chemotherapy and endocrine therapy were recommended.  On 10/25/19 she had a mediport placed at her right chest for vascular access for chemotherapy.    Interval history: Patient states she is doing okay.  She complains of constipation and is not taking any pain medication.  She also complains of occasional acid reflux.    She started chemotherapy already without any port access problems.      Review of Systems: Denies any chest pain or shortness of breath.  Denies any fever or chills.  See HPI/ Interval History for other systems reviewed.    OBJECTIVE:   Vitals:    11/08/19 1010   BP: (!) 156/85   Pulse: 84   Temp: 97.7 °F (36.5 °C)      Physical Exam:  HEENT: Normocephalic, atraumatic.    General: alert and oriented; no acute distress.  Breast: Well healed scar at upper right chest (port site).  No erythema or edema.  Well healed scar at left breast.      ASSESSMENT:  Ms. Shu Mendoza is a 54 y.o. year old female with LEFT breast cancer who presents today for post op check, s/p mediport placement at right chest.      PLAN:   We discussed that everything appears  to be healing well.  I advised her to take colace for occasional constipation.  We also discussed taking a probiotic to optimize her gut health.  She can follow up with Dr. Mccray and see me back as needed.    ~Pilar Corey PA-C      Surgical Oncology            11/8/2019

## 2019-11-12 NOTE — PROGRESS NOTES
REFERRING PHYSICIAN:  No referring provider defined for this encounter.       Diane Laughlin MD    DIAGNOSIS:    This is a 54 y.o. female with a stage 1A pT1c N1 M0 grade 2 ER + CO + HER2 - IDC of the left breast.    TREATMENT SUMMARY:  The patient is status post left mastectomy and sentinel node biopsy on 9/11/2019.  Final pathology showed 1.3 cm IDC, 1/22 LN positive.  Mammaprint high risk.    INTERVAL HISTORY:   Shu Mendoza comes in for a post-op check.  She denies fever, chills, chest pain or shortness of breath.  Her pain is well controlled.      MEDICATIONS:  Current Outpatient Medications   Medication Sig Dispense Refill    amlodipine-benazepril (LOTREL) 5-40 mg per capsule TAKE 1 CAPSULE BY ORAL ROUTE EVERY DAY      atorvastatin (LIPITOR) 10 MG tablet TAKE 1 TABLET BY ORAL ROUTE EVERY DAY      cetirizine (ZYRTEC) 10 MG tablet Take 1 tablet (10 mg total) by mouth once daily. (Patient not taking: Reported on 11/8/2019) 30 tablet 0    dorzolamide (TRUSOPT) 2 % ophthalmic solution Place 1 drop into both eyes 3 (three) times daily.       empagliflozin (JARDIANCE) 10 mg Tab       fluticasone propionate (FLONASE) 50 mcg/actuation nasal spray 1 spray by Each Nostril route nightly as needed for Rhinitis.      hydroCHLOROthiazide (MICROZIDE) 12.5 mg capsule TK 1 C PO D  2    HYDROcodone-acetaminophen (NORCO) 5-325 mg per tablet Take 1 tablet by mouth every 6 (six) hours as needed for Pain. 21 tablet 0    latanoprost 0.005 % ophthalmic solution INT 1 GTT IN OU QD HS  11    metFORMIN (GLUCOPHAGE-XR) 500 MG 24 hr tablet Take 2 tablets (1,000 mg total) by mouth 2 (two) times daily with meals. 120 tablet 6    multivitamin (ONE DAILY MULTIVITAMIN) per tablet Take 1 tablet by mouth once daily.      HYDROcodone-acetaminophen (NORCO) 5-325 mg per tablet Take 1 tablet by mouth every 6 (six) hours as needed for Pain. 5 tablet 0    lidocaine-prilocaine (EMLA) cream APPLY TOPICALLY OVER PORT SITE AT LEAST 60  MINUTES PRIOR TO CHEMO FOR 1 DOSE  2    nystatin (MYCOSTATIN) cream Apply topically.      ondansetron (ZOFRAN-ODT) 8 MG TbDL Take 1 tablet (8 mg total) by mouth every 6 (six) hours as needed (nausea). 30 tablet 2    oxybutynin (DITROPAN-XL) 5 MG TR24 Take 1 tablet (5 mg total) by mouth once daily. 90 tablet 3    promethazine (PHENERGAN) 25 MG tablet Take 1 tablet (25 mg total) by mouth every 6 (six) hours as needed for Nausea. 30 tablet 2    traMADol (ULTRAM) 50 mg tablet Take 1 tablet (50 mg total) by mouth every 6 (six) hours as needed for Pain. 10 tablet 0     No current facility-administered medications for this visit.        ALLERGIES:   Review of patient's allergies indicates:   Allergen Reactions    Morphine Itching    Percocet [oxycodone-acetaminophen] Itching       PHYSICAL EXAMINATION:   General:  This is a well appearing female with appropriate speech, affect and gait.     Breast:  Incision clean, dry, and intact. One drian removed    IMPRESSION:   The patient has had an uneventful postoperative course.    PLAN:   1. return for second drain removal  2. right mammogram in 11 months  3. The patient is advised in continued exam of the breast chest wall and to report to this office sooner should she note any areas of abnormality or concern.   4.  She has been instructed to meet with med onc for discussion of adjuvant treatment recommendations

## 2019-11-13 NOTE — PROGRESS NOTES
Subjective:       Patient ID: Shu Mendoza is a 54 y.o. female.     Chief Complaint: follow up for breast cancer     Diagnosis:  Stage IA ([id number]) invasive ductal carcinoma of the left breast, grade 2, ER/VA positive, HER2 positive, s/p left mastectomy and reconstruction on 9/11/2019. Mammaprint high risk     Oncologic History:  1. Ms Mendoza is a 55 yo postmenopausal woman with HTN, DM, who presents today for further management of pathologic stage IA ([id number]) invasive ductal carcinoma of the left breast. She was referred to Dr Mccray for bloody nipple discharge first noted in June 2019. Before then she had a negative mammogram on 5/20/19. She had a left breast US on 6/27/19 in the left breast retroareolar region just behind the nipple, there is an irregular hypoechoic intraductal mass measuring 1.0 cm. She underwent a biopsy on 7/8/2019. It showed invasive ductal carcinoma, ER strongly positive 100%, VA positive 80%, HER2 negative 1+. Ki67 5% activity within invasive component. Patient underwent a left total mastectomy and reconstruction on 9/11/2019. Pathology showed a 1.3 cm invasive ductal carcinoma, grade 2. DCIS present, negative margin, 22 lymph nodes removed, one lymph node positive with macrometastases 13 mm, no extranodal extension. No lymphovascular invasion. Pathologic T1c N1a. Mammaprint high risk with chemo benefit >12%. 5-10 years recurrence risk without chemo 20-25%, with chemo and endocrine therapy 7%. BRCAnalysis from 7/18/19 was negative. Case was discussed at tumor board. Adjuvant chemotherapy followed by endocrine therapy was recommended. Radiation not recommended. Patient presents today for further management. Feeling well. Works at [employer].   2. Port placed by Dr Mccray on 10/25/19. Echo on 10/22/19 showed normal LVEF 59%.   3. Adjuvant DDAC followed by weekly taxol started on 10/31/2019. cycle 2 to be given on 11/14/19.      Interval History:   Ms Mendoza returns today for cycle 2 DDAC.  Feeling well. Had fatigue and mild nausea from chemo. Did not need to take antiemetics. BP has been in the 150-160s range at home.      ECO     ROS:   A ten-point system review is obtained and negative except for what was stated in the Interval History.      Physical Examination:   Vital signs reviewed.   General: well hydrated, well developed, in no acute distress  HEENT: normocephalic, PERRLA, EOMI, anicteric sclerae, oropharynx clear  Neck: supple, no JVD, thyromegaly, cervical or supraclavicular lymphadenopathy  Lungs: clear breath sounds bilaterally, no wheezing, rales, or rhonchi  Heart: RRR, no M/R/G  Abdomen: soft, no tenderness, non-distended, no hepatosplenomegaly, mass, or hernia. BS present  Extremities: no clubbing, cyanosis, or edema  Skin: no rash, ulcer, or open wounds  Neuro: alert and oriented x 4, no focal neuro deficit  Psych: pleasant and appropriate mood and affect     Objective:      Laboratory Data:  Labs reviewed. CBC, CMP unremarkable     Imaging Data:  Echo on 10/22/19 showed normal LVEF 59%.     Assessment and Plan:      1. Malignant neoplasm of central portion of left breast in female, estrogen receptor positive    2. Carcinoma of left breast metastatic to axillary lymph node    3. Type 2 diabetes mellitus without complication, without long-term current use of insulin    4. Essential hypertension        1.2  - Ms Mendoza is a 55 yo postmenopausal woman with stage IA ([id number]) invasive ductal carcinoma of the left breast, ER/WV positive, HER2 negative, s/p left mastectomy and reconstruction on 2019. Mammaprint high risk with chemo benefit >12%. 5-10 years recurrence risk without chemo 20-25%, with chemo and endocrine therapy 7%.  - She is on adjuvant DDAC followed by weekly taxol  - doing well. Cycle 2 AC today. neulasta tomorrow  - return in 2 weeks for cycle 3    3.  - BS good  - c/w current meds    4.  - BP has been elevated  - asked patient to call her PCP to adjust BP  medication. She understands and agrees with the plan      Follow-up:      RTC in 2 weeks  Knows to call in the interval if any problems arise.

## 2019-11-14 ENCOUNTER — INFUSION (OUTPATIENT)
Dept: INFUSION THERAPY | Facility: OTHER | Age: 54
End: 2019-11-14
Attending: INTERNAL MEDICINE
Payer: COMMERCIAL

## 2019-11-14 ENCOUNTER — OFFICE VISIT (OUTPATIENT)
Dept: HEMATOLOGY/ONCOLOGY | Facility: CLINIC | Age: 54
End: 2019-11-14
Payer: COMMERCIAL

## 2019-11-14 ENCOUNTER — LAB VISIT (OUTPATIENT)
Dept: LAB | Facility: OTHER | Age: 54
End: 2019-11-14
Attending: INTERNAL MEDICINE
Payer: COMMERCIAL

## 2019-11-14 VITALS
HEIGHT: 64 IN | TEMPERATURE: 97 F | RESPIRATION RATE: 16 BRPM | SYSTOLIC BLOOD PRESSURE: 183 MMHG | BODY MASS INDEX: 31.47 KG/M2 | HEART RATE: 80 BPM | OXYGEN SATURATION: 99 % | DIASTOLIC BLOOD PRESSURE: 77 MMHG | WEIGHT: 184.31 LBS

## 2019-11-14 DIAGNOSIS — C50.112 MALIGNANT NEOPLASM OF CENTRAL PORTION OF LEFT BREAST IN FEMALE, ESTROGEN RECEPTOR POSITIVE: Primary | ICD-10-CM

## 2019-11-14 DIAGNOSIS — Z17.0 MALIGNANT NEOPLASM OF CENTRAL PORTION OF LEFT BREAST IN FEMALE, ESTROGEN RECEPTOR POSITIVE: ICD-10-CM

## 2019-11-14 DIAGNOSIS — Z17.0 MALIGNANT NEOPLASM OF CENTRAL PORTION OF LEFT BREAST IN FEMALE, ESTROGEN RECEPTOR POSITIVE: Primary | ICD-10-CM

## 2019-11-14 DIAGNOSIS — I10 ESSENTIAL HYPERTENSION: ICD-10-CM

## 2019-11-14 DIAGNOSIS — C50.912 CARCINOMA OF LEFT BREAST METASTATIC TO AXILLARY LYMPH NODE: ICD-10-CM

## 2019-11-14 DIAGNOSIS — C77.3 CARCINOMA OF LEFT BREAST METASTATIC TO AXILLARY LYMPH NODE: ICD-10-CM

## 2019-11-14 DIAGNOSIS — E11.9 TYPE 2 DIABETES MELLITUS WITHOUT COMPLICATION, WITHOUT LONG-TERM CURRENT USE OF INSULIN: ICD-10-CM

## 2019-11-14 DIAGNOSIS — C50.112 MALIGNANT NEOPLASM OF CENTRAL PORTION OF LEFT BREAST IN FEMALE, ESTROGEN RECEPTOR POSITIVE: ICD-10-CM

## 2019-11-14 LAB
ALBUMIN SERPL BCP-MCNC: 3.8 G/DL (ref 3.5–5.2)
ALP SERPL-CCNC: 115 U/L (ref 55–135)
ALT SERPL W/O P-5'-P-CCNC: 19 U/L (ref 10–44)
ANION GAP SERPL CALC-SCNC: 11 MMOL/L (ref 8–16)
AST SERPL-CCNC: 14 U/L (ref 10–40)
BASOPHILS # BLD AUTO: 0.11 K/UL (ref 0–0.2)
BASOPHILS NFR BLD: 1 % (ref 0–1.9)
BILIRUB SERPL-MCNC: 0.2 MG/DL (ref 0.1–1)
BUN SERPL-MCNC: 10 MG/DL (ref 6–20)
CALCIUM SERPL-MCNC: 9.6 MG/DL (ref 8.7–10.5)
CHLORIDE SERPL-SCNC: 107 MMOL/L (ref 95–110)
CO2 SERPL-SCNC: 24 MMOL/L (ref 23–29)
CREAT SERPL-MCNC: 0.7 MG/DL (ref 0.5–1.4)
DIFFERENTIAL METHOD: ABNORMAL
EOSINOPHIL # BLD AUTO: 0 K/UL (ref 0–0.5)
EOSINOPHIL NFR BLD: 0.4 % (ref 0–8)
ERYTHROCYTE [DISTWIDTH] IN BLOOD BY AUTOMATED COUNT: 15 % (ref 11.5–14.5)
EST. GFR  (AFRICAN AMERICAN): >60 ML/MIN/1.73 M^2
EST. GFR  (NON AFRICAN AMERICAN): >60 ML/MIN/1.73 M^2
GLUCOSE SERPL-MCNC: 144 MG/DL (ref 70–110)
HCT VFR BLD AUTO: 38.8 % (ref 37–48.5)
HGB BLD-MCNC: 12.2 G/DL (ref 12–16)
IMM GRANULOCYTES # BLD AUTO: 0.46 K/UL (ref 0–0.04)
IMM GRANULOCYTES NFR BLD AUTO: 4.3 % (ref 0–0.5)
LYMPHOCYTES # BLD AUTO: 2.8 K/UL (ref 1–4.8)
LYMPHOCYTES NFR BLD: 25.6 % (ref 18–48)
MCH RBC QN AUTO: 27.5 PG (ref 27–31)
MCHC RBC AUTO-ENTMCNC: 31.4 G/DL (ref 32–36)
MCV RBC AUTO: 88 FL (ref 82–98)
MONOCYTES # BLD AUTO: 0.6 K/UL (ref 0.3–1)
MONOCYTES NFR BLD: 5.9 % (ref 4–15)
NEUTROPHILS # BLD AUTO: 6.8 K/UL (ref 1.8–7.7)
NEUTROPHILS NFR BLD: 62.8 % (ref 38–73)
NRBC BLD-RTO: 0 /100 WBC
PLATELET # BLD AUTO: 187 K/UL (ref 150–350)
PMV BLD AUTO: 9.3 FL (ref 9.2–12.9)
POTASSIUM SERPL-SCNC: 3.5 MMOL/L (ref 3.5–5.1)
PROT SERPL-MCNC: 7.2 G/DL (ref 6–8.4)
RBC # BLD AUTO: 4.43 M/UL (ref 4–5.4)
SODIUM SERPL-SCNC: 142 MMOL/L (ref 136–145)
WBC # BLD AUTO: 10.75 K/UL (ref 3.9–12.7)

## 2019-11-14 PROCEDURE — 85025 COMPLETE CBC W/AUTO DIFF WBC: CPT

## 2019-11-14 PROCEDURE — 80053 COMPREHEN METABOLIC PANEL: CPT

## 2019-11-14 PROCEDURE — 3008F PR BODY MASS INDEX (BMI) DOCUMENTED: ICD-10-PCS | Mod: CPTII,S$GLB,, | Performed by: INTERNAL MEDICINE

## 2019-11-14 PROCEDURE — 99999 PR PBB SHADOW E&M-EST. PATIENT-LVL III: CPT | Mod: PBBFAC,,, | Performed by: INTERNAL MEDICINE

## 2019-11-14 PROCEDURE — 99215 PR OFFICE/OUTPT VISIT, EST, LEVL V, 40-54 MIN: ICD-10-PCS | Mod: S$GLB,,, | Performed by: INTERNAL MEDICINE

## 2019-11-14 PROCEDURE — 96411 CHEMO IV PUSH ADDL DRUG: CPT

## 2019-11-14 PROCEDURE — 36415 COLL VENOUS BLD VENIPUNCTURE: CPT

## 2019-11-14 PROCEDURE — 99215 OFFICE O/P EST HI 40 MIN: CPT | Mod: S$GLB,,, | Performed by: INTERNAL MEDICINE

## 2019-11-14 PROCEDURE — 96367 TX/PROPH/DG ADDL SEQ IV INF: CPT

## 2019-11-14 PROCEDURE — 3008F BODY MASS INDEX DOCD: CPT | Mod: CPTII,S$GLB,, | Performed by: INTERNAL MEDICINE

## 2019-11-14 PROCEDURE — 99999 PR PBB SHADOW E&M-EST. PATIENT-LVL III: ICD-10-PCS | Mod: PBBFAC,,, | Performed by: INTERNAL MEDICINE

## 2019-11-14 PROCEDURE — 63600175 PHARM REV CODE 636 W HCPCS: Performed by: INTERNAL MEDICINE

## 2019-11-14 PROCEDURE — 96413 CHEMO IV INFUSION 1 HR: CPT

## 2019-11-14 RX ORDER — DOXORUBICIN HYDROCHLORIDE 2 MG/ML
60 INJECTION, SOLUTION INTRAVENOUS
Status: COMPLETED | OUTPATIENT
Start: 2019-11-14 | End: 2019-11-14

## 2019-11-14 RX ORDER — HEPARIN 100 UNIT/ML
500 SYRINGE INTRAVENOUS
Status: CANCELLED | OUTPATIENT
Start: 2019-11-15

## 2019-11-14 RX ORDER — SODIUM CHLORIDE 0.9 % (FLUSH) 0.9 %
10 SYRINGE (ML) INJECTION
Status: CANCELLED | OUTPATIENT
Start: 2019-11-15

## 2019-11-14 RX ORDER — HEPARIN 100 UNIT/ML
500 SYRINGE INTRAVENOUS
Status: DISCONTINUED | OUTPATIENT
Start: 2019-11-14 | End: 2019-11-14 | Stop reason: HOSPADM

## 2019-11-14 RX ORDER — SODIUM CHLORIDE 0.9 % (FLUSH) 0.9 %
10 SYRINGE (ML) INJECTION
Status: DISCONTINUED | OUTPATIENT
Start: 2019-11-14 | End: 2019-11-14 | Stop reason: HOSPADM

## 2019-11-14 RX ORDER — DOXORUBICIN HYDROCHLORIDE 2 MG/ML
60 INJECTION, SOLUTION INTRAVENOUS
Status: CANCELLED | OUTPATIENT
Start: 2019-11-15

## 2019-11-14 RX ADMIN — APREPITANT 130 MG: 130 INJECTION, EMULSION INTRAVENOUS at 10:11

## 2019-11-14 RX ADMIN — DOXORUBICIN HYDROCHLORIDE 116 MG: 2 INJECTION, SOLUTION INTRAVENOUS at 11:11

## 2019-11-14 RX ADMIN — SODIUM CHLORIDE: 9 INJECTION, SOLUTION INTRAVENOUS at 10:11

## 2019-11-14 RX ADMIN — HEPARIN SODIUM (PORCINE) LOCK FLUSH IV SOLN 100 UNIT/ML 500 UNITS: 100 SOLUTION at 12:11

## 2019-11-14 RX ADMIN — PALONOSETRON HYDROCHLORIDE: 0.25 INJECTION, SOLUTION INTRAVENOUS at 10:11

## 2019-11-14 RX ADMIN — CYCLOPHOSPHAMIDE 1160 MG: 1 INJECTION, POWDER, FOR SOLUTION INTRAVENOUS; ORAL at 11:11

## 2019-11-14 NOTE — PLAN OF CARE
C2 AC administered,patient tolerated well. VSS, NAD. D/C'd home with self. RTC tomorrow for Udencya.

## 2019-11-14 NOTE — Clinical Note
Return to main campus on 11/27, get YOLANDE PEREZ, see me at 10:30, then come to [facility] for chemo (AC), return to main campus on 11/29 to get neulasta. RTC on 12/12 with YOLANDE PEREZ, see me, then get AC, return on 12/13 to get neulasta

## 2019-11-15 ENCOUNTER — INFUSION (OUTPATIENT)
Dept: INFUSION THERAPY | Facility: OTHER | Age: 54
End: 2019-11-15
Attending: INTERNAL MEDICINE
Payer: COMMERCIAL

## 2019-11-15 VITALS
HEART RATE: 73 BPM | RESPIRATION RATE: 18 BRPM | DIASTOLIC BLOOD PRESSURE: 76 MMHG | TEMPERATURE: 98 F | HEIGHT: 64 IN | WEIGHT: 185.63 LBS | OXYGEN SATURATION: 99 % | BODY MASS INDEX: 31.69 KG/M2 | SYSTOLIC BLOOD PRESSURE: 154 MMHG

## 2019-11-15 DIAGNOSIS — C50.112 MALIGNANT NEOPLASM OF CENTRAL PORTION OF LEFT BREAST IN FEMALE, ESTROGEN RECEPTOR POSITIVE: Primary | ICD-10-CM

## 2019-11-15 DIAGNOSIS — Z17.0 MALIGNANT NEOPLASM OF CENTRAL PORTION OF LEFT BREAST IN FEMALE, ESTROGEN RECEPTOR POSITIVE: Primary | ICD-10-CM

## 2019-11-15 PROCEDURE — 63600175 PHARM REV CODE 636 W HCPCS: Performed by: INTERNAL MEDICINE

## 2019-11-15 PROCEDURE — 96372 THER/PROPH/DIAG INJ SC/IM: CPT

## 2019-11-15 RX ADMIN — PEGFILGRASTIM-CBQV 6 MG: 6 INJECTION, SOLUTION SUBCUTANEOUS at 01:11

## 2019-11-26 ENCOUNTER — TELEPHONE (OUTPATIENT)
Dept: HEMATOLOGY/ONCOLOGY | Facility: CLINIC | Age: 54
End: 2019-11-26

## 2019-11-27 ENCOUNTER — OFFICE VISIT (OUTPATIENT)
Dept: HEMATOLOGY/ONCOLOGY | Facility: CLINIC | Age: 54
End: 2019-11-27
Payer: COMMERCIAL

## 2019-11-27 ENCOUNTER — LAB VISIT (OUTPATIENT)
Dept: LAB | Facility: HOSPITAL | Age: 54
End: 2019-11-27
Attending: INTERNAL MEDICINE
Payer: COMMERCIAL

## 2019-11-27 VITALS
HEIGHT: 64 IN | TEMPERATURE: 98 F | OXYGEN SATURATION: 98 % | SYSTOLIC BLOOD PRESSURE: 172 MMHG | BODY MASS INDEX: 31.32 KG/M2 | HEART RATE: 82 BPM | WEIGHT: 183.44 LBS | DIASTOLIC BLOOD PRESSURE: 84 MMHG | RESPIRATION RATE: 18 BRPM

## 2019-11-27 DIAGNOSIS — I10 ESSENTIAL HYPERTENSION: ICD-10-CM

## 2019-11-27 DIAGNOSIS — L08.9 SOFT TISSUE INFECTION: ICD-10-CM

## 2019-11-27 DIAGNOSIS — C50.112 MALIGNANT NEOPLASM OF CENTRAL PORTION OF LEFT BREAST IN FEMALE, ESTROGEN RECEPTOR POSITIVE: ICD-10-CM

## 2019-11-27 DIAGNOSIS — C50.112 MALIGNANT NEOPLASM OF CENTRAL PORTION OF LEFT BREAST IN FEMALE, ESTROGEN RECEPTOR POSITIVE: Primary | ICD-10-CM

## 2019-11-27 DIAGNOSIS — D64.81 ANTINEOPLASTIC CHEMOTHERAPY INDUCED ANEMIA: ICD-10-CM

## 2019-11-27 DIAGNOSIS — C50.912 CARCINOMA OF LEFT BREAST METASTATIC TO AXILLARY LYMPH NODE: ICD-10-CM

## 2019-11-27 DIAGNOSIS — C77.3 CARCINOMA OF LEFT BREAST METASTATIC TO AXILLARY LYMPH NODE: ICD-10-CM

## 2019-11-27 DIAGNOSIS — E11.9 TYPE 2 DIABETES MELLITUS WITHOUT COMPLICATION, WITHOUT LONG-TERM CURRENT USE OF INSULIN: ICD-10-CM

## 2019-11-27 DIAGNOSIS — Z17.0 MALIGNANT NEOPLASM OF CENTRAL PORTION OF LEFT BREAST IN FEMALE, ESTROGEN RECEPTOR POSITIVE: ICD-10-CM

## 2019-11-27 DIAGNOSIS — Z17.0 MALIGNANT NEOPLASM OF CENTRAL PORTION OF LEFT BREAST IN FEMALE, ESTROGEN RECEPTOR POSITIVE: Primary | ICD-10-CM

## 2019-11-27 DIAGNOSIS — T45.1X5A ANTINEOPLASTIC CHEMOTHERAPY INDUCED ANEMIA: ICD-10-CM

## 2019-11-27 LAB
ALBUMIN SERPL BCP-MCNC: 3.8 G/DL (ref 3.5–5.2)
ALP SERPL-CCNC: 134 U/L (ref 55–135)
ALT SERPL W/O P-5'-P-CCNC: 20 U/L (ref 10–44)
ANION GAP SERPL CALC-SCNC: 9 MMOL/L (ref 8–16)
ANISOCYTOSIS BLD QL SMEAR: SLIGHT
AST SERPL-CCNC: 15 U/L (ref 10–40)
BASOPHILS # BLD AUTO: ABNORMAL K/UL (ref 0–0.2)
BASOPHILS NFR BLD: 0 % (ref 0–1.9)
BILIRUB SERPL-MCNC: 0.2 MG/DL (ref 0.1–1)
BUN SERPL-MCNC: 11 MG/DL (ref 6–20)
CALCIUM SERPL-MCNC: 10.1 MG/DL (ref 8.7–10.5)
CHLORIDE SERPL-SCNC: 106 MMOL/L (ref 95–110)
CO2 SERPL-SCNC: 28 MMOL/L (ref 23–29)
CREAT SERPL-MCNC: 0.7 MG/DL (ref 0.5–1.4)
DIFFERENTIAL METHOD: ABNORMAL
EOSINOPHIL # BLD AUTO: ABNORMAL K/UL (ref 0–0.5)
EOSINOPHIL NFR BLD: 0 % (ref 0–8)
ERYTHROCYTE [DISTWIDTH] IN BLOOD BY AUTOMATED COUNT: 16.1 % (ref 11.5–14.5)
EST. GFR  (AFRICAN AMERICAN): >60 ML/MIN/1.73 M^2
EST. GFR  (NON AFRICAN AMERICAN): >60 ML/MIN/1.73 M^2
GLUCOSE SERPL-MCNC: 115 MG/DL (ref 70–110)
HCT VFR BLD AUTO: 37.4 % (ref 37–48.5)
HGB BLD-MCNC: 11.7 G/DL (ref 12–16)
HYPOCHROMIA BLD QL SMEAR: ABNORMAL
IMM GRANULOCYTES # BLD AUTO: ABNORMAL K/UL (ref 0–0.04)
IMM GRANULOCYTES NFR BLD AUTO: ABNORMAL % (ref 0–0.5)
LYMPHOCYTES # BLD AUTO: ABNORMAL K/UL (ref 1–4.8)
LYMPHOCYTES NFR BLD: 16 % (ref 18–48)
MCH RBC QN AUTO: 27.5 PG (ref 27–31)
MCHC RBC AUTO-ENTMCNC: 31.3 G/DL (ref 32–36)
MCV RBC AUTO: 88 FL (ref 82–98)
MONOCYTES # BLD AUTO: ABNORMAL K/UL (ref 0.3–1)
MONOCYTES NFR BLD: 7 % (ref 4–15)
NEUTROPHILS NFR BLD: 75 % (ref 38–73)
NEUTS BAND NFR BLD MANUAL: 2 %
NRBC BLD-RTO: 0 /100 WBC
PLATELET # BLD AUTO: 240 K/UL (ref 150–350)
PLATELET BLD QL SMEAR: ABNORMAL
PMV BLD AUTO: 9.8 FL (ref 9.2–12.9)
POLYCHROMASIA BLD QL SMEAR: ABNORMAL
POTASSIUM SERPL-SCNC: 4.1 MMOL/L (ref 3.5–5.1)
PROT SERPL-MCNC: 7.2 G/DL (ref 6–8.4)
RBC # BLD AUTO: 4.25 M/UL (ref 4–5.4)
SODIUM SERPL-SCNC: 143 MMOL/L (ref 136–145)
WBC # BLD AUTO: 18.2 K/UL (ref 3.9–12.7)

## 2019-11-27 PROCEDURE — 99999 PR PBB SHADOW E&M-EST. PATIENT-LVL III: CPT | Mod: PBBFAC,,, | Performed by: INTERNAL MEDICINE

## 2019-11-27 PROCEDURE — 99215 PR OFFICE/OUTPT VISIT, EST, LEVL V, 40-54 MIN: ICD-10-PCS | Mod: S$GLB,,, | Performed by: INTERNAL MEDICINE

## 2019-11-27 PROCEDURE — 99999 PR PBB SHADOW E&M-EST. PATIENT-LVL III: ICD-10-PCS | Mod: PBBFAC,,, | Performed by: INTERNAL MEDICINE

## 2019-11-27 PROCEDURE — 36415 COLL VENOUS BLD VENIPUNCTURE: CPT

## 2019-11-27 PROCEDURE — 85027 COMPLETE CBC AUTOMATED: CPT

## 2019-11-27 PROCEDURE — 3008F BODY MASS INDEX DOCD: CPT | Mod: CPTII,S$GLB,, | Performed by: INTERNAL MEDICINE

## 2019-11-27 PROCEDURE — 3008F PR BODY MASS INDEX (BMI) DOCUMENTED: ICD-10-PCS | Mod: CPTII,S$GLB,, | Performed by: INTERNAL MEDICINE

## 2019-11-27 PROCEDURE — 99215 OFFICE O/P EST HI 40 MIN: CPT | Mod: S$GLB,,, | Performed by: INTERNAL MEDICINE

## 2019-11-27 PROCEDURE — 80053 COMPREHEN METABOLIC PANEL: CPT

## 2019-11-27 PROCEDURE — 85007 BL SMEAR W/DIFF WBC COUNT: CPT

## 2019-11-27 RX ORDER — SULFAMETHOXAZOLE AND TRIMETHOPRIM 800; 160 MG/1; MG/1
2 TABLET ORAL 2 TIMES DAILY
Qty: 40 TABLET | Refills: 0 | Status: SHIPPED | OUTPATIENT
Start: 2019-11-27 | End: 2019-11-27

## 2019-11-27 RX ORDER — SULFAMETHOXAZOLE AND TRIMETHOPRIM 800; 160 MG/1; MG/1
2 TABLET ORAL 2 TIMES DAILY
Qty: 40 TABLET | Refills: 0 | Status: SHIPPED | OUTPATIENT
Start: 2019-11-27 | End: 2019-12-07

## 2019-11-27 RX ORDER — HEPARIN 100 UNIT/ML
500 SYRINGE INTRAVENOUS
Status: CANCELLED | OUTPATIENT
Start: 2019-11-29

## 2019-11-27 RX ORDER — DOXORUBICIN HYDROCHLORIDE 2 MG/ML
60 INJECTION, SOLUTION INTRAVENOUS
Status: CANCELLED | OUTPATIENT
Start: 2019-11-29

## 2019-11-27 RX ORDER — SODIUM CHLORIDE 0.9 % (FLUSH) 0.9 %
10 SYRINGE (ML) INJECTION
Status: CANCELLED | OUTPATIENT
Start: 2019-11-29

## 2019-11-27 NOTE — Clinical Note
Please cancel chemo and neulasta this week. RTC ([facility]) on 12/5, get CBC, CMP, see me, then get AC, return on 12/6 for neulasta

## 2019-11-27 NOTE — PROGRESS NOTES
Subjective:       Patient ID: Shu Mendoza is a 54 y.o. female.     Chief Complaint: follow up for breast cancer     Diagnosis:  Stage IA ([id number]) invasive ductal carcinoma of the left breast, grade 2, ER/NH positive, HER2 positive, s/p left mastectomy and reconstruction on 9/11/2019. Mammaprint high risk     Oncologic History:  1. Ms Mendoza is a 53 yo postmenopausal woman with HTN, DM, who presents today for further management of pathologic stage IA ([id number]) invasive ductal carcinoma of the left breast. She was referred to Dr Mccray for bloody nipple discharge first noted in June 2019. Before then she had a negative mammogram on 5/20/19. She had a left breast US on 6/27/19 in the left breast retroareolar region just behind the nipple, there is an irregular hypoechoic intraductal mass measuring 1.0 cm. She underwent a biopsy on 7/8/2019. It showed invasive ductal carcinoma, ER strongly positive 100%, NH positive 80%, HER2 negative 1+. Ki67 5% activity within invasive component. Patient underwent a left total mastectomy and reconstruction on 9/11/2019. Pathology showed a 1.3 cm invasive ductal carcinoma, grade 2. DCIS present, negative margin, 22 lymph nodes removed, one lymph node positive with macrometastases 13 mm, no extranodal extension. No lymphovascular invasion. Pathologic T1c N1a. Mammaprint high risk with chemo benefit >12%. 5-10 years recurrence risk without chemo 20-25%, with chemo and endocrine therapy 7%. BRCAnalysis from 7/18/19 was negative. Case was discussed at tumor board. Adjuvant chemotherapy followed by endocrine therapy was recommended. Radiation not recommended. Patient presents today for further management. Feeling well. Works at [employer].   2. Port placed by Dr Mccray on 10/25/19. Echo on 10/22/19 showed normal LVEF 59%.   3. Adjuvant DDAC followed by weekly taxol started on 10/31/2019. cycle 2 on 11/14/19.      Interval History:   Ms Mendoza returns today for cycle 3 DDAC. Feeling well.  Still had heart burn. Has been feeling numbness in her lower abdomen. Her umbilicus started to bleed last night. Denies fever, chills.      ECO     ROS:   A ten-point system review is obtained and negative except for what was stated in the Interval History.      Physical Examination:   Vital signs reviewed.   General: well hydrated, well developed, in no acute distress  HEENT: normocephalic, PERRLA, EOMI, anicteric sclerae, oropharynx clear  Neck: supple, no JVD, thyromegaly, cervical or supraclavicular lymphadenopathy  Lungs: clear breath sounds bilaterally, no wheezing, rales, or rhonchi  Heart: RRR, no M/R/G  Abdomen: soft, no tenderness, non-distended, no hepatosplenomegaly, mass, or hernia. BS present. +purulent discharge from the umbilicus  Extremities: no clubbing, cyanosis, or edema  Skin: no rash, ulcer, or open wounds  Neuro: alert and oriented x 4, no focal neuro deficit  Psych: pleasant and appropriate mood and affect     Objective:      Laboratory Data:  Labs reviewed. WBC 18.2 with 75% neutrophils, Hb 11.7. CMP normal.      Imaging Data:  Echo on 10/22/19 showed normal LVEF 59%.     Assessment and Plan:      1. Malignant neoplasm of central portion of left breast in female, estrogen receptor positive    2. Carcinoma of left breast metastatic to axillary lymph node    3. Soft tissue infection    4. Antineoplastic chemotherapy induced anemia    5. Essential hypertension    6. Type 2 diabetes mellitus without complication, without long-term current use of insulin        1.2  - Ms Mendoza is a 53 yo postmenopausal woman with stage IA ([id number]) invasive ductal carcinoma of the left breast, ER/MD positive, HER2 negative, s/p left mastectomy and reconstruction on 2019. Mammaprint high risk with chemo benefit >12%. 5-10 years recurrence risk without chemo 20-25%, with chemo and endocrine therapy 7%.  - She is on adjuvant DDAC followed by weekly taxol  - due for cycle 3 DDAC today. However, she has an  infection of her umbilicus. She did have reconstruction surgery done. I am concerned about her abdominal wound infection. Bactrim prescribed. Patient called Dr Saucedo's office and will be seen this afternoon   - hold chemo today. Return next week for cycle 3 if infection is controlled at that time.      3.  - see above. Prescribed bactrim. Patient will be seen at Dr Saucedo's office this afternoon.     4.  - mild. Monitor    5.  - BP elevated  - f/u with PCP    6.  - BS good  - c/w current meds       Follow-up:      RTC next week for cycle 3 DDAC  Knows to call in the interval if any problems arise.       Answers for HPI/ROS submitted by the patient on 11/21/2019   appetite change : Yes  unexpected weight change: No  visual disturbance: Yes  cough: Yes  chest pain: No  abdominal pain: No  diarrhea: No  back pain: Yes  rash: No  headaches: No  adenopathy: No  nervous/ anxious: No

## 2019-11-28 ENCOUNTER — PATIENT MESSAGE (OUTPATIENT)
Dept: HEMATOLOGY/ONCOLOGY | Facility: CLINIC | Age: 54
End: 2019-11-28

## 2019-12-04 NOTE — PROGRESS NOTES
Subjective:       Patient ID: Shu Mendoza is a 54 y.o. female.     Chief Complaint: follow up for breast cancer     Diagnosis:  Stage IA ([id number]) invasive ductal carcinoma of the left breast, grade 2, ER/ME positive, HER2 positive, s/p left mastectomy and reconstruction on 9/11/2019. Mammaprint high risk     Oncologic History:  1. Ms Mendoza is a 55 yo postmenopausal woman with HTN, DM, who presents today for further management of pathologic stage IA ([id number]) invasive ductal carcinoma of the left breast. She was referred to Dr Mccray for bloody nipple discharge first noted in June 2019. Before then she had a negative mammogram on 5/20/19. She had a left breast US on 6/27/19 in the left breast retroareolar region just behind the nipple, there is an irregular hypoechoic intraductal mass measuring 1.0 cm. She underwent a biopsy on 7/8/2019. It showed invasive ductal carcinoma, ER strongly positive 100%, ME positive 80%, HER2 negative 1+. Ki67 5% activity within invasive component. Patient underwent a left total mastectomy and reconstruction on 9/11/2019. Pathology showed a 1.3 cm invasive ductal carcinoma, grade 2. DCIS present, negative margin, 22 lymph nodes removed, one lymph node positive with macrometastases 13 mm, no extranodal extension. No lymphovascular invasion. Pathologic T1c N1a. Mammaprint high risk with chemo benefit >12%. 5-10 years recurrence risk without chemo 20-25%, with chemo and endocrine therapy 7%. BRCAnalysis from 7/18/19 was negative. Case was discussed at tumor board. Adjuvant chemotherapy followed by endocrine therapy was recommended. Radiation not recommended. Patient presents today for further management. Feeling well. Works at [employer].   2. Port placed by Dr Mccray on 10/25/19. Echo on 10/22/19 showed normal LVEF 59%.   3. Adjuvant DDAC followed by weekly taxol started on 10/31/2019. cycle 2 on 11/14/19. cycle 3 was delayed for a week for umbilical infection     Interval  History:   Ms Mendoza returns today for cycle 3 DDAC. Chemo was held last week for umbilical drainage at the reconstruction wound site. She was seen by plastic surgery, who told her it is likely inflammatory but agreed on bactrim. She is still taking bactrim. Umbilical drainage and bleeding has stopped completely. She started to have some nausea this morning but wants to continue with chemo. No fever, chills, pain.      ECO     ROS:   A ten-point system review is obtained and negative except for what was stated in the Interval History.      Physical Examination:   Vital signs reviewed.   General: well hydrated, well developed, in no acute distress  HEENT: normocephalic, PERRLA, EOMI, anicteric sclerae, oropharynx clear  Neck: supple, no JVD, thyromegaly, cervical or supraclavicular lymphadenopathy  Lungs: clear breath sounds bilaterally, no wheezing, rales, or rhonchi  Heart: RRR, no M/R/G  Abdomen: soft, no tenderness, non-distended, no hepatosplenomegaly, mass, or hernia. BS present. No pus, drainage or abnormality of the umbilicus.   Extremities: no clubbing, cyanosis, or edema  Skin: no rash, ulcer, or open wounds  Neuro: alert and oriented x 4, no focal neuro deficit  Psych: pleasant and appropriate mood and affect     Objective:      Laboratory Data:  Labs reviewed. CBC, CMP unremarkable     Imaging Data:  Echo on 10/22/19 showed normal LVEF 59%.     Assessment and Plan:      1. Malignant neoplasm of central portion of left breast in female, estrogen receptor positive    2. Carcinoma of left breast metastatic to axillary lymph node    3. Leukocytosis, unspecified type    4. Umbilical bleeding    5. Essential hypertension    6. Type 2 diabetes mellitus without complication, without long-term current use of insulin    7. Non-intractable vomiting with nausea, unspecified vomiting type         1.2  - Ms Mendoza is a 53 yo postmenopausal woman with stage IA ([id number]) invasive ductal carcinoma of the left breast,  ER/UT positive, HER2 negative, s/p left mastectomy and reconstruction on 9/11/2019. Mammaprint high risk with chemo benefit >12%. 5-10 years recurrence risk without chemo 20-25%, with chemo and endocrine therapy 7%.  - She is on adjuvant DDAC followed by weekly taxol  - umbilical infection has been treated. No signs of infection now  - proceed with cycle 3 DDAC today  - RTC in 2 weeks for cycle 4    3.  - 2/2 infection last week. Now has resolved    4.  - resolved after antibiotics  - monitor    5.  - BP good  - c/w current meds    6.  - BS good  - c/w current meds    7.  - started this morning. Patient wants to continue with chemo  - getting antiemetics with chemo  - IVF today       Follow-up:      RTC in 2 weeks for DDAC  Knows to call in the interval if any problems arise.

## 2019-12-05 ENCOUNTER — INFUSION (OUTPATIENT)
Dept: INFUSION THERAPY | Facility: OTHER | Age: 54
End: 2019-12-05
Attending: INTERNAL MEDICINE
Payer: COMMERCIAL

## 2019-12-05 ENCOUNTER — LAB VISIT (OUTPATIENT)
Dept: LAB | Facility: OTHER | Age: 54
End: 2019-12-05
Attending: INTERNAL MEDICINE
Payer: COMMERCIAL

## 2019-12-05 ENCOUNTER — OFFICE VISIT (OUTPATIENT)
Dept: HEMATOLOGY/ONCOLOGY | Facility: CLINIC | Age: 54
End: 2019-12-05
Payer: COMMERCIAL

## 2019-12-05 VITALS
WEIGHT: 179.69 LBS | BODY MASS INDEX: 30.68 KG/M2 | OXYGEN SATURATION: 98 % | RESPIRATION RATE: 16 BRPM | SYSTOLIC BLOOD PRESSURE: 137 MMHG | TEMPERATURE: 97 F | HEART RATE: 86 BPM | DIASTOLIC BLOOD PRESSURE: 76 MMHG | HEIGHT: 64 IN

## 2019-12-05 DIAGNOSIS — E11.9 TYPE 2 DIABETES MELLITUS WITHOUT COMPLICATION, WITHOUT LONG-TERM CURRENT USE OF INSULIN: ICD-10-CM

## 2019-12-05 DIAGNOSIS — Z17.0 MALIGNANT NEOPLASM OF CENTRAL PORTION OF LEFT BREAST IN FEMALE, ESTROGEN RECEPTOR POSITIVE: Primary | ICD-10-CM

## 2019-12-05 DIAGNOSIS — C50.112 MALIGNANT NEOPLASM OF CENTRAL PORTION OF LEFT BREAST IN FEMALE, ESTROGEN RECEPTOR POSITIVE: Primary | ICD-10-CM

## 2019-12-05 DIAGNOSIS — R19.8 UMBILICAL BLEEDING: ICD-10-CM

## 2019-12-05 DIAGNOSIS — I10 ESSENTIAL HYPERTENSION: ICD-10-CM

## 2019-12-05 DIAGNOSIS — D72.829 LEUKOCYTOSIS, UNSPECIFIED TYPE: ICD-10-CM

## 2019-12-05 DIAGNOSIS — C50.112 MALIGNANT NEOPLASM OF CENTRAL PORTION OF LEFT BREAST IN FEMALE, ESTROGEN RECEPTOR POSITIVE: ICD-10-CM

## 2019-12-05 DIAGNOSIS — R11.2 NON-INTRACTABLE VOMITING WITH NAUSEA, UNSPECIFIED VOMITING TYPE: ICD-10-CM

## 2019-12-05 DIAGNOSIS — C77.3 CARCINOMA OF LEFT BREAST METASTATIC TO AXILLARY LYMPH NODE: ICD-10-CM

## 2019-12-05 DIAGNOSIS — Z17.0 MALIGNANT NEOPLASM OF CENTRAL PORTION OF LEFT BREAST IN FEMALE, ESTROGEN RECEPTOR POSITIVE: ICD-10-CM

## 2019-12-05 DIAGNOSIS — C50.912 CARCINOMA OF LEFT BREAST METASTATIC TO AXILLARY LYMPH NODE: ICD-10-CM

## 2019-12-05 LAB
ALBUMIN SERPL BCP-MCNC: 3.9 G/DL (ref 3.5–5.2)
ALP SERPL-CCNC: 91 U/L (ref 55–135)
ALT SERPL W/O P-5'-P-CCNC: 34 U/L (ref 10–44)
ANION GAP SERPL CALC-SCNC: 11 MMOL/L (ref 8–16)
AST SERPL-CCNC: 25 U/L (ref 10–40)
BASOPHILS # BLD AUTO: 0.05 K/UL (ref 0–0.2)
BASOPHILS NFR BLD: 0.7 % (ref 0–1.9)
BILIRUB SERPL-MCNC: 0.2 MG/DL (ref 0.1–1)
BUN SERPL-MCNC: 21 MG/DL (ref 6–20)
CALCIUM SERPL-MCNC: 10.4 MG/DL (ref 8.7–10.5)
CHLORIDE SERPL-SCNC: 106 MMOL/L (ref 95–110)
CO2 SERPL-SCNC: 19 MMOL/L (ref 23–29)
CREAT SERPL-MCNC: 1.1 MG/DL (ref 0.5–1.4)
DIFFERENTIAL METHOD: ABNORMAL
EOSINOPHIL # BLD AUTO: 0 K/UL (ref 0–0.5)
EOSINOPHIL NFR BLD: 0.4 % (ref 0–8)
ERYTHROCYTE [DISTWIDTH] IN BLOOD BY AUTOMATED COUNT: 17.2 % (ref 11.5–14.5)
EST. GFR  (AFRICAN AMERICAN): >60 ML/MIN/1.73 M^2
EST. GFR  (NON AFRICAN AMERICAN): 57 ML/MIN/1.73 M^2
GLUCOSE SERPL-MCNC: 158 MG/DL (ref 70–110)
HCT VFR BLD AUTO: 39.4 % (ref 37–48.5)
HGB BLD-MCNC: 12.6 G/DL (ref 12–16)
IMM GRANULOCYTES # BLD AUTO: 0.05 K/UL (ref 0–0.04)
IMM GRANULOCYTES NFR BLD AUTO: 0.7 % (ref 0–0.5)
LYMPHOCYTES # BLD AUTO: 1.9 K/UL (ref 1–4.8)
LYMPHOCYTES NFR BLD: 26.8 % (ref 18–48)
MCH RBC QN AUTO: 27.8 PG (ref 27–31)
MCHC RBC AUTO-ENTMCNC: 32 G/DL (ref 32–36)
MCV RBC AUTO: 87 FL (ref 82–98)
MONOCYTES # BLD AUTO: 0.6 K/UL (ref 0.3–1)
MONOCYTES NFR BLD: 7.8 % (ref 4–15)
NEUTROPHILS # BLD AUTO: 4.6 K/UL (ref 1.8–7.7)
NEUTROPHILS NFR BLD: 63.6 % (ref 38–73)
NRBC BLD-RTO: 0 /100 WBC
PLATELET # BLD AUTO: 295 K/UL (ref 150–350)
PMV BLD AUTO: 9 FL (ref 9.2–12.9)
POTASSIUM SERPL-SCNC: 4.1 MMOL/L (ref 3.5–5.1)
PROT SERPL-MCNC: 7.4 G/DL (ref 6–8.4)
RBC # BLD AUTO: 4.54 M/UL (ref 4–5.4)
SODIUM SERPL-SCNC: 136 MMOL/L (ref 136–145)
WBC # BLD AUTO: 7.2 K/UL (ref 3.9–12.7)

## 2019-12-05 PROCEDURE — 96367 TX/PROPH/DG ADDL SEQ IV INF: CPT

## 2019-12-05 PROCEDURE — 36415 COLL VENOUS BLD VENIPUNCTURE: CPT

## 2019-12-05 PROCEDURE — 99999 PR PBB SHADOW E&M-EST. PATIENT-LVL III: ICD-10-PCS | Mod: PBBFAC,,, | Performed by: INTERNAL MEDICINE

## 2019-12-05 PROCEDURE — 96411 CHEMO IV PUSH ADDL DRUG: CPT

## 2019-12-05 PROCEDURE — 63600175 PHARM REV CODE 636 W HCPCS: Mod: JG | Performed by: INTERNAL MEDICINE

## 2019-12-05 PROCEDURE — 99215 OFFICE O/P EST HI 40 MIN: CPT | Mod: S$GLB,,, | Performed by: INTERNAL MEDICINE

## 2019-12-05 PROCEDURE — 96413 CHEMO IV INFUSION 1 HR: CPT

## 2019-12-05 PROCEDURE — 3008F PR BODY MASS INDEX (BMI) DOCUMENTED: ICD-10-PCS | Mod: CPTII,S$GLB,, | Performed by: INTERNAL MEDICINE

## 2019-12-05 PROCEDURE — 80053 COMPREHEN METABOLIC PANEL: CPT

## 2019-12-05 PROCEDURE — 99215 PR OFFICE/OUTPT VISIT, EST, LEVL V, 40-54 MIN: ICD-10-PCS | Mod: S$GLB,,, | Performed by: INTERNAL MEDICINE

## 2019-12-05 PROCEDURE — 99999 PR PBB SHADOW E&M-EST. PATIENT-LVL III: CPT | Mod: PBBFAC,,, | Performed by: INTERNAL MEDICINE

## 2019-12-05 PROCEDURE — 3008F BODY MASS INDEX DOCD: CPT | Mod: CPTII,S$GLB,, | Performed by: INTERNAL MEDICINE

## 2019-12-05 PROCEDURE — 96361 HYDRATE IV INFUSION ADD-ON: CPT

## 2019-12-05 PROCEDURE — 85025 COMPLETE CBC W/AUTO DIFF WBC: CPT

## 2019-12-05 RX ORDER — SODIUM CHLORIDE 9 MG/ML
INJECTION, SOLUTION INTRAVENOUS CONTINUOUS
Status: CANCELLED
Start: 2019-12-05

## 2019-12-05 RX ORDER — HEPARIN 100 UNIT/ML
500 SYRINGE INTRAVENOUS
Status: DISCONTINUED | OUTPATIENT
Start: 2019-12-05 | End: 2019-12-05 | Stop reason: HOSPADM

## 2019-12-05 RX ORDER — SODIUM CHLORIDE 0.9 % (FLUSH) 0.9 %
10 SYRINGE (ML) INJECTION
Status: DISCONTINUED | OUTPATIENT
Start: 2019-12-05 | End: 2019-12-05 | Stop reason: HOSPADM

## 2019-12-05 RX ORDER — SODIUM CHLORIDE 9 MG/ML
INJECTION, SOLUTION INTRAVENOUS CONTINUOUS
Status: ACTIVE | OUTPATIENT
Start: 2019-12-05 | End: 2019-12-05

## 2019-12-05 RX ORDER — DOXORUBICIN HYDROCHLORIDE 2 MG/ML
60 INJECTION, SOLUTION INTRAVENOUS
Status: COMPLETED | OUTPATIENT
Start: 2019-12-05 | End: 2019-12-05

## 2019-12-05 RX ADMIN — HEPARIN 500 UNITS: 100 SYRINGE at 11:12

## 2019-12-05 RX ADMIN — SODIUM CHLORIDE: 0.9 INJECTION, SOLUTION INTRAVENOUS at 09:12

## 2019-12-05 RX ADMIN — SODIUM CHLORIDE: 0.9 INJECTION, SOLUTION INTRAVENOUS at 10:12

## 2019-12-05 RX ADMIN — PALONOSETRON HYDROCHLORIDE: 0.25 INJECTION, SOLUTION INTRAVENOUS at 09:12

## 2019-12-05 RX ADMIN — CYCLOPHOSPHAMIDE 1160 MG: 1 INJECTION, POWDER, FOR SOLUTION INTRAVENOUS; ORAL at 10:12

## 2019-12-05 RX ADMIN — APREPITANT 130 MG: 130 INJECTION, EMULSION INTRAVENOUS at 09:12

## 2019-12-05 RX ADMIN — DOXORUBICIN HYDROCHLORIDE 116 MG: 2 INJECTION, SOLUTION INTRAVENOUS at 10:12

## 2019-12-05 NOTE — Clinical Note
RTC on 12/19 with CBC, CMP, see me, then get AC, return on 12/20 to get neulasta. RTC on 1/2 with CBC, CMP, see me, then get taxol (weekly)

## 2019-12-05 NOTE — PLAN OF CARE
AC infusions  complete. Pt tolerated well. VSS. NAD. Port to right chest de-accessed after heparinized per protocol.  AVS provided. Pt verbalized understanding of discharge instructions before leaving with self.

## 2019-12-06 ENCOUNTER — INFUSION (OUTPATIENT)
Dept: INFUSION THERAPY | Facility: OTHER | Age: 54
End: 2019-12-06
Attending: INTERNAL MEDICINE
Payer: COMMERCIAL

## 2019-12-06 VITALS
WEIGHT: 180.31 LBS | DIASTOLIC BLOOD PRESSURE: 68 MMHG | HEART RATE: 76 BPM | HEIGHT: 64 IN | RESPIRATION RATE: 16 BRPM | SYSTOLIC BLOOD PRESSURE: 135 MMHG | OXYGEN SATURATION: 96 % | BODY MASS INDEX: 30.78 KG/M2 | TEMPERATURE: 98 F

## 2019-12-06 DIAGNOSIS — Z17.0 MALIGNANT NEOPLASM OF CENTRAL PORTION OF LEFT BREAST IN FEMALE, ESTROGEN RECEPTOR POSITIVE: Primary | ICD-10-CM

## 2019-12-06 DIAGNOSIS — C50.112 MALIGNANT NEOPLASM OF CENTRAL PORTION OF LEFT BREAST IN FEMALE, ESTROGEN RECEPTOR POSITIVE: Primary | ICD-10-CM

## 2019-12-06 PROCEDURE — 63600175 PHARM REV CODE 636 W HCPCS: Performed by: INTERNAL MEDICINE

## 2019-12-06 PROCEDURE — 96372 THER/PROPH/DIAG INJ SC/IM: CPT

## 2019-12-06 RX ADMIN — PEGFILGRASTIM-CBQV 6 MG: 6 INJECTION, SOLUTION SUBCUTANEOUS at 11:12

## 2019-12-06 NOTE — PLAN OF CARE
Udenyca to right arm complete. Pt tolerated well. VSS. NAD. AVS provided. Pt verbalized understanding of discharge instructions before leaving with self.

## 2019-12-13 NOTE — OPERATIVE NOTE ADDENDUM
Certification of Assistant at Surgery       Surgery Date: 9/11/2019     Participating Surgeons:  Surgeon(s) and Role:  Panel 1:     * Derek Colin MD - Primary     * Joe Holden MD - Assisting     * Vee Angel MD - Resident - Assisting  Panel 2:     * Shikha Mccray MD - Primary    Procedures:  Procedure(s) (LRB):  RECONSTRUCTION, BREAST, USING SALEEM FREE FLAP - UNILATERAL (Left)  MASTECTOMY (Left)  MASTECTOMY, WITH SENTINEL NODE BIOPSY AND AXILLARY LYMPHADENECTOMY LEFT (Left)    Assistant Surgeon's Certification of Necessity:  I understand that section 1842 (b) (6) (d) of the Social Security Act generally prohibits Medicare Part B reasonable charge payment for the services of assistants at surgery in teaching hospitals when qualified residents are available to furnish such services. I certify that the services for which payment is claimed were medically necessary, and that no qualified resident was available to perform the services. I further understand that these services are subject to post-payment review by the Medicare carrier.      Joe Holden MD    12/13/2019  10:55 AM

## 2019-12-19 ENCOUNTER — INFUSION (OUTPATIENT)
Dept: INFUSION THERAPY | Facility: OTHER | Age: 54
End: 2019-12-19
Attending: INTERNAL MEDICINE
Payer: COMMERCIAL

## 2019-12-19 ENCOUNTER — OFFICE VISIT (OUTPATIENT)
Dept: HEMATOLOGY/ONCOLOGY | Facility: CLINIC | Age: 54
End: 2019-12-19
Payer: COMMERCIAL

## 2019-12-19 VITALS
HEART RATE: 75 BPM | BODY MASS INDEX: 30.9 KG/M2 | RESPIRATION RATE: 16 BRPM | DIASTOLIC BLOOD PRESSURE: 85 MMHG | HEIGHT: 64 IN | WEIGHT: 181 LBS | OXYGEN SATURATION: 99 % | TEMPERATURE: 97 F | SYSTOLIC BLOOD PRESSURE: 160 MMHG

## 2019-12-19 DIAGNOSIS — C77.3 CARCINOMA OF LEFT BREAST METASTATIC TO AXILLARY LYMPH NODE: ICD-10-CM

## 2019-12-19 DIAGNOSIS — Z17.0 MALIGNANT NEOPLASM OF CENTRAL PORTION OF LEFT BREAST IN FEMALE, ESTROGEN RECEPTOR POSITIVE: Primary | ICD-10-CM

## 2019-12-19 DIAGNOSIS — C50.112 MALIGNANT NEOPLASM OF CENTRAL PORTION OF LEFT BREAST IN FEMALE, ESTROGEN RECEPTOR POSITIVE: Primary | ICD-10-CM

## 2019-12-19 DIAGNOSIS — R11.0 NAUSEA: ICD-10-CM

## 2019-12-19 DIAGNOSIS — C50.912 CARCINOMA OF LEFT BREAST METASTATIC TO AXILLARY LYMPH NODE: ICD-10-CM

## 2019-12-19 DIAGNOSIS — I10 ESSENTIAL HYPERTENSION: ICD-10-CM

## 2019-12-19 DIAGNOSIS — E11.9 TYPE 2 DIABETES MELLITUS WITHOUT COMPLICATION, WITHOUT LONG-TERM CURRENT USE OF INSULIN: ICD-10-CM

## 2019-12-19 PROCEDURE — 96411 CHEMO IV PUSH ADDL DRUG: CPT

## 2019-12-19 PROCEDURE — 99215 PR OFFICE/OUTPT VISIT, EST, LEVL V, 40-54 MIN: ICD-10-PCS | Mod: S$GLB,,, | Performed by: INTERNAL MEDICINE

## 2019-12-19 PROCEDURE — 99215 OFFICE O/P EST HI 40 MIN: CPT | Mod: S$GLB,,, | Performed by: INTERNAL MEDICINE

## 2019-12-19 PROCEDURE — 99999 PR PBB SHADOW E&M-EST. PATIENT-LVL III: CPT | Mod: PBBFAC,,, | Performed by: INTERNAL MEDICINE

## 2019-12-19 PROCEDURE — 63600175 PHARM REV CODE 636 W HCPCS: Mod: JG | Performed by: INTERNAL MEDICINE

## 2019-12-19 PROCEDURE — 99999 PR PBB SHADOW E&M-EST. PATIENT-LVL III: ICD-10-PCS | Mod: PBBFAC,,, | Performed by: INTERNAL MEDICINE

## 2019-12-19 PROCEDURE — 3008F BODY MASS INDEX DOCD: CPT | Mod: CPTII,S$GLB,, | Performed by: INTERNAL MEDICINE

## 2019-12-19 PROCEDURE — 96413 CHEMO IV INFUSION 1 HR: CPT

## 2019-12-19 PROCEDURE — 3008F PR BODY MASS INDEX (BMI) DOCUMENTED: ICD-10-PCS | Mod: CPTII,S$GLB,, | Performed by: INTERNAL MEDICINE

## 2019-12-19 PROCEDURE — 96367 TX/PROPH/DG ADDL SEQ IV INF: CPT

## 2019-12-19 RX ORDER — DOXORUBICIN HYDROCHLORIDE 2 MG/ML
60 INJECTION, SOLUTION INTRAVENOUS
Status: COMPLETED | OUTPATIENT
Start: 2019-12-19 | End: 2019-12-19

## 2019-12-19 RX ORDER — DOXORUBICIN HYDROCHLORIDE 2 MG/ML
60 INJECTION, SOLUTION INTRAVENOUS
Status: CANCELLED | OUTPATIENT
Start: 2019-12-19

## 2019-12-19 RX ORDER — SODIUM CHLORIDE 0.9 % (FLUSH) 0.9 %
10 SYRINGE (ML) INJECTION
Status: DISCONTINUED | OUTPATIENT
Start: 2019-12-19 | End: 2019-12-19 | Stop reason: HOSPADM

## 2019-12-19 RX ORDER — SODIUM CHLORIDE 0.9 % (FLUSH) 0.9 %
10 SYRINGE (ML) INJECTION
Status: CANCELLED | OUTPATIENT
Start: 2019-12-19

## 2019-12-19 RX ORDER — HEPARIN 100 UNIT/ML
500 SYRINGE INTRAVENOUS
Status: CANCELLED | OUTPATIENT
Start: 2019-12-19

## 2019-12-19 RX ORDER — HEPARIN 100 UNIT/ML
500 SYRINGE INTRAVENOUS
Status: DISCONTINUED | OUTPATIENT
Start: 2019-12-19 | End: 2019-12-19 | Stop reason: HOSPADM

## 2019-12-19 RX ADMIN — SODIUM CHLORIDE: 0.9 INJECTION, SOLUTION INTRAVENOUS at 09:12

## 2019-12-19 RX ADMIN — APREPITANT 130 MG: 130 INJECTION, EMULSION INTRAVENOUS at 10:12

## 2019-12-19 RX ADMIN — PALONOSETRON HYDROCHLORIDE: 0.25 INJECTION, SOLUTION INTRAVENOUS at 09:12

## 2019-12-19 RX ADMIN — CYCLOPHOSPHAMIDE 1160 MG: 1 INJECTION, POWDER, FOR SOLUTION INTRAVENOUS; ORAL at 10:12

## 2019-12-19 RX ADMIN — HEPARIN SODIUM (PORCINE) LOCK FLUSH IV SOLN 100 UNIT/ML 500 UNITS: 100 SOLUTION at 12:12

## 2019-12-19 RX ADMIN — DOXORUBICIN HYDROCHLORIDE 116 MG: 2 INJECTION, SOLUTION INTRAVENOUS at 10:12

## 2019-12-19 NOTE — Clinical Note
Keep 1/2 appt. Please continue to schedule weekly taxol with CBC, CMP x 11 after 1/2. Also see me on 1/16, after labs, before taxol

## 2019-12-19 NOTE — PROGRESS NOTES
Subjective:       Patient ID: Shu Mendoza is a 54 y.o. female.     Chief Complaint: follow up for breast cancer     Diagnosis:  Stage IA ([id number]) invasive ductal carcinoma of the left breast, grade 2, ER/DC positive, HER2 positive, s/p left mastectomy and reconstruction on 9/11/2019. Mammaprint high risk     Oncologic History:  1. Ms Mendoza is a 53 yo postmenopausal woman with HTN, DM, who presents today for further management of pathologic stage IA ([id number]) invasive ductal carcinoma of the left breast. She was referred to Dr Mccray for bloody nipple discharge first noted in June 2019. Before then she had a negative mammogram on 5/20/19. She had a left breast US on 6/27/19 in the left breast retroareolar region just behind the nipple, there is an irregular hypoechoic intraductal mass measuring 1.0 cm. She underwent a biopsy on 7/8/2019. It showed invasive ductal carcinoma, ER strongly positive 100%, DC positive 80%, HER2 negative 1+. Ki67 5% activity within invasive component. Patient underwent a left total mastectomy and reconstruction on 9/11/2019. Pathology showed a 1.3 cm invasive ductal carcinoma, grade 2. DCIS present, negative margin, 22 lymph nodes removed, one lymph node positive with macrometastases 13 mm, no extranodal extension. No lymphovascular invasion. Pathologic T1c N1a. Mammaprint high risk with chemo benefit >12%. 5-10 years recurrence risk without chemo 20-25%, with chemo and endocrine therapy 7%. BRCAnalysis from 7/18/19 was negative. Case was discussed at tumor board. Adjuvant chemotherapy followed by endocrine therapy was recommended. Radiation not recommended. Patient presents today for further management. Feeling well. Works at [employer].   2. Port placed by Dr Mccray on 10/25/19. Echo on 10/22/19 showed normal LVEF 59%.   3. Adjuvant DDAC followed by weekly taxol started on 10/31/2019. cycle 2 on 11/14/19. cycle 3 was delayed for a week for umbilical infection, given on 12/5/2019,  cycle 4 to be given on 19     Interval History:   Ms Mendoza returns today for cycle 4 DDAC.  Had a couple of days of nausea, decreased appetite after cycle 3 AC. Not able to eat and drink. Took antiemetics then started to feel better. Feeling well now. No complaints.     ECO     ROS:   A ten-point system review is obtained and negative except for what was stated in the Interval History.      Physical Examination:   Vital signs reviewed.   General: well hydrated, well developed, in no acute distress  HEENT: normocephalic, PERRLA, EOMI, anicteric sclerae, oropharynx clear  Neck: supple, no JVD, thyromegaly, cervical or supraclavicular lymphadenopathy  Lungs: clear breath sounds bilaterally, no wheezing, rales, or rhonchi  Heart: RRR, no M/R/G  Abdomen: soft, no tenderness, non-distended, no hepatosplenomegaly, mass, or hernia. BS present. No pus, drainage or abnormality of the umbilicus.   Extremities: no clubbing, cyanosis, or edema  Skin: no rash, ulcer, or open wounds  Neuro: alert and oriented x 4, no focal neuro deficit  Psych: pleasant and appropriate mood and affect  Breasts: s/p left mastectomy and reconstruction, bilateral breasts no abnormal skin nodules, open wounds, masses, or axillary LAD     Objective:      Laboratory Data:  Labs reviewed. CBC, CMP unremarkable     Imaging Data:  Echo on 10/22/19 showed normal LVEF 59%.     Assessment and Plan:      1. Malignant neoplasm of central portion of left breast in female, estrogen receptor positive    2. Carcinoma of left breast metastatic to axillary lymph node    3. Nausea    4. Essential hypertension    5. Type 2 diabetes mellitus without complication, without long-term current use of insulin        1.2  - Ms Mendoza is a 55 yo postmenopausal woman with stage IA ([id number]) invasive ductal carcinoma of the left breast, ER/NE positive, HER2 negative, s/p left mastectomy and reconstruction on 2019. Mammaprint high risk with chemo benefit >12%. 5-10  years recurrence risk without chemo 20-25%, with chemo and endocrine therapy 7%.  - She is on adjuvant DDAC followed by weekly taxol  - doing well. Cycle 4 DDAC today  - RTC in 2 weeks to start weekly taxol    3.  - asked patient to take antiemetics every 6-8 hours scheduled over the weekend after chemo to prevent nausea. She understands and agrees with the plan      4.  - BP elevated  - f/u with PCP    5.  - BS good  - c/w current meds        Follow-up:      RTC in 2 weeks to start weekly taxol  Knows to call in the interval if any problems arise.

## 2019-12-19 NOTE — PLAN OF CARE
AC infusions complete. Pt tolerated well. VSS. NAD. Port to right chest de-accessed after heparinized per protocol. AVS provided. Pt verbalized understanding of discharge instructions before leaving with family member.

## 2019-12-19 NOTE — LETTER
December 19, 2019      [facility] [address]  [address], [address]  [facility] [address]  Phone: [phone]       Patient: Shu Mendoza   YOB: 1965  Date of Visit: 12/19/2019    To Whom It May Concern:    KEELY Mendoza  was at [facility] on 12/19/2019. She is getting intensive chemotherapy currently. She needs to stay away from the crowds to prevent infection. Chemo is causing her a lot of toxicities, including severe fatigue, intractable nausea, vomiting, and weakness. She should be excused from work when she is not feeling well. If you have any questions or concerns, or if I can be of further assistance, please do not hesitate to contact me.    Sincerely,    Eliana Norris MD

## 2019-12-20 ENCOUNTER — INFUSION (OUTPATIENT)
Dept: INFUSION THERAPY | Facility: OTHER | Age: 54
End: 2019-12-20
Attending: INTERNAL MEDICINE
Payer: COMMERCIAL

## 2019-12-20 VITALS
OXYGEN SATURATION: 99 % | RESPIRATION RATE: 18 BRPM | SYSTOLIC BLOOD PRESSURE: 166 MMHG | HEART RATE: 94 BPM | DIASTOLIC BLOOD PRESSURE: 49 MMHG | TEMPERATURE: 98 F

## 2019-12-20 DIAGNOSIS — C50.112 MALIGNANT NEOPLASM OF CENTRAL PORTION OF LEFT BREAST IN FEMALE, ESTROGEN RECEPTOR POSITIVE: Primary | ICD-10-CM

## 2019-12-20 DIAGNOSIS — Z17.0 MALIGNANT NEOPLASM OF CENTRAL PORTION OF LEFT BREAST IN FEMALE, ESTROGEN RECEPTOR POSITIVE: Primary | ICD-10-CM

## 2019-12-20 PROCEDURE — 96372 THER/PROPH/DIAG INJ SC/IM: CPT

## 2019-12-20 PROCEDURE — 63600175 PHARM REV CODE 636 W HCPCS: Performed by: INTERNAL MEDICINE

## 2019-12-20 RX ADMIN — PEGFILGRASTIM-CBQV 6 MG: 6 INJECTION, SOLUTION SUBCUTANEOUS at 12:12

## 2019-12-27 ENCOUNTER — PATIENT MESSAGE (OUTPATIENT)
Dept: HEMATOLOGY/ONCOLOGY | Facility: CLINIC | Age: 54
End: 2019-12-27

## 2019-12-27 ENCOUNTER — TELEPHONE (OUTPATIENT)
Dept: HEMATOLOGY/ONCOLOGY | Facility: CLINIC | Age: 54
End: 2019-12-27

## 2019-12-27 NOTE — TELEPHONE ENCOUNTER
Returned call and spoke with Ms Mendoza. Ms Kelly states she is having cold symptoms,runny nose, chest congestion and sore throat.  Informed Ms Mendoza, Dr Norris is away from the clinic until Monday, December 30 th. She may want to contact her PCP or go to Urgent Care to have her symptoms addressed.  Ms Mendoza states she will reach out to one of the above.

## 2019-12-27 NOTE — TELEPHONE ENCOUNTER
----- Message from Killian Angeles sent at 12/27/2019  2:57 PM CST -----  Contact: SHALINI DIANE [[id number]]  Name of Who is Calling: SHALINI DIANE [[id number]]      What is the request in detail: Would like to speak with staff in regards to cold symptoms, no fever. Would like to have a prescription sent to [id number] - [place] [address] [facility]      Can the clinic reply by [facility]: no      What Number to Call Back if not in [facility]: [phone]

## 2019-12-31 NOTE — PROGRESS NOTES
Subjective:       Patient ID: Shu Mendoza is a 54 y.o. female.     Chief Complaint: follow up for breast cancer     Diagnosis:  Stage IA ([id number]) invasive ductal carcinoma of the left breast, grade 2, ER/ID positive, HER2 positive, s/p left mastectomy and reconstruction on 9/11/2019. Mammaprint high risk     Oncologic History:  1. Ms Mendoza is a 55 yo postmenopausal woman with HTN, DM, who presents today for further management of pathologic stage IA ([id number]) invasive ductal carcinoma of the left breast. She was referred to Dr Mccray for bloody nipple discharge first noted in June 2019. Before then she had a negative mammogram on 5/20/19. She had a left breast US on 6/27/19 in the left breast retroareolar region just behind the nipple, there is an irregular hypoechoic intraductal mass measuring 1.0 cm. She underwent a biopsy on 7/8/2019. It showed invasive ductal carcinoma, ER strongly positive 100%, ID positive 80%, HER2 negative 1+. Ki67 5% activity within invasive component. Patient underwent a left total mastectomy and reconstruction on 9/11/2019. Pathology showed a 1.3 cm invasive ductal carcinoma, grade 2. DCIS present, negative margin, 22 lymph nodes removed, one lymph node positive with macrometastases 13 mm, no extranodal extension. No lymphovascular invasion. Pathologic T1c N1a. Mammaprint high risk with chemo benefit >12%. 5-10 years recurrence risk without chemo 20-25%, with chemo and endocrine therapy 7%. BRCAnalysis from 7/18/19 was negative. Case was discussed at tumor board. Adjuvant chemotherapy followed by endocrine therapy was recommended. Radiation not recommended. Patient presents today for further management. Feeling well. Works at [employer].   2. Port placed by Dr Mccray on 10/25/19. Echo on 10/22/19 showed normal LVEF 59%.   3. Adjuvant DDAC followed by weekly taxol started on 10/31/2019. cycle 2 on 11/14/19. cycle 3 was delayed for a week for umbilical infection, given on 12/5/2019,  cycle 4 given on 19. Weekly taxol to be started on 19.      Interval History:   Ms Mendoza returns today to start weekly taxol. Her sister came over for Mike and had a cold. She started to have sore throat, rhinorrhea and cough on . Denies fever. Had mouth sores after the last dose of chemo. Continues to work.      ECO     ROS:   A ten-point system review is obtained and negative except for what was stated in the Interval History.      Physical Examination:   Vital signs reviewed.   General: well hydrated, well developed, in no acute distress  HEENT: normocephalic, PERRLA, EOMI, anicteric sclerae, oropharynx clear  Neck: supple, no JVD, thyromegaly, cervical or supraclavicular lymphadenopathy  Lungs: clear breath sounds bilaterally, no wheezing, rales, or rhonchi  Heart: RRR, no M/R/G  Abdomen: soft, no tenderness, non-distended, no hepatosplenomegaly, mass, or hernia. BS present. No pus, drainage or abnormality of the umbilicus.   Extremities: no clubbing, cyanosis, or edema  Skin: no rash, ulcer, or open wounds  Neuro: alert and oriented x 4, no focal neuro deficit  Psych: pleasant and appropriate mood and affect  Breasts: s/p left mastectomy and reconstruction, bilateral breasts no abnormal skin nodules, open wounds, masses, or axillary LAD     Objective:      Laboratory Data:  Labs reviewed. CBC, CMP unremarkable     Imaging Data:  Echo on 10/22/19 showed normal LVEF 59%.     Assessment and Plan:      1. Malignant neoplasm of central portion of left breast in female, estrogen receptor positive    2. Carcinoma of left breast metastatic to axillary lymph node    3. Bronchitis    4. Mucositis    5. Essential hypertension    6. Type 2 diabetes mellitus without complication, without long-term current use of insulin        1.2  - Ms Mendoza is a 53 yo postmenopausal woman with stage IA ([id number]) invasive ductal carcinoma of the left breast, ER/TX positive, HER2 negative, s/p left mastectomy and  reconstruction on 9/11/2019. Mammaprint high risk with chemo benefit >12%. 5-10 years recurrence risk without chemo 20-25%, with chemo and endocrine therapy 7%.  - She is on adjuvant DDAC followed by weekly taxol  - doing well. Start weekly taxol today  - continue with weekly taxol  - see me in 2 weeks    3.  - likely viral  - levaquin x 7 days prescribed given that patient is on chemo  - check CXR    4.  - prescribed magic mouthwash    5.  - BP good  - c/w current meds    6.  - BS good  - c/w current meds       Follow-up:      RTC in 2 weeks   Knows to call in the interval if any problems arise.

## 2020-01-02 ENCOUNTER — OFFICE VISIT (OUTPATIENT)
Dept: HEMATOLOGY/ONCOLOGY | Facility: CLINIC | Age: 55
End: 2020-01-02
Payer: COMMERCIAL

## 2020-01-02 ENCOUNTER — HOSPITAL ENCOUNTER (OUTPATIENT)
Dept: RADIOLOGY | Facility: OTHER | Age: 55
Discharge: HOME | End: 2020-01-02
Attending: INTERNAL MEDICINE
Payer: COMMERCIAL

## 2020-01-02 ENCOUNTER — INFUSION (OUTPATIENT)
Dept: INFUSION THERAPY | Facility: OTHER | Age: 55
End: 2020-01-02
Attending: INTERNAL MEDICINE
Payer: COMMERCIAL

## 2020-01-02 ENCOUNTER — LAB VISIT (OUTPATIENT)
Dept: LAB | Facility: OTHER | Age: 55
End: 2020-01-02
Attending: INTERNAL MEDICINE
Payer: COMMERCIAL

## 2020-01-02 ENCOUNTER — TELEPHONE (OUTPATIENT)
Dept: HEMATOLOGY/ONCOLOGY | Facility: CLINIC | Age: 55
End: 2020-01-02

## 2020-01-02 VITALS
BODY MASS INDEX: 30.6 KG/M2 | HEIGHT: 64 IN | WEIGHT: 179.25 LBS | OXYGEN SATURATION: 96 % | HEART RATE: 86 BPM | TEMPERATURE: 100 F | SYSTOLIC BLOOD PRESSURE: 140 MMHG | RESPIRATION RATE: 16 BRPM | DIASTOLIC BLOOD PRESSURE: 69 MMHG

## 2020-01-02 VITALS
DIASTOLIC BLOOD PRESSURE: 76 MMHG | OXYGEN SATURATION: 98 % | SYSTOLIC BLOOD PRESSURE: 140 MMHG | RESPIRATION RATE: 18 BRPM | HEART RATE: 93 BPM

## 2020-01-02 DIAGNOSIS — Z17.0 MALIGNANT NEOPLASM OF CENTRAL PORTION OF LEFT BREAST IN FEMALE, ESTROGEN RECEPTOR POSITIVE: ICD-10-CM

## 2020-01-02 DIAGNOSIS — C50.112 MALIGNANT NEOPLASM OF CENTRAL PORTION OF LEFT BREAST IN FEMALE, ESTROGEN RECEPTOR POSITIVE: Primary | ICD-10-CM

## 2020-01-02 DIAGNOSIS — K12.30 MUCOSITIS: ICD-10-CM

## 2020-01-02 DIAGNOSIS — E11.9 TYPE 2 DIABETES MELLITUS WITHOUT COMPLICATION, WITHOUT LONG-TERM CURRENT USE OF INSULIN: ICD-10-CM

## 2020-01-02 DIAGNOSIS — Z17.0 MALIGNANT NEOPLASM OF CENTRAL PORTION OF LEFT BREAST IN FEMALE, ESTROGEN RECEPTOR POSITIVE: Primary | ICD-10-CM

## 2020-01-02 DIAGNOSIS — C77.3 CARCINOMA OF LEFT BREAST METASTATIC TO AXILLARY LYMPH NODE: ICD-10-CM

## 2020-01-02 DIAGNOSIS — C50.112 MALIGNANT NEOPLASM OF CENTRAL PORTION OF LEFT BREAST IN FEMALE, ESTROGEN RECEPTOR POSITIVE: ICD-10-CM

## 2020-01-02 DIAGNOSIS — J40 BRONCHITIS: ICD-10-CM

## 2020-01-02 DIAGNOSIS — C50.912 CARCINOMA OF LEFT BREAST METASTATIC TO AXILLARY LYMPH NODE: ICD-10-CM

## 2020-01-02 DIAGNOSIS — I10 ESSENTIAL HYPERTENSION: ICD-10-CM

## 2020-01-02 LAB
ALBUMIN SERPL BCP-MCNC: 3.7 G/DL (ref 3.5–5.2)
ALP SERPL-CCNC: 113 U/L (ref 55–135)
ALT SERPL W/O P-5'-P-CCNC: 17 U/L (ref 10–44)
ANION GAP SERPL CALC-SCNC: 9 MMOL/L (ref 8–16)
AST SERPL-CCNC: 14 U/L (ref 10–40)
BASOPHILS # BLD AUTO: 0.07 K/UL (ref 0–0.2)
BASOPHILS NFR BLD: 0.8 % (ref 0–1.9)
BILIRUB SERPL-MCNC: 0.3 MG/DL (ref 0.1–1)
BUN SERPL-MCNC: 11 MG/DL (ref 6–20)
CALCIUM SERPL-MCNC: 9.9 MG/DL (ref 8.7–10.5)
CHLORIDE SERPL-SCNC: 105 MMOL/L (ref 95–110)
CO2 SERPL-SCNC: 26 MMOL/L (ref 23–29)
CREAT SERPL-MCNC: 0.7 MG/DL (ref 0.5–1.4)
DIFFERENTIAL METHOD: ABNORMAL
EOSINOPHIL # BLD AUTO: 0 K/UL (ref 0–0.5)
EOSINOPHIL NFR BLD: 0.5 % (ref 0–8)
ERYTHROCYTE [DISTWIDTH] IN BLOOD BY AUTOMATED COUNT: 18.2 % (ref 11.5–14.5)
EST. GFR  (AFRICAN AMERICAN): >60 ML/MIN/1.73 M^2
EST. GFR  (NON AFRICAN AMERICAN): >60 ML/MIN/1.73 M^2
GLUCOSE SERPL-MCNC: 128 MG/DL (ref 70–110)
HCT VFR BLD AUTO: 34.9 % (ref 37–48.5)
HGB BLD-MCNC: 11.3 G/DL (ref 12–16)
IMM GRANULOCYTES # BLD AUTO: 0.26 K/UL (ref 0–0.04)
IMM GRANULOCYTES NFR BLD AUTO: 3.1 % (ref 0–0.5)
LYMPHOCYTES # BLD AUTO: 1.4 K/UL (ref 1–4.8)
LYMPHOCYTES NFR BLD: 17.2 % (ref 18–48)
MCH RBC QN AUTO: 28 PG (ref 27–31)
MCHC RBC AUTO-ENTMCNC: 32.4 G/DL (ref 32–36)
MCV RBC AUTO: 86 FL (ref 82–98)
MONOCYTES # BLD AUTO: 1 K/UL (ref 0.3–1)
MONOCYTES NFR BLD: 12 % (ref 4–15)
NEUTROPHILS # BLD AUTO: 5.5 K/UL (ref 1.8–7.7)
NEUTROPHILS NFR BLD: 66.4 % (ref 38–73)
NRBC BLD-RTO: 0 /100 WBC
PLATELET # BLD AUTO: 266 K/UL (ref 150–350)
PMV BLD AUTO: 9 FL (ref 9.2–12.9)
POTASSIUM SERPL-SCNC: 3.3 MMOL/L (ref 3.5–5.1)
PROT SERPL-MCNC: 7.3 G/DL (ref 6–8.4)
RBC # BLD AUTO: 4.04 M/UL (ref 4–5.4)
SODIUM SERPL-SCNC: 140 MMOL/L (ref 136–145)
WBC # BLD AUTO: 8.31 K/UL (ref 3.9–12.7)

## 2020-01-02 PROCEDURE — 85025 COMPLETE CBC W/AUTO DIFF WBC: CPT

## 2020-01-02 PROCEDURE — 71046 XR CHEST PA AND LATERAL: ICD-10-PCS | Mod: 26,,, | Performed by: RADIOLOGY

## 2020-01-02 PROCEDURE — 99215 PR OFFICE/OUTPT VISIT, EST, LEVL V, 40-54 MIN: ICD-10-PCS | Mod: S$GLB,,, | Performed by: INTERNAL MEDICINE

## 2020-01-02 PROCEDURE — 36415 COLL VENOUS BLD VENIPUNCTURE: CPT

## 2020-01-02 PROCEDURE — 63600175 PHARM REV CODE 636 W HCPCS: Performed by: INTERNAL MEDICINE

## 2020-01-02 PROCEDURE — 71046 X-RAY EXAM CHEST 2 VIEWS: CPT | Mod: 26,,, | Performed by: RADIOLOGY

## 2020-01-02 PROCEDURE — 80053 COMPREHEN METABOLIC PANEL: CPT

## 2020-01-02 PROCEDURE — 99215 OFFICE O/P EST HI 40 MIN: CPT | Mod: S$GLB,,, | Performed by: INTERNAL MEDICINE

## 2020-01-02 PROCEDURE — S0028 INJECTION, FAMOTIDINE, 20 MG: HCPCS | Performed by: INTERNAL MEDICINE

## 2020-01-02 PROCEDURE — 71046 X-RAY EXAM CHEST 2 VIEWS: CPT | Mod: TC,FY

## 2020-01-02 PROCEDURE — 99999 PR PBB SHADOW E&M-EST. PATIENT-LVL III: ICD-10-PCS | Mod: PBBFAC,,, | Performed by: INTERNAL MEDICINE

## 2020-01-02 PROCEDURE — 96367 TX/PROPH/DG ADDL SEQ IV INF: CPT

## 2020-01-02 PROCEDURE — 25000003 PHARM REV CODE 250: Performed by: INTERNAL MEDICINE

## 2020-01-02 PROCEDURE — 96413 CHEMO IV INFUSION 1 HR: CPT

## 2020-01-02 PROCEDURE — 96375 TX/PRO/DX INJ NEW DRUG ADDON: CPT

## 2020-01-02 PROCEDURE — 99999 PR PBB SHADOW E&M-EST. PATIENT-LVL III: CPT | Mod: PBBFAC,,, | Performed by: INTERNAL MEDICINE

## 2020-01-02 RX ORDER — HEPARIN 100 UNIT/ML
500 SYRINGE INTRAVENOUS
Status: DISCONTINUED | OUTPATIENT
Start: 2020-01-02 | End: 2020-01-02 | Stop reason: HOSPADM

## 2020-01-02 RX ORDER — EPINEPHRINE 0.3 MG/.3ML
0.3 INJECTION SUBCUTANEOUS ONCE AS NEEDED
Status: DISCONTINUED | OUTPATIENT
Start: 2020-01-02 | End: 2020-01-02 | Stop reason: HOSPADM

## 2020-01-02 RX ORDER — EPINEPHRINE 0.3 MG/.3ML
0.3 INJECTION SUBCUTANEOUS ONCE AS NEEDED
Status: CANCELLED | OUTPATIENT
Start: 2020-01-02

## 2020-01-02 RX ORDER — SODIUM CHLORIDE 0.9 % (FLUSH) 0.9 %
10 SYRINGE (ML) INJECTION
Status: DISCONTINUED | OUTPATIENT
Start: 2020-01-02 | End: 2020-01-02 | Stop reason: HOSPADM

## 2020-01-02 RX ORDER — FAMOTIDINE 10 MG/ML
20 INJECTION INTRAVENOUS
Status: CANCELLED | OUTPATIENT
Start: 2020-01-02

## 2020-01-02 RX ORDER — SODIUM CHLORIDE 0.9 % (FLUSH) 0.9 %
10 SYRINGE (ML) INJECTION
Status: CANCELLED | OUTPATIENT
Start: 2020-01-02

## 2020-01-02 RX ORDER — POTASSIUM CHLORIDE 750 MG/1
40 TABLET, EXTENDED RELEASE ORAL ONCE
Status: CANCELLED
Start: 2020-01-02

## 2020-01-02 RX ORDER — POTASSIUM CHLORIDE 20 MEQ/1
40 TABLET, EXTENDED RELEASE ORAL ONCE
Status: COMPLETED | OUTPATIENT
Start: 2020-01-02 | End: 2020-01-02

## 2020-01-02 RX ORDER — HEPARIN 100 UNIT/ML
500 SYRINGE INTRAVENOUS
Status: CANCELLED | OUTPATIENT
Start: 2020-01-02

## 2020-01-02 RX ORDER — LEVOFLOXACIN 750 MG/1
750 TABLET ORAL DAILY
Qty: 7 TABLET | Refills: 0 | Status: SHIPPED | OUTPATIENT
Start: 2020-01-02 | End: 2020-01-09

## 2020-01-02 RX ORDER — FAMOTIDINE 10 MG/ML
20 INJECTION INTRAVENOUS
Status: COMPLETED | OUTPATIENT
Start: 2020-01-02 | End: 2020-01-02

## 2020-01-02 RX ORDER — DIPHENHYDRAMINE HYDROCHLORIDE 50 MG/ML
50 INJECTION INTRAMUSCULAR; INTRAVENOUS ONCE AS NEEDED
Status: CANCELLED | OUTPATIENT
Start: 2020-01-02

## 2020-01-02 RX ORDER — DIPHENHYDRAMINE HYDROCHLORIDE 50 MG/ML
50 INJECTION INTRAMUSCULAR; INTRAVENOUS ONCE AS NEEDED
Status: DISCONTINUED | OUTPATIENT
Start: 2020-01-02 | End: 2020-01-02 | Stop reason: HOSPADM

## 2020-01-02 RX ADMIN — DIPHENHYDRAMINE HYDROCHLORIDE 25 MG: 50 INJECTION, SOLUTION INTRAMUSCULAR; INTRAVENOUS at 10:01

## 2020-01-02 RX ADMIN — Medication 10 MG: at 10:01

## 2020-01-02 RX ADMIN — HEPARIN SODIUM (PORCINE) LOCK FLUSH IV SOLN 100 UNIT/ML 500 UNITS: 100 SOLUTION at 11:01

## 2020-01-02 RX ADMIN — FAMOTIDINE 20 MG: 10 INJECTION, SOLUTION INTRAVENOUS at 10:01

## 2020-01-02 RX ADMIN — SODIUM CHLORIDE: 0.9 INJECTION, SOLUTION INTRAVENOUS at 10:01

## 2020-01-02 RX ADMIN — POTASSIUM CHLORIDE 40 MEQ: 1500 TABLET, EXTENDED RELEASE ORAL at 10:01

## 2020-01-02 RX ADMIN — PACLITAXEL 156 MG: 6 INJECTION, SOLUTION INTRAVENOUS at 10:01

## 2020-01-02 NOTE — TELEPHONE ENCOUNTER
----- Message from Eliana Norris MD sent at 1/2/2020  1:15 PM CST -----  Please let patient know there is no pneumonia. Keep taking the levaquin I prescribed today.

## 2020-01-02 NOTE — TELEPHONE ENCOUNTER
Called and spoke with Ms Rameys. Informed Ms Mendoza, Dr Norris reviewed her recent chest xray results and Dr Norris states there is no pneumonia. Ms Kelly to continue taking the Levaquin Dr Norris prescribed today.

## 2020-01-08 RX ORDER — EPINEPHRINE 0.3 MG/.3ML
0.3 INJECTION SUBCUTANEOUS ONCE AS NEEDED
Status: CANCELLED | OUTPATIENT
Start: 2020-01-08

## 2020-01-08 RX ORDER — HEPARIN 100 UNIT/ML
500 SYRINGE INTRAVENOUS
Status: CANCELLED | OUTPATIENT
Start: 2020-01-08

## 2020-01-08 RX ORDER — DIPHENHYDRAMINE HYDROCHLORIDE 50 MG/ML
50 INJECTION INTRAMUSCULAR; INTRAVENOUS ONCE AS NEEDED
Status: CANCELLED | OUTPATIENT
Start: 2020-01-08

## 2020-01-08 RX ORDER — FAMOTIDINE 10 MG/ML
20 INJECTION INTRAVENOUS
Status: CANCELLED | OUTPATIENT
Start: 2020-01-08

## 2020-01-08 RX ORDER — SODIUM CHLORIDE 0.9 % (FLUSH) 0.9 %
10 SYRINGE (ML) INJECTION
Status: CANCELLED | OUTPATIENT
Start: 2020-01-08

## 2020-01-09 ENCOUNTER — INFUSION (OUTPATIENT)
Dept: INFUSION THERAPY | Facility: OTHER | Age: 55
End: 2020-01-09
Attending: INTERNAL MEDICINE
Payer: COMMERCIAL

## 2020-01-09 VITALS
HEART RATE: 96 BPM | DIASTOLIC BLOOD PRESSURE: 71 MMHG | BODY MASS INDEX: 30.83 KG/M2 | RESPIRATION RATE: 18 BRPM | OXYGEN SATURATION: 98 % | WEIGHT: 180.56 LBS | HEIGHT: 64 IN | TEMPERATURE: 98 F | SYSTOLIC BLOOD PRESSURE: 126 MMHG

## 2020-01-09 DIAGNOSIS — C50.112 MALIGNANT NEOPLASM OF CENTRAL PORTION OF LEFT BREAST IN FEMALE, ESTROGEN RECEPTOR POSITIVE: Primary | ICD-10-CM

## 2020-01-09 DIAGNOSIS — Z17.0 MALIGNANT NEOPLASM OF CENTRAL PORTION OF LEFT BREAST IN FEMALE, ESTROGEN RECEPTOR POSITIVE: Primary | ICD-10-CM

## 2020-01-09 PROCEDURE — 96367 TX/PROPH/DG ADDL SEQ IV INF: CPT

## 2020-01-09 PROCEDURE — 96413 CHEMO IV INFUSION 1 HR: CPT

## 2020-01-09 PROCEDURE — 96375 TX/PRO/DX INJ NEW DRUG ADDON: CPT

## 2020-01-09 PROCEDURE — S0028 INJECTION, FAMOTIDINE, 20 MG: HCPCS | Performed by: INTERNAL MEDICINE

## 2020-01-09 PROCEDURE — 63600175 PHARM REV CODE 636 W HCPCS: Performed by: INTERNAL MEDICINE

## 2020-01-09 PROCEDURE — 25000003 PHARM REV CODE 250: Performed by: INTERNAL MEDICINE

## 2020-01-09 RX ORDER — HEPARIN 100 UNIT/ML
500 SYRINGE INTRAVENOUS
Status: DISCONTINUED | OUTPATIENT
Start: 2020-01-09 | End: 2020-01-09 | Stop reason: HOSPADM

## 2020-01-09 RX ORDER — FAMOTIDINE 10 MG/ML
20 INJECTION INTRAVENOUS
Status: COMPLETED | OUTPATIENT
Start: 2020-01-09 | End: 2020-01-09

## 2020-01-09 RX ORDER — SODIUM CHLORIDE 0.9 % (FLUSH) 0.9 %
10 SYRINGE (ML) INJECTION
Status: DISCONTINUED | OUTPATIENT
Start: 2020-01-09 | End: 2020-01-09 | Stop reason: HOSPADM

## 2020-01-09 RX ORDER — EPINEPHRINE 0.3 MG/.3ML
0.3 INJECTION SUBCUTANEOUS ONCE AS NEEDED
Status: DISCONTINUED | OUTPATIENT
Start: 2020-01-09 | End: 2020-01-09 | Stop reason: HOSPADM

## 2020-01-09 RX ORDER — DIPHENHYDRAMINE HYDROCHLORIDE 50 MG/ML
50 INJECTION INTRAMUSCULAR; INTRAVENOUS ONCE AS NEEDED
Status: DISCONTINUED | OUTPATIENT
Start: 2020-01-09 | End: 2020-01-09 | Stop reason: HOSPADM

## 2020-01-09 RX ADMIN — DEXAMETHASONE SODIUM PHOSPHATE 10 MG: 4 INJECTION, SOLUTION INTRA-ARTICULAR; INTRALESIONAL; INTRAMUSCULAR; INTRAVENOUS; SOFT TISSUE at 09:01

## 2020-01-09 RX ADMIN — HEPARIN 500 UNITS: 100 SYRINGE at 10:01

## 2020-01-09 RX ADMIN — SODIUM CHLORIDE: 0.9 INJECTION, SOLUTION INTRAVENOUS at 08:01

## 2020-01-09 RX ADMIN — PACLITAXEL 156 MG: 6 INJECTION, SOLUTION INTRAVENOUS at 09:01

## 2020-01-09 RX ADMIN — FAMOTIDINE 20 MG: 10 INJECTION, SOLUTION INTRAVENOUS at 08:01

## 2020-01-09 RX ADMIN — DIPHENHYDRAMINE HYDROCHLORIDE 25 MG: 50 INJECTION, SOLUTION INTRAMUSCULAR; INTRAVENOUS at 08:01

## 2020-01-09 NOTE — PLAN OF CARE
Taxol infusion complete. Pt tolerated well. VSS. NAD. Port to right chest de-accessed after heparinized per protocol.  AVS provided. Pt verbalized understanding of discharge instructions before leaving with mother.

## 2020-01-16 ENCOUNTER — OFFICE VISIT (OUTPATIENT)
Dept: HEMATOLOGY/ONCOLOGY | Facility: CLINIC | Age: 55
End: 2020-01-16
Payer: COMMERCIAL

## 2020-01-16 ENCOUNTER — INFUSION (OUTPATIENT)
Dept: INFUSION THERAPY | Facility: OTHER | Age: 55
End: 2020-01-16
Attending: INTERNAL MEDICINE
Payer: COMMERCIAL

## 2020-01-16 VITALS
DIASTOLIC BLOOD PRESSURE: 72 MMHG | OXYGEN SATURATION: 100 % | BODY MASS INDEX: 30.48 KG/M2 | HEIGHT: 64 IN | RESPIRATION RATE: 16 BRPM | SYSTOLIC BLOOD PRESSURE: 136 MMHG | WEIGHT: 178.56 LBS | HEART RATE: 88 BPM | TEMPERATURE: 98 F

## 2020-01-16 VITALS — HEART RATE: 80 BPM | DIASTOLIC BLOOD PRESSURE: 77 MMHG | SYSTOLIC BLOOD PRESSURE: 143 MMHG

## 2020-01-16 DIAGNOSIS — E11.9 TYPE 2 DIABETES MELLITUS WITHOUT COMPLICATION, WITHOUT LONG-TERM CURRENT USE OF INSULIN: ICD-10-CM

## 2020-01-16 DIAGNOSIS — C50.112 MALIGNANT NEOPLASM OF CENTRAL PORTION OF LEFT BREAST IN FEMALE, ESTROGEN RECEPTOR POSITIVE: Primary | ICD-10-CM

## 2020-01-16 DIAGNOSIS — T45.1X5A ANTINEOPLASTIC CHEMOTHERAPY INDUCED ANEMIA: ICD-10-CM

## 2020-01-16 DIAGNOSIS — Z17.0 MALIGNANT NEOPLASM OF CENTRAL PORTION OF LEFT BREAST IN FEMALE, ESTROGEN RECEPTOR POSITIVE: Primary | ICD-10-CM

## 2020-01-16 DIAGNOSIS — C77.3 CARCINOMA OF LEFT BREAST METASTATIC TO AXILLARY LYMPH NODE: ICD-10-CM

## 2020-01-16 DIAGNOSIS — D64.81 ANTINEOPLASTIC CHEMOTHERAPY INDUCED ANEMIA: ICD-10-CM

## 2020-01-16 DIAGNOSIS — I10 ESSENTIAL HYPERTENSION: ICD-10-CM

## 2020-01-16 DIAGNOSIS — C50.912 CARCINOMA OF LEFT BREAST METASTATIC TO AXILLARY LYMPH NODE: ICD-10-CM

## 2020-01-16 PROCEDURE — 99215 PR OFFICE/OUTPT VISIT, EST, LEVL V, 40-54 MIN: ICD-10-PCS | Mod: S$GLB,,, | Performed by: INTERNAL MEDICINE

## 2020-01-16 PROCEDURE — 96375 TX/PRO/DX INJ NEW DRUG ADDON: CPT

## 2020-01-16 PROCEDURE — 25000003 PHARM REV CODE 250: Performed by: INTERNAL MEDICINE

## 2020-01-16 PROCEDURE — 99999 PR PBB SHADOW E&M-EST. PATIENT-LVL III: ICD-10-PCS | Mod: PBBFAC,,, | Performed by: INTERNAL MEDICINE

## 2020-01-16 PROCEDURE — 96367 TX/PROPH/DG ADDL SEQ IV INF: CPT

## 2020-01-16 PROCEDURE — 96413 CHEMO IV INFUSION 1 HR: CPT

## 2020-01-16 PROCEDURE — 63600175 PHARM REV CODE 636 W HCPCS: Performed by: INTERNAL MEDICINE

## 2020-01-16 PROCEDURE — S0028 INJECTION, FAMOTIDINE, 20 MG: HCPCS | Performed by: INTERNAL MEDICINE

## 2020-01-16 PROCEDURE — 99999 PR PBB SHADOW E&M-EST. PATIENT-LVL III: CPT | Mod: PBBFAC,,, | Performed by: INTERNAL MEDICINE

## 2020-01-16 PROCEDURE — 99215 OFFICE O/P EST HI 40 MIN: CPT | Mod: S$GLB,,, | Performed by: INTERNAL MEDICINE

## 2020-01-16 RX ORDER — DIPHENHYDRAMINE HYDROCHLORIDE 50 MG/ML
50 INJECTION INTRAMUSCULAR; INTRAVENOUS ONCE AS NEEDED
Status: CANCELLED | OUTPATIENT
Start: 2020-01-16

## 2020-01-16 RX ORDER — FAMOTIDINE 10 MG/ML
20 INJECTION INTRAVENOUS
Status: CANCELLED | OUTPATIENT
Start: 2020-01-16

## 2020-01-16 RX ORDER — SODIUM CHLORIDE 0.9 % (FLUSH) 0.9 %
10 SYRINGE (ML) INJECTION
Status: DISCONTINUED | OUTPATIENT
Start: 2020-01-16 | End: 2020-01-16 | Stop reason: HOSPADM

## 2020-01-16 RX ORDER — HEPARIN 100 UNIT/ML
500 SYRINGE INTRAVENOUS
Status: CANCELLED | OUTPATIENT
Start: 2020-01-16

## 2020-01-16 RX ORDER — DIPHENHYDRAMINE HYDROCHLORIDE 50 MG/ML
50 INJECTION INTRAMUSCULAR; INTRAVENOUS ONCE AS NEEDED
Status: DISCONTINUED | OUTPATIENT
Start: 2020-01-16 | End: 2020-01-16 | Stop reason: HOSPADM

## 2020-01-16 RX ORDER — EPINEPHRINE 0.3 MG/.3ML
0.3 INJECTION SUBCUTANEOUS ONCE AS NEEDED
Status: DISCONTINUED | OUTPATIENT
Start: 2020-01-16 | End: 2020-01-16 | Stop reason: HOSPADM

## 2020-01-16 RX ORDER — HEPARIN 100 UNIT/ML
500 SYRINGE INTRAVENOUS
Status: DISCONTINUED | OUTPATIENT
Start: 2020-01-16 | End: 2020-01-16 | Stop reason: HOSPADM

## 2020-01-16 RX ORDER — SODIUM CHLORIDE 0.9 % (FLUSH) 0.9 %
10 SYRINGE (ML) INJECTION
Status: CANCELLED | OUTPATIENT
Start: 2020-01-16

## 2020-01-16 RX ORDER — FAMOTIDINE 10 MG/ML
20 INJECTION INTRAVENOUS
Status: COMPLETED | OUTPATIENT
Start: 2020-01-16 | End: 2020-01-16

## 2020-01-16 RX ORDER — EPINEPHRINE 0.3 MG/.3ML
0.3 INJECTION SUBCUTANEOUS ONCE AS NEEDED
Status: CANCELLED | OUTPATIENT
Start: 2020-01-16

## 2020-01-16 RX ADMIN — SODIUM CHLORIDE: 0.9 INJECTION, SOLUTION INTRAVENOUS at 09:01

## 2020-01-16 RX ADMIN — HEPARIN 500 UNITS: 100 SYRINGE at 11:01

## 2020-01-16 RX ADMIN — Medication 10 MG: at 09:01

## 2020-01-16 RX ADMIN — FAMOTIDINE 20 MG: 10 INJECTION, SOLUTION INTRAVENOUS at 09:01

## 2020-01-16 RX ADMIN — PACLITAXEL 156 MG: 6 INJECTION, SOLUTION INTRAVENOUS at 09:01

## 2020-01-16 RX ADMIN — DIPHENHYDRAMINE HYDROCHLORIDE 25 MG: 50 INJECTION, SOLUTION INTRAMUSCULAR; INTRAVENOUS at 09:01

## 2020-01-16 NOTE — PROGRESS NOTES
Subjective:       Patient ID: Shu Mendoza is a 54 y.o. female.     Chief Complaint: follow up for breast cancer     Diagnosis:  Stage IA ([id number]) invasive ductal carcinoma of the left breast, grade 2, ER/MO positive, HER2 positive, s/p left mastectomy and reconstruction on 9/11/2019. Mammaprint high risk     Oncologic History:  1. Ms Mendoza is a 53 yo postmenopausal woman with HTN, DM, who presents today for further management of pathologic stage IA ([id number]) invasive ductal carcinoma of the left breast. She was referred to Dr Mccray for bloody nipple discharge first noted in June 2019. Before then she had a negative mammogram on 5/20/19. She had a left breast US on 6/27/19 in the left breast retroareolar region just behind the nipple, there is an irregular hypoechoic intraductal mass measuring 1.0 cm. She underwent a biopsy on 7/8/2019. It showed invasive ductal carcinoma, ER strongly positive 100%, MO positive 80%, HER2 negative 1+. Ki67 5% activity within invasive component. Patient underwent a left total mastectomy and reconstruction on 9/11/2019. Pathology showed a 1.3 cm invasive ductal carcinoma, grade 2. DCIS present, negative margin, 22 lymph nodes removed, one lymph node positive with macrometastases 13 mm, no extranodal extension. No lymphovascular invasion. Pathologic T1c N1a. Mammaprint high risk with chemo benefit >12%. 5-10 years recurrence risk without chemo 20-25%, with chemo and endocrine therapy 7%. BRCAnalysis from 7/18/19 was negative. Case was discussed at tumor board. Adjuvant chemotherapy followed by endocrine therapy was recommended. Radiation not recommended. Patient presents today for further management. Feeling well. Works at [employer].   2. Port placed by Dr Mccray on 10/25/19. Echo on 10/22/19 showed normal LVEF 59%.   3. Adjuvant DDAC followed by weekly taxol started on 10/31/2019. cycle 2 on 11/14/19. cycle 3 was delayed for a week for umbilical infection, given on 12/5/2019,  cycle 4 given on 19. Weekly taxol started on 19.      Interval History:   Ms Mendoza returns today for follow up. Due for week 3 of taxol today. Feeling well. Had mild diarrhea, occasional muscle spasm and decreased appetite from taxol. But overall tolerating it well. No fever, chills.      ECO     ROS:   A ten-point system review is obtained and negative except for what was stated in the Interval History.      Physical Examination:   Vital signs reviewed.   General: well hydrated, well developed, in no acute distress  HEENT: normocephalic, PERRLA, EOMI, anicteric sclerae, oropharynx clear  Neck: supple, no JVD, thyromegaly, cervical or supraclavicular lymphadenopathy  Lungs: clear breath sounds bilaterally, no wheezing, rales, or rhonchi  Heart: RRR, no M/R/G  Abdomen: soft, no tenderness, non-distended, no hepatosplenomegaly, mass, or hernia. BS present. No pus, drainage or abnormality of the umbilicus.   Extremities: no clubbing, cyanosis, or edema  Skin: no rash, ulcer, or open wounds  Neuro: alert and oriented x 4, no focal neuro deficit  Psych: pleasant and appropriate mood and affect  Breasts: s/p left mastectomy and reconstruction, bilateral breasts no abnormal skin nodules, open wounds, masses, or axillary LAD     Objective:      Laboratory Data:  Labs reviewed. CBC, CMP unremarkable. Mild anemia     Imaging Data:  Echo on 10/22/19 showed normal LVEF 59%.     Assessment and Plan:      1. Malignant neoplasm of central portion of left breast in female, estrogen receptor positive    2. Carcinoma of left breast metastatic to axillary lymph node    3. Essential hypertension    4. Type 2 diabetes mellitus without complication, without long-term current use of insulin    5. Antineoplastic chemotherapy induced anemia        1.2  - Ms Mendoza is a 53 yo postmenopausal woman with stage IA ([id number]) invasive ductal carcinoma of the left breast, ER/OR positive, HER2 negative, s/p left mastectomy and  reconstruction on 9/11/2019. Mammaprint high risk with chemo benefit >12%. 5-10 years recurrence risk without chemo 20-25%, with chemo and endocrine therapy 7%.  - She is on adjuvant DDAC followed by weekly taxol  - doing well. Week 3 of weekly taxol today  - continue with weekly taxol  - see me in 3 weeks     3.  - BP good  - c/w current meds    4.  - BS good  - c/w current mds    5.  - mild. No need for transfusion  - monitor        Follow-up:      RTC in 3 weeks   Knows to call in the interval if any problems arise.

## 2020-01-16 NOTE — Clinical Note
Continue with weekly CBC, CMP, then taxol for a total of 12 weeks (next week week 4). See me on 2/6 after labs, before chemo

## 2020-01-22 RX ORDER — HEPARIN 100 UNIT/ML
500 SYRINGE INTRAVENOUS
Status: CANCELLED | OUTPATIENT
Start: 2020-01-22

## 2020-01-22 RX ORDER — SODIUM CHLORIDE 0.9 % (FLUSH) 0.9 %
10 SYRINGE (ML) INJECTION
Status: CANCELLED | OUTPATIENT
Start: 2020-01-22

## 2020-01-22 RX ORDER — DIPHENHYDRAMINE HYDROCHLORIDE 50 MG/ML
50 INJECTION INTRAMUSCULAR; INTRAVENOUS ONCE AS NEEDED
Status: CANCELLED | OUTPATIENT
Start: 2020-01-22

## 2020-01-22 RX ORDER — FAMOTIDINE 10 MG/ML
20 INJECTION INTRAVENOUS
Status: CANCELLED | OUTPATIENT
Start: 2020-01-22

## 2020-01-22 RX ORDER — EPINEPHRINE 0.3 MG/.3ML
0.3 INJECTION SUBCUTANEOUS ONCE AS NEEDED
Status: CANCELLED | OUTPATIENT
Start: 2020-01-22

## 2020-01-23 ENCOUNTER — INFUSION (OUTPATIENT)
Dept: INFUSION THERAPY | Facility: OTHER | Age: 55
End: 2020-01-23
Attending: INTERNAL MEDICINE
Payer: COMMERCIAL

## 2020-01-23 VITALS
WEIGHT: 178.38 LBS | SYSTOLIC BLOOD PRESSURE: 132 MMHG | DIASTOLIC BLOOD PRESSURE: 69 MMHG | HEART RATE: 88 BPM | RESPIRATION RATE: 17 BRPM | HEIGHT: 64 IN | BODY MASS INDEX: 30.45 KG/M2 | TEMPERATURE: 99 F | OXYGEN SATURATION: 99 %

## 2020-01-23 DIAGNOSIS — C50.112 MALIGNANT NEOPLASM OF CENTRAL PORTION OF LEFT BREAST IN FEMALE, ESTROGEN RECEPTOR POSITIVE: Primary | ICD-10-CM

## 2020-01-23 DIAGNOSIS — Z17.0 MALIGNANT NEOPLASM OF CENTRAL PORTION OF LEFT BREAST IN FEMALE, ESTROGEN RECEPTOR POSITIVE: Primary | ICD-10-CM

## 2020-01-23 PROCEDURE — 96413 CHEMO IV INFUSION 1 HR: CPT

## 2020-01-23 PROCEDURE — 63600175 PHARM REV CODE 636 W HCPCS: Performed by: INTERNAL MEDICINE

## 2020-01-23 PROCEDURE — 25000003 PHARM REV CODE 250: Performed by: INTERNAL MEDICINE

## 2020-01-23 PROCEDURE — S0028 INJECTION, FAMOTIDINE, 20 MG: HCPCS | Performed by: INTERNAL MEDICINE

## 2020-01-23 PROCEDURE — 96367 TX/PROPH/DG ADDL SEQ IV INF: CPT

## 2020-01-23 PROCEDURE — 96375 TX/PRO/DX INJ NEW DRUG ADDON: CPT

## 2020-01-23 RX ORDER — DIPHENHYDRAMINE HYDROCHLORIDE 50 MG/ML
50 INJECTION INTRAMUSCULAR; INTRAVENOUS ONCE AS NEEDED
Status: DISCONTINUED | OUTPATIENT
Start: 2020-01-23 | End: 2020-01-23 | Stop reason: HOSPADM

## 2020-01-23 RX ORDER — EPINEPHRINE 0.3 MG/.3ML
0.3 INJECTION SUBCUTANEOUS ONCE AS NEEDED
Status: DISCONTINUED | OUTPATIENT
Start: 2020-01-23 | End: 2020-01-23 | Stop reason: HOSPADM

## 2020-01-23 RX ORDER — SODIUM CHLORIDE 0.9 % (FLUSH) 0.9 %
10 SYRINGE (ML) INJECTION
Status: DISCONTINUED | OUTPATIENT
Start: 2020-01-23 | End: 2020-01-23 | Stop reason: HOSPADM

## 2020-01-23 RX ORDER — FAMOTIDINE 10 MG/ML
20 INJECTION INTRAVENOUS
Status: COMPLETED | OUTPATIENT
Start: 2020-01-23 | End: 2020-01-23

## 2020-01-23 RX ORDER — HEPARIN 100 UNIT/ML
500 SYRINGE INTRAVENOUS
Status: DISCONTINUED | OUTPATIENT
Start: 2020-01-23 | End: 2020-01-23 | Stop reason: HOSPADM

## 2020-01-23 RX ADMIN — DIPHENHYDRAMINE HYDROCHLORIDE 25 MG: 50 INJECTION, SOLUTION INTRAMUSCULAR; INTRAVENOUS at 08:01

## 2020-01-23 RX ADMIN — HEPARIN 500 UNITS: 100 SYRINGE at 10:01

## 2020-01-23 RX ADMIN — PACLITAXEL 156 MG: 6 INJECTION, SOLUTION INTRAVENOUS at 09:01

## 2020-01-23 RX ADMIN — FAMOTIDINE 20 MG: 10 INJECTION, SOLUTION INTRAVENOUS at 08:01

## 2020-01-23 RX ADMIN — SODIUM CHLORIDE: 0.9 INJECTION, SOLUTION INTRAVENOUS at 08:01

## 2020-01-23 RX ADMIN — Medication 10 MG: at 08:01

## 2020-01-29 ENCOUNTER — HOSPITAL ENCOUNTER (EMERGENCY)
Facility: OTHER | Age: 55
Discharge: HOME | End: 2020-01-29
Attending: EMERGENCY MEDICINE
Payer: COMMERCIAL

## 2020-01-29 VITALS
WEIGHT: 176 LBS | BODY MASS INDEX: 30.05 KG/M2 | SYSTOLIC BLOOD PRESSURE: 153 MMHG | HEIGHT: 64 IN | RESPIRATION RATE: 21 BRPM | OXYGEN SATURATION: 99 % | DIASTOLIC BLOOD PRESSURE: 74 MMHG | TEMPERATURE: 99 F | HEART RATE: 102 BPM

## 2020-01-29 DIAGNOSIS — J01.90 ACUTE BACTERIAL SINUSITIS: Primary | ICD-10-CM

## 2020-01-29 DIAGNOSIS — R07.9 CHEST PAIN: ICD-10-CM

## 2020-01-29 DIAGNOSIS — B34.9 VIRAL SYNDROME: ICD-10-CM

## 2020-01-29 DIAGNOSIS — R00.0 TACHYCARDIA: ICD-10-CM

## 2020-01-29 DIAGNOSIS — B96.89 ACUTE BACTERIAL SINUSITIS: Primary | ICD-10-CM

## 2020-01-29 LAB
ALBUMIN SERPL BCP-MCNC: 3.6 G/DL (ref 3.5–5.2)
ALP SERPL-CCNC: 74 U/L (ref 55–135)
ALT SERPL W/O P-5'-P-CCNC: 15 U/L (ref 10–44)
ANION GAP SERPL CALC-SCNC: 11 MMOL/L (ref 8–16)
AST SERPL-CCNC: 13 U/L (ref 10–40)
BACTERIA #/AREA URNS HPF: ABNORMAL /HPF
BASOPHILS # BLD AUTO: 0.06 K/UL (ref 0–0.2)
BASOPHILS NFR BLD: 0.7 % (ref 0–1.9)
BILIRUB SERPL-MCNC: 0.7 MG/DL (ref 0.1–1)
BILIRUB UR QL STRIP: NEGATIVE
BUN SERPL-MCNC: 11 MG/DL (ref 6–20)
CALCIUM SERPL-MCNC: 9.6 MG/DL (ref 8.7–10.5)
CHLORIDE SERPL-SCNC: 106 MMOL/L (ref 95–110)
CLARITY UR: CLEAR
CO2 SERPL-SCNC: 25 MMOL/L (ref 23–29)
COLOR UR: YELLOW
CREAT SERPL-MCNC: 0.6 MG/DL (ref 0.5–1.4)
CTP QC/QA: YES
DIFFERENTIAL METHOD: ABNORMAL
EOSINOPHIL # BLD AUTO: 0.1 K/UL (ref 0–0.5)
EOSINOPHIL NFR BLD: 0.6 % (ref 0–8)
ERYTHROCYTE [DISTWIDTH] IN BLOOD BY AUTOMATED COUNT: 18.9 % (ref 11.5–14.5)
EST. GFR  (AFRICAN AMERICAN): >60 ML/MIN/1.73 M^2
EST. GFR  (NON AFRICAN AMERICAN): >60 ML/MIN/1.73 M^2
GLUCOSE SERPL-MCNC: 115 MG/DL (ref 70–110)
GLUCOSE UR QL STRIP: ABNORMAL
HCT VFR BLD AUTO: 32.8 % (ref 37–48.5)
HGB BLD-MCNC: 10.6 G/DL (ref 12–16)
HGB UR QL STRIP: ABNORMAL
IMM GRANULOCYTES # BLD AUTO: 0.05 K/UL (ref 0–0.04)
IMM GRANULOCYTES NFR BLD AUTO: 0.6 % (ref 0–0.5)
KETONES UR QL STRIP: ABNORMAL
LACTATE SERPL-SCNC: 2 MMOL/L (ref 0.5–2.2)
LEUKOCYTE ESTERASE UR QL STRIP: NEGATIVE
LYMPHOCYTES # BLD AUTO: 1.2 K/UL (ref 1–4.8)
LYMPHOCYTES NFR BLD: 15.2 % (ref 18–48)
MCH RBC QN AUTO: 29.3 PG (ref 27–31)
MCHC RBC AUTO-ENTMCNC: 32.3 G/DL (ref 32–36)
MCV RBC AUTO: 91 FL (ref 82–98)
MICROSCOPIC COMMENT: ABNORMAL
MONOCYTES # BLD AUTO: 0.6 K/UL (ref 0.3–1)
MONOCYTES NFR BLD: 7.9 % (ref 4–15)
NEUTROPHILS # BLD AUTO: 6.1 K/UL (ref 1.8–7.7)
NEUTROPHILS NFR BLD: 75 % (ref 38–73)
NITRITE UR QL STRIP: NEGATIVE
NRBC BLD-RTO: 0 /100 WBC
PH UR STRIP: 6 [PH] (ref 5–8)
PLATELET # BLD AUTO: 315 K/UL (ref 150–350)
PMV BLD AUTO: 9 FL (ref 9.2–12.9)
POC MOLECULAR INFLUENZA A AGN: NEGATIVE
POC MOLECULAR INFLUENZA B AGN: NEGATIVE
POTASSIUM SERPL-SCNC: 3.2 MMOL/L (ref 3.5–5.1)
PROT SERPL-MCNC: 7 G/DL (ref 6–8.4)
PROT UR QL STRIP: NEGATIVE
RBC # BLD AUTO: 3.62 M/UL (ref 4–5.4)
RBC #/AREA URNS HPF: 5 /HPF (ref 0–4)
SODIUM SERPL-SCNC: 142 MMOL/L (ref 136–145)
SP GR UR STRIP: 1.02 (ref 1–1.03)
SQUAMOUS #/AREA URNS HPF: 3 /HPF
URN SPEC COLLECT METH UR: ABNORMAL
UROBILINOGEN UR STRIP-ACNC: 1 EU/DL
WBC # BLD AUTO: 8.14 K/UL (ref 3.9–12.7)
YEAST URNS QL MICRO: ABNORMAL

## 2020-01-29 PROCEDURE — 85025 COMPLETE CBC W/AUTO DIFF WBC: CPT | Mod: 91

## 2020-01-29 PROCEDURE — 93010 ELECTROCARDIOGRAM REPORT: CPT | Mod: ,,, | Performed by: INTERNAL MEDICINE

## 2020-01-29 PROCEDURE — 80053 COMPREHEN METABOLIC PANEL: CPT | Mod: 91

## 2020-01-29 PROCEDURE — 96360 HYDRATION IV INFUSION INIT: CPT

## 2020-01-29 PROCEDURE — 81000 URINALYSIS NONAUTO W/SCOPE: CPT

## 2020-01-29 PROCEDURE — 63600175 PHARM REV CODE 636 W HCPCS: Performed by: EMERGENCY MEDICINE

## 2020-01-29 PROCEDURE — 93005 ELECTROCARDIOGRAM TRACING: CPT

## 2020-01-29 PROCEDURE — 87040 BLOOD CULTURE FOR BACTERIA: CPT | Mod: 59

## 2020-01-29 PROCEDURE — 83605 ASSAY OF LACTIC ACID: CPT

## 2020-01-29 PROCEDURE — 99285 EMERGENCY DEPT VISIT HI MDM: CPT | Mod: 25

## 2020-01-29 PROCEDURE — 93010 EKG 12-LEAD: ICD-10-PCS | Mod: ,,, | Performed by: INTERNAL MEDICINE

## 2020-01-29 RX ORDER — FLUTICASONE PROPIONATE 50 MCG
1 SPRAY, SUSPENSION (ML) NASAL 2 TIMES DAILY PRN
Qty: 15 G | Refills: 0 | Status: SHIPPED | OUTPATIENT
Start: 2020-01-29 | End: 2020-02-06

## 2020-01-29 RX ORDER — AMOXICILLIN AND CLAVULANATE POTASSIUM 875; 125 MG/1; MG/1
1 TABLET, FILM COATED ORAL 2 TIMES DAILY
Qty: 14 TABLET | Refills: 0 | Status: SHIPPED | OUTPATIENT
Start: 2020-01-29 | End: 2020-08-12

## 2020-01-29 RX ADMIN — SODIUM CHLORIDE 1000 ML: 0.9 INJECTION, SOLUTION INTRAVENOUS at 09:01

## 2020-01-29 NOTE — ED PROVIDER NOTES
Encounter Date: 1/29/2020    SCRIBE #1 NOTE: Pedro KIRKPATRICK am scribing for, and in the presence of, Dr. Valverde .       History     Chief Complaint   Patient presents with    URI     cough/congestion and sore throat since December. currently receiving chemo     Time seen by provider: 8:39 AM    This is a 54 y.o. female with a history of DM, HTN, breast cancer, and glaucoma who presents with complaint of productive cough with green/yellow sputum that began a 4 days ago. The patient reports that she is also experiencing congestion and sore throat. The patient reports that approximately two weeks ago she was given a course of antibiotics by her oncologist Dr. Norris for a prolonged productive cough, congestion and sore throat. The patient reports that after the antibiotics most of her symptoms resolved besides the cough which became non-productive. She reports that most of her symptoms returned and worsened four days ago.  She last received chemo 6 days ago. She denies fever, rhinorrhea, chest pain, shortness of breath, nausea, vomiting, and dysuria.     The history is provided by the patient.     Review of patient's allergies indicates:   Allergen Reactions    Morphine Itching    Percocet [oxycodone-acetaminophen] Itching     Past Medical History:   Diagnosis Date    Breast cancer     Diabetes mellitus     Encounter for blood transfusion     Glaucoma 2012    Hypertension     Malignant neoplasm of central portion of left breast in female, estrogen receptor positive 7/23/2019    Renal cyst     Retinal tear of right eye      Past Surgical History:   Procedure Laterality Date    BREAST BIOPSY      EYE SURGERY Bilateral     as a child for cross eye    HERNIA REPAIR  2009    umbilical    HYSTERECTOMY  2000    NILES, BSO secondary to ovarian cyst    INSERTION OF TUNNELED CENTRAL VENOUS CATHETER (CVC) WITH SUBCUTANEOUS PORT Right 10/25/2019    Procedure: [id number]-R-CATH RIGHT (CONSENT AM OF) 1. 0 hr  case;  Surgeon: Shikha Mccray MD;  Location: [facility];  Service: General;  Laterality: Right;    MASTECTOMY Left 9/11/2019    Procedure: MASTECTOMY;  Surgeon: Shikha Mccray MD;  Location: [facility];  Service: Plastics;  Laterality: Left;    MASTECTOMY WITH SENTINEL NODE BIOPSY AND AXILLARY LYMPH NODE DISSECTION Left 9/11/2019    Procedure: MASTECTOMY, WITH SENTINEL NODE BIOPSY AND AXILLARY LYMPHADENECTOMY LEFT;  Surgeon: Shikha Mccray MD;  Location: [facility];  Service: Plastics;  Laterality: Left;    OOPHORECTOMY      RECONSTRUCTION OF BREAST WITH DEEP INFERIOR EPIGASTRIC ARTERY  (SALEEM) FREE FLAP Left 9/11/2019    Procedure: RECONSTRUCTION, BREAST, USING SALEEM FREE FLAP - UNILATERAL;  Surgeon: Derek Colin MD;  Location: [facility];  Service: Plastics;  Laterality: Left;     Family History   Problem Relation Age of Onset    Ovarian cancer Other     Breast cancer Neg Hx     Colon cancer Neg Hx      Social History     Tobacco Use    Smoking status: Never Smoker    Smokeless tobacco: Never Used   Substance Use Topics    Alcohol use: Yes     Alcohol/week: 0.0 standard drinks     Comment: occasional    Drug use: No     Review of Systems   Constitutional: Negative for fever.   HENT: Positive for congestion and sore throat.    Respiratory: Positive for cough (productive). Negative for shortness of breath.    Cardiovascular: Negative for chest pain.   Gastrointestinal: Negative for nausea and vomiting.   Genitourinary: Negative for dysuria.   Musculoskeletal: Negative for back pain.   Skin: Negative for rash.   Neurological: Negative for weakness.   Hematological: Does not bruise/bleed easily.   All other systems reviewed and are negative.      Physical Exam     Initial Vitals [01/29/20 0817]   BP Pulse Resp Temp SpO2   (!) 145/79 108 18 98.6 °F (37 °C) 100 %      MAP       --         Physical Exam    Nursing note and vitals reviewed.  Constitutional: She appears well-developed and well-nourished. She is  not diaphoretic. No distress.   HENT:   Head: Normocephalic and atraumatic.   Right Ear: External ear normal.   Left Ear: External ear normal.   Nose: Nose normal.   Bilateral TM's are dull and bulging. No erythema. Clear rhinorrhea. Slight erythema of the posterior oropharynx. No edema. No exudate.     Tenderness to palpation over frontal sinus   Eyes: Conjunctivae and EOM are normal. Pupils are equal, round, and reactive to light. No scleral icterus.   Neck: Normal range of motion. Neck supple. No tracheal deviation present. No JVD present.   Cardiovascular: Regular rhythm, normal heart sounds and intact distal pulses. Tachycardia present.  Exam reveals no gallop and no friction rub.    No murmur heard.  Pulmonary/Chest: Breath sounds normal. No respiratory distress. She has no wheezes. She has no rhonchi. She has no rales. She exhibits no tenderness.   Abdominal: Soft. Bowel sounds are normal. She exhibits no distension and no mass. There is no tenderness. There is no rebound and no guarding.   Musculoskeletal: Normal range of motion. She exhibits no edema or tenderness.   Neurological: She is alert and oriented to person, place, and time. She has normal strength and normal reflexes. She displays normal reflexes. No cranial nerve deficit or sensory deficit. GCS score is 15. GCS eye subscore is 4. GCS verbal subscore is 5. GCS motor subscore is 6.   Skin: Skin is warm and dry. Capillary refill takes less than 2 seconds. No rash noted. No erythema.   Psychiatric: She has a normal mood and affect. Her behavior is normal. Thought content normal.         ED Course   Procedures  Labs Reviewed   CBC W/ AUTO DIFFERENTIAL - Abnormal; Notable for the following components:       Result Value    RBC 3.62 (*)     Hemoglobin 10.6 (*)     Hematocrit 32.8 (*)     RDW 18.9 (*)     MPV 9.0 (*)     Immature Granulocytes 0.6 (*)     Immature Grans (Abs) 0.05 (*)     Gran% 75.0 (*)     Lymph% 15.2 (*)     All other components within  normal limits   COMPREHENSIVE METABOLIC PANEL - Abnormal; Notable for the following components:    Potassium 3.2 (*)     Glucose 115 (*)     All other components within normal limits   URINALYSIS, REFLEX TO URINE CULTURE - Abnormal; Notable for the following components:    Glucose, UA 3+ (*)     Ketones, UA Trace (*)     Occult Blood UA 2+ (*)     All other components within normal limits    Narrative:     Preferred Collection Type->Urine, Clean Catch   URINALYSIS MICROSCOPIC - Abnormal; Notable for the following components:    RBC, UA 5 (*)     Yeast, UA Occasional (*)     All other components within normal limits    Narrative:     Preferred Collection Type->Urine, Clean Catch   CULTURE, BLOOD   CULTURE, BLOOD   LACTIC ACID, PLASMA   POCT INFLUENZA A/B MOLECULAR     EKG Readings: (Independently Interpreted)   Initial Reading: No STEMI. Previous EKG: Compared with most recent EKG Previous EKG Date: August 13, 2019. Rhythm: Normal Sinus Rhythm.   T-wave inversion lead 3  When compared to previous no significant changes have occurred       Imaging Results          X-Ray Chest AP Portable (Final result)  Result time 01/29/20 09:39:29    Final result by Nelida Balderrama MD (01/29/20 09:39:29)                 Impression:      No detrimental interval change      Electronically signed by: Nelida Balderrama MD  Date:    01/29/2020  Time:    09:39             Narrative:    EXAMINATION:  XR CHEST AP PORTABLE    CLINICAL HISTORY:  Sepsis;    TECHNIQUE:  Single frontal view of the chest was performed.    COMPARISON:  January 2, 2020    FINDINGS:  Right central venous catheter remains, with tip in superior vena cava.  There are surgical clips about the left hemithorax.  The cardiac size is stable, not enlarged.  There is no large volume of pleural fluid.  No focal consolidation is present.                              X-Rays:   Independently Interpreted Readings:   Chest X-Ray: Tip of port in place. No focal infiltrate. Normal  cardiac shadow.      Medical Decision Making:   History:   Old Medical Records: I decided to obtain old medical records.  Differential Diagnosis:   Meningitis, epidural abscess, central nervous system infection, epidural abscess, otitis media, otitis externa, acute bacterial sinusitis, viral syndrome, pharyngitis, deep neck space infection, epiglottitis, retropharyngeal infection, pneumonia, endocarditis, pericarditis, medistinitis, rheumatic fever, intrabdominal abscess, PID, TOA, UTI, acute pyelonephritis, lymphoma, sarcoid, lupus, rheumatoid arthritis, giant cell arteritis, drug fever, factitious fever, acute respiratory distress syndrome, aspirin overdose, anticholinergic syndrome, drug withdrawal, gout, heat stroke, intracranial hemorrhage, ischemic colitis, malignancy, malignant hyperthermia, myocardial infarction, neuroleptic malignant syndrome, pheochromocytoma,serotonin syndrome, thromboembolic disease, vasculitis, acalculous cholecystitis, adrenal insufficiency, benign post-operative fever, pancreatitis, thyroid storm, transfusion reaction    Independently Interpreted Test(s):   I have ordered and independently interpreted X-rays - see prior notes.  I have ordered and independently interpreted EKG Reading(s) - see prior notes  Clinical Tests:   Lab Tests: Ordered and Reviewed  Radiological Study: Ordered and Reviewed  Medical Tests: Ordered and Reviewed  ED Management:  54-year-old female immunocompromised, with facial pain and tenderness palpation exam consistent with acute bacterial sinusitis.  Will treat with Augmentin. After taking into careful account the patient's historical factors, physical exam findings, empirical and objective data obtained on ED workup, the patient appears to be low risk for an emergent medical condition. I feel it is safe and appropriate at this time for the patient to be discharged for follow up and re-evaluation as detailed in the discharge instructions. I have discussed the  specifics of the workup with the patient/guardian and the patient/guardian has verbalized understanding of the details of the workup, the diagnosis, the treatment plan, and the need for outpatient follow-up.  Although the patient has no emergent etiology today this does not preclude the development of an emergent condition some in addition, I have advised the patient that they can return to the ED and/or activated EMS at any time with worsening of her symptoms, change of their symptoms, or with any other medical complaints.  Patient's/guardian understands the emergency department visit today was primarily to address immediate concerns and to rule out emergent causes of the symptoms that may require further workup and evaluation as an outpatient.  All questions addressed and patient's/guardian given discharge instructions and follow-up information.  Patient improved with treatment in the emergency department and is comfortable going home.  Educated the patient on warning signs and symptoms for which they must seek immediate medical attention.  I emphasized the importance of followup.  Patient understands that the emergency visit today is primarily to address immediate concerns and to rule out emergent cause of symptoms and that they may require further workup and evaluation as an outpatient. All questions addressed and patient given discharge instructions and followup information.                   Scribe Attestation:   Scribe #1: I performed the above scribed service and the documentation accurately describes the services I performed. I attest to the accuracy of the note.    Attending Attestation:           Physician Attestation for Scribe:  Physician Attestation Statement for Scribe #1: I, Dr. Valverde, reviewed documentation, as scribed by Pedro Peter in my presence, and it is both accurate and complete.                 ED Course as of Jan 29 1330   Wed Jan 29, 2020   1012 Awaiting call back from Dr. Norris,  Heme/Onc    [MA]   1102 Case discussed with , requested we start patient on antibiotics - Augmentin, follow-up with her, since has not had a fever okay to follow up with chemo in the morning    [MA]      ED Course User Index  [MA] Hossein Valverde MD                Clinical Impression:     1. Acute bacterial sinusitis    2. Viral syndrome    3. Tachycardia                                  Hossein Valverde MD  01/29/20 0890

## 2020-01-29 NOTE — ED NOTES
Hourly rounding done, IV fluids reconnected, siderails up x's 2, lights turned down per pt request and call light in pt's left hand,  Will continue to monitor

## 2020-01-29 NOTE — ED NOTES
"  LOC: The patient is awake, alert and aware of environment with an appropriate affect, the patient is oriented x 3 and speaking appropriately.  APPEARANCE: Patient resting comfortably and in no acute distress, patient is clean and well groomed, patient's clothing is properly fastened.  SKIN: The skin is warm and dry, color consistent with ethnicity, patient has normal skin turgor and moist mucus membranes, skin intact, no breakdown or bruising noted.  MUSCULOSKELETAL: Patient moving all extremities spontaneously, no obvious swelling or deformities noted.  RESPIRATORY: Airway is open and patent, respirations are spontaneous, patient has a normal effort and rate, no accessory muscle use noted, bilateral breath sounds clear, but diminished.  CARDIAC: Patient has a normal rate and regular rhythm, no periphreal edema noted, capillary refill < 3 seconds.  ABDOMEN: Soft and non tender to palpation, no distention noted.  NEUROLOGIC: PERRL, 4 mm bilaterally, eyes open spontaneously, behavior appropriate to situation, follows commands, facial expression symmetrical, bilateral hand grasp equal and even, purposeful motor response noted, normal sensation in all extremities when touched with a finger.    Pt reporting cough and congestion since the end of December. Pt reports she currently is having body aches, chest pain and chills. Pt describes chest pain as dull, "feels like a strain".  Pt says she has taken her temperature at home, but it always says 98.7. Pt reporting she began coughing up green mucus on Sunday with a little blood. Pt reports her chemo doctor started her on last week for the coughing, but states it has not helped.    Will consult MD and continue to monitor.   "

## 2020-01-30 ENCOUNTER — INFUSION (OUTPATIENT)
Dept: INFUSION THERAPY | Facility: OTHER | Age: 55
End: 2020-01-30
Attending: INTERNAL MEDICINE
Payer: COMMERCIAL

## 2020-01-30 VITALS
RESPIRATION RATE: 18 BRPM | OXYGEN SATURATION: 98 % | BODY MASS INDEX: 30.41 KG/M2 | SYSTOLIC BLOOD PRESSURE: 137 MMHG | TEMPERATURE: 98 F | DIASTOLIC BLOOD PRESSURE: 74 MMHG | WEIGHT: 178.13 LBS | HEIGHT: 64 IN | HEART RATE: 94 BPM

## 2020-01-30 DIAGNOSIS — C50.112 MALIGNANT NEOPLASM OF CENTRAL PORTION OF LEFT BREAST IN FEMALE, ESTROGEN RECEPTOR POSITIVE: Primary | ICD-10-CM

## 2020-01-30 DIAGNOSIS — Z17.0 MALIGNANT NEOPLASM OF CENTRAL PORTION OF LEFT BREAST IN FEMALE, ESTROGEN RECEPTOR POSITIVE: Primary | ICD-10-CM

## 2020-01-30 PROCEDURE — 96413 CHEMO IV INFUSION 1 HR: CPT

## 2020-01-30 PROCEDURE — 63600175 PHARM REV CODE 636 W HCPCS: Performed by: INTERNAL MEDICINE

## 2020-01-30 PROCEDURE — 96375 TX/PRO/DX INJ NEW DRUG ADDON: CPT

## 2020-01-30 PROCEDURE — 96367 TX/PROPH/DG ADDL SEQ IV INF: CPT

## 2020-01-30 PROCEDURE — S0028 INJECTION, FAMOTIDINE, 20 MG: HCPCS | Performed by: INTERNAL MEDICINE

## 2020-01-30 PROCEDURE — 25000003 PHARM REV CODE 250: Performed by: INTERNAL MEDICINE

## 2020-01-30 RX ORDER — SODIUM CHLORIDE 0.9 % (FLUSH) 0.9 %
10 SYRINGE (ML) INJECTION
Status: CANCELLED | OUTPATIENT
Start: 2020-01-30

## 2020-01-30 RX ORDER — FAMOTIDINE 10 MG/ML
20 INJECTION INTRAVENOUS
Status: CANCELLED | OUTPATIENT
Start: 2020-01-30

## 2020-01-30 RX ORDER — DIPHENHYDRAMINE HYDROCHLORIDE 50 MG/ML
50 INJECTION INTRAMUSCULAR; INTRAVENOUS ONCE AS NEEDED
Status: CANCELLED | OUTPATIENT
Start: 2020-01-30

## 2020-01-30 RX ORDER — EPINEPHRINE 0.3 MG/.3ML
0.3 INJECTION SUBCUTANEOUS ONCE AS NEEDED
Status: CANCELLED | OUTPATIENT
Start: 2020-01-30

## 2020-01-30 RX ORDER — DIPHENHYDRAMINE HYDROCHLORIDE 50 MG/ML
50 INJECTION INTRAMUSCULAR; INTRAVENOUS ONCE AS NEEDED
Status: DISCONTINUED | OUTPATIENT
Start: 2020-01-30 | End: 2020-01-30 | Stop reason: HOSPADM

## 2020-01-30 RX ORDER — FAMOTIDINE 10 MG/ML
20 INJECTION INTRAVENOUS
Status: COMPLETED | OUTPATIENT
Start: 2020-01-30 | End: 2020-01-30

## 2020-01-30 RX ORDER — HEPARIN 100 UNIT/ML
500 SYRINGE INTRAVENOUS
Status: DISCONTINUED | OUTPATIENT
Start: 2020-01-30 | End: 2020-01-30 | Stop reason: HOSPADM

## 2020-01-30 RX ORDER — HEPARIN 100 UNIT/ML
500 SYRINGE INTRAVENOUS
Status: CANCELLED | OUTPATIENT
Start: 2020-01-30

## 2020-01-30 RX ORDER — EPINEPHRINE 0.3 MG/.3ML
0.3 INJECTION SUBCUTANEOUS ONCE AS NEEDED
Status: DISCONTINUED | OUTPATIENT
Start: 2020-01-30 | End: 2020-01-30 | Stop reason: HOSPADM

## 2020-01-30 RX ORDER — SODIUM CHLORIDE 0.9 % (FLUSH) 0.9 %
10 SYRINGE (ML) INJECTION
Status: DISCONTINUED | OUTPATIENT
Start: 2020-01-30 | End: 2020-01-30 | Stop reason: HOSPADM

## 2020-01-30 RX ADMIN — HEPARIN 500 UNITS: 100 SYRINGE at 10:01

## 2020-01-30 RX ADMIN — SODIUM CHLORIDE: 9 INJECTION, SOLUTION INTRAVENOUS at 08:01

## 2020-01-30 RX ADMIN — PACLITAXEL 156 MG: 6 INJECTION, SOLUTION INTRAVENOUS at 09:01

## 2020-01-30 RX ADMIN — FAMOTIDINE 20 MG: 10 INJECTION, SOLUTION INTRAVENOUS at 08:01

## 2020-01-30 RX ADMIN — Medication 10 MG: at 08:01

## 2020-01-30 RX ADMIN — DIPHENHYDRAMINE HYDROCHLORIDE 25 MG: 50 INJECTION, SOLUTION INTRAMUSCULAR; INTRAVENOUS at 09:01

## 2020-02-03 LAB
BACTERIA BLD CULT: NORMAL
BACTERIA BLD CULT: NORMAL

## 2020-02-06 ENCOUNTER — OFFICE VISIT (OUTPATIENT)
Dept: HEMATOLOGY/ONCOLOGY | Facility: CLINIC | Age: 55
End: 2020-02-06
Payer: COMMERCIAL

## 2020-02-06 ENCOUNTER — INFUSION (OUTPATIENT)
Dept: INFUSION THERAPY | Facility: OTHER | Age: 55
End: 2020-02-06
Attending: INTERNAL MEDICINE
Payer: COMMERCIAL

## 2020-02-06 VITALS
HEIGHT: 64 IN | TEMPERATURE: 98 F | OXYGEN SATURATION: 98 % | RESPIRATION RATE: 16 BRPM | SYSTOLIC BLOOD PRESSURE: 141 MMHG | DIASTOLIC BLOOD PRESSURE: 76 MMHG | WEIGHT: 174.38 LBS | HEART RATE: 79 BPM | BODY MASS INDEX: 29.77 KG/M2

## 2020-02-06 VITALS — SYSTOLIC BLOOD PRESSURE: 135 MMHG | HEART RATE: 92 BPM | DIASTOLIC BLOOD PRESSURE: 67 MMHG

## 2020-02-06 DIAGNOSIS — C50.112 MALIGNANT NEOPLASM OF CENTRAL PORTION OF LEFT BREAST IN FEMALE, ESTROGEN RECEPTOR POSITIVE: Primary | ICD-10-CM

## 2020-02-06 DIAGNOSIS — E11.9 TYPE 2 DIABETES MELLITUS WITHOUT COMPLICATION, WITHOUT LONG-TERM CURRENT USE OF INSULIN: ICD-10-CM

## 2020-02-06 DIAGNOSIS — D64.81 ANTINEOPLASTIC CHEMOTHERAPY INDUCED ANEMIA: ICD-10-CM

## 2020-02-06 DIAGNOSIS — I10 ESSENTIAL HYPERTENSION: ICD-10-CM

## 2020-02-06 DIAGNOSIS — C77.3 CARCINOMA OF LEFT BREAST METASTATIC TO AXILLARY LYMPH NODE: ICD-10-CM

## 2020-02-06 DIAGNOSIS — Z17.0 MALIGNANT NEOPLASM OF CENTRAL PORTION OF LEFT BREAST IN FEMALE, ESTROGEN RECEPTOR POSITIVE: Primary | ICD-10-CM

## 2020-02-06 DIAGNOSIS — C50.912 CARCINOMA OF LEFT BREAST METASTATIC TO AXILLARY LYMPH NODE: ICD-10-CM

## 2020-02-06 DIAGNOSIS — T45.1X5A ANTINEOPLASTIC CHEMOTHERAPY INDUCED ANEMIA: ICD-10-CM

## 2020-02-06 PROCEDURE — 3008F PR BODY MASS INDEX (BMI) DOCUMENTED: ICD-10-PCS | Mod: CPTII,S$GLB,, | Performed by: INTERNAL MEDICINE

## 2020-02-06 PROCEDURE — S0028 INJECTION, FAMOTIDINE, 20 MG: HCPCS | Performed by: INTERNAL MEDICINE

## 2020-02-06 PROCEDURE — 25000003 PHARM REV CODE 250: Performed by: INTERNAL MEDICINE

## 2020-02-06 PROCEDURE — 3008F BODY MASS INDEX DOCD: CPT | Mod: CPTII,S$GLB,, | Performed by: INTERNAL MEDICINE

## 2020-02-06 PROCEDURE — 99215 OFFICE O/P EST HI 40 MIN: CPT | Mod: S$GLB,,, | Performed by: INTERNAL MEDICINE

## 2020-02-06 PROCEDURE — 3077F PR MOST RECENT SYSTOLIC BLOOD PRESSURE >= 140 MM HG: ICD-10-PCS | Mod: CPTII,S$GLB,, | Performed by: INTERNAL MEDICINE

## 2020-02-06 PROCEDURE — 99215 PR OFFICE/OUTPT VISIT, EST, LEVL V, 40-54 MIN: ICD-10-PCS | Mod: S$GLB,,, | Performed by: INTERNAL MEDICINE

## 2020-02-06 PROCEDURE — 3078F DIAST BP <80 MM HG: CPT | Mod: CPTII,S$GLB,, | Performed by: INTERNAL MEDICINE

## 2020-02-06 PROCEDURE — 63600175 PHARM REV CODE 636 W HCPCS: Performed by: INTERNAL MEDICINE

## 2020-02-06 PROCEDURE — 96367 TX/PROPH/DG ADDL SEQ IV INF: CPT

## 2020-02-06 PROCEDURE — 96375 TX/PRO/DX INJ NEW DRUG ADDON: CPT

## 2020-02-06 PROCEDURE — 99999 PR PBB SHADOW E&M-EST. PATIENT-LVL III: CPT | Mod: PBBFAC,,, | Performed by: INTERNAL MEDICINE

## 2020-02-06 PROCEDURE — 99999 PR PBB SHADOW E&M-EST. PATIENT-LVL III: ICD-10-PCS | Mod: PBBFAC,,, | Performed by: INTERNAL MEDICINE

## 2020-02-06 PROCEDURE — 3078F PR MOST RECENT DIASTOLIC BLOOD PRESSURE < 80 MM HG: ICD-10-PCS | Mod: CPTII,S$GLB,, | Performed by: INTERNAL MEDICINE

## 2020-02-06 PROCEDURE — 96413 CHEMO IV INFUSION 1 HR: CPT

## 2020-02-06 PROCEDURE — 3077F SYST BP >= 140 MM HG: CPT | Mod: CPTII,S$GLB,, | Performed by: INTERNAL MEDICINE

## 2020-02-06 RX ORDER — DIPHENHYDRAMINE HYDROCHLORIDE 50 MG/ML
50 INJECTION INTRAMUSCULAR; INTRAVENOUS ONCE AS NEEDED
Status: DISCONTINUED | OUTPATIENT
Start: 2020-02-06 | End: 2020-02-06 | Stop reason: HOSPADM

## 2020-02-06 RX ORDER — FAMOTIDINE 10 MG/ML
20 INJECTION INTRAVENOUS
Status: COMPLETED | OUTPATIENT
Start: 2020-02-06 | End: 2020-02-06

## 2020-02-06 RX ORDER — EPINEPHRINE 0.3 MG/.3ML
0.3 INJECTION SUBCUTANEOUS ONCE AS NEEDED
Status: DISCONTINUED | OUTPATIENT
Start: 2020-02-06 | End: 2020-02-06 | Stop reason: HOSPADM

## 2020-02-06 RX ORDER — HEPARIN 100 UNIT/ML
500 SYRINGE INTRAVENOUS
Status: DISCONTINUED | OUTPATIENT
Start: 2020-02-06 | End: 2020-02-06 | Stop reason: HOSPADM

## 2020-02-06 RX ORDER — SODIUM CHLORIDE 0.9 % (FLUSH) 0.9 %
10 SYRINGE (ML) INJECTION
Status: CANCELLED | OUTPATIENT
Start: 2020-02-06

## 2020-02-06 RX ORDER — HEPARIN 100 UNIT/ML
500 SYRINGE INTRAVENOUS
Status: CANCELLED | OUTPATIENT
Start: 2020-02-06

## 2020-02-06 RX ORDER — DIPHENHYDRAMINE HYDROCHLORIDE 50 MG/ML
50 INJECTION INTRAMUSCULAR; INTRAVENOUS ONCE AS NEEDED
Status: CANCELLED | OUTPATIENT
Start: 2020-02-06

## 2020-02-06 RX ORDER — FAMOTIDINE 10 MG/ML
20 INJECTION INTRAVENOUS
Status: CANCELLED | OUTPATIENT
Start: 2020-02-06

## 2020-02-06 RX ORDER — SODIUM CHLORIDE 0.9 % (FLUSH) 0.9 %
10 SYRINGE (ML) INJECTION
Status: DISCONTINUED | OUTPATIENT
Start: 2020-02-06 | End: 2020-02-06 | Stop reason: HOSPADM

## 2020-02-06 RX ORDER — EPINEPHRINE 0.3 MG/.3ML
0.3 INJECTION SUBCUTANEOUS ONCE AS NEEDED
Status: CANCELLED | OUTPATIENT
Start: 2020-02-06

## 2020-02-06 RX ADMIN — DIPHENHYDRAMINE HYDROCHLORIDE 25 MG: 50 INJECTION, SOLUTION INTRAMUSCULAR; INTRAVENOUS at 09:02

## 2020-02-06 RX ADMIN — PACLITAXEL 156 MG: 6 INJECTION, SOLUTION INTRAVENOUS at 09:02

## 2020-02-06 RX ADMIN — SODIUM CHLORIDE: 0.9 INJECTION, SOLUTION INTRAVENOUS at 09:02

## 2020-02-06 RX ADMIN — HEPARIN 500 UNITS: 100 SYRINGE at 10:02

## 2020-02-06 RX ADMIN — Medication 10 MG: at 09:02

## 2020-02-06 RX ADMIN — FAMOTIDINE 20 MG: 10 INJECTION INTRAVENOUS at 09:02

## 2020-02-06 NOTE — PROGRESS NOTES
Subjective:       Patient ID: Shu Mendoza is a 54 y.o. female.     Chief Complaint: follow up for breast cancer     Diagnosis:  Stage IA ([id number]) invasive ductal carcinoma of the left breast, grade 2, ER/IA positive, HER2 positive, s/p left mastectomy and reconstruction on 9/11/2019. Mammaprint high risk     Oncologic History:  1. Ms Mendoza is a 55 yo postmenopausal woman with HTN, DM, who presents today for further management of pathologic stage IA ([id number]) invasive ductal carcinoma of the left breast. She was referred to Dr Mccray for bloody nipple discharge first noted in June 2019. Before then she had a negative mammogram on 5/20/19. She had a left breast US on 6/27/19 in the left breast retroareolar region just behind the nipple, there is an irregular hypoechoic intraductal mass measuring 1.0 cm. She underwent a biopsy on 7/8/2019. It showed invasive ductal carcinoma, ER strongly positive 100%, IA positive 80%, HER2 negative 1+. Ki67 5% activity within invasive component. Patient underwent a left total mastectomy and reconstruction on 9/11/2019. Pathology showed a 1.3 cm invasive ductal carcinoma, grade 2. DCIS present, negative margin, 22 lymph nodes removed, one lymph node positive with macrometastases 13 mm, no extranodal extension. No lymphovascular invasion. Pathologic T1c N1a. Mammaprint high risk with chemo benefit >12%. 5-10 years recurrence risk without chemo 20-25%, with chemo and endocrine therapy 7%. BRCAnalysis from 7/18/19 was negative. Case was discussed at tumor board. Adjuvant chemotherapy followed by endocrine therapy was recommended. Radiation not recommended. Patient presents today for further management. Feeling well. Works at [employer].   2. Port placed by Dr Mccray on 10/25/19. Echo on 10/22/19 showed normal LVEF 59%.   3. Adjuvant DDAC followed by weekly taxol started on 10/31/2019. cycle 2 on 11/14/19. cycle 3 was delayed for a week for umbilical infection, given on 12/5/2019,  cycle 4 given on 19. Weekly taxol started on 19.      Interval History:   Ms Mendoza returns today for follow up. Due for week 6 of taxol today.  Went to ER on 20 for URI, likely viral infection. Flu swap was negative. She feels well today. Mild neuropathy in fingers, intermittent. No fever, chills.      ECO     ROS:   A ten-point system review is obtained and negative except for what was stated in the Interval History.      Physical Examination:   Vital signs reviewed.   General: well hydrated, well developed, in no acute distress  HEENT: normocephalic, PERRLA, EOMI, anicteric sclerae, oropharynx clear  Neck: supple, no JVD, thyromegaly, cervical or supraclavicular lymphadenopathy  Lungs: clear breath sounds bilaterally, no wheezing, rales, or rhonchi  Heart: RRR, no M/R/G  Abdomen: soft, no tenderness, non-distended, no hepatosplenomegaly, mass, or hernia. BS present. No pus, drainage or abnormality of the umbilicus.   Extremities: no clubbing, cyanosis, or edema  Skin: no rash, ulcer, or open wounds  Neuro: alert and oriented x 4, no focal neuro deficit  Psych: pleasant and appropriate mood and affect  Breasts: s/p left mastectomy and reconstruction, bilateral breasts no abnormal skin nodules, open wounds, masses, or axillary LAD     Objective:      Laboratory Data:  Labs reviewed. CBC, CMP unremarkable. Mild anemia     Imaging Data:  Echo on 10/22/19 showed normal LVEF 59%.     Assessment and Plan:      1. Malignant neoplasm of central portion of left breast in female, estrogen receptor positive    2. Carcinoma of left breast metastatic to axillary lymph node    3. Antineoplastic chemotherapy induced anemia    4. Essential hypertension    5. Type 2 diabetes mellitus without complication, without long-term current use of insulin        1.2  - Ms Mendoza is a 53 yo postmenopausal woman with stage IA ([id number]) invasive ductal carcinoma of the left breast, ER/MD positive, HER2 negative, s/p left  mastectomy and reconstruction on 9/11/2019. Mammaprint high risk with chemo benefit >12%. 5-10 years recurrence risk without chemo 20-25%, with chemo and endocrine therapy 7%.  - on adjuvant DDAC followed by weekly taxol  - doing well. Week 6 of weekly taxol today  - continue with weekly taxol  - see me in 3 weeks     3.  - mild.  - monitor    4.  - BP good  - c/w current meds     4.  - BS good  - c/w current mds          Follow-up:      RTC in 3 weeks   Knows to call in the interval if any problems arise.

## 2020-02-06 NOTE — Clinical Note
Please schedule weekly carbo/taxol with CBC, CMP for a total of 12 weeks (has 3 more weeks not scheduled). RTC on 3/5, after labs, before taxol.

## 2020-02-12 RX ORDER — SODIUM CHLORIDE 0.9 % (FLUSH) 0.9 %
10 SYRINGE (ML) INJECTION
Status: CANCELLED | OUTPATIENT
Start: 2020-02-12

## 2020-02-12 RX ORDER — HEPARIN 100 UNIT/ML
500 SYRINGE INTRAVENOUS
Status: CANCELLED | OUTPATIENT
Start: 2020-02-12

## 2020-02-12 RX ORDER — FAMOTIDINE 10 MG/ML
20 INJECTION INTRAVENOUS
Status: CANCELLED | OUTPATIENT
Start: 2020-02-12

## 2020-02-12 RX ORDER — DIPHENHYDRAMINE HYDROCHLORIDE 50 MG/ML
50 INJECTION INTRAMUSCULAR; INTRAVENOUS ONCE AS NEEDED
Status: CANCELLED | OUTPATIENT
Start: 2020-02-12

## 2020-02-12 RX ORDER — EPINEPHRINE 0.3 MG/.3ML
0.3 INJECTION SUBCUTANEOUS ONCE AS NEEDED
Status: CANCELLED | OUTPATIENT
Start: 2020-02-12

## 2020-02-13 ENCOUNTER — INFUSION (OUTPATIENT)
Dept: INFUSION THERAPY | Facility: OTHER | Age: 55
End: 2020-02-13
Attending: INTERNAL MEDICINE
Payer: COMMERCIAL

## 2020-02-13 VITALS
BODY MASS INDEX: 30.71 KG/M2 | WEIGHT: 179.88 LBS | RESPIRATION RATE: 16 BRPM | SYSTOLIC BLOOD PRESSURE: 165 MMHG | HEART RATE: 89 BPM | OXYGEN SATURATION: 97 % | TEMPERATURE: 98 F | HEIGHT: 64 IN | DIASTOLIC BLOOD PRESSURE: 86 MMHG

## 2020-02-13 DIAGNOSIS — Z17.0 MALIGNANT NEOPLASM OF CENTRAL PORTION OF LEFT BREAST IN FEMALE, ESTROGEN RECEPTOR POSITIVE: Primary | ICD-10-CM

## 2020-02-13 DIAGNOSIS — C50.112 MALIGNANT NEOPLASM OF CENTRAL PORTION OF LEFT BREAST IN FEMALE, ESTROGEN RECEPTOR POSITIVE: Primary | ICD-10-CM

## 2020-02-13 PROCEDURE — S0028 INJECTION, FAMOTIDINE, 20 MG: HCPCS | Performed by: INTERNAL MEDICINE

## 2020-02-13 PROCEDURE — 63600175 PHARM REV CODE 636 W HCPCS: Performed by: INTERNAL MEDICINE

## 2020-02-13 PROCEDURE — 96413 CHEMO IV INFUSION 1 HR: CPT

## 2020-02-13 PROCEDURE — 96367 TX/PROPH/DG ADDL SEQ IV INF: CPT

## 2020-02-13 PROCEDURE — 96375 TX/PRO/DX INJ NEW DRUG ADDON: CPT

## 2020-02-13 PROCEDURE — 25000003 PHARM REV CODE 250: Performed by: INTERNAL MEDICINE

## 2020-02-13 RX ORDER — HEPARIN 100 UNIT/ML
500 SYRINGE INTRAVENOUS
Status: DISCONTINUED | OUTPATIENT
Start: 2020-02-13 | End: 2020-02-13 | Stop reason: HOSPADM

## 2020-02-13 RX ORDER — FAMOTIDINE 10 MG/ML
20 INJECTION INTRAVENOUS
Status: COMPLETED | OUTPATIENT
Start: 2020-02-13 | End: 2020-02-13

## 2020-02-13 RX ORDER — SODIUM CHLORIDE 0.9 % (FLUSH) 0.9 %
10 SYRINGE (ML) INJECTION
Status: DISCONTINUED | OUTPATIENT
Start: 2020-02-13 | End: 2020-02-13 | Stop reason: HOSPADM

## 2020-02-13 RX ORDER — DIPHENHYDRAMINE HYDROCHLORIDE 50 MG/ML
50 INJECTION INTRAMUSCULAR; INTRAVENOUS ONCE AS NEEDED
Status: DISCONTINUED | OUTPATIENT
Start: 2020-02-13 | End: 2020-02-13 | Stop reason: HOSPADM

## 2020-02-13 RX ORDER — EPINEPHRINE 0.3 MG/.3ML
0.3 INJECTION SUBCUTANEOUS ONCE AS NEEDED
Status: DISCONTINUED | OUTPATIENT
Start: 2020-02-13 | End: 2020-02-13 | Stop reason: HOSPADM

## 2020-02-13 RX ADMIN — HEPARIN 500 UNITS: 100 SYRINGE at 10:02

## 2020-02-13 RX ADMIN — PACLITAXEL 156 MG: 6 INJECTION, SOLUTION INTRAVENOUS at 09:02

## 2020-02-13 RX ADMIN — DIPHENHYDRAMINE HYDROCHLORIDE 25 MG: 50 INJECTION, SOLUTION INTRAMUSCULAR; INTRAVENOUS at 08:02

## 2020-02-13 RX ADMIN — SODIUM CHLORIDE: 0.9 INJECTION, SOLUTION INTRAVENOUS at 08:02

## 2020-02-13 RX ADMIN — Medication 10 MG: at 08:02

## 2020-02-13 RX ADMIN — FAMOTIDINE 20 MG: 10 INJECTION, SOLUTION INTRAVENOUS at 08:02

## 2020-02-13 NOTE — PLAN OF CARE
Taxol infusion complete. Pt tolerated well. VSS. NAD. Port to chest de-accessed after heparinized per protocol.  AVS provided. Pt verbalized understanding of discharge instructions before leaving with mother.

## 2020-02-19 ENCOUNTER — LAB VISIT (OUTPATIENT)
Dept: LAB | Facility: OTHER | Age: 55
End: 2020-02-19
Attending: INTERNAL MEDICINE
Payer: COMMERCIAL

## 2020-02-19 DIAGNOSIS — Z17.0 MALIGNANT NEOPLASM OF CENTRAL PORTION OF LEFT BREAST IN FEMALE, ESTROGEN RECEPTOR POSITIVE: ICD-10-CM

## 2020-02-19 DIAGNOSIS — C50.112 MALIGNANT NEOPLASM OF CENTRAL PORTION OF LEFT BREAST IN FEMALE, ESTROGEN RECEPTOR POSITIVE: ICD-10-CM

## 2020-02-19 LAB
ALBUMIN SERPL BCP-MCNC: 3.6 G/DL (ref 3.5–5.2)
ALP SERPL-CCNC: 70 U/L (ref 55–135)
ALT SERPL W/O P-5'-P-CCNC: 17 U/L (ref 10–44)
ANION GAP SERPL CALC-SCNC: 6 MMOL/L (ref 8–16)
AST SERPL-CCNC: 13 U/L (ref 10–40)
BASOPHILS # BLD AUTO: 0.04 K/UL (ref 0–0.2)
BASOPHILS NFR BLD: 0.7 % (ref 0–1.9)
BILIRUB SERPL-MCNC: 0.2 MG/DL (ref 0.1–1)
BUN SERPL-MCNC: 10 MG/DL (ref 6–20)
CALCIUM SERPL-MCNC: 9.7 MG/DL (ref 8.7–10.5)
CHLORIDE SERPL-SCNC: 109 MMOL/L (ref 95–110)
CO2 SERPL-SCNC: 26 MMOL/L (ref 23–29)
CREAT SERPL-MCNC: 0.6 MG/DL (ref 0.5–1.4)
DIFFERENTIAL METHOD: ABNORMAL
EOSINOPHIL # BLD AUTO: 0 K/UL (ref 0–0.5)
EOSINOPHIL NFR BLD: 0.5 % (ref 0–8)
ERYTHROCYTE [DISTWIDTH] IN BLOOD BY AUTOMATED COUNT: 16.1 % (ref 11.5–14.5)
EST. GFR  (AFRICAN AMERICAN): >60 ML/MIN/1.73 M^2
EST. GFR  (NON AFRICAN AMERICAN): >60 ML/MIN/1.73 M^2
GLUCOSE SERPL-MCNC: 150 MG/DL (ref 70–110)
HCT VFR BLD AUTO: 32.5 % (ref 37–48.5)
HGB BLD-MCNC: 10 G/DL (ref 12–16)
IMM GRANULOCYTES # BLD AUTO: 0.04 K/UL (ref 0–0.04)
IMM GRANULOCYTES NFR BLD AUTO: 0.7 % (ref 0–0.5)
LYMPHOCYTES # BLD AUTO: 1.4 K/UL (ref 1–4.8)
LYMPHOCYTES NFR BLD: 25.5 % (ref 18–48)
MCH RBC QN AUTO: 29.8 PG (ref 27–31)
MCHC RBC AUTO-ENTMCNC: 30.8 G/DL (ref 32–36)
MCV RBC AUTO: 97 FL (ref 82–98)
MONOCYTES # BLD AUTO: 0.3 K/UL (ref 0.3–1)
MONOCYTES NFR BLD: 5.8 % (ref 4–15)
NEUTROPHILS # BLD AUTO: 3.7 K/UL (ref 1.8–7.7)
NEUTROPHILS NFR BLD: 66.8 % (ref 38–73)
NRBC BLD-RTO: 0 /100 WBC
PLATELET # BLD AUTO: 321 K/UL (ref 150–350)
PMV BLD AUTO: 9.1 FL (ref 9.2–12.9)
POTASSIUM SERPL-SCNC: 4.1 MMOL/L (ref 3.5–5.1)
PROT SERPL-MCNC: 6.6 G/DL (ref 6–8.4)
RBC # BLD AUTO: 3.36 M/UL (ref 4–5.4)
SODIUM SERPL-SCNC: 141 MMOL/L (ref 136–145)
WBC # BLD AUTO: 5.53 K/UL (ref 3.9–12.7)

## 2020-02-19 PROCEDURE — 80053 COMPREHEN METABOLIC PANEL: CPT

## 2020-02-19 PROCEDURE — 36415 COLL VENOUS BLD VENIPUNCTURE: CPT

## 2020-02-19 PROCEDURE — 85025 COMPLETE CBC W/AUTO DIFF WBC: CPT

## 2020-02-19 RX ORDER — SODIUM CHLORIDE 0.9 % (FLUSH) 0.9 %
10 SYRINGE (ML) INJECTION
Status: CANCELLED | OUTPATIENT
Start: 2020-02-19

## 2020-02-19 RX ORDER — DIPHENHYDRAMINE HYDROCHLORIDE 50 MG/ML
50 INJECTION INTRAMUSCULAR; INTRAVENOUS ONCE AS NEEDED
Status: CANCELLED | OUTPATIENT
Start: 2020-02-19

## 2020-02-19 RX ORDER — EPINEPHRINE 0.3 MG/.3ML
0.3 INJECTION SUBCUTANEOUS ONCE AS NEEDED
Status: CANCELLED | OUTPATIENT
Start: 2020-02-19

## 2020-02-19 RX ORDER — HEPARIN 100 UNIT/ML
500 SYRINGE INTRAVENOUS
Status: CANCELLED | OUTPATIENT
Start: 2020-02-19

## 2020-02-19 RX ORDER — FAMOTIDINE 10 MG/ML
20 INJECTION INTRAVENOUS
Status: CANCELLED | OUTPATIENT
Start: 2020-02-19

## 2020-02-20 ENCOUNTER — INFUSION (OUTPATIENT)
Dept: INFUSION THERAPY | Facility: OTHER | Age: 55
End: 2020-02-20
Attending: INTERNAL MEDICINE
Payer: COMMERCIAL

## 2020-02-20 VITALS
BODY MASS INDEX: 30.3 KG/M2 | WEIGHT: 177.5 LBS | TEMPERATURE: 98 F | HEIGHT: 64 IN | HEART RATE: 91 BPM | OXYGEN SATURATION: 100 % | DIASTOLIC BLOOD PRESSURE: 74 MMHG | RESPIRATION RATE: 18 BRPM | SYSTOLIC BLOOD PRESSURE: 137 MMHG

## 2020-02-20 DIAGNOSIS — C50.112 MALIGNANT NEOPLASM OF CENTRAL PORTION OF LEFT BREAST IN FEMALE, ESTROGEN RECEPTOR POSITIVE: Primary | ICD-10-CM

## 2020-02-20 DIAGNOSIS — Z17.0 MALIGNANT NEOPLASM OF CENTRAL PORTION OF LEFT BREAST IN FEMALE, ESTROGEN RECEPTOR POSITIVE: Primary | ICD-10-CM

## 2020-02-20 PROCEDURE — 96367 TX/PROPH/DG ADDL SEQ IV INF: CPT

## 2020-02-20 PROCEDURE — 63600175 PHARM REV CODE 636 W HCPCS: Performed by: INTERNAL MEDICINE

## 2020-02-20 PROCEDURE — S0028 INJECTION, FAMOTIDINE, 20 MG: HCPCS | Performed by: INTERNAL MEDICINE

## 2020-02-20 PROCEDURE — 96375 TX/PRO/DX INJ NEW DRUG ADDON: CPT

## 2020-02-20 PROCEDURE — 96413 CHEMO IV INFUSION 1 HR: CPT

## 2020-02-20 PROCEDURE — 25000003 PHARM REV CODE 250: Performed by: INTERNAL MEDICINE

## 2020-02-20 RX ORDER — EPINEPHRINE 0.3 MG/.3ML
0.3 INJECTION SUBCUTANEOUS ONCE AS NEEDED
Status: DISCONTINUED | OUTPATIENT
Start: 2020-02-20 | End: 2020-02-20 | Stop reason: HOSPADM

## 2020-02-20 RX ORDER — SODIUM CHLORIDE 0.9 % (FLUSH) 0.9 %
10 SYRINGE (ML) INJECTION
Status: DISCONTINUED | OUTPATIENT
Start: 2020-02-20 | End: 2020-02-20 | Stop reason: HOSPADM

## 2020-02-20 RX ORDER — DIPHENHYDRAMINE HYDROCHLORIDE 50 MG/ML
50 INJECTION INTRAMUSCULAR; INTRAVENOUS ONCE AS NEEDED
Status: DISCONTINUED | OUTPATIENT
Start: 2020-02-20 | End: 2020-02-20 | Stop reason: HOSPADM

## 2020-02-20 RX ORDER — FAMOTIDINE 10 MG/ML
20 INJECTION INTRAVENOUS
Status: COMPLETED | OUTPATIENT
Start: 2020-02-20 | End: 2020-02-20

## 2020-02-20 RX ORDER — HEPARIN 100 UNIT/ML
500 SYRINGE INTRAVENOUS
Status: DISCONTINUED | OUTPATIENT
Start: 2020-02-20 | End: 2020-02-20 | Stop reason: HOSPADM

## 2020-02-20 RX ADMIN — Medication 10 MG: at 09:02

## 2020-02-20 RX ADMIN — HEPARIN 500 UNITS: 100 SYRINGE at 11:02

## 2020-02-20 RX ADMIN — DIPHENHYDRAMINE HYDROCHLORIDE 25 MG: 50 INJECTION, SOLUTION INTRAMUSCULAR; INTRAVENOUS at 09:02

## 2020-02-20 RX ADMIN — FAMOTIDINE 20 MG: 10 INJECTION, SOLUTION INTRAVENOUS at 09:02

## 2020-02-20 RX ADMIN — SODIUM CHLORIDE: 0.9 INJECTION, SOLUTION INTRAVENOUS at 08:02

## 2020-02-20 RX ADMIN — PACLITAXEL 156 MG: 300 INJECTION, SOLUTION INTRAVENOUS at 09:02

## 2020-02-20 NOTE — PLAN OF CARE
Taxol infusion complete. Pt tolerated well. VSS. NAD. Port to chest de-accessed after heparinized per protocol.  AVS provided. Pt verbalized understanding of discharge instructions before leaving with self.

## 2020-02-26 RX ORDER — SODIUM CHLORIDE 0.9 % (FLUSH) 0.9 %
10 SYRINGE (ML) INJECTION
Status: CANCELLED | OUTPATIENT
Start: 2020-02-26

## 2020-02-26 RX ORDER — FAMOTIDINE 10 MG/ML
20 INJECTION INTRAVENOUS
Status: CANCELLED | OUTPATIENT
Start: 2020-02-26

## 2020-02-26 RX ORDER — HEPARIN 100 UNIT/ML
500 SYRINGE INTRAVENOUS
Status: CANCELLED | OUTPATIENT
Start: 2020-02-26

## 2020-02-26 RX ORDER — EPINEPHRINE 0.3 MG/.3ML
0.3 INJECTION SUBCUTANEOUS ONCE AS NEEDED
Status: CANCELLED | OUTPATIENT
Start: 2020-02-26

## 2020-02-26 RX ORDER — DIPHENHYDRAMINE HYDROCHLORIDE 50 MG/ML
50 INJECTION INTRAMUSCULAR; INTRAVENOUS ONCE AS NEEDED
Status: CANCELLED | OUTPATIENT
Start: 2020-02-26

## 2020-02-27 ENCOUNTER — INFUSION (OUTPATIENT)
Dept: INFUSION THERAPY | Facility: OTHER | Age: 55
End: 2020-02-27
Attending: INTERNAL MEDICINE
Payer: COMMERCIAL

## 2020-02-27 VITALS
BODY MASS INDEX: 31.26 KG/M2 | WEIGHT: 182.13 LBS | RESPIRATION RATE: 16 BRPM | SYSTOLIC BLOOD PRESSURE: 156 MMHG | DIASTOLIC BLOOD PRESSURE: 80 MMHG | TEMPERATURE: 98 F | HEART RATE: 94 BPM | OXYGEN SATURATION: 98 %

## 2020-02-27 DIAGNOSIS — C50.112 MALIGNANT NEOPLASM OF CENTRAL PORTION OF LEFT BREAST IN FEMALE, ESTROGEN RECEPTOR POSITIVE: Primary | ICD-10-CM

## 2020-02-27 DIAGNOSIS — Z17.0 MALIGNANT NEOPLASM OF CENTRAL PORTION OF LEFT BREAST IN FEMALE, ESTROGEN RECEPTOR POSITIVE: Primary | ICD-10-CM

## 2020-02-27 PROCEDURE — 25000003 PHARM REV CODE 250: Performed by: INTERNAL MEDICINE

## 2020-02-27 PROCEDURE — 63600175 PHARM REV CODE 636 W HCPCS: Performed by: INTERNAL MEDICINE

## 2020-02-27 PROCEDURE — 96413 CHEMO IV INFUSION 1 HR: CPT

## 2020-02-27 PROCEDURE — 96375 TX/PRO/DX INJ NEW DRUG ADDON: CPT

## 2020-02-27 PROCEDURE — 96367 TX/PROPH/DG ADDL SEQ IV INF: CPT

## 2020-02-27 PROCEDURE — S0028 INJECTION, FAMOTIDINE, 20 MG: HCPCS | Performed by: INTERNAL MEDICINE

## 2020-02-27 RX ORDER — ONDANSETRON 2 MG/ML
8 INJECTION INTRAMUSCULAR; INTRAVENOUS
Status: COMPLETED | OUTPATIENT
Start: 2020-02-27 | End: 2020-02-27

## 2020-02-27 RX ORDER — HEPARIN 100 UNIT/ML
500 SYRINGE INTRAVENOUS
Status: DISCONTINUED | OUTPATIENT
Start: 2020-02-27 | End: 2020-02-27 | Stop reason: HOSPADM

## 2020-02-27 RX ORDER — EPINEPHRINE 0.3 MG/.3ML
0.3 INJECTION SUBCUTANEOUS ONCE AS NEEDED
Status: DISCONTINUED | OUTPATIENT
Start: 2020-02-27 | End: 2020-02-27 | Stop reason: HOSPADM

## 2020-02-27 RX ORDER — FAMOTIDINE 10 MG/ML
20 INJECTION INTRAVENOUS
Status: COMPLETED | OUTPATIENT
Start: 2020-02-27 | End: 2020-02-27

## 2020-02-27 RX ORDER — SODIUM CHLORIDE 0.9 % (FLUSH) 0.9 %
10 SYRINGE (ML) INJECTION
Status: DISCONTINUED | OUTPATIENT
Start: 2020-02-27 | End: 2020-02-27 | Stop reason: HOSPADM

## 2020-02-27 RX ORDER — DIPHENHYDRAMINE HYDROCHLORIDE 50 MG/ML
50 INJECTION INTRAMUSCULAR; INTRAVENOUS ONCE AS NEEDED
Status: DISCONTINUED | OUTPATIENT
Start: 2020-02-27 | End: 2020-02-27 | Stop reason: HOSPADM

## 2020-02-27 RX ADMIN — Medication 10 MG: at 09:02

## 2020-02-27 RX ADMIN — SODIUM CHLORIDE: 0.9 INJECTION, SOLUTION INTRAVENOUS at 08:02

## 2020-02-27 RX ADMIN — DIPHENHYDRAMINE HYDROCHLORIDE 25 MG: 50 INJECTION, SOLUTION INTRAMUSCULAR; INTRAVENOUS at 08:02

## 2020-02-27 RX ADMIN — PACLITAXEL 156 MG: 6 INJECTION, SOLUTION INTRAVENOUS at 09:02

## 2020-02-27 RX ADMIN — FAMOTIDINE 20 MG: 10 INJECTION, SOLUTION INTRAVENOUS at 08:02

## 2020-02-27 RX ADMIN — ONDANSETRON 8 MG: 2 INJECTION INTRAMUSCULAR; INTRAVENOUS at 09:02

## 2020-02-27 RX ADMIN — HEPARIN 500 UNITS: 100 SYRINGE at 10:02

## 2020-03-04 ENCOUNTER — TELEPHONE (OUTPATIENT)
Dept: SURGERY | Facility: CLINIC | Age: 55
End: 2020-03-04

## 2020-03-04 NOTE — TELEPHONE ENCOUNTER
Called the patient about her appointment with Dr. Mccray on 3/12 needing to have the time changed to due Dr. Mccray being in surgery. She was agreeable to changing her appointment from 1045 to 1145 on 3/12. She voiced thanks and understanding.

## 2020-03-05 ENCOUNTER — LAB VISIT (OUTPATIENT)
Dept: LAB | Facility: OTHER | Age: 55
End: 2020-03-05
Attending: INTERNAL MEDICINE
Payer: COMMERCIAL

## 2020-03-05 ENCOUNTER — INFUSION (OUTPATIENT)
Dept: INFUSION THERAPY | Facility: OTHER | Age: 55
End: 2020-03-05
Attending: INTERNAL MEDICINE
Payer: COMMERCIAL

## 2020-03-05 ENCOUNTER — OFFICE VISIT (OUTPATIENT)
Dept: HEMATOLOGY/ONCOLOGY | Facility: CLINIC | Age: 55
End: 2020-03-05
Payer: COMMERCIAL

## 2020-03-05 VITALS
OXYGEN SATURATION: 99 % | BODY MASS INDEX: 30.75 KG/M2 | HEIGHT: 64 IN | RESPIRATION RATE: 18 BRPM | DIASTOLIC BLOOD PRESSURE: 84 MMHG | TEMPERATURE: 98 F | HEART RATE: 88 BPM | SYSTOLIC BLOOD PRESSURE: 154 MMHG | WEIGHT: 180.13 LBS

## 2020-03-05 VITALS — DIASTOLIC BLOOD PRESSURE: 84 MMHG | SYSTOLIC BLOOD PRESSURE: 145 MMHG | HEART RATE: 99 BPM

## 2020-03-05 DIAGNOSIS — Z17.0 MALIGNANT NEOPLASM OF CENTRAL PORTION OF LEFT BREAST IN FEMALE, ESTROGEN RECEPTOR POSITIVE: Primary | ICD-10-CM

## 2020-03-05 DIAGNOSIS — C50.112 MALIGNANT NEOPLASM OF CENTRAL PORTION OF LEFT BREAST IN FEMALE, ESTROGEN RECEPTOR POSITIVE: Primary | ICD-10-CM

## 2020-03-05 DIAGNOSIS — D64.81 ANTINEOPLASTIC CHEMOTHERAPY INDUCED ANEMIA: ICD-10-CM

## 2020-03-05 DIAGNOSIS — Z17.0 MALIGNANT NEOPLASM OF CENTRAL PORTION OF LEFT BREAST IN FEMALE, ESTROGEN RECEPTOR POSITIVE: ICD-10-CM

## 2020-03-05 DIAGNOSIS — T45.1X5A ANTINEOPLASTIC CHEMOTHERAPY INDUCED ANEMIA: ICD-10-CM

## 2020-03-05 DIAGNOSIS — E11.9 TYPE 2 DIABETES MELLITUS WITHOUT COMPLICATION, WITHOUT LONG-TERM CURRENT USE OF INSULIN: ICD-10-CM

## 2020-03-05 DIAGNOSIS — C50.912 CARCINOMA OF LEFT BREAST METASTATIC TO AXILLARY LYMPH NODE: ICD-10-CM

## 2020-03-05 DIAGNOSIS — I10 ESSENTIAL HYPERTENSION: ICD-10-CM

## 2020-03-05 DIAGNOSIS — C77.3 CARCINOMA OF LEFT BREAST METASTATIC TO AXILLARY LYMPH NODE: ICD-10-CM

## 2020-03-05 DIAGNOSIS — C50.112 MALIGNANT NEOPLASM OF CENTRAL PORTION OF LEFT BREAST IN FEMALE, ESTROGEN RECEPTOR POSITIVE: ICD-10-CM

## 2020-03-05 LAB
ALBUMIN SERPL BCP-MCNC: 3.7 G/DL (ref 3.5–5.2)
ALP SERPL-CCNC: 71 U/L (ref 55–135)
ALT SERPL W/O P-5'-P-CCNC: 23 U/L (ref 10–44)
ANION GAP SERPL CALC-SCNC: 10 MMOL/L (ref 8–16)
AST SERPL-CCNC: 19 U/L (ref 10–40)
BASOPHILS # BLD AUTO: 0.04 K/UL (ref 0–0.2)
BASOPHILS NFR BLD: 0.6 % (ref 0–1.9)
BILIRUB SERPL-MCNC: 0.3 MG/DL (ref 0.1–1)
BUN SERPL-MCNC: 7 MG/DL (ref 6–20)
CALCIUM SERPL-MCNC: 9.5 MG/DL (ref 8.7–10.5)
CHLORIDE SERPL-SCNC: 107 MMOL/L (ref 95–110)
CO2 SERPL-SCNC: 25 MMOL/L (ref 23–29)
CREAT SERPL-MCNC: 0.7 MG/DL (ref 0.5–1.4)
DIFFERENTIAL METHOD: ABNORMAL
EOSINOPHIL # BLD AUTO: 0 K/UL (ref 0–0.5)
EOSINOPHIL NFR BLD: 0.3 % (ref 0–8)
ERYTHROCYTE [DISTWIDTH] IN BLOOD BY AUTOMATED COUNT: 15.6 % (ref 11.5–14.5)
EST. GFR  (AFRICAN AMERICAN): >60 ML/MIN/1.73 M^2
EST. GFR  (NON AFRICAN AMERICAN): >60 ML/MIN/1.73 M^2
GLUCOSE SERPL-MCNC: 134 MG/DL (ref 70–110)
HCT VFR BLD AUTO: 34 % (ref 37–48.5)
HGB BLD-MCNC: 11 G/DL (ref 12–16)
IMM GRANULOCYTES # BLD AUTO: 0.06 K/UL (ref 0–0.04)
IMM GRANULOCYTES NFR BLD AUTO: 1 % (ref 0–0.5)
LYMPHOCYTES # BLD AUTO: 1.3 K/UL (ref 1–4.8)
LYMPHOCYTES NFR BLD: 21.4 % (ref 18–48)
MCH RBC QN AUTO: 30.6 PG (ref 27–31)
MCHC RBC AUTO-ENTMCNC: 32.4 G/DL (ref 32–36)
MCV RBC AUTO: 95 FL (ref 82–98)
MONOCYTES # BLD AUTO: 0.5 K/UL (ref 0.3–1)
MONOCYTES NFR BLD: 7.4 % (ref 4–15)
NEUTROPHILS # BLD AUTO: 4.3 K/UL (ref 1.8–7.7)
NEUTROPHILS NFR BLD: 69.3 % (ref 38–73)
NRBC BLD-RTO: 0 /100 WBC
PLATELET # BLD AUTO: 350 K/UL (ref 150–350)
PMV BLD AUTO: 9.2 FL (ref 9.2–12.9)
POTASSIUM SERPL-SCNC: 3.7 MMOL/L (ref 3.5–5.1)
PROT SERPL-MCNC: 6.9 G/DL (ref 6–8.4)
RBC # BLD AUTO: 3.59 M/UL (ref 4–5.4)
SODIUM SERPL-SCNC: 142 MMOL/L (ref 136–145)
WBC # BLD AUTO: 6.21 K/UL (ref 3.9–12.7)

## 2020-03-05 PROCEDURE — 99215 OFFICE O/P EST HI 40 MIN: CPT | Mod: S$GLB,,, | Performed by: INTERNAL MEDICINE

## 2020-03-05 PROCEDURE — 63600175 PHARM REV CODE 636 W HCPCS: Performed by: INTERNAL MEDICINE

## 2020-03-05 PROCEDURE — 3008F PR BODY MASS INDEX (BMI) DOCUMENTED: ICD-10-PCS | Mod: CPTII,S$GLB,, | Performed by: INTERNAL MEDICINE

## 2020-03-05 PROCEDURE — 3077F PR MOST RECENT SYSTOLIC BLOOD PRESSURE >= 140 MM HG: ICD-10-PCS | Mod: CPTII,S$GLB,, | Performed by: INTERNAL MEDICINE

## 2020-03-05 PROCEDURE — 96413 CHEMO IV INFUSION 1 HR: CPT

## 2020-03-05 PROCEDURE — 99999 PR PBB SHADOW E&M-EST. PATIENT-LVL III: CPT | Mod: PBBFAC,,, | Performed by: INTERNAL MEDICINE

## 2020-03-05 PROCEDURE — 3079F DIAST BP 80-89 MM HG: CPT | Mod: CPTII,S$GLB,, | Performed by: INTERNAL MEDICINE

## 2020-03-05 PROCEDURE — 96375 TX/PRO/DX INJ NEW DRUG ADDON: CPT

## 2020-03-05 PROCEDURE — 36415 COLL VENOUS BLD VENIPUNCTURE: CPT

## 2020-03-05 PROCEDURE — 3077F SYST BP >= 140 MM HG: CPT | Mod: CPTII,S$GLB,, | Performed by: INTERNAL MEDICINE

## 2020-03-05 PROCEDURE — 3008F BODY MASS INDEX DOCD: CPT | Mod: CPTII,S$GLB,, | Performed by: INTERNAL MEDICINE

## 2020-03-05 PROCEDURE — 99999 PR PBB SHADOW E&M-EST. PATIENT-LVL III: ICD-10-PCS | Mod: PBBFAC,,, | Performed by: INTERNAL MEDICINE

## 2020-03-05 PROCEDURE — 80053 COMPREHEN METABOLIC PANEL: CPT

## 2020-03-05 PROCEDURE — 96367 TX/PROPH/DG ADDL SEQ IV INF: CPT

## 2020-03-05 PROCEDURE — 25000003 PHARM REV CODE 250: Performed by: INTERNAL MEDICINE

## 2020-03-05 PROCEDURE — 99215 PR OFFICE/OUTPT VISIT, EST, LEVL V, 40-54 MIN: ICD-10-PCS | Mod: S$GLB,,, | Performed by: INTERNAL MEDICINE

## 2020-03-05 PROCEDURE — 3079F PR MOST RECENT DIASTOLIC BLOOD PRESSURE 80-89 MM HG: ICD-10-PCS | Mod: CPTII,S$GLB,, | Performed by: INTERNAL MEDICINE

## 2020-03-05 PROCEDURE — S0028 INJECTION, FAMOTIDINE, 20 MG: HCPCS | Performed by: INTERNAL MEDICINE

## 2020-03-05 PROCEDURE — 85025 COMPLETE CBC W/AUTO DIFF WBC: CPT

## 2020-03-05 RX ORDER — DIPHENHYDRAMINE HYDROCHLORIDE 50 MG/ML
50 INJECTION INTRAMUSCULAR; INTRAVENOUS ONCE AS NEEDED
Status: DISCONTINUED | OUTPATIENT
Start: 2020-03-05 | End: 2020-03-05 | Stop reason: HOSPADM

## 2020-03-05 RX ORDER — HEPARIN 100 UNIT/ML
500 SYRINGE INTRAVENOUS
Status: CANCELLED | OUTPATIENT
Start: 2020-03-05

## 2020-03-05 RX ORDER — DIPHENHYDRAMINE HYDROCHLORIDE 50 MG/ML
50 INJECTION INTRAMUSCULAR; INTRAVENOUS ONCE AS NEEDED
Status: CANCELLED | OUTPATIENT
Start: 2020-03-05

## 2020-03-05 RX ORDER — FAMOTIDINE 10 MG/ML
20 INJECTION INTRAVENOUS
Status: COMPLETED | OUTPATIENT
Start: 2020-03-05 | End: 2020-03-05

## 2020-03-05 RX ORDER — SODIUM CHLORIDE 0.9 % (FLUSH) 0.9 %
10 SYRINGE (ML) INJECTION
Status: DISCONTINUED | OUTPATIENT
Start: 2020-03-05 | End: 2020-03-05 | Stop reason: HOSPADM

## 2020-03-05 RX ORDER — EPINEPHRINE 0.3 MG/.3ML
0.3 INJECTION SUBCUTANEOUS ONCE AS NEEDED
Status: CANCELLED | OUTPATIENT
Start: 2020-03-05

## 2020-03-05 RX ORDER — FAMOTIDINE 10 MG/ML
20 INJECTION INTRAVENOUS
Status: CANCELLED | OUTPATIENT
Start: 2020-03-05

## 2020-03-05 RX ORDER — HEPARIN 100 UNIT/ML
500 SYRINGE INTRAVENOUS
Status: DISCONTINUED | OUTPATIENT
Start: 2020-03-05 | End: 2020-03-05 | Stop reason: HOSPADM

## 2020-03-05 RX ORDER — EPINEPHRINE 0.3 MG/.3ML
0.3 INJECTION SUBCUTANEOUS ONCE AS NEEDED
Status: DISCONTINUED | OUTPATIENT
Start: 2020-03-05 | End: 2020-03-05 | Stop reason: HOSPADM

## 2020-03-05 RX ORDER — SODIUM CHLORIDE 0.9 % (FLUSH) 0.9 %
10 SYRINGE (ML) INJECTION
Status: CANCELLED | OUTPATIENT
Start: 2020-03-05

## 2020-03-05 RX ADMIN — FAMOTIDINE 20 MG: 10 INJECTION INTRAVENOUS at 09:03

## 2020-03-05 RX ADMIN — PACLITAXEL 156 MG: 6 INJECTION, SOLUTION INTRAVENOUS at 10:03

## 2020-03-05 RX ADMIN — DIPHENHYDRAMINE HYDROCHLORIDE 25 MG: 50 INJECTION, SOLUTION INTRAMUSCULAR; INTRAVENOUS at 10:03

## 2020-03-05 RX ADMIN — Medication 10 MG: at 09:03

## 2020-03-05 RX ADMIN — SODIUM CHLORIDE: 0.9 INJECTION, SOLUTION INTRAVENOUS at 09:03

## 2020-03-05 RX ADMIN — HEPARIN 500 UNITS: 100 SYRINGE at 11:03

## 2020-03-05 NOTE — PROGRESS NOTES
Subjective:       Patient ID: Shu Mendoza is a 54 y.o. female.     Chief Complaint: follow up for breast cancer     Diagnosis:  Stage IA ([id number]) invasive ductal carcinoma of the left breast, grade 2, ER/IL positive, HER2 negative, s/p left mastectomy and reconstruction on 9/11/2019. Mammaprint high risk     Oncologic History:  1. Ms Mendoza is a 53 yo postmenopausal woman with HTN, DM, who presents today for further management of pathologic stage IA ([id number]) invasive ductal carcinoma of the left breast. She was referred to Dr Mccray for bloody nipple discharge first noted in June 2019. Before then she had a negative mammogram on 5/20/19. She had a left breast US on 6/27/19 in the left breast retroareolar region just behind the nipple, there is an irregular hypoechoic intraductal mass measuring 1.0 cm. She underwent a biopsy on 7/8/2019. It showed invasive ductal carcinoma, ER strongly positive 100%, IL positive 80%, HER2 negative 1+. Ki67 5% activity within invasive component. Patient underwent a left total mastectomy and reconstruction on 9/11/2019. Pathology showed a 1.3 cm invasive ductal carcinoma, grade 2. DCIS present, negative margin, 22 lymph nodes removed, one lymph node positive with macrometastases 13 mm, no extranodal extension. No lymphovascular invasion. Pathologic T1c N1a. Mammaprint high risk with chemo benefit >12%. 5-10 years recurrence risk without chemo 20-25%, with chemo and endocrine therapy 7%. BRCAnalysis from 7/18/19 was negative. Case was discussed at tumor board. Adjuvant chemotherapy followed by endocrine therapy was recommended. Radiation not recommended. Patient presents today for further management. Feeling well. Works at [email or web address]. She had NILES/BSO in 2000 and was on hormone replacement therapy after that. Stopped in 2019 after she was diagnosed with breast cancer.   2. Port placed by Dr Mccray on 10/25/19. Echo on 10/22/19 showed normal LVEF 59%.   3. Adjuvant DDAC followed by  weekly taxol started on 10/31/2019. cycle 2 on 19. cycle 3 was delayed for a week for umbilical infection, given on 2019, cycle 4 given on 19. Weekly taxol started on 19.      Interval History:   Ms Mendoza returns today for follow up. Due for week 10 of taxol today.  Tolerating it well. Mild neuropathy in hands L>R. Denies other complaints.      ECO     ROS:   A ten-point system review is obtained and negative except for what was stated in the Interval History.      Physical Examination:   Vital signs reviewed.   General: well hydrated, well developed, in no acute distress  HEENT: normocephalic, PERRLA, EOMI, anicteric sclerae, oropharynx clear  Neck: supple, no JVD, thyromegaly, cervical or supraclavicular lymphadenopathy  Lungs: clear breath sounds bilaterally, no wheezing, rales, or rhonchi  Heart: RRR, no M/R/G  Abdomen: soft, no tenderness, non-distended, no hepatosplenomegaly, mass, or hernia. BS present. No pus, drainage or abnormality of the umbilicus.   Extremities: no clubbing, cyanosis, or edema  Skin: no rash, ulcer, or open wounds  Neuro: alert and oriented x 4, no focal neuro deficit  Psych: pleasant and appropriate mood and affect  Breasts: s/p left mastectomy and reconstruction, bilateral breasts no abnormal skin nodules, open wounds, masses, or axillary LAD     Objective:      Laboratory Data:  Labs reviewed. CBC, CMP unremarkable.      Imaging Data:  Echo on 10/22/19 showed normal LVEF 59%.     Assessment and Plan:      1. Malignant neoplasm of central portion of left breast in female, estrogen receptor positive    2. Carcinoma of left breast metastatic to axillary lymph node    3. Antineoplastic chemotherapy induced anemia    4. Essential hypertension    5. Type 2 diabetes mellitus without complication, without long-term current use of insulin        1.2  - Ms Mendoza is a 53 yo postmenopausal woman with stage IA ([id number]) invasive ductal carcinoma of the left breast, ER/MO  positive, HER2 negative, s/p left mastectomy and reconstruction on 9/11/2019. Mammaprint high risk with chemo benefit >12%. 5-10 years recurrence risk without chemo 20-25%, with chemo and endocrine therapy 7%.  - on adjuvant DDAC followed by weekly taxol  - doing well. Week 10 of weekly taxol today  - continue with weekly taxol  - see me in 3 weeks to discuss adjuvant endocrine therapy with AI     3.  - mild.  - monitor     4.  - BP good  - c/w current meds     5.  - BS good  - c/w current mds       Follow-up:      RTC in 3 weeks   Knows to call in the interval if any problems arise.

## 2020-03-05 NOTE — Clinical Note
RTC in 3 weeks to see me. If I am not available, see Dr Braxton or Dr Helms to discuss adjuvant endocrine therapy

## 2020-03-11 ENCOUNTER — LAB VISIT (OUTPATIENT)
Dept: LAB | Facility: OTHER | Age: 55
End: 2020-03-11
Attending: INTERNAL MEDICINE
Payer: COMMERCIAL

## 2020-03-11 DIAGNOSIS — Z17.0 MALIGNANT NEOPLASM OF CENTRAL PORTION OF LEFT BREAST IN FEMALE, ESTROGEN RECEPTOR POSITIVE: ICD-10-CM

## 2020-03-11 DIAGNOSIS — C50.112 MALIGNANT NEOPLASM OF CENTRAL PORTION OF LEFT BREAST IN FEMALE, ESTROGEN RECEPTOR POSITIVE: ICD-10-CM

## 2020-03-11 LAB
ALBUMIN SERPL BCP-MCNC: 3.5 G/DL (ref 3.5–5.2)
ALP SERPL-CCNC: 72 U/L (ref 55–135)
ALT SERPL W/O P-5'-P-CCNC: 25 U/L (ref 10–44)
ANION GAP SERPL CALC-SCNC: 10 MMOL/L (ref 8–16)
AST SERPL-CCNC: 15 U/L (ref 10–40)
BASOPHILS # BLD AUTO: 0.04 K/UL (ref 0–0.2)
BASOPHILS NFR BLD: 0.6 % (ref 0–1.9)
BILIRUB SERPL-MCNC: 0.2 MG/DL (ref 0.1–1)
BUN SERPL-MCNC: 11 MG/DL (ref 6–20)
CALCIUM SERPL-MCNC: 9.4 MG/DL (ref 8.7–10.5)
CHLORIDE SERPL-SCNC: 108 MMOL/L (ref 95–110)
CO2 SERPL-SCNC: 24 MMOL/L (ref 23–29)
CREAT SERPL-MCNC: 0.7 MG/DL (ref 0.5–1.4)
DIFFERENTIAL METHOD: ABNORMAL
EOSINOPHIL # BLD AUTO: 0 K/UL (ref 0–0.5)
EOSINOPHIL NFR BLD: 0.3 % (ref 0–8)
ERYTHROCYTE [DISTWIDTH] IN BLOOD BY AUTOMATED COUNT: 15.5 % (ref 11.5–14.5)
EST. GFR  (AFRICAN AMERICAN): >60 ML/MIN/1.73 M^2
EST. GFR  (NON AFRICAN AMERICAN): >60 ML/MIN/1.73 M^2
GLUCOSE SERPL-MCNC: 181 MG/DL (ref 70–110)
HCT VFR BLD AUTO: 33.6 % (ref 37–48.5)
HGB BLD-MCNC: 10.7 G/DL (ref 12–16)
IMM GRANULOCYTES # BLD AUTO: 0.08 K/UL (ref 0–0.04)
IMM GRANULOCYTES NFR BLD AUTO: 1.3 % (ref 0–0.5)
LYMPHOCYTES # BLD AUTO: 1.6 K/UL (ref 1–4.8)
LYMPHOCYTES NFR BLD: 24.7 % (ref 18–48)
MCH RBC QN AUTO: 30.5 PG (ref 27–31)
MCHC RBC AUTO-ENTMCNC: 31.8 G/DL (ref 32–36)
MCV RBC AUTO: 96 FL (ref 82–98)
MONOCYTES # BLD AUTO: 0.5 K/UL (ref 0.3–1)
MONOCYTES NFR BLD: 7.3 % (ref 4–15)
NEUTROPHILS # BLD AUTO: 4.2 K/UL (ref 1.8–7.7)
NEUTROPHILS NFR BLD: 65.8 % (ref 38–73)
NRBC BLD-RTO: 0 /100 WBC
PLATELET # BLD AUTO: 383 K/UL (ref 150–350)
PMV BLD AUTO: 9.3 FL (ref 9.2–12.9)
POTASSIUM SERPL-SCNC: 3.5 MMOL/L (ref 3.5–5.1)
PROT SERPL-MCNC: 6.6 G/DL (ref 6–8.4)
RBC # BLD AUTO: 3.51 M/UL (ref 4–5.4)
SODIUM SERPL-SCNC: 142 MMOL/L (ref 136–145)
WBC # BLD AUTO: 6.32 K/UL (ref 3.9–12.7)

## 2020-03-11 PROCEDURE — 85025 COMPLETE CBC W/AUTO DIFF WBC: CPT

## 2020-03-11 PROCEDURE — 36415 COLL VENOUS BLD VENIPUNCTURE: CPT

## 2020-03-11 PROCEDURE — 80053 COMPREHEN METABOLIC PANEL: CPT

## 2020-03-11 RX ORDER — EPINEPHRINE 0.3 MG/.3ML
0.3 INJECTION SUBCUTANEOUS ONCE AS NEEDED
Status: CANCELLED | OUTPATIENT
Start: 2020-03-11

## 2020-03-11 RX ORDER — DIPHENHYDRAMINE HYDROCHLORIDE 50 MG/ML
50 INJECTION INTRAMUSCULAR; INTRAVENOUS ONCE AS NEEDED
Status: CANCELLED | OUTPATIENT
Start: 2020-03-11

## 2020-03-11 RX ORDER — FAMOTIDINE 10 MG/ML
20 INJECTION INTRAVENOUS
Status: CANCELLED | OUTPATIENT
Start: 2020-03-11

## 2020-03-11 RX ORDER — SODIUM CHLORIDE 0.9 % (FLUSH) 0.9 %
10 SYRINGE (ML) INJECTION
Status: CANCELLED | OUTPATIENT
Start: 2020-03-11

## 2020-03-11 RX ORDER — HEPARIN 100 UNIT/ML
500 SYRINGE INTRAVENOUS
Status: CANCELLED | OUTPATIENT
Start: 2020-03-11

## 2020-03-12 ENCOUNTER — OFFICE VISIT (OUTPATIENT)
Dept: SURGERY | Facility: CLINIC | Age: 55
End: 2020-03-12
Attending: SURGERY
Payer: COMMERCIAL

## 2020-03-12 ENCOUNTER — INFUSION (OUTPATIENT)
Dept: INFUSION THERAPY | Facility: OTHER | Age: 55
End: 2020-03-12
Attending: INTERNAL MEDICINE
Payer: COMMERCIAL

## 2020-03-12 VITALS
HEART RATE: 91 BPM | WEIGHT: 180.75 LBS | SYSTOLIC BLOOD PRESSURE: 155 MMHG | HEIGHT: 64 IN | RESPIRATION RATE: 16 BRPM | OXYGEN SATURATION: 98 % | TEMPERATURE: 98 F | DIASTOLIC BLOOD PRESSURE: 82 MMHG | BODY MASS INDEX: 30.86 KG/M2

## 2020-03-12 VITALS
TEMPERATURE: 98 F | HEIGHT: 64 IN | HEART RATE: 110 BPM | WEIGHT: 180.75 LBS | BODY MASS INDEX: 30.86 KG/M2 | DIASTOLIC BLOOD PRESSURE: 85 MMHG | SYSTOLIC BLOOD PRESSURE: 171 MMHG

## 2020-03-12 DIAGNOSIS — Z17.0 MALIGNANT NEOPLASM OF CENTRAL PORTION OF LEFT BREAST IN FEMALE, ESTROGEN RECEPTOR POSITIVE: Primary | ICD-10-CM

## 2020-03-12 DIAGNOSIS — C50.112 MALIGNANT NEOPLASM OF CENTRAL PORTION OF LEFT BREAST IN FEMALE, ESTROGEN RECEPTOR POSITIVE: Primary | ICD-10-CM

## 2020-03-12 DIAGNOSIS — C50.912 MALIGNANT NEOPLASM OF LEFT BREAST IN FEMALE, ESTROGEN RECEPTOR POSITIVE, UNSPECIFIED SITE OF BREAST: Primary | ICD-10-CM

## 2020-03-12 DIAGNOSIS — Z17.0 MALIGNANT NEOPLASM OF LEFT BREAST IN FEMALE, ESTROGEN RECEPTOR POSITIVE, UNSPECIFIED SITE OF BREAST: Primary | ICD-10-CM

## 2020-03-12 DIAGNOSIS — M79.89 LEFT ARM SWELLING: ICD-10-CM

## 2020-03-12 DIAGNOSIS — Z12.31 ENCOUNTER FOR SCREENING MAMMOGRAM FOR BREAST CANCER: Primary | ICD-10-CM

## 2020-03-12 PROCEDURE — 99213 OFFICE O/P EST LOW 20 MIN: CPT | Mod: S$GLB,,, | Performed by: SURGERY

## 2020-03-12 PROCEDURE — 3077F SYST BP >= 140 MM HG: CPT | Mod: CPTII,S$GLB,, | Performed by: SURGERY

## 2020-03-12 PROCEDURE — 99213 PR OFFICE/OUTPT VISIT, EST, LEVL III, 20-29 MIN: ICD-10-PCS | Mod: S$GLB,,, | Performed by: SURGERY

## 2020-03-12 PROCEDURE — 3079F DIAST BP 80-89 MM HG: CPT | Mod: CPTII,S$GLB,, | Performed by: SURGERY

## 2020-03-12 PROCEDURE — 63600175 PHARM REV CODE 636 W HCPCS: Performed by: INTERNAL MEDICINE

## 2020-03-12 PROCEDURE — S0028 INJECTION, FAMOTIDINE, 20 MG: HCPCS | Performed by: INTERNAL MEDICINE

## 2020-03-12 PROCEDURE — 3008F BODY MASS INDEX DOCD: CPT | Mod: CPTII,S$GLB,, | Performed by: SURGERY

## 2020-03-12 PROCEDURE — 96367 TX/PROPH/DG ADDL SEQ IV INF: CPT

## 2020-03-12 PROCEDURE — 96413 CHEMO IV INFUSION 1 HR: CPT

## 2020-03-12 PROCEDURE — 25000003 PHARM REV CODE 250: Performed by: INTERNAL MEDICINE

## 2020-03-12 PROCEDURE — 3079F PR MOST RECENT DIASTOLIC BLOOD PRESSURE 80-89 MM HG: ICD-10-PCS | Mod: CPTII,S$GLB,, | Performed by: SURGERY

## 2020-03-12 PROCEDURE — 3008F PR BODY MASS INDEX (BMI) DOCUMENTED: ICD-10-PCS | Mod: CPTII,S$GLB,, | Performed by: SURGERY

## 2020-03-12 PROCEDURE — 96376 TX/PRO/DX INJ SAME DRUG ADON: CPT

## 2020-03-12 PROCEDURE — 3077F PR MOST RECENT SYSTOLIC BLOOD PRESSURE >= 140 MM HG: ICD-10-PCS | Mod: CPTII,S$GLB,, | Performed by: SURGERY

## 2020-03-12 RX ORDER — HEPARIN 100 UNIT/ML
500 SYRINGE INTRAVENOUS
Status: DISCONTINUED | OUTPATIENT
Start: 2020-03-12 | End: 2020-03-12 | Stop reason: HOSPADM

## 2020-03-12 RX ORDER — EPINEPHRINE 0.3 MG/.3ML
0.3 INJECTION SUBCUTANEOUS ONCE AS NEEDED
Status: DISCONTINUED | OUTPATIENT
Start: 2020-03-12 | End: 2020-03-12 | Stop reason: HOSPADM

## 2020-03-12 RX ORDER — SODIUM CHLORIDE 0.9 % (FLUSH) 0.9 %
10 SYRINGE (ML) INJECTION
Status: DISCONTINUED | OUTPATIENT
Start: 2020-03-12 | End: 2020-03-12 | Stop reason: HOSPADM

## 2020-03-12 RX ORDER — DIPHENHYDRAMINE HYDROCHLORIDE 50 MG/ML
50 INJECTION INTRAMUSCULAR; INTRAVENOUS ONCE AS NEEDED
Status: DISCONTINUED | OUTPATIENT
Start: 2020-03-12 | End: 2020-03-12 | Stop reason: HOSPADM

## 2020-03-12 RX ORDER — FAMOTIDINE 10 MG/ML
20 INJECTION INTRAVENOUS
Status: COMPLETED | OUTPATIENT
Start: 2020-03-12 | End: 2020-03-12

## 2020-03-12 RX ADMIN — Medication 10 MG: at 08:03

## 2020-03-12 RX ADMIN — FAMOTIDINE 20 MG: 10 INJECTION, SOLUTION INTRAVENOUS at 08:03

## 2020-03-12 RX ADMIN — PACLITAXEL 156 MG: 6 INJECTION, SOLUTION INTRAVENOUS at 09:03

## 2020-03-12 RX ADMIN — DIPHENHYDRAMINE HYDROCHLORIDE 25 MG: 50 INJECTION, SOLUTION INTRAMUSCULAR; INTRAVENOUS at 08:03

## 2020-03-12 RX ADMIN — HEPARIN 500 UNITS: 100 SYRINGE at 10:03

## 2020-03-12 RX ADMIN — SODIUM CHLORIDE: 0.9 INJECTION, SOLUTION INTRAVENOUS at 08:03

## 2020-03-12 NOTE — PROGRESS NOTES
[facility]  [facility]  3/12/2020      SUBJECTIVE:   Ms. Shu Mendoza is a 54 y.o. female with LEFT breast cancer who presents today for post op check, s/p mediport placement at right chest.      History of Present Illness: Patient was originally seen by me on 6/19/19 for left sided bloody nipple discharge. An ultrasound was ordered and showed an intraductal mass at the left retroareolar region (BIRADS 4, suspicious.    A biopsy of this LEFT breast mass proved to be consistent with invasive mammary carcinoma, ER/MA+ Her2--.    She was then seen by Dr. Shikha Mcrcay to discuss treatment options.  ON 9/11/19 she had a left skin sparing mastectomy and SLNB with left SALEEM flap reconstruction.  Final pathology showed grade IDC, 13mm with 0 positive lymph nodes, stage T1cN1 (stage 1B).    Her mammaprint score was high risk so adjuvant chemotherapy and endocrine therapy were recommended.  Adjuvant DDAC with taxol with Dr. Norris. Currently on taxol still.    Interval history: Patient states she is doing okay.  Almost done with chemo.    Review of Systems: Denies any chest pain or shortness of breath.  Denies any fever or chills.  See HPI/ Interval History for other systems reviewed.    OBJECTIVE:   There were no vitals filed for this visit.   Physical Exam:  HEENT: Normocephalic, atraumatic.    General: alert and oriented; no acute distress.  Breast: Well healed scar at upper right chest (port site).  No erythema or edema.  Well healed scar at left breast.      ASSESSMENT:  Ms. Shu Mendoza is a 54 y.o. year old female with LEFT breast cancer who presents today for f/u . JACKIE.  PLAN:     F/u 6 months.   Due for mmg. Overdue. Needs right mammogram.

## 2020-03-18 RX ORDER — HEPARIN 100 UNIT/ML
500 SYRINGE INTRAVENOUS
Status: CANCELLED | OUTPATIENT
Start: 2020-03-18

## 2020-03-18 RX ORDER — SODIUM CHLORIDE 0.9 % (FLUSH) 0.9 %
10 SYRINGE (ML) INJECTION
Status: CANCELLED | OUTPATIENT
Start: 2020-03-18

## 2020-03-18 RX ORDER — FAMOTIDINE 10 MG/ML
20 INJECTION INTRAVENOUS
Status: CANCELLED | OUTPATIENT
Start: 2020-03-18

## 2020-03-18 RX ORDER — DIPHENHYDRAMINE HYDROCHLORIDE 50 MG/ML
50 INJECTION INTRAMUSCULAR; INTRAVENOUS ONCE AS NEEDED
Status: CANCELLED | OUTPATIENT
Start: 2020-03-18

## 2020-03-18 RX ORDER — EPINEPHRINE 0.3 MG/.3ML
0.3 INJECTION SUBCUTANEOUS ONCE AS NEEDED
Status: CANCELLED | OUTPATIENT
Start: 2020-03-18

## 2020-03-19 ENCOUNTER — INFUSION (OUTPATIENT)
Dept: INFUSION THERAPY | Facility: OTHER | Age: 55
End: 2020-03-19
Attending: INTERNAL MEDICINE
Payer: COMMERCIAL

## 2020-03-19 VITALS
OXYGEN SATURATION: 97 % | RESPIRATION RATE: 17 BRPM | HEIGHT: 64 IN | DIASTOLIC BLOOD PRESSURE: 82 MMHG | WEIGHT: 182.31 LBS | SYSTOLIC BLOOD PRESSURE: 143 MMHG | BODY MASS INDEX: 31.12 KG/M2 | TEMPERATURE: 98 F | HEART RATE: 90 BPM

## 2020-03-19 DIAGNOSIS — C50.112 MALIGNANT NEOPLASM OF CENTRAL PORTION OF LEFT BREAST IN FEMALE, ESTROGEN RECEPTOR POSITIVE: Primary | ICD-10-CM

## 2020-03-19 DIAGNOSIS — Z17.0 MALIGNANT NEOPLASM OF CENTRAL PORTION OF LEFT BREAST IN FEMALE, ESTROGEN RECEPTOR POSITIVE: Primary | ICD-10-CM

## 2020-03-19 PROCEDURE — 63600175 PHARM REV CODE 636 W HCPCS: Performed by: INTERNAL MEDICINE

## 2020-03-19 PROCEDURE — 96376 TX/PRO/DX INJ SAME DRUG ADON: CPT

## 2020-03-19 PROCEDURE — 96413 CHEMO IV INFUSION 1 HR: CPT

## 2020-03-19 PROCEDURE — 96367 TX/PROPH/DG ADDL SEQ IV INF: CPT

## 2020-03-19 PROCEDURE — 25000003 PHARM REV CODE 250: Performed by: INTERNAL MEDICINE

## 2020-03-19 PROCEDURE — S0028 INJECTION, FAMOTIDINE, 20 MG: HCPCS | Performed by: INTERNAL MEDICINE

## 2020-03-19 RX ORDER — EPINEPHRINE 0.3 MG/.3ML
0.3 INJECTION SUBCUTANEOUS ONCE AS NEEDED
Status: DISCONTINUED | OUTPATIENT
Start: 2020-03-19 | End: 2020-03-19 | Stop reason: HOSPADM

## 2020-03-19 RX ORDER — DIPHENHYDRAMINE HYDROCHLORIDE 50 MG/ML
50 INJECTION INTRAMUSCULAR; INTRAVENOUS ONCE AS NEEDED
Status: DISCONTINUED | OUTPATIENT
Start: 2020-03-19 | End: 2020-03-19 | Stop reason: HOSPADM

## 2020-03-19 RX ORDER — SODIUM CHLORIDE 0.9 % (FLUSH) 0.9 %
10 SYRINGE (ML) INJECTION
Status: DISCONTINUED | OUTPATIENT
Start: 2020-03-19 | End: 2020-03-19 | Stop reason: HOSPADM

## 2020-03-19 RX ORDER — FAMOTIDINE 10 MG/ML
20 INJECTION INTRAVENOUS
Status: COMPLETED | OUTPATIENT
Start: 2020-03-19 | End: 2020-03-19

## 2020-03-19 RX ORDER — HEPARIN 100 UNIT/ML
500 SYRINGE INTRAVENOUS
Status: DISCONTINUED | OUTPATIENT
Start: 2020-03-19 | End: 2020-03-19 | Stop reason: HOSPADM

## 2020-03-19 RX ADMIN — SODIUM CHLORIDE: 9 INJECTION, SOLUTION INTRAVENOUS at 08:03

## 2020-03-19 RX ADMIN — FAMOTIDINE 20 MG: 10 INJECTION INTRAVENOUS at 08:03

## 2020-03-19 RX ADMIN — PACLITAXEL 156 MG: 6 INJECTION, SOLUTION INTRAVENOUS at 09:03

## 2020-03-19 RX ADMIN — Medication 10 MG: at 08:03

## 2020-03-19 RX ADMIN — HEPARIN 500 UNITS: 100 SYRINGE at 10:03

## 2020-03-19 RX ADMIN — DIPHENHYDRAMINE HYDROCHLORIDE 25 MG: 50 INJECTION, SOLUTION INTRAMUSCULAR; INTRAVENOUS at 08:03

## 2020-03-20 ENCOUNTER — CLINICAL SUPPORT (OUTPATIENT)
Dept: REHABILITATION | Facility: HOSPITAL | Age: 55
End: 2020-03-20
Payer: COMMERCIAL

## 2020-03-20 DIAGNOSIS — C50.112 MALIGNANT NEOPLASM OF CENTRAL PORTION OF LEFT BREAST IN FEMALE, ESTROGEN RECEPTOR POSITIVE: Primary | ICD-10-CM

## 2020-03-20 DIAGNOSIS — C50.912 MALIGNANT NEOPLASM OF LEFT BREAST IN FEMALE, ESTROGEN RECEPTOR POSITIVE, UNSPECIFIED SITE OF BREAST: ICD-10-CM

## 2020-03-20 DIAGNOSIS — Z17.0 MALIGNANT NEOPLASM OF CENTRAL PORTION OF LEFT BREAST IN FEMALE, ESTROGEN RECEPTOR POSITIVE: Primary | ICD-10-CM

## 2020-03-20 DIAGNOSIS — Z17.0 MALIGNANT NEOPLASM OF LEFT BREAST IN FEMALE, ESTROGEN RECEPTOR POSITIVE, UNSPECIFIED SITE OF BREAST: ICD-10-CM

## 2020-03-20 DIAGNOSIS — M79.89 LEFT ARM SWELLING: ICD-10-CM

## 2020-03-20 PROCEDURE — 97530 THERAPEUTIC ACTIVITIES: CPT | Performed by: PHYSICAL MEDICINE & REHABILITATION

## 2020-03-20 PROCEDURE — 97140 MANUAL THERAPY 1/> REGIONS: CPT | Mod: 59 | Performed by: PHYSICAL MEDICINE & REHABILITATION

## 2020-03-20 PROCEDURE — 97162 PT EVAL MOD COMPLEX 30 MIN: CPT | Performed by: PHYSICAL MEDICINE & REHABILITATION

## 2020-03-20 NOTE — PROGRESS NOTES
[facility]  Physical Therapy Initial Evaluation    Name: Shu Mendoza  Clinic Number: [id number]    Therapy Diagnosis:   Encounter Diagnoses   Name Primary?    Malignant neoplasm of left breast in female, estrogen receptor positive, unspecified site of breast     Left arm swelling     Malignant neoplasm of central portion of left breast in female, estrogen receptor positive Yes     Physician: Shikha Mccray MD    Physician Orders: PT Eval and Treat   Medical Diagnosis: Left breast cancer/Left arm swelling  Evaluation Date: 3/20/2020  Authorization Period Expiration: 12/31/2020  Plan of Care Certification Period: 5/15/2020 (8 weeks)  Visit # / Visits authorized: 1/ 20  Insurance: [employer]    Time In: 8:00 AM  Time Out: 9:00 AM  Total Billable Time: 60 minutes    Precautions: cancer      History   History of Present Illness: Shu is a 54 y.o. female that presents to  [facility] at the [facility] s/p left breast surgery.   Diagnosis: Left breasr IDC, grade 2, ER (+), CT (+), HER2 (-)   Chief complaint: swelling in left arm and noticed this about 3 weeks ago. States a blood pressure cuff was put on her left arm and then noticed swelling in her left arm. States swelling has decreased in left arm. 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  Surgical History: 9/11/19 left breast mastectomy with SLNB and SALEEM flap reconstruction  Shu Mendoza  has a past surgical history that includes Oophorectomy; Breast biopsy; Hysterectomy (2000); Hernia repair (2009); Eye surgery (Bilateral); Reconstruction of breast with deep inferior epigastric artery  (SALEEM) free flap (Left, 9/11/2019); Mastectomy (Left, 9/11/2019); Mastectomy with sentinel node biopsy and axillary lymph node dissection (Left, 9/11/2019); and Insertion of tunneled central venous catheter (CVC) with subcutaneous port (Right, 10/25/2019).    Chemotherapy: 11/19-completed  3/19/20  Radiation: None    Past Medical History:   Diagnosis Date    Breast cancer     Diabetes mellitus     Encounter for blood transfusion     Glaucoma 2012    Hypertension     Malignant neoplasm of central portion of left breast in female, estrogen receptor positive 7/23/2019    Renal cyst     Retinal tear of right eye           Medications:  Shu has a current medication list which includes the following prescription(s): amlodipine-benazepril, amoxicillin-clavulanate 875-125mg, atorvastatin, dorzolamide, jardiance, fluticasone propionate, hydrochlorothiazide, hydrocodone-acetaminophen, latanoprost, lidocaine-prilocaine, loratadine, magic mouthwash diphen/antac/lidoc, metformin, multivitamin, nystatin, ondansetron, oxybutynin, and promethazine.    Allergies:  Review of patient's allergies indicates:   Allergen Reactions    Morphine Itching    Percocet [oxycodone-acetaminophen] Itching        Prior Therapy: Seen 7/19 for pre-op eval and 10/19 to 11/19 post-op  Social History:  lives with their partner  Occupation: /coordinator at [employer]       Prior Level of Function: Independent  Current Level of Function: Independent with using LUE--LUE feels heavy    Other Past Medical History: See Above    Patient's Goals: I want to decrease the swelling in my arm    Hand dominance: Right handed    Subjective   Pt states: mild tenderness in left hand   Pain: 4/10 on VAS currently.   Best: 4/10  Worst: 6/10   Pain location: left hand   Objective   Mental status :oriented    Postural examination/scapula alignment: Rounded shoulder and Head forward    Skin Integrity:   Scar Location: left breast and abdomen  Appearance: healed  Signs of infection: No  Drainage:None  Color:NA    Edema: Mild  Location: LUE    Axillary Web Syndrome/Cording:   Location: None  Degree of Cording: NA (mild, moderate etc...)   Number of cords present: NA    Sensation: WNL LUE        Range of Motion:      Shoulder  "Range of Motion:   Active /Passive ROM Right Left   Flexion 170 170   Abduction 170 170   Extension 70 70   IR/90deg 85 85   ER/90deg 90 90          Strength: manual muscle test grades below   Upper Extremity Strength   (R) UE (L) UE   Shoulder flexion: 5/5 5/5   Shoulder Abduction: 5/5 5/5   Shoulder IR 5/5 5/5   Shoulder ER 5/5 5/5   Elbow flexion: 5/5 5/5   Elbow extension: 5/5 5/5   Wrist flexion: 5/5 5/5   Wrist extension: 5/5 5/5    5/5 5/5       Baseline Measurements of BL UE's for early detection of Lymphedema:     LANDMARK RIGHT UE LEFT UE DIFFERENCE   E + 8" 37 cm 38 cm 1 cm   E + 6" 34 cm 36 cm 2 cm   E + 4" 31 cm 33.5 cm 1.5 cm   E + 2" 29 cm 31 cm 2 cm   Elbow 27.5 cm 28.5 cm 1 cm   W+ 8" 27.5 cm 28 cm 0.5 cm   W +  6" 25 cm 26.5 cm 1.5 cm   W + 4" 21.5 cm 23 cm 1.5 cm   Wrist 18 cm 18.5 cm 0.5 cm   DPC 21 cm 22 cm 1 cm   IP Thumb 6.5 cm 6.5 cm 0 cm     Functional Mobility (Bed mobility, transfers)  Independent    ADL's:  Independent    Gait Assessment:   - AD used: none  - Assistance: independent  - Distance: community distances     Patient Education Provided   - role of therapy in multi - disciplinary team, goals for therapy  - Pt was educated in lymphedema etiology and management plans.--given in previous sessions and reviewed today.    - Pt was provided with written risk reductions and precautions for managing lymphedema.--given in previous sessions and reviewed today     Pt has no cultural, educational or language barriers to learning provided.  Treatment and Instruction of Home Exercise Program    Time In: 8:20 AM  Time Out: 9:00 AM    Shu performed individual therapeutic activity of self MLD techniques for 15 minutes including the following:   Short Neck  Clear Healthy Lymph Nodes:  Right Axilla                                                  Left Groin  Open Anastomoses:  Anterior Axillo-Axillary  (AAA)                                    Axillo-Inguinal     (AI)                          "            Left Upper Arm (Lateral and Medial)  Left Antecubital Fossa  Left Dorsal Forearm  Left Volar Forearm  Dorsum Left Hand        Shu received the following manual therapy techniques were performed of CDT of MLD techniques for 30 minutes  MLD:    Short Neck  Clear Healthy Lymph Nodes:  Right Axilla                                                  Left Groin  Open Anastomoses:  Anterior Axillo-Axillary  (AAA)                                    Axillo-Inguinal     (AI)                                    Posterior Axillo-Axillary  (MARTI)  Left Upper Arm (Lateral and Medial)  Left Antecubital Fossa  Left Dorsal Forearm  Left Volar Forearm  Dorsum Left Hand    Rework Left UE  Rework Anastomoses  Rework Healthy lymph Nodes      Written Home Exercises Provided and Patient Education: Handouts given   See EMR under patient instructions for program given  Pt demonstrated good understanding of the education provided. Patient demo good return demo of skill of exercises.    Functional Limitations Reporting      CMS Impairment/Limitation/Restriction for FOTO Outcome Survey    Therapist reviewed FOTO scores for Shu Mendoza on 3/20/2020.   FOTO documents entered into EPIC - see Media section.    Limitations Score: 34%  Category: Carrying             Assessment   This is a 54 y.o. female referred to outpatient physical therapy and presents with a medical diagnosis of left breast cancer and was seen today post-operatively to establish PT plan of care for impairments following surgery including: Stage 1 lymphedema of LUE.     Pt instructed in  Self MLD techniques and given a written diagram to follow this session and was able to demonstrate these techniques given independently. Patient instructed to perform self MLD daily. Patient was fitted with size F tubigrip to LUE to help to decrease swelling. Patient to wear full time until she can get a compression sleeve for her LUE. Pt instructed to follow up with therapist if  any concerns arise with program established. Pt will continue to benefit from skilled physical therapy to address the impairments stated in chart below, provide patient/family education and to maximize pt's level of independence in home and community environment     Anticipated barriers to physical therapy: COVID19 precautions      Pt's spiritual, cultural and educational needs considered and pt agreeable to plan of care and goals as stated below:     Medical necessity is demonstrated by the following IMPAIRMENTS/PROMBLEM LIST:    History  Co-morbidities and personal factors that may impact the plan of care Co-morbidities:   history of cancer    Personal Factors:   no deficits     moderate   Examination  Body Structures and Functions, activity limitations and participation restrictions that may impact the plan of care Body Regions:   upper extremities    Body Systems:    edema    Participation Restrictions:   No Deficits    Activity limitations:   Learning and applying knowledge  no deficits    General Tasks and Commands  no deficits    Communication  no deficits    Mobility  lifting and carrying objects    Self care  no deficits    Domestic Life  no deficits    Interactions/Relationships  no deficits    Life Areas  no deficits    Community and Social Life  no deficits         moderate   Clinical Presentation evolving clinical presentation with changing clinical characteristics moderate   Decision Making/ Complexity Score: moderate       Goals: Pt agrees with goals set    Short Term goals: 4 weeks  1. Patient will demonstrate 100% understanding of lymphedema risk reduction practices to include self monitoring for lymphedema. (progressing, not met)  2. Patient will demonstrate independence with Home Exercise program established. (progressing, not met)  3. Decrease girth in left upper arm, forearm, wrist and hand by up to 2cm to allow for improved UE symmetry, clothing choice, ROM, and UE function. (progressing, not  met)  4.  Patient will perform self lymph drainage techniques to enhance lymphatic drainage and mobility.  (progressing, not met)    Long Term Goals: ( 8  weeks)  1. Patient to show decreased girth in left UE by up to 3cm to allow for improved UE symmetry, clothing choice, ROM, and UE function.  (progressing, not met)  2. Patient will show reduction in density to mild or less with improved contour of limb to allow for improved cosmesis, improved lymphatic drainage, and functional mobility.  (progressing, not met)  3. Patient to rashaad/doff compression garment with daily compliance to support lymphatic mobility and skin elasticity.  (progressing, not met)      Plan   Outpatient physical therapy 2x week for 8 weeks to include the following:   Manual Therapy, Patient Education, Therapeutic Activites and Therapeutic Exercise.    Plan of care Certification Period: 3/20/2020 to 5/15/2020    As we are following the updates regarding COVID19 closely and taking every precaution to ensure the safety of our patients, staff and community, and out of abundance of caution and to reduce the risk and limit community spread, I did not reschedule Ms. Mendoza for a follow-up visit. Ms. Mendoza was given my card with my email and phone number to contact me with any questions or concerns. I will reach out to Ms. Mendoza next week via telephone and/or telemedicine to check on her. .    Therapist: Lolly Torres, PT  3/20/2020

## 2020-03-20 NOTE — PLAN OF CARE
[facility]  Physical Therapy Initial Evaluation    Name: Shu Mendoza  Clinic Number: [id number]    Therapy Diagnosis:   Encounter Diagnoses   Name Primary?    Malignant neoplasm of left breast in female, estrogen receptor positive, unspecified site of breast     Left arm swelling     Malignant neoplasm of central portion of left breast in female, estrogen receptor positive Yes     Physician: Shikha Mccray MD    Physician Orders: PT Eval and Treat   Medical Diagnosis: Left breast cancer/Left arm swelling  Evaluation Date: 3/20/2020  Authorization Period Expiration: 12/31/2020  Plan of Care Certification Period: 5/15/2020 (8 weeks)  Visit # / Visits authorized: 1/ 20  Insurance: [employer]    Time In: 8:00 AM  Time Out: 9:00 AM  Total Billable Time: 60 minutes    Precautions: cancer      History   History of Present Illness: Shu is a 54 y.o. female that presents to  [facility] at the [facility] s/p left breast surgery.   Diagnosis: Left breasr IDC, grade 2, ER (+), MT (+), HER2 (-)   Chief complaint: swelling in left arm and noticed this about 3 weeks ago. States a blood pressure cuff was put on her left arm and then noticed swelling in her left arm. States swelling has decreased in left arm. 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  Surgical History: 9/11/19 left breast mastectomy with SLNB and SALEEM flap reconstruction  Shu Mendoza  has a past surgical history that includes Oophorectomy; Breast biopsy; Hysterectomy (2000); Hernia repair (2009); Eye surgery (Bilateral); Reconstruction of breast with deep inferior epigastric artery  (SALEEM) free flap (Left, 9/11/2019); Mastectomy (Left, 9/11/2019); Mastectomy with sentinel node biopsy and axillary lymph node dissection (Left, 9/11/2019); and Insertion of tunneled central venous catheter (CVC) with subcutaneous port (Right, 10/25/2019).    Chemotherapy: 11/19-completed  3/19/20  Radiation: None    Past Medical History:   Diagnosis Date    Breast cancer     Diabetes mellitus     Encounter for blood transfusion     Glaucoma 2012    Hypertension     Malignant neoplasm of central portion of left breast in female, estrogen receptor positive 7/23/2019    Renal cyst     Retinal tear of right eye           Medications:  Shu has a current medication list which includes the following prescription(s): amlodipine-benazepril, amoxicillin-clavulanate 875-125mg, atorvastatin, dorzolamide, jardiance, fluticasone propionate, hydrochlorothiazide, hydrocodone-acetaminophen, latanoprost, lidocaine-prilocaine, loratadine, magic mouthwash diphen/antac/lidoc, metformin, multivitamin, nystatin, ondansetron, oxybutynin, and promethazine.    Allergies:  Review of patient's allergies indicates:   Allergen Reactions    Morphine Itching    Percocet [oxycodone-acetaminophen] Itching        Prior Therapy: Seen 7/19 for pre-op eval and 10/19 to 11/19 post-op  Social History:  lives with their partner  Occupation: /coordinator at [employer]       Prior Level of Function: Independent  Current Level of Function: Independent with using LUE--LUE feels heavy    Other Past Medical History: See Above    Patient's Goals: I want to decrease the swelling in my arm    Hand dominance: Right handed    Subjective   Pt states: mild tenderness in left hand   Pain: 4/10 on VAS currently.   Best: 4/10  Worst: 6/10   Pain location: left hand   Objective   Mental status :oriented    Postural examination/scapula alignment: Rounded shoulder and Head forward    Skin Integrity:   Scar Location: left breast and abdomen  Appearance: healed  Signs of infection: No  Drainage:None  Color:NA    Edema: Mild  Location: LUE    Axillary Web Syndrome/Cording:   Location: None  Degree of Cording: NA (mild, moderate etc...)   Number of cords present: NA    Sensation: WNL LUE        Range of Motion:      Shoulder  "Range of Motion:   Active /Passive ROM Right Left   Flexion 170 170   Abduction 170 170   Extension 70 70   IR/90deg 85 85   ER/90deg 90 90          Strength: manual muscle test grades below   Upper Extremity Strength   (R) UE (L) UE   Shoulder flexion: 5/5 5/5   Shoulder Abduction: 5/5 5/5   Shoulder IR 5/5 5/5   Shoulder ER 5/5 5/5   Elbow flexion: 5/5 5/5   Elbow extension: 5/5 5/5   Wrist flexion: 5/5 5/5   Wrist extension: 5/5 5/5    5/5 5/5       Baseline Measurements of BL UE's for early detection of Lymphedema:     LANDMARK RIGHT UE LEFT UE DIFFERENCE   E + 8" 37 cm 38 cm 1 cm   E + 6" 34 cm 36 cm 2 cm   E + 4" 31 cm 33.5 cm 1.5 cm   E + 2" 29 cm 31 cm 2 cm   Elbow 27.5 cm 28.5 cm 1 cm   W+ 8" 27.5 cm 28 cm 0.5 cm   W +  6" 25 cm 26.5 cm 1.5 cm   W + 4" 21.5 cm 23 cm 1.5 cm   Wrist 18 cm 18.5 cm 0.5 cm   DPC 21 cm 22 cm 1 cm   IP Thumb 6.5 cm 6.5 cm 0 cm     Functional Mobility (Bed mobility, transfers)  Independent    ADL's:  Independent    Gait Assessment:   - AD used: none  - Assistance: independent  - Distance: community distances     Patient Education Provided   - role of therapy in multi - disciplinary team, goals for therapy  - Pt was educated in lymphedema etiology and management plans.--given in previous sessions and reviewed today.    - Pt was provided with written risk reductions and precautions for managing lymphedema.--given in previous sessions and reviewed today     Pt has no cultural, educational or language barriers to learning provided.  Treatment and Instruction of Home Exercise Program    Time In: 8:20 AM  Time Out: 9:00 AM    Shu performed individual therapeutic activity of self MLD techniques for 15 minutes including the following:   Short Neck  Clear Healthy Lymph Nodes:  Right Axilla                                                  Left Groin  Open Anastomoses:  Anterior Axillo-Axillary  (AAA)                                    Axillo-Inguinal     (AI)                          "            Left Upper Arm (Lateral and Medial)  Left Antecubital Fossa  Left Dorsal Forearm  Left Volar Forearm  Dorsum Left Hand        Shu received the following manual therapy techniques were performed of CDT of MLD techniques for 30 minutes  MLD:    Short Neck  Clear Healthy Lymph Nodes:  Right Axilla                                                  Left Groin  Open Anastomoses:  Anterior Axillo-Axillary  (AAA)                                    Axillo-Inguinal     (AI)                                    Posterior Axillo-Axillary  (MARTI)  Left Upper Arm (Lateral and Medial)  Left Antecubital Fossa  Left Dorsal Forearm  Left Volar Forearm  Dorsum Left Hand    Rework Left UE  Rework Anastomoses  Rework Healthy lymph Nodes      Written Home Exercises Provided and Patient Education: Handouts given   See EMR under patient instructions for program given  Pt demonstrated good understanding of the education provided. Patient demo good return demo of skill of exercises.    Functional Limitations Reporting      CMS Impairment/Limitation/Restriction for FOTO Outcome Survey    Therapist reviewed FOTO scores for Shu Mendoza on 3/20/2020.   FOTO documents entered into EPIC - see Media section.    Limitations Score: 34%  Category: Carrying             Assessment   This is a 54 y.o. female referred to outpatient physical therapy and presents with a medical diagnosis of left breast cancer and was seen today post-operatively to establish PT plan of care for impairments following surgery including: Stage 1 lymphedema of LUE.     Pt instructed in  Self MLD techniques and given a written diagram to follow this session and was able to demonstrate these techniques given independently. Patient instructed to perform self MLD daily. Patient was fitted with size F tubigrip to LUE to help to decrease swelling. Patient to wear full time until she can get a compression sleeve for her LUE. Pt instructed to follow up with therapist if  any concerns arise with program established. Pt will continue to benefit from skilled physical therapy to address the impairments stated in chart below, provide patient/family education and to maximize pt's level of independence in home and community environment     Anticipated barriers to physical therapy: COVID19 precautions      Pt's spiritual, cultural and educational needs considered and pt agreeable to plan of care and goals as stated below:     Medical necessity is demonstrated by the following IMPAIRMENTS/PROMBLEM LIST:    History  Co-morbidities and personal factors that may impact the plan of care Co-morbidities:   history of cancer    Personal Factors:   no deficits     moderate   Examination  Body Structures and Functions, activity limitations and participation restrictions that may impact the plan of care Body Regions:   upper extremities    Body Systems:    edema    Participation Restrictions:   No Deficits    Activity limitations:   Learning and applying knowledge  no deficits    General Tasks and Commands  no deficits    Communication  no deficits    Mobility  lifting and carrying objects    Self care  no deficits    Domestic Life  no deficits    Interactions/Relationships  no deficits    Life Areas  no deficits    Community and Social Life  no deficits         moderate   Clinical Presentation evolving clinical presentation with changing clinical characteristics moderate   Decision Making/ Complexity Score: moderate       Goals: Pt agrees with goals set    Short Term goals: 4 weeks  1. Patient will demonstrate 100% understanding of lymphedema risk reduction practices to include self monitoring for lymphedema. (progressing, not met)  2. Patient will demonstrate independence with Home Exercise program established. (progressing, not met)  3. Decrease girth in left upper arm, forearm, wrist and hand by up to 2cm to allow for improved UE symmetry, clothing choice, ROM, and UE function. (progressing, not  met)  4.  Patient will perform self lymph drainage techniques to enhance lymphatic drainage and mobility.  (progressing, not met)    Long Term Goals: ( 8  weeks)  1. Patient to show decreased girth in left UE by up to 3cm to allow for improved UE symmetry, clothing choice, ROM, and UE function.  (progressing, not met)  2. Patient will show reduction in density to mild or less with improved contour of limb to allow for improved cosmesis, improved lymphatic drainage, and functional mobility.  (progressing, not met)  3. Patient to rashaad/doff compression garment with daily compliance to support lymphatic mobility and skin elasticity.  (progressing, not met)      Plan   Outpatient physical therapy 2x week for 8 weeks to include the following:   Manual Therapy, Patient Education, Therapeutic Activites and Therapeutic Exercise.    Plan of care Certification Period: 3/20/2020 to 5/15/2020    As we are following the updates regarding COVID19 closely and taking every precaution to ensure the safety of our patients, staff and community, and out of abundance of caution and to reduce the risk and limit community spread, I did not reschedule Ms. Mendoza for a follow-up visit. Ms. Mendoza was given my card with my email and phone number to contact me with any questions or concerns. I will reach out to Ms. Mendoza next week via telephone and/or telemedicine to check on her. .    Therapist: Lolly Torres, PT  3/20/2020

## 2020-03-26 ENCOUNTER — OFFICE VISIT (OUTPATIENT)
Dept: HEMATOLOGY/ONCOLOGY | Facility: CLINIC | Age: 55
End: 2020-03-26
Payer: COMMERCIAL

## 2020-03-26 ENCOUNTER — TELEPHONE (OUTPATIENT)
Dept: REHABILITATION | Facility: HOSPITAL | Age: 55
End: 2020-03-26

## 2020-03-26 DIAGNOSIS — E55.9 VITAMIN D DEFICIENCY: ICD-10-CM

## 2020-03-26 DIAGNOSIS — Z17.0 MALIGNANT NEOPLASM OF LEFT BREAST IN FEMALE, ESTROGEN RECEPTOR POSITIVE, UNSPECIFIED SITE OF BREAST: Primary | ICD-10-CM

## 2020-03-26 DIAGNOSIS — D64.81 ANTINEOPLASTIC CHEMOTHERAPY INDUCED ANEMIA: ICD-10-CM

## 2020-03-26 DIAGNOSIS — C50.912 MALIGNANT NEOPLASM OF LEFT BREAST IN FEMALE, ESTROGEN RECEPTOR POSITIVE, UNSPECIFIED SITE OF BREAST: Primary | ICD-10-CM

## 2020-03-26 DIAGNOSIS — G62.0 CHEMOTHERAPY-INDUCED NEUROPATHY: ICD-10-CM

## 2020-03-26 DIAGNOSIS — I89.0 LYMPHEDEMA: ICD-10-CM

## 2020-03-26 DIAGNOSIS — E11.9 TYPE 2 DIABETES MELLITUS WITHOUT COMPLICATION, WITHOUT LONG-TERM CURRENT USE OF INSULIN: ICD-10-CM

## 2020-03-26 DIAGNOSIS — E87.6 HYPOKALEMIA: ICD-10-CM

## 2020-03-26 DIAGNOSIS — T45.1X5A CHEMOTHERAPY-INDUCED NEUROPATHY: ICD-10-CM

## 2020-03-26 DIAGNOSIS — T45.1X5A ANTINEOPLASTIC CHEMOTHERAPY INDUCED ANEMIA: ICD-10-CM

## 2020-03-26 PROCEDURE — 99215 PR OFFICE/OUTPT VISIT, EST, LEVL V, 40-54 MIN: ICD-10-PCS | Mod: 95,,, | Performed by: NURSE PRACTITIONER

## 2020-03-26 PROCEDURE — 99215 OFFICE O/P EST HI 40 MIN: CPT | Mod: 95,,, | Performed by: NURSE PRACTITIONER

## 2020-03-26 RX ORDER — ANASTROZOLE 1 MG/1
1 TABLET ORAL DAILY
Qty: 30 TABLET | Refills: 3 | Status: SHIPPED | OUTPATIENT
Start: 2020-03-26 | End: 2020-07-20

## 2020-03-26 NOTE — TELEPHONE ENCOUNTER
I called Mrs Mendoza today (3/26/20) to see how she is doing. Mrs. Mendoza stated her  is helping her with performing self MLD techniques as she has neuropathies in her hands and has difficulty feeling with her hands. She states she is continuing to wear the tubigrip sleeve to her left arm. I recommended she and her  continue with self MLD techniques 1-2 times per day and that she continue to wear tubigrip sleeve to LUE.     Mrs. Mendoza states she does have the [facility] portal and would be interested in a telemedicine visit.     I told Mrs. Mendoza to please call me with any questions or concerns she may have.  I will reach out to her next week via telephone or telemedicine to check on her status.    Lolly Torres, PT

## 2020-03-26 NOTE — PATIENT INSTRUCTIONS
Hi there!   It was great meeting you today.   -Labs looked good. Anemia getting better. Potassium slightly low- increase foods high in potassium.   -start Vit D 1000iu daily.   -I will have Dr. Braxton send in the prescription for Arimidex (anastrozole) and you can start taking it 4/1/2020. When you return, I will have you set up for a Bone density as we can not do this now due to coronavirus exposure risk. I saw Dr. Norris had in her note, your mammaprint reported 5-10 years recurrence risk without chemo 20-25%, with chemo and endocrine therapy 7%. You have completed chemo and now will take endocrine therapy with Arimidex (anastrozole) for 5 years.     Information:   Anastrozole tablets  What is this medicine?  ANASTROZOLE (an AS troe zole) is used to treat breast cancer in women who have gone through menopause. Some types of breast cancer depend on estrogen to grow, and this medicine can stop tumor growth by blocking estrogen production.  How should I use this medicine?  Take this medicine by mouth with a glass of water. Follow the directions on the prescription label. You can take this medicine with or without food. Take your doses at regular intervals. Do not take your medicine more often than directed. Do not stop taking except on the advice of your doctor or health care professional.  Talk to your pediatrician regarding the use of this medicine in children. Special care may be needed.  What side effects may I notice from receiving this medicine?  Side effects that you should report to your doctor or health care professional as soon as possible:  · allergic reactions like skin rash, itching or hives, swelling of the face, lips, or tongue  · any new or unusual symptoms  · breathing problems  · chest pain  · leg pain or swelling  · vomiting  Side effects that usually do not require medical attention (report to your doctor or health care professional if they continue or are bothersome):  · back or bone pain  · cough,  or throat infection  · diarrhea or constipation  · dizziness  · headache  · hot flashes  · loss of appetite  · nausea  · sweating  · weakness and tiredness  · weight gain  What may interact with this medicine?  Do not take this medicine with any of the following medications:  · female hormones, like estrogens or progestins and birth control pills  This medicine may also interact with the following medications:  · tamoxifen  What if I miss a dose?  If you miss a dose, take it as soon as you can. If it is almost time for your next dose, take only that dose. Do not take double or extra doses.  Where should I keep my medicine?  Keep out of the reach of children.  Store at room temperature between 20 and 25 degrees C (68 and 77 degrees F). Throw away any unused medicine after the expiration date.  What should I tell my health care provider before I take this medicine?  They need to know if you have any of these conditions:  · liver disease  · an unusual or allergic reaction to anastrozole, other medicines, foods, dyes, or preservatives  · pregnant or trying to get pregnant  · breast-feeding  What should I watch for while using this medicine?  Visit your doctor or health care professional for regular checks on your progress. Let your doctor or health care professional know about any unusual vaginal bleeding.  Do not treat yourself for diarrhea, nausea, vomiting or other side effects. Ask your doctor or health care professional for advice.  NOTE:This sheet is a summary. It may not cover all possible information. If you have questions about this medicine, talk to your doctor, pharmacist, or health care provider. Copyright© 2017 [employer]              Will monitor cholesterol levels and Vit D. Also, Bone mineral density every 1-2 years.

## 2020-03-26 NOTE — Clinical Note
-RTC at the beginning of May to see me at [facility] with cbc, cmp, Vit D, Lipid panel (fasting), and a  BMD.

## 2020-03-26 NOTE — PROGRESS NOTES
PATIENT: Shu Mendoza  MRN: [id number]  DATE: 3/26/2020    VIRTUAL VISIT:     History of Present Illness: Ms. Mendoza is a 54 y.o. postmenopausal woman with stage IA ([id number]) invasive ductal carcinoma of the left breast, ER/MO positive, HER2 negative, s/p left mastectomy and reconstruction on 9/11/2019. Mammaprint high risk with chemo benefit >12%. 5-10 years recurrence risk without chemo 20-25%, with chemo and endocrine therapy 7%.  - s/p adjuvant DDAC followed by weekly taxol.  - seen in follow up today to discuss adjuvant endocrine therapy with AI .      Of note, She had NILES/BSO in 2000.   No baseline BMD.     Today, feels pretty good.   Numbness and tingling in fingers since chemo L>R.   Lymphedema as usual in  left arm/hand- causing neuropathy to be slightly worse.   States PT called and gave her exercises to do at home for lymphedema encouraged to wear sleeve. Will go in for therapy after Pandemic improves.   Slight cough due to allergies.   No fever or SOB.   No other complaints.   No joint/pain complaints.     Recently saw Dr. Mccray and right breast MMG ordered as due.       Oncologic History:  1. Ms Mendoza is a 53 yo postmenopausal woman with HTN, DM, who presents today for further management of pathologic stage IA ([id number]) invasive ductal carcinoma of the left breast. She was referred to Dr Mccray for bloody nipple discharge first noted in June 2019. Before then she had a negative mammogram on 5/20/19. She had a left breast US on 6/27/19 in the left breast retroareolar region just behind the nipple, there is an irregular hypoechoic intraductal mass measuring 1.0 cm. She underwent a biopsy on 7/8/2019. It showed invasive ductal carcinoma, ER strongly positive 100%, MO positive 80%, HER2 negative 1+. Ki67 5% activity within invasive component. Patient underwent a left total mastectomy and reconstruction on 9/11/2019. Pathology showed a 1.3 cm invasive ductal carcinoma, grade 2. DCIS present, negative margin,  22 lymph nodes removed, one lymph node positive with macrometastases 13 mm, no extranodal extension. No lymphovascular invasion. Pathologic T1c N1a. Mammaprint high risk with chemo benefit >12%. 5-10 years recurrence risk without chemo 20-25%, with chemo and endocrine therapy 7%. BRCAnalysis from 7/18/19 was negative. Case was discussed at tumor board. Adjuvant chemotherapy followed by endocrine therapy was recommended. Radiation not recommended. Patient presents today for further management. Feeling well. Works at [employer]. She had NILES/BSO in 2000 and was on hormone replacement therapy after that. Stopped in 2019 after she was diagnosed with breast cancer.   2. Port placed by Dr Mccray on 10/25/19. Echo on 10/22/19 showed normal LVEF 59%.   3. Adjuvant DDAC followed by weekly taxol started on 10/31/2019. cycle 2 on 11/14/19. cycle 3 was delayed for a week for umbilical infection, given on 12/5/2019, cycle 4 given on 12/19/19.   Weekly taxol started on 1/2/19 and completed 3/19/2020.         PMFSH: all information reviewed and updated as relevant to today's visit.     Review of Systems: A ten-point system review is obtained and negative except for what was stated in the Interval History.     Objective:        Physical Exam:   Limited due to virtual visit.   AAO x 3. Appears comfortable and in NAD.       ECOG Performance Status:   ECOG SCORE    0 - Fully active-able to carry on all pre-disease performance without restriction         Laboratory Data: reviewed  Results for orders placed or performed in visit on 03/18/20   CBC auto differential   Result Value Ref Range    WBC 6.90 3.90 - 12.70 K/uL    RBC 3.73 (L) 4.00 - 5.40 M/uL    Hemoglobin 11.4 (L) 12.0 - 16.0 g/dL    Hematocrit 35.1 (L) 37.0 - 48.5 %    Mean Corpuscular Volume 94 82 - 98 fL    Mean Corpuscular Hemoglobin 30.6 27.0 - 31.0 pg    Mean Corpuscular Hemoglobin Conc 32.5 32.0 - 36.0 g/dL    RDW 15.1 (H) 11.5 - 14.5 %    Platelets 326 150 - 350 K/uL     MPV 8.8 (L) 9.2 - 12.9 fL    Immature Granulocytes 0.7 (H) 0.0 - 0.5 %    Gran # (ANC) 4.7 1.8 - 7.7 K/uL    Immature Grans (Abs) 0.05 (H) 0.00 - 0.04 K/uL    Lymph # 1.6 1.0 - 4.8 K/uL    Mono # 0.4 0.3 - 1.0 K/uL    Eos # 0.0 0.0 - 0.5 K/uL    Baso # 0.06 0.00 - 0.20 K/uL    nRBC 0 0 /100 WBC    Gran% 68.7 38.0 - 73.0 %    Lymph% 23.5 18.0 - 48.0 %    Mono% 5.8 4.0 - 15.0 %    Eosinophil% 0.4 0.0 - 8.0 %    Basophil% 0.9 0.0 - 1.9 %    Differential Method Automated    Comprehensive metabolic panel   Result Value Ref Range    Sodium 139 136 - 145 mmol/L    Potassium 3.3 (L) 3.5 - 5.1 mmol/L    Chloride 103 95 - 110 mmol/L    CO2 26 23 - 29 mmol/L    Glucose 135 (H) 70 - 110 mg/dL    BUN, Bld 9 6 - 20 mg/dL    Creatinine 0.7 0.5 - 1.4 mg/dL    Calcium 9.5 8.7 - 10.5 mg/dL    Total Protein 6.8 6.0 - 8.4 g/dL    Albumin 3.7 3.5 - 5.2 g/dL    Total Bilirubin 0.3 0.1 - 1.0 mg/dL    Alkaline Phosphatase 72 55 - 135 U/L    AST 17 10 - 40 U/L    ALT 25 10 - 44 U/L    Anion Gap 10 8 - 16 mmol/L    eGFR if African American >60 >60 mL/min/1.73 m^2    eGFR if non African American >60 >60 mL/min/1.73 m^2              Assessment/Plan:     1. Malignant neoplasm of left breast in female, estrogen receptor positive, unspecified site of breast    2. Lymphedema    3. Antineoplastic chemotherapy induced anemia    4. Hypokalemia    5. Chemotherapy-induced neuropathy    6. Type 2 diabetes mellitus without complication, without long-term current use of insulin    7. Vitamin D deficiency        Plan:  -Doing well. Completed adjuvant chemotherapy and agrees to adjuvant endocrine therapy with AI after discussion today. -We discussed adverse effects of Anastozole as listed below. Information given. Will start next week on 4/1/2020.   -Labs reviewed. Anemia parameters improved. Potassium low- increase high potassium foods in diet. Monitor sugars at home.    -Start Vit D 1000iu daily.   -BMD and Vit D level when returns. Unable to do BMD now  "due to coronavirus exposure risk.   -RTC at the beginning of May to see me with cbc, cmp, Vit D, Lipid panel, BMD.  -Ok for port removal. Message to Dr. Mccray.   -MMG due- ordered per Dr. Mccray.     Patient educated that symptoms of COVID-19 can develop up to 2 weeks from last contact with infected individuals. Symptoms to monitor for include fever greater than 100.4, cough and shortness of breath. I advised the patient to monitor temperature twice per day and practice ways to prevent potential spread of the disease including: social distancing of at least 6 feet, avoid contact with sick individuals, avoid touching mouth, nose, and eyes with unwashed hands, cover nose and mouth when coughing and sneezing, use of hand  that contains at least 60% alcohol, and washing hands with soap and water for at least 20 seconds. Should you develop symptoms, please notify us or your PCP for further direction.      Anastrozole adverse effects discussed:            The patient location is: home  The chief complaint leading to consultation is: breast cancer follow up   Visit type: Virtual visit with synchronous audio and video  Total time spent with patient: 25 minutes.   Each patient to whom he or she provides medical services by telemedicine is:  (1) informed of the relationship between the physician and patient and the respective role of any other health care provider with respect to management of the patient; and (2) notified that he or she may decline to receive medical services by telemedicine and may withdraw from such care at any time.          Med and Orders:  Orders Placed This Encounter    Bone Density Spine And Hip    CBC Oncology    Comprehensive metabolic panel    Vitamin D    Lipid panel    anastrozole (ARIMIDEX) 1 mg Tab       Follow Up:  Follow up in about 4 weeks (around 4/23/2020).    Patient instructions: as above.  "Hi there!   It was great meeting you today.   -Labs looked good. Anemia getting " "better. Potassium slightly low- increase foods high in potassium.  -start Vit D 1000iu daily.    -I will have Dr. Braxton send in the prescription for Arimidex (anastrozole) and you can start taking it 4/1/2020. When you return, I will have you set up for a Bone density as we can not do this now due to coronavirus exposure risk. I saw Dr. Norris had in her note, your mammaprint reported 5-10 years recurrence risk without chemo 20-25%, with chemo and endocrine therapy 7%. You have completed chemo and now will take endocrine therapy with Arimidex (anastrozole) for 5 years. "        Patient is in agreement with the proposed treatment plan. All questions were answered to the patient's satisfaction. Pt knows to call clinic for any new or worsening symptoms and if anything is needed before the next clinic visit.      Bo Ba, FNP-C  Hematology & Oncology  [address] [place] [id number]  ph. [phone]  Fax. [phone]           Answers for HPI/ROS submitted by the patient on 3/25/2020   appetite change : No  unexpected weight change: No  visual disturbance: No  cough: No  shortness of breath: No  chest pain: No  abdominal pain: No  diarrhea: No  frequency: No  back pain: No  rash: No  headaches: No  adenopathy: Yes  nervous/ anxious: No    "

## 2020-03-27 ENCOUNTER — PATIENT MESSAGE (OUTPATIENT)
Dept: HEMATOLOGY/ONCOLOGY | Facility: CLINIC | Age: 55
End: 2020-03-27

## 2020-04-02 ENCOUNTER — TELEPHONE (OUTPATIENT)
Dept: REHABILITATION | Facility: HOSPITAL | Age: 55
End: 2020-04-02

## 2020-04-02 NOTE — TELEPHONE ENCOUNTER
I called Ms. Mendoza today to check on how she is doing. Ms. Mendoza stated swelling in her LUE is decreasing. She stated she is continuing to perform self MLD techniques and to wear her tubigrip sleeve. I recommended she continue to perform self MLD techniques daily and to wear her tubigrip sleeve to LUE.   I will continue to monitor Ms. Mendoza via telephone due to COVID 19 precautions.    Lolly Torres, PT

## 2020-04-09 ENCOUNTER — TELEPHONE (OUTPATIENT)
Dept: REHABILITATION | Facility: HOSPITAL | Age: 55
End: 2020-04-09

## 2020-04-09 NOTE — TELEPHONE ENCOUNTER
I called Ms. Mendoza today to check on how she is doing. Ms. Mendoza stated swelling remains in her LUE--hasn't gotten worse. She states she is performing self MLD techniques daily and continuing to wear her tubigrip sleeve. I instructed her to continue with self MLD techniques and wearing her tubigrip sleeve. When able-once COVID19 precautions are lifted-will have patient come to PT clinic to remeasure her LUE and obtain a prescription for a compression sleeve.   I will continue to monitor her weekly via telephone due to COVID19 precautions.    Lolly Torres, PT

## 2020-04-16 ENCOUNTER — TELEPHONE (OUTPATIENT)
Dept: REHABILITATION | Facility: HOSPITAL | Age: 55
End: 2020-04-16

## 2020-04-16 DIAGNOSIS — M79.89 LEFT ARM SWELLING: ICD-10-CM

## 2020-04-16 DIAGNOSIS — C50.912 MALIGNANT NEOPLASM OF LEFT BREAST IN FEMALE, ESTROGEN RECEPTOR POSITIVE, UNSPECIFIED SITE OF BREAST: Primary | ICD-10-CM

## 2020-04-16 DIAGNOSIS — I89.0 LYMPHEDEMA OF LEFT ARM: ICD-10-CM

## 2020-04-16 DIAGNOSIS — Z17.0 MALIGNANT NEOPLASM OF LEFT BREAST IN FEMALE, ESTROGEN RECEPTOR POSITIVE, UNSPECIFIED SITE OF BREAST: Primary | ICD-10-CM

## 2020-04-16 NOTE — TELEPHONE ENCOUNTER
I called Ms. Mendoza today to see how she is doing. She states swelling in her left arm tends to go up and down. She states she is continuing with self MLD and wearing tubigrip sleeve. We discussed getting her a compression glove and sleeve for her left hand and arm and she is in agreement to getting this. I have requested a prescription from Dr. Mccray for a compression sleeve and glove for her left hand and arm. I will also contact Quintin Jackson with [facility] about the Flexitouch Plus compression pump for Ms. Mendoza.   I will email her prescription to her so she can get her compression sleeve and glove.   I will continue to monitor her via telephone due to COVID19  precautions.     Lolly Torres, PT

## 2020-04-27 ENCOUNTER — CLINICAL SUPPORT (OUTPATIENT)
Dept: REHABILITATION | Facility: HOSPITAL | Age: 55
End: 2020-04-27
Attending: INTERNAL MEDICINE
Payer: COMMERCIAL

## 2020-04-27 DIAGNOSIS — Z17.0 MALIGNANT NEOPLASM OF NIPPLE OF LEFT BREAST IN FEMALE, ESTROGEN RECEPTOR POSITIVE: ICD-10-CM

## 2020-04-27 DIAGNOSIS — M79.89 LEFT ARM SWELLING: ICD-10-CM

## 2020-04-27 DIAGNOSIS — C50.012 MALIGNANT NEOPLASM OF NIPPLE OF LEFT BREAST IN FEMALE, ESTROGEN RECEPTOR POSITIVE: ICD-10-CM

## 2020-04-27 DIAGNOSIS — C50.912 CARCINOMA OF LEFT BREAST METASTATIC TO AXILLARY LYMPH NODE: ICD-10-CM

## 2020-04-27 DIAGNOSIS — C77.3 CARCINOMA OF LEFT BREAST METASTATIC TO AXILLARY LYMPH NODE: ICD-10-CM

## 2020-04-27 PROCEDURE — 97140 MANUAL THERAPY 1/> REGIONS: CPT | Performed by: PHYSICAL MEDICINE & REHABILITATION

## 2020-04-27 PROCEDURE — 97150 GROUP THERAPEUTIC PROCEDURES: CPT | Performed by: PHYSICAL MEDICINE & REHABILITATION

## 2020-04-27 NOTE — PROGRESS NOTES
Physical Therapy Daily Treatment Note     Name: Shu Mendoza  Clinic Number: [id number]  Diagnosis: No diagnosis found.  Physician: Shikha Mccray MD    Precautions:Left breast cancer/Left arm swelling  Visit #: 2 of 20  Time In: 10:00 AM  Time Out: 10:45 AM  Total Treatment Time 1:1: 45 Minutes    Evaluation Date: 3/20/2020  Visit # authorized: 20  Authorization period: 12/31/2020  Plan of care Expiration: 5/15/2020  MD referral: Dr. Shikha Mccray  Insurance: [facility]      Subjective     Pt reports: has been performing her self MLD and has been wearing tubigrip sleeve. States she feels the swelling in her left arm has decreased and feeling less tightness. States she is looking into getting her compression sleeve.   Pain Scale: Shu rates pain on a scale of 0/10 on VAS.   Pain location: NA    Fatigue: None  Functional change: less tightness in LUE  Diagnosis: Left breasr IDC, grade 2, ER (+), CT (+), HER2 (-)   Surgical History: 9/11/19 left breast mastectomy with SLNB and SALEEM flap reconstruction  Shu Mendoza  has a past surgical history that includes Oophorectomy; Breast biopsy; Hysterectomy (2000); Hernia repair (2009); Eye surgery (Bilateral); Reconstruction of breast with deep inferior epigastric artery  (SALEEM) free flap (Left, 9/11/2019); Mastectomy (Left, 9/11/2019); Mastectomy with sentinel node biopsy and axillary lymph node dissection (Left, 9/11/2019); and Insertion of tunneled central venous catheter (CVC) with subcutaneous port (Right, 10/25/2019).     Chemotherapy: 11/19-completed 3/19/20  Radiation: None    Objective     Shu performed individual therapeutic activity of self MLD techniques for 15 minutes including the following:   Short Neck  Clear Healthy Lymph Nodes:  Right Axilla                                                  Left Groin  Open Anastomoses:  Anterior Axillo-Axillary  (AAA)                                     "Axillo-Inguinal     (AI)                                     Left Upper Arm (Lateral and Medial)  Left Antecubital Fossa  Left Dorsal Forearm  Left Volar Forearm  Dorsum Left Hand     Shu received the following manual therapy techniques were performed of CDT of MLD techniques for 30 minutes  MLD:     Short Neck  Clear Healthy Lymph Nodes:  Right Axilla                                                  Left Groin  Open Anastomoses:  Anterior Axillo-Axillary  (AAA)                                    Axillo-Inguinal     (AI)                                    Posterior Axillo-Axillary  (MARTI)  Left Upper Arm (Lateral and Medial)  Left Antecubital Fossa  Left Dorsal Forearm  Left Volar Forearm  Dorsum Left Hand     Rework Left UE  Rework Anastomoses  Rework Healthy lymph Nodes      LANDMARK Left UE Diff RIGHT UE LEFT UE DIFFERENCE   Date:  4/27/20 4/27/20 3/20/20 3/2020 3/2020   E + 8" 37.5 cm 0.5 cm 37 cm 38 cm 1 cm   E + 6" 35.5 cm 0.5 cm 34 cm 36 cm 2 cm   E + 4" 32 cm 1.5 cm 31 cm 33.5 cm 1.5 cm   E + 2" 30 cm 1 cm 29 cm 31 cm 2 cm   Elbow 28 cm 0.5 cm 27.5 cm 28.5 cm 1 cm   W+ 8" 28 cm No chg 27.5 cm 28 cm 0.5 cm   W +  6" 26 cm 0.5 cm 25 cm 26.5 cm 1.5 cm   W + 4" 22.5 cm 0.5 cm 21.5 cm 23 cm 1.5 cm   Wrist 18 cm 0.5 cm 18 cm 18.5 cm 0.5 cm   DPC 20.5 cm 1.5 cm 21 cm 22 cm 1 cm   IP Thumb 6.5 cm No chg 6.5 cm 6.5 cm 0 cm      Functional Limitations Reporting       CMS Impairment/Limitation/Restriction for FOTO Outcome Survey     Therapist reviewed FOTO scores for Shu Mendoza on 3/20/2020.   FOTO documents entered into [employer] - see Media section.     Limitations Score: 35%  Category: Carrying           Home Exercise Program and Patient Education   Education provided re:  - role of PT in multi - disciplinary team, goals for PT  - progress towards goals     See EMR under notes/patient instructions for HEP given/taught this session - all sets and reps included. Pt received printed copy.     Pt was able to " demonstrate and report understanding and performance  Pt has no cultural, educational or language barriers to learning provided.    Assessment     Patient is responding well to physical therapy.    Pt prognosis is Good. Patient received manual therapy as above of CDT with MLD techniques. Reviewed self MLD techniques with patient and patient is now independent with self MLD techniques. Girth of LUE has decreased from 0.5 cm to 1.5 cm. Patient given written prescription for compression glove and sleeve. Patient instructed to continue with self MLD techniques daily and to continue to wear tubigrip sleeve (size D) to LUE. Once receives compression glove and sleeve, will wear compression garments during the day and tubigrip sleeve at night.  Pt will continue to benefit from skilled outpatient physical therapy to address the deficits listed in the problem list chart on initial evaluation, provide pt/family education and to maximize pt's level of independence in the home and community environment.     Goals as follows:  Short Term goals: 4 weeks  1. Patient will demonstrate 100% understanding of lymphedema risk reduction practices to include self monitoring for lymphedema. (met, 4/27/20)  2. Patient will demonstrate independence with Home Exercise program established. (met, 4/27/20)  3. Decrease girth in left upper arm, forearm, wrist and hand by up to 2cm to allow for improved UE symmetry, clothing choice, ROM, and UE function. (met, 4/27/20)  4.  Patient will perform self lymph drainage techniques to enhance lymphatic drainage and mobility.  (met, 4/20/20)     Long Term Goals: ( 8  weeks)  1. Patient to show decreased girth in left UE by up to 3cm to allow for improved UE symmetry, clothing choice, ROM, and UE function.  (progressing, not met)  2. Patient will show reduction in density to mild or less with improved contour of limb to allow for improved cosmesis, improved lymphatic drainage, and functional mobility.   (progressing, not met)  3. Patient to rashaad/doff compression garment with daily compliance to support lymphatic mobility and skin elasticity.  (progressing, not met)     Plan     Will continue to monitor patient via telephone due to COVID 19 precautions. Patient would like to return to clinic once she has received her compression garments for me to check. Patient also to call with any questions or concerns.     Therapist: Lolly Torres, PT  4/27/2020

## 2020-05-05 ENCOUNTER — LAB VISIT (OUTPATIENT)
Dept: LAB | Facility: OTHER | Age: 55
End: 2020-05-05
Payer: COMMERCIAL

## 2020-05-05 ENCOUNTER — HOSPITAL ENCOUNTER (OUTPATIENT)
Dept: RADIOLOGY | Facility: OTHER | Age: 55
Discharge: HOME | End: 2020-05-05
Attending: NURSE PRACTITIONER
Payer: COMMERCIAL

## 2020-05-05 DIAGNOSIS — Z17.0 MALIGNANT NEOPLASM OF LEFT BREAST IN FEMALE, ESTROGEN RECEPTOR POSITIVE, UNSPECIFIED SITE OF BREAST: ICD-10-CM

## 2020-05-05 DIAGNOSIS — E55.9 VITAMIN D DEFICIENCY: ICD-10-CM

## 2020-05-05 DIAGNOSIS — C50.912 MALIGNANT NEOPLASM OF LEFT BREAST IN FEMALE, ESTROGEN RECEPTOR POSITIVE, UNSPECIFIED SITE OF BREAST: ICD-10-CM

## 2020-05-05 LAB
25(OH)D3+25(OH)D2 SERPL-MCNC: 59 NG/ML (ref 30–96)
ALBUMIN SERPL BCP-MCNC: 3.9 G/DL (ref 3.5–5.2)
ALP SERPL-CCNC: 71 U/L (ref 55–135)
ALT SERPL W/O P-5'-P-CCNC: 24 U/L (ref 10–44)
ANION GAP SERPL CALC-SCNC: 11 MMOL/L (ref 8–16)
AST SERPL-CCNC: 19 U/L (ref 10–40)
BILIRUB SERPL-MCNC: 0.4 MG/DL (ref 0.1–1)
BUN SERPL-MCNC: 9 MG/DL (ref 6–20)
CALCIUM SERPL-MCNC: 9.7 MG/DL (ref 8.7–10.5)
CHLORIDE SERPL-SCNC: 107 MMOL/L (ref 95–110)
CHOLEST SERPL-MCNC: 131 MG/DL (ref 120–199)
CHOLEST/HDLC SERPL: 2.6 {RATIO} (ref 2–5)
CO2 SERPL-SCNC: 26 MMOL/L (ref 23–29)
CREAT SERPL-MCNC: 0.7 MG/DL (ref 0.5–1.4)
ERYTHROCYTE [DISTWIDTH] IN BLOOD BY AUTOMATED COUNT: 14.6 % (ref 11.5–14.5)
EST. GFR  (AFRICAN AMERICAN): >60 ML/MIN/1.73 M^2
EST. GFR  (NON AFRICAN AMERICAN): >60 ML/MIN/1.73 M^2
GLUCOSE SERPL-MCNC: 123 MG/DL (ref 70–110)
HCT VFR BLD AUTO: 38.7 % (ref 37–48.5)
HDLC SERPL-MCNC: 51 MG/DL (ref 40–75)
HDLC SERPL: 38.9 % (ref 20–50)
HGB BLD-MCNC: 12.4 G/DL (ref 12–16)
IMM GRANULOCYTES # BLD AUTO: 0.02 K/UL (ref 0–0.04)
LDLC SERPL CALC-MCNC: 68.6 MG/DL (ref 63–159)
MCH RBC QN AUTO: 28.3 PG (ref 27–31)
MCHC RBC AUTO-ENTMCNC: 32 G/DL (ref 32–36)
MCV RBC AUTO: 88 FL (ref 82–98)
NEUTROPHILS # BLD AUTO: 4.1 K/UL (ref 1.8–7.7)
NONHDLC SERPL-MCNC: 80 MG/DL
PLATELET # BLD AUTO: 309 K/UL (ref 150–350)
PMV BLD AUTO: 9 FL (ref 9.2–12.9)
POTASSIUM SERPL-SCNC: 3.9 MMOL/L (ref 3.5–5.1)
PROT SERPL-MCNC: 7.2 G/DL (ref 6–8.4)
RBC # BLD AUTO: 4.38 M/UL (ref 4–5.4)
SODIUM SERPL-SCNC: 144 MMOL/L (ref 136–145)
TRIGL SERPL-MCNC: 57 MG/DL (ref 30–150)
WBC # BLD AUTO: 6.31 K/UL (ref 3.9–12.7)

## 2020-05-05 PROCEDURE — 80053 COMPREHEN METABOLIC PANEL: CPT

## 2020-05-05 PROCEDURE — 77080 DXA BONE DENSITY AXIAL: CPT | Mod: TC

## 2020-05-05 PROCEDURE — 77080 DEXA BONE DENSITY SPINE HIP: ICD-10-PCS | Mod: 26,,, | Performed by: RADIOLOGY

## 2020-05-05 PROCEDURE — 82306 VITAMIN D 25 HYDROXY: CPT

## 2020-05-05 PROCEDURE — 77080 DXA BONE DENSITY AXIAL: CPT | Mod: 26,,, | Performed by: RADIOLOGY

## 2020-05-05 PROCEDURE — 85027 COMPLETE CBC AUTOMATED: CPT

## 2020-05-05 PROCEDURE — 80061 LIPID PANEL: CPT

## 2020-05-05 PROCEDURE — 36415 COLL VENOUS BLD VENIPUNCTURE: CPT

## 2020-05-07 ENCOUNTER — OFFICE VISIT (OUTPATIENT)
Dept: HEMATOLOGY/ONCOLOGY | Facility: CLINIC | Age: 55
End: 2020-05-07
Attending: INTERNAL MEDICINE
Payer: COMMERCIAL

## 2020-05-07 DIAGNOSIS — Z17.0 MALIGNANT NEOPLASM OF CENTRAL PORTION OF LEFT BREAST IN FEMALE, ESTROGEN RECEPTOR POSITIVE: Primary | ICD-10-CM

## 2020-05-07 DIAGNOSIS — C50.112 MALIGNANT NEOPLASM OF CENTRAL PORTION OF LEFT BREAST IN FEMALE, ESTROGEN RECEPTOR POSITIVE: Primary | ICD-10-CM

## 2020-05-07 PROCEDURE — 99212 PR OFFICE/OUTPT VISIT, EST, LEVL II, 10-19 MIN: ICD-10-PCS | Mod: 95,,, | Performed by: INTERNAL MEDICINE

## 2020-05-07 PROCEDURE — 99212 OFFICE O/P EST SF 10 MIN: CPT | Mod: 95,,, | Performed by: INTERNAL MEDICINE

## 2020-05-19 ENCOUNTER — DOCUMENTATION ONLY (OUTPATIENT)
Dept: REHABILITATION | Facility: HOSPITAL | Age: 55
End: 2020-05-19

## 2020-05-19 DIAGNOSIS — C77.3 CARCINOMA OF LEFT BREAST METASTATIC TO AXILLARY LYMPH NODE: ICD-10-CM

## 2020-05-19 DIAGNOSIS — C50.912 CARCINOMA OF LEFT BREAST METASTATIC TO AXILLARY LYMPH NODE: ICD-10-CM

## 2020-05-19 DIAGNOSIS — Z17.0 MALIGNANT NEOPLASM OF NIPPLE OF LEFT BREAST IN FEMALE, ESTROGEN RECEPTOR POSITIVE: ICD-10-CM

## 2020-05-19 DIAGNOSIS — M79.89 LEFT ARM SWELLING: ICD-10-CM

## 2020-05-19 DIAGNOSIS — C50.012 MALIGNANT NEOPLASM OF NIPPLE OF LEFT BREAST IN FEMALE, ESTROGEN RECEPTOR POSITIVE: ICD-10-CM

## 2020-05-19 NOTE — PROGRESS NOTES
Discharge Note  Mrs. logan was seen in outpatient physical therapy for an initial evaluation on 3/20/202 for lymphedema to LUE. Mrs. Logan attended two follow up visits and was monitored via telephone due to COVID 19 precautions. I spoke with Mrs Logan today and she said she has received her Flexitouch Plus compression pump and her compression glove and sleeve for her LUE. I reviewed with her the following: use compression pump daily; wear compression sleeve and glove during the day and tubigrip sleeve at night. Patient verbalized understanding of these instructions. Patient told to call me with any questions or concerns.  POC has ended and patient is discharged from physical therapy.        Lolly Torres, PT

## 2020-06-26 ENCOUNTER — INFUSION (OUTPATIENT)
Dept: INFUSION THERAPY | Facility: OTHER | Age: 55
End: 2020-06-26
Attending: INTERNAL MEDICINE
Payer: MEDICAID

## 2020-06-26 ENCOUNTER — HOSPITAL ENCOUNTER (OUTPATIENT)
Dept: RADIOLOGY | Facility: OTHER | Age: 55
Discharge: HOME | End: 2020-06-26
Attending: SURGERY
Payer: MEDICAID

## 2020-06-26 VITALS
HEIGHT: 64 IN | RESPIRATION RATE: 18 BRPM | OXYGEN SATURATION: 100 % | HEART RATE: 90 BPM | SYSTOLIC BLOOD PRESSURE: 195 MMHG | BODY MASS INDEX: 31.12 KG/M2 | WEIGHT: 182.31 LBS | TEMPERATURE: 98 F | DIASTOLIC BLOOD PRESSURE: 101 MMHG

## 2020-06-26 DIAGNOSIS — D64.81 ANTINEOPLASTIC CHEMOTHERAPY INDUCED ANEMIA: Primary | ICD-10-CM

## 2020-06-26 DIAGNOSIS — Z12.31 ENCOUNTER FOR SCREENING MAMMOGRAM FOR BREAST CANCER: ICD-10-CM

## 2020-06-26 DIAGNOSIS — T45.1X5A ANTINEOPLASTIC CHEMOTHERAPY INDUCED ANEMIA: Primary | ICD-10-CM

## 2020-06-26 PROCEDURE — 25000003 PHARM REV CODE 250: Performed by: INTERNAL MEDICINE

## 2020-06-26 PROCEDURE — 77063 BREAST TOMOSYNTHESIS BI: CPT | Mod: 26,,, | Performed by: RADIOLOGY

## 2020-06-26 PROCEDURE — A4216 STERILE WATER/SALINE, 10 ML: HCPCS | Performed by: INTERNAL MEDICINE

## 2020-06-26 PROCEDURE — 77067 SCR MAMMO BI INCL CAD: CPT | Mod: TC

## 2020-06-26 PROCEDURE — 63600175 PHARM REV CODE 636 W HCPCS: Performed by: INTERNAL MEDICINE

## 2020-06-26 PROCEDURE — 77067 SCR MAMMO BI INCL CAD: CPT | Mod: 26,,, | Performed by: RADIOLOGY

## 2020-06-26 PROCEDURE — 77063 MAMMO DIGITAL SCREENING RIGHT WITH TOMOSYNTHESIS_CAD: ICD-10-PCS | Mod: 26,,, | Performed by: RADIOLOGY

## 2020-06-26 PROCEDURE — 77067 MAMMO DIGITAL SCREENING RIGHT WITH TOMOSYNTHESIS_CAD: ICD-10-PCS | Mod: 26,,, | Performed by: RADIOLOGY

## 2020-06-26 RX ORDER — HEPARIN 100 UNIT/ML
500 SYRINGE INTRAVENOUS
Status: COMPLETED | OUTPATIENT
Start: 2020-06-26 | End: 2020-06-26

## 2020-06-26 RX ORDER — SODIUM CHLORIDE 0.9 % (FLUSH) 0.9 %
10 SYRINGE (ML) INJECTION
Status: COMPLETED | OUTPATIENT
Start: 2020-06-26 | End: 2020-06-26

## 2020-06-26 RX ORDER — HEPARIN 100 UNIT/ML
500 SYRINGE INTRAVENOUS
Status: CANCELLED | OUTPATIENT
Start: 2020-06-26

## 2020-06-26 RX ORDER — SODIUM CHLORIDE 0.9 % (FLUSH) 0.9 %
10 SYRINGE (ML) INJECTION
Status: CANCELLED | OUTPATIENT
Start: 2020-06-26

## 2020-06-26 RX ADMIN — SODIUM CHLORIDE, PRESERVATIVE FREE 10 ML: 5 INJECTION INTRAVENOUS at 08:06

## 2020-06-26 RX ADMIN — HEPARIN 500 UNITS: 100 SYRINGE at 08:06

## 2020-08-10 NOTE — PROGRESS NOTES
"  Subjective:       Shu Mendoza is a 55 y.o. female who is an established patient who was referred by Dr Laughlin for evaluation of OAB, renal cysts.      She is a previous pt of Dr Shepherd and Lorraine. She presents with c/o urge/freq. [facility] records show long standing microhematuria s/p workup. Prior h/o recurrent UTIs, used to take Macrobid prophy. Last UTI in 12/16 per her report. S/p NILES-BSO. Multiple reports of flank pain in the past.     She reports her bladder is "overactive" for about 3-4 months. Frequency up to x20wdfc. She reports starting Jardiance around that time.     Abd US from  (7/15) - parapelvic cyst with calcification, stable since 2010 per their records.     Significant glucosuria today (on Jardiance). She reports glucose control as fair - last A1c 8.0 per her report. Also takes HCTZ.    PVR (bladder scan) initial visit - 31cc    She was given trial of Ditropan - she has been doing great with this. Now able to hold for q3hrs (rather than p28kvwm). Nocturia x 0 (was x 2).     Last saw Fela MARMOLEJO 8/19. Now s/p chemo for breast cancer 1 year ago. Still taking Ditropan 5mg with good response. Denies UTIs. Doing well.       The following portions of the patient's history were reviewed and updated as appropriate: allergies, current medications, past family history, past medical history, past social history, past surgical history and problem list.    Review of Systems  Constitutional: no fever or chills  ENT: no nasal congestion or sore throat  Respiratory: no cough or shortness of breath  Cardiovascular: no chest pain or palpitations  Gastrointestinal: no nausea or vomiting, tolerating diet  Genitourinary: as per HPI  Hematologic/Lymphatic: no easy bruising or lymphadenopathy  Musculoskeletal: no arthralgias or myalgias  Skin: no rashes or lesions  Neurological: no seizures or tremors  Behavioral/Psych: no auditory or visual hallucinations        Objective:    Vitals: /80 (BP Location: Right " "arm, Patient Position: Sitting, BP Method: Large (Automatic))   Pulse 71   Ht 5' 4" (1.626 m)   Wt 82.6 kg (182 lb)   LMP  (LMP Unknown)   BMI 31.24 kg/m²     Physical Exam   General: well developed, well nourished in no acute distress  Head: normocephalic, atraumatic  Neck: supple, trachea midline, no obvious enlargement of thyroid  HEENT: EOMI, mucus membranes moist, sclera anicteric, no hearing impairment  Lungs: symmetric expansion, non-labored breathing  Cardiovascular: regular rate and rhythm, normal pulses  Abdomen: soft, non tender, non distended, no palpable masses, no hepatosplenomegaly, no hernias, no CVA tenderness  Musculoskeletal: no peripheral edema, normal ROM in bilateral upper and lower extremities  Lymphatics: no cervical or inguinal lymphadenopathy  Skin: no rashes or lesions  Neuro: alert and oriented x 3, no gross deficits  Psych: normal judgment and insight, normal mood/affect and non-anxious  Genitourinary:   patient declined exam      Lab Review   Urine analysis today in clinic shows positive for 250 red blood cells, glucose 1000    Lab Results   Component Value Date    WBC 6.31 05/05/2020    HGB 12.4 05/05/2020    HCT 38.7 05/05/2020    MCV 88 05/05/2020     05/05/2020     Lab Results   Component Value Date    CREATININE 0.7 05/05/2020    BUN 9 05/05/2020       Imaging  Images and reports were personally reviewed by me and discussed with patient       Assessment/Plan:      1. OAB (overactive bladder)    - Behavioral changes, PFPT, anticholinergics, mirabegron. Botox/InterStim for refractory UUI.   - Likely worsened by Jardiance and HCTZ   - Ditropan 5mg - doing well, refilled     2. Microhematuria    - Long history   - Prior workup   - Will monitor, okay to hold on repeat w/u     3. Frequency of urination    - Ditropan - doing great         Follow up in 12 months          "

## 2020-08-12 ENCOUNTER — OFFICE VISIT (OUTPATIENT)
Dept: UROLOGY | Facility: CLINIC | Age: 55
End: 2020-08-12
Attending: UROLOGY
Payer: MEDICAID

## 2020-08-12 VITALS
BODY MASS INDEX: 31.07 KG/M2 | WEIGHT: 182 LBS | SYSTOLIC BLOOD PRESSURE: 132 MMHG | HEIGHT: 64 IN | DIASTOLIC BLOOD PRESSURE: 80 MMHG | HEART RATE: 71 BPM

## 2020-08-12 DIAGNOSIS — R35.0 FREQUENCY OF URINATION: ICD-10-CM

## 2020-08-12 DIAGNOSIS — R31.29 MICROHEMATURIA: Primary | ICD-10-CM

## 2020-08-12 DIAGNOSIS — N32.81 OAB (OVERACTIVE BLADDER): ICD-10-CM

## 2020-08-12 LAB
BILIRUB SERPL-MCNC: NEGATIVE MG/DL
BLOOD URINE, POC: 250
CLARITY, POC UA: CLEAR
COLOR, POC UA: YELLOW
GLUCOSE UR QL STRIP: 1000
KETONES UR QL STRIP: NEGATIVE
LEUKOCYTE ESTERASE URINE, POC: NEGATIVE
NITRITE, POC UA: NEGATIVE
PH, POC UA: 5
PROTEIN, POC: NEGATIVE
SPECIFIC GRAVITY, POC UA: 1.01
UROBILINOGEN, POC UA: NORMAL

## 2020-08-12 PROCEDURE — 99213 OFFICE O/P EST LOW 20 MIN: CPT | Mod: 25,S$GLB,, | Performed by: UROLOGY

## 2020-08-12 PROCEDURE — 99213 PR OFFICE/OUTPT VISIT, EST, LEVL III, 20-29 MIN: ICD-10-PCS | Mod: 25,S$GLB,, | Performed by: UROLOGY

## 2020-08-12 PROCEDURE — 81002 POCT URINE DIPSTICK WITHOUT MICROSCOPE: ICD-10-PCS | Mod: S$GLB,,, | Performed by: UROLOGY

## 2020-08-12 PROCEDURE — 81002 URINALYSIS NONAUTO W/O SCOPE: CPT | Mod: S$GLB,,, | Performed by: UROLOGY

## 2020-08-12 RX ORDER — OXYBUTYNIN CHLORIDE 5 MG/1
5 TABLET, EXTENDED RELEASE ORAL DAILY
Qty: 90 TABLET | Refills: 3 | Status: SHIPPED | OUTPATIENT
Start: 2020-08-12 | End: 2021-08-13 | Stop reason: SDUPTHER

## 2020-08-17 ENCOUNTER — OFFICE VISIT (OUTPATIENT)
Dept: OBSTETRICS AND GYNECOLOGY | Facility: CLINIC | Age: 55
End: 2020-08-17
Payer: MEDICAID

## 2020-08-17 VITALS
HEIGHT: 64 IN | SYSTOLIC BLOOD PRESSURE: 142 MMHG | WEIGHT: 183.63 LBS | BODY MASS INDEX: 31.35 KG/M2 | DIASTOLIC BLOOD PRESSURE: 86 MMHG

## 2020-08-17 DIAGNOSIS — Z01.419 WELL WOMAN EXAM WITH ROUTINE GYNECOLOGICAL EXAM: Primary | ICD-10-CM

## 2020-08-17 DIAGNOSIS — C50.919 MALIGNANT NEOPLASM OF FEMALE BREAST, UNSPECIFIED ESTROGEN RECEPTOR STATUS, UNSPECIFIED LATERALITY, UNSPECIFIED SITE OF BREAST: ICD-10-CM

## 2020-08-17 DIAGNOSIS — N95.1 HOT FLASHES DUE TO MENOPAUSE: ICD-10-CM

## 2020-08-17 PROCEDURE — 99396 PR PREVENTIVE VISIT,EST,40-64: ICD-10-PCS | Mod: 25,S$PBB,, | Performed by: OBSTETRICS & GYNECOLOGY

## 2020-08-17 PROCEDURE — 99214 OFFICE O/P EST MOD 30 MIN: CPT | Mod: PBBFAC | Performed by: OBSTETRICS & GYNECOLOGY

## 2020-08-17 PROCEDURE — 99999 PR PBB SHADOW E&M-EST. PATIENT-LVL IV: ICD-10-PCS | Mod: PBBFAC,,, | Performed by: OBSTETRICS & GYNECOLOGY

## 2020-08-17 PROCEDURE — 99999 PR PBB SHADOW E&M-EST. PATIENT-LVL IV: CPT | Mod: PBBFAC,,, | Performed by: OBSTETRICS & GYNECOLOGY

## 2020-08-17 PROCEDURE — 99396 PREV VISIT EST AGE 40-64: CPT | Mod: 25,S$PBB,, | Performed by: OBSTETRICS & GYNECOLOGY

## 2020-08-17 RX ORDER — PAROXETINE 7.5 MG/1
7.5 CAPSULE ORAL DAILY
Qty: 30 CAPSULE | Refills: 11 | Status: SHIPPED | OUTPATIENT
Start: 2020-08-17 | End: 2020-08-21

## 2020-08-17 NOTE — PROGRESS NOTES
SUBJECTIVE:     Chief Complaint: Well Woman       History of Present Illness:  Annual Exam  Patient presents for annual exam.   She c/o worsening hot flashes today.  LMP:   She denies any vd, vb, dyspareunia, dysuria, depression, anxiety.  Last pap was in 2003 and was neg per patient.  Birth Control: s/p NILES/BSO 2003    Last year was dx with: Stage IA (I5wF3hW1) invasive ductal carcinoma of the left breast, grade 2, ER/SD positive, HER2 negative, s/p left mastectomy and reconstruction on 9/11/2019, s/p chemo on Arimidex.    Seeing urology for OAB, renal cysts.    GYN screening history: denies  Mammogram history: see above, mammo neg 2020  Colonoscopy history: due  Dexa history: neg 2020    FH:   Breast cancer: none  Colon cancer: none  Ovarian cancer: maternal great gm    Review of patient's allergies indicates:   Allergen Reactions    Morphine Itching    Percocet [oxycodone-acetaminophen] Itching       Past Medical History:   Diagnosis Date    Breast cancer     Diabetes mellitus     Encounter for blood transfusion     Glaucoma 2012    Hypertension     Malignant neoplasm of central portion of left breast in female, estrogen receptor positive 7/23/2019    Renal cyst     Retinal tear of right eye      Past Surgical History:   Procedure Laterality Date    BREAST BIOPSY      EYE SURGERY Bilateral     as a child for cross eye    HERNIA REPAIR  2009    umbilical    HYSTERECTOMY  2000    NILES, BSO secondary to ovarian cyst    INSERTION OF TUNNELED CENTRAL VENOUS CATHETER (CVC) WITH SUBCUTANEOUS PORT Right 10/25/2019    Procedure: DUTIEXEHA-KTEO-U-CATH RIGHT (CONSENT AM OF) 1. 0 hr case;  Surgeon: Shikha Mccray MD;  Location: [facility] OR;  Service: General;  Laterality: Right;    MASTECTOMY Left 9/11/2019    Procedure: MASTECTOMY;  Surgeon: Shikha Mccray MD;  Location: [facility] OR;  Service: Plastics;  Laterality: Left;    MASTECTOMY WITH SENTINEL NODE BIOPSY AND AXILLARY LYMPH NODE DISSECTION Left 9/11/2019     Procedure: MASTECTOMY, WITH SENTINEL NODE BIOPSY AND AXILLARY LYMPHADENECTOMY LEFT;  Surgeon: Shikha Mccrya MD;  Location: [facility] OR;  Service: Plastics;  Laterality: Left;    OOPHORECTOMY      RECONSTRUCTION OF BREAST WITH DEEP INFERIOR EPIGASTRIC ARTERY  (SALEEM) FREE FLAP Left 2019    Procedure: RECONSTRUCTION, BREAST, USING SALEEM FREE FLAP - UNILATERAL;  Surgeon: Derek Colin MD;  Location: [facility] OR;  Service: Plastics;  Laterality: Left;     OB History        3    Para   3    Term   3            AB        Living   3       SAB        TAB        Ectopic        Multiple        Live Births   3               Family History   Problem Relation Age of Onset    Ovarian cancer Other     Breast cancer Neg Hx     Colon cancer Neg Hx      Social History     Tobacco Use    Smoking status: Never Smoker    Smokeless tobacco: Never Used   Substance Use Topics    Alcohol use: Yes     Alcohol/week: 0.0 standard drinks     Comment: occasional    Drug use: No       Current Outpatient Medications   Medication Sig    amlodipine-benazepril (LOTREL) 5-40 mg per capsule TAKE 1 CAPSULE BY ORAL ROUTE EVERY DAY    anastrozole (ARIMIDEX) 1 mg Tab TAKE 1 TABLET(1 MG) BY MOUTH EVERY DAY    atorvastatin (LIPITOR) 10 MG tablet TAKE 1 TABLET BY ORAL ROUTE EVERY DAY    dorzolamide (TRUSOPT) 2 % ophthalmic solution Place 1 drop into both eyes 3 (three) times daily.     empagliflozin (JARDIANCE) 10 mg Tab Take 10 mg by mouth once daily.     fluticasone propionate (FLONASE) 50 mcg/actuation nasal spray 1 spray by Each Nostril route nightly as needed for Rhinitis.    hydroCHLOROthiazide (MICROZIDE) 12.5 mg capsule TK 1 C PO D    latanoprost 0.005 % ophthalmic solution INT 1 GTT IN OU QD HS    lidocaine-prilocaine (EMLA) cream APPLY TOPICALLY OVER PORT SITE AT LEAST 60 MINUTES PRIOR TO CHEMO FOR 1 DOSE    loratadine (CLARITIN ORAL) Take by mouth.    magic mouthwash diphen/antac/lidoc Swish and spit 15  mLs every 4 (four) hours as needed (mucositis).    metFORMIN (GLUCOPHAGE-XR) 500 MG 24 hr tablet Take 2 tablets (1,000 mg total) by mouth 2 (two) times daily with meals.    multivitamin (ONE DAILY MULTIVITAMIN) per tablet Take 1 tablet by mouth once daily.    ondansetron (ZOFRAN-ODT) 8 MG TbDL Take 1 tablet (8 mg total) by mouth every 6 (six) hours as needed (nausea).    oxybutynin (DITROPAN-XL) 5 MG TR24 Take 1 tablet (5 mg total) by mouth once daily.    promethazine (PHENERGAN) 25 MG tablet Take 1 tablet (25 mg total) by mouth every 6 (six) hours as needed for Nausea.    PARoxetine mesylate,menop.sym, 7.5 mg Cap Take 7.5 mg by mouth once daily.     No current facility-administered medications for this visit.        Review of Systems:  GENERAL: No fever, chills, fatigability or weight loss.  CARDIOVASCULAR: No chest pain. No palpitations.  RESPIRATORY: No SOB, no wheezing.  BREAST: Denies pain. No lumps. No discharge.  VULVAR: No pain, no lesions and no itching.  VAGINAL: No relaxation, no itching, no discharge, no abnormal bleeding and no lesions.  ABDOMEN: No abdominal pain. Denies nausea. Denies vomiting. No diarrhea. No constipation  URINARY: No incontinence, no nocturia, no frequency and no dysuria.  NEUROLOGICAL: No headaches. No vision changes.       OBJECTIVE:     Vitals:    08/17/20 0959   BP: (!) 142/86       Physical Exam:  Gen: NAD, well developed, well-nourished  HEENT: Normocephalic, atraumatic  Eyes: EOM nl, conjuntivae normal  Neck: ROM normal, no thyromegaly  Respiratory: Effort normal   Abd: soft, nontender, no masses palpated  Breast: Normal bilaterally, no masses, lesions or tenderness. No nipple discharge on expression, no lymphadenopathy bilaterally.  SSE:  Vulva: no lesions or rashes  Cervix: surgically absent  BME:   Cervix: surgically absent  Adnexa: surgically absent  Uterus: surgically absent  Musculoskeletal: normal ROM  Neuro: alert, AAOx3  Skin: warm and dry  Psych: mood/affect  nl, behavior normal, judgement normal, thought content normal        ASSESSMENT:       ICD-10-CM ICD-9-CM    1. Well woman exam with routine gynecological exam  Z01.419 V72.31 Ambulatory referral/consult to [facility] COLONOSCOPY   2. Hot flashes due to menopause  N95.1 627.2 PARoxetine mesylate,menop.sym, 7.5 mg Cap   3. Malignant neoplasm of female breast, unspecified estrogen receptor status, unspecified laterality, unspecified site of breast  C50.919 174.9           Plan:      Shu was seen today for well woman.    Diagnoses and all orders for this visit:    Well woman exam with routine gynecological exam  -     Ambulatory referral/consult to [facility] COLONOSCOPY; Future    Hot flashes due to menopause  -     PARoxetine mesylate,menop.sym, 7.5 mg Cap; Take 7.5 mg by mouth once daily.    Malignant neoplasm of female breast, unspecified estrogen receptor status, unspecified laterality, unspecified site of breast        Orders Placed This Encounter   Procedures    Ambulatory referral/consult to [facility] COLONOSCOPY     -Non hormonal tx options for hot flashes discussed, patient would like to try paxil. R/B/A discussed.   Follow up in one year for annual, or prn.    Julie R Jeansonne

## 2020-08-21 ENCOUNTER — TELEPHONE (OUTPATIENT)
Dept: OBSTETRICS AND GYNECOLOGY | Facility: CLINIC | Age: 55
End: 2020-08-21

## 2020-08-21 RX ORDER — PAROXETINE 10 MG/1
10 TABLET, FILM COATED ORAL DAILY
Qty: 30 TABLET | Refills: 6 | Status: SHIPPED | OUTPATIENT
Start: 2020-08-21 | End: 2020-10-26

## 2020-08-21 NOTE — TELEPHONE ENCOUNTER
Spoke with pt and provided her with number to call and schedule gastro appt [phone]. Pt verbalized understanding.

## 2020-08-21 NOTE — TELEPHONE ENCOUNTER
----- Message from Anna Twan sent at 8/21/2020 10:07 AM CDT -----  Contact: SHALINI DIANE [[id number]]  Type: Patient Call Back    Who called:SHALINI DIANE [[id number]]    What is the request in detail: Patient is requesting a call back. She states that she was given a referral for the [email or web address] provider and no one has given her a call to schedule her appointment. She would like to know if the office can assist with getting her scheduled.   Please advise.    Can the clinic reply by [facility]? No    Best call back number: [phone]    Additional Information: N/A

## 2020-09-02 ENCOUNTER — TELEPHONE (OUTPATIENT)
Dept: OBSTETRICS AND GYNECOLOGY | Facility: CLINIC | Age: 55
End: 2020-09-02

## 2020-09-02 NOTE — TELEPHONE ENCOUNTER
----- Message from Fawn Acevedo, Patient Care Assistant sent at 9/2/2020  1:38 PM CDT -----  Name of Who is Calling: SHALINI DIANE [[id number]]    What is the request in detail: Requesting a call back in regards of coloscopy referral. Please contact to further discuss and advise      Can the clinic reply by [facility]: No    [facility]   [phone]

## 2020-09-02 NOTE — TELEPHONE ENCOUNTER
Spoke with pt and provided number for Gastro enterology Dept [phone]. Pt verbalized understanding.

## 2020-09-03 ENCOUNTER — INFUSION (OUTPATIENT)
Dept: INFUSION THERAPY | Facility: OTHER | Age: 55
End: 2020-09-03
Attending: INTERNAL MEDICINE
Payer: MEDICAID

## 2020-09-03 ENCOUNTER — OFFICE VISIT (OUTPATIENT)
Dept: HEMATOLOGY/ONCOLOGY | Facility: CLINIC | Age: 55
End: 2020-09-03
Payer: MEDICAID

## 2020-09-03 VITALS
BODY MASS INDEX: 31.73 KG/M2 | HEART RATE: 85 BPM | HEIGHT: 64 IN | TEMPERATURE: 98 F | OXYGEN SATURATION: 99 % | WEIGHT: 185.88 LBS | DIASTOLIC BLOOD PRESSURE: 76 MMHG | RESPIRATION RATE: 16 BRPM | SYSTOLIC BLOOD PRESSURE: 141 MMHG

## 2020-09-03 DIAGNOSIS — T45.1X5A ANTINEOPLASTIC CHEMOTHERAPY INDUCED ANEMIA: Primary | ICD-10-CM

## 2020-09-03 DIAGNOSIS — Z17.0 MALIGNANT NEOPLASM OF NIPPLE OF LEFT BREAST IN FEMALE, ESTROGEN RECEPTOR POSITIVE: Primary | ICD-10-CM

## 2020-09-03 DIAGNOSIS — C77.3 MALIGNANT NEOPLASM METASTATIC TO LYMPH NODE OF AXILLA: ICD-10-CM

## 2020-09-03 DIAGNOSIS — E55.9 VITAMIN D DEFICIENCY: ICD-10-CM

## 2020-09-03 DIAGNOSIS — D64.81 ANTINEOPLASTIC CHEMOTHERAPY INDUCED ANEMIA: Primary | ICD-10-CM

## 2020-09-03 DIAGNOSIS — G62.9 NEUROPATHY: ICD-10-CM

## 2020-09-03 DIAGNOSIS — I10 ESSENTIAL HYPERTENSION: ICD-10-CM

## 2020-09-03 DIAGNOSIS — C50.012 MALIGNANT NEOPLASM OF NIPPLE OF LEFT BREAST IN FEMALE, ESTROGEN RECEPTOR POSITIVE: Primary | ICD-10-CM

## 2020-09-03 PROCEDURE — 99214 OFFICE O/P EST MOD 30 MIN: CPT | Mod: PBBFAC,25 | Performed by: INTERNAL MEDICINE

## 2020-09-03 PROCEDURE — A4216 STERILE WATER/SALINE, 10 ML: HCPCS | Performed by: INTERNAL MEDICINE

## 2020-09-03 PROCEDURE — 99999 PR PBB SHADOW E&M-EST. PATIENT-LVL IV: CPT | Mod: PBBFAC,,, | Performed by: INTERNAL MEDICINE

## 2020-09-03 PROCEDURE — 99214 OFFICE O/P EST MOD 30 MIN: CPT | Mod: S$PBB,,, | Performed by: INTERNAL MEDICINE

## 2020-09-03 PROCEDURE — 25000003 PHARM REV CODE 250: Performed by: INTERNAL MEDICINE

## 2020-09-03 PROCEDURE — 99999 PR PBB SHADOW E&M-EST. PATIENT-LVL IV: ICD-10-PCS | Mod: PBBFAC,,, | Performed by: INTERNAL MEDICINE

## 2020-09-03 PROCEDURE — 96523 IRRIG DRUG DELIVERY DEVICE: CPT

## 2020-09-03 PROCEDURE — 63600175 PHARM REV CODE 636 W HCPCS: Performed by: INTERNAL MEDICINE

## 2020-09-03 PROCEDURE — 99214 PR OFFICE/OUTPT VISIT, EST, LEVL IV, 30-39 MIN: ICD-10-PCS | Mod: S$PBB,,, | Performed by: INTERNAL MEDICINE

## 2020-09-03 RX ORDER — HEPARIN 100 UNIT/ML
500 SYRINGE INTRAVENOUS
Status: CANCELLED | OUTPATIENT
Start: 2020-09-03

## 2020-09-03 RX ORDER — GABAPENTIN 300 MG/1
300 CAPSULE ORAL NIGHTLY
Qty: 30 CAPSULE | Refills: 3 | Status: SHIPPED | OUTPATIENT
Start: 2020-09-03 | End: 2021-01-04 | Stop reason: SDUPTHER

## 2020-09-03 RX ORDER — SODIUM CHLORIDE 0.9 % (FLUSH) 0.9 %
10 SYRINGE (ML) INJECTION
Status: COMPLETED | OUTPATIENT
Start: 2020-09-03 | End: 2020-09-03

## 2020-09-03 RX ORDER — HEPARIN 100 UNIT/ML
500 SYRINGE INTRAVENOUS
Status: COMPLETED | OUTPATIENT
Start: 2020-09-03 | End: 2020-09-03

## 2020-09-03 RX ORDER — SODIUM CHLORIDE 0.9 % (FLUSH) 0.9 %
10 SYRINGE (ML) INJECTION
Status: CANCELLED | OUTPATIENT
Start: 2020-09-03

## 2020-09-03 RX ADMIN — SODIUM CHLORIDE, PRESERVATIVE FREE 10 ML: 5 INJECTION INTRAVENOUS at 09:09

## 2020-09-03 RX ADMIN — HEPARIN 500 UNITS: 100 SYRINGE at 09:09

## 2020-09-03 NOTE — PROGRESS NOTES
Subjective:       Patient ID: Shu Mendoza is a 55 y.o. female.     Chief Complaint: follow up for breast cancer     Diagnosis:  Stage IA ([id number]) invasive ductal carcinoma of the left breast, grade 2, ER/SD positive, HER2 negative, s/p left mastectomy and reconstruction on 9/11/2019. Mammaprint high risk     Oncologic History:  1. Ms Mendoza is a 53 yo postmenopausal woman with HTN, DM, who presents today for further management of pathologic stage IA (M3eR4K4) invasive ductal carcinoma of the left breast. She was referred to Dr Mccray for bloody nipple discharge first noted in June 2019. Before then she had a negative mammogram on 5/20/19. She had a left breast US on 6/27/19 in the left breast retroareolar region just behind the nipple, there is an irregular hypoechoic intraductal mass measuring 1.0 cm. She underwent a biopsy on 7/8/2019. It showed invasive ductal carcinoma, ER strongly positive 100%, SD positive 80%, HER2 negative 1+. Ki67 5% activity within invasive component. Patient underwent a left total mastectomy and reconstruction on 9/11/2019. Pathology showed a 1.3 cm invasive ductal carcinoma, grade 2. DCIS present, negative margin, 22 lymph nodes removed, one lymph node positive with macrometastases 13 mm, no extranodal extension. No lymphovascular invasion. Pathologic T1c N1a. Mammaprint high risk with chemo benefit >12%. 5-10 years recurrence risk without chemo 20-25%, with chemo and endocrine therapy 7%. BRCAnalysis from 7/18/19 was negative. Case was discussed at tumor board. Adjuvant chemotherapy followed by endocrine therapy was recommended. Radiation not recommended. Patient presents today for further management. Feeling well. Works at [employer]. She had NILES/BSO in 2000 and was on hormone replacement therapy after that. Stopped in 2019 after she was diagnosed with breast cancer.   2. Port placed by Dr Mccray on 10/25/19. Echo on 10/22/19 showed normal LVEF 59%.   3. Adjuvant DDAC followed by  weekly taxol started on 10/31/2019. cycle 2 on 19. cycle 3 was delayed for a week for umbilical infection, given on 2019, cycle 4 given on 19. Weekly taxol completed on 3/19/19. Started anastrozole in 2020     Interval History:   Ms Mendoza returns today for follow up. Has been on anastrozole since April. Feeling well. Neuropathy in feet worse at night. Noted some swelling under the left arm at night. Was given sleeve for lymphedema. Not wearing it today       ECO     ROS:   A ten-point system review is obtained and negative except for what was stated in the Interval History.      Physical Examination:   Vital signs reviewed.   General: well hydrated, well developed, in no acute distress  HEENT: normocephalic, PERRLA, EOMI, anicteric sclerae, oropharynx clear  Neck: supple, no JVD, thyromegaly, cervical or supraclavicular lymphadenopathy  Lungs: clear breath sounds bilaterally, no wheezing, rales, or rhonchi  Heart: RRR, no M/R/G  Abdomen: soft, no tenderness, non-distended, no hepatosplenomegaly, mass, or hernia. BS present. No pus, drainage or abnormality of the umbilicus.   Extremities: no clubbing, cyanosis, or edema  Skin: no rash, ulcer, or open wounds  Neuro: alert and oriented x 4, no focal neuro deficit  Psych: pleasant and appropriate mood and affect  Breasts: s/p left mastectomy and reconstruction, bilateral breasts no abnormal skin nodules, open wounds, masses, or axillary LAD     Objective:      Laboratory Data:  Labs reviewed. vit D level 20 normal     Imaging Data:  Mammogram 20 normal    Bone density 20 normal     Assessment and Plan:      1. Malignant neoplasm of nipple of left breast in female, estrogen receptor positive    2. Malignant neoplasm metastatic to lymph node of axilla    3. Neuropathy    4. Essential hypertension        1.2  - Ms Mendoza is a 53 yo postmenopausal woman with stage IA (V5wM2hK2) invasive ductal carcinoma of the left breast, ER/TN  positive, HER2 negative, s/p left mastectomy and reconstruction on 9/11/2019. Mammaprint high risk with chemo benefit >12%. 5-10 years recurrence risk without chemo 20-25%, with chemo and endocrine therapy 7%.  - completed adjuvant DDAC followed by weekly taxol  - on anastrozole since April 2020. Continue for at least 5 years  - discussed about vit D ~1000 units and calcium 1000 mg a day. She is taking vit D 1000 units a day. Asked her to add calcium  - next mammogram in June 2021. Next bone density in May 2022  - RTC in 3 months  - She wants to have her port removed. Messaged Dr Mccray's office     3.  - worse at night  - try gabapentin 300 mg at bedtime. Fall precautions given    4.  - BP good  - c/w current meds         Follow-up:      RTC in 3 months  Knows to call in the interval if any problems arise.

## 2020-09-03 NOTE — PLAN OF CARE
Port flush complete. Pt tolerated well. NAD. Port to right chest de-accessed after heparinized per protocol.

## 2020-09-10 ENCOUNTER — OFFICE VISIT (OUTPATIENT)
Dept: SURGERY | Facility: CLINIC | Age: 55
End: 2020-09-10
Attending: SURGERY
Payer: MEDICAID

## 2020-09-10 DIAGNOSIS — C50.912 MALIGNANT NEOPLASM OF LEFT BREAST IN FEMALE, ESTROGEN RECEPTOR POSITIVE, UNSPECIFIED SITE OF BREAST: Primary | ICD-10-CM

## 2020-09-10 DIAGNOSIS — Z85.3 HISTORY OF BREAST CANCER: ICD-10-CM

## 2020-09-10 DIAGNOSIS — N64.4 BREAST PAIN, LEFT: ICD-10-CM

## 2020-09-10 DIAGNOSIS — Z17.0 MALIGNANT NEOPLASM OF LEFT BREAST IN FEMALE, ESTROGEN RECEPTOR POSITIVE, UNSPECIFIED SITE OF BREAST: Primary | ICD-10-CM

## 2020-09-10 PROCEDURE — 99213 OFFICE O/P EST LOW 20 MIN: CPT | Mod: S$GLB,,, | Performed by: SURGERY

## 2020-09-10 PROCEDURE — 99213 PR OFFICE/OUTPT VISIT, EST, LEVL III, 20-29 MIN: ICD-10-PCS | Mod: S$GLB,,, | Performed by: SURGERY

## 2020-09-10 NOTE — PROGRESS NOTES
[facility]  [facility]  9/10/2020      SUBJECTIVE:   Ms. Shu Mendoza is a 55 y.o. female with LEFT breast cancer who presents today for post op check, s/p mediport placement at right chest.      History of Present Illness: Patient was originally seen by me on 6/19/19 for left sided bloody nipple discharge. An ultrasound was ordered and showed an intraductal mass at the left retroareolar region (BIRADS 4, suspicious.    A biopsy of this LEFT breast mass proved to be consistent with invasive mammary carcinoma, ER/KS+ Her2--.    She was then seen by me to discuss treatment options.    On 9/11/19 she had a left skin sparing mastectomy and SLNB with left SALEEM flap reconstruction.  Final pathology showed grade 2 IDC, 13mm with 1/22 positive lymph nodes, stage T1cN1 (stage 1B).    Her mammaprint score was high risk so adjuvant chemotherapy and endocrine therapy were recommended.  Adjuvant DDAC with taxol with Dr. Norris. Completed 3/2020  Started anaztrazole April 2020.    Interval history: Patient states she is doing okay. Reports some soreness and stinging in the left breast superior/OUQ.  Otherwise no complaints.  Wants port out.      Review of Systems: Denies any chest pain or shortness of breath.  Denies any fever or chills.  See HPI/ Interval History for other systems reviewed.    OBJECTIVE:   There were no vitals filed for this visit.    Physical Exam:  HEENT: Normocephalic, atraumatic.    General: alert and oriented; no acute distress.  Breast: Well healed scar at upper right chest (port site).  No erythema or edema.  Well healed scar at left breast.  No masses. No LAD bilaterally. She does have lymphedema of the LUE.  Managed with home machine and sleeve.    ASSESSMENT:  Ms. Shu Mendoza is a 55 y.o. year old female with LEFT breast cancer who presents today for f/u . JACKIE.  PLAN:     F/u 6 months.   Due for mmg right breast June 2021.  Will do LEFT MMG to ensure nothing suspicious (I  suspect fat necrosis)  Due for second stage, seeing Dr. Mansfield 2 weeks--will let us know about port removal.  May do at same time as revision.

## 2020-09-14 ENCOUNTER — HOSPITAL ENCOUNTER (OUTPATIENT)
Dept: RADIOLOGY | Facility: OTHER | Age: 55
Discharge: HOME | End: 2020-09-14
Attending: SURGERY
Payer: MEDICAID

## 2020-09-14 DIAGNOSIS — Z85.3 HISTORY OF BREAST CANCER: ICD-10-CM

## 2020-09-14 DIAGNOSIS — N64.4 BREAST PAIN, LEFT: ICD-10-CM

## 2020-09-14 PROCEDURE — 77065 DX MAMMO INCL CAD UNI: CPT | Mod: 26,LT,, | Performed by: RADIOLOGY

## 2020-09-14 PROCEDURE — 77065 DX MAMMO INCL CAD UNI: CPT | Mod: TC,LT

## 2020-09-14 PROCEDURE — 77061 MAMMO DIGITAL DIAGNOSTIC LEFT WITH TOMO: ICD-10-PCS | Mod: 26,LT,, | Performed by: RADIOLOGY

## 2020-09-14 PROCEDURE — 77061 BREAST TOMOSYNTHESIS UNI: CPT | Mod: 26,LT,, | Performed by: RADIOLOGY

## 2020-09-14 PROCEDURE — 77065 MAMMO DIGITAL DIAGNOSTIC LEFT WITH TOMO: ICD-10-PCS | Mod: 26,LT,, | Performed by: RADIOLOGY

## 2020-10-02 ENCOUNTER — PATIENT MESSAGE (OUTPATIENT)
Dept: SURGERY | Facility: CLINIC | Age: 55
End: 2020-10-02

## 2020-10-19 ENCOUNTER — PATIENT MESSAGE (OUTPATIENT)
Dept: SURGERY | Facility: CLINIC | Age: 55
End: 2020-10-19

## 2020-10-26 ENCOUNTER — HOSPITAL ENCOUNTER (OUTPATIENT)
Dept: PREADMISSION TESTING | Facility: OTHER | Age: 55
Discharge: HOME | End: 2020-10-26
Attending: PLASTIC SURGERY
Payer: MEDICAID

## 2020-10-26 ENCOUNTER — ANESTHESIA EVENT (OUTPATIENT)
Dept: SURGERY | Facility: OTHER | Age: 55
End: 2020-10-26
Payer: MEDICAID

## 2020-10-26 VITALS
TEMPERATURE: 97 F | WEIGHT: 184 LBS | BODY MASS INDEX: 31.41 KG/M2 | HEART RATE: 70 BPM | DIASTOLIC BLOOD PRESSURE: 76 MMHG | OXYGEN SATURATION: 99 % | HEIGHT: 64 IN | SYSTOLIC BLOOD PRESSURE: 144 MMHG

## 2020-10-26 RX ORDER — FAMOTIDINE 20 MG/1
20 TABLET, FILM COATED ORAL
Status: CANCELLED | OUTPATIENT
Start: 2020-10-26 | End: 2020-10-26

## 2020-10-26 RX ORDER — CELECOXIB 200 MG/1
400 CAPSULE ORAL
Status: CANCELLED | OUTPATIENT
Start: 2020-10-26 | End: 2020-10-26

## 2020-10-26 RX ORDER — CLINDAMYCIN HYDROCHLORIDE 300 MG/1
CAPSULE ORAL
COMMUNITY
Start: 2020-10-23 | End: 2021-03-11

## 2020-10-26 RX ORDER — OXYCODONE AND ACETAMINOPHEN 5; 325 MG/1; MG/1
TABLET ORAL
COMMUNITY
Start: 2020-10-23 | End: 2021-06-11

## 2020-10-26 RX ORDER — SODIUM CHLORIDE, SODIUM LACTATE, POTASSIUM CHLORIDE, CALCIUM CHLORIDE 600; 310; 30; 20 MG/100ML; MG/100ML; MG/100ML; MG/100ML
INJECTION, SOLUTION INTRAVENOUS CONTINUOUS
Status: CANCELLED | OUTPATIENT
Start: 2020-10-26

## 2020-10-26 RX ORDER — LIDOCAINE HYDROCHLORIDE 10 MG/ML
0.5 INJECTION, SOLUTION EPIDURAL; INFILTRATION; INTRACAUDAL; PERINEURAL ONCE
Status: CANCELLED | OUTPATIENT
Start: 2020-10-26 | End: 2020-10-26

## 2020-10-26 RX ORDER — CARVEDILOL 6.25 MG/1
TABLET ORAL
COMMUNITY
Start: 2020-10-13 | End: 2022-08-02 | Stop reason: SDUPTHER

## 2020-10-26 NOTE — ANESTHESIA PREPROCEDURE EVALUATION
10/26/2020  Shu Mendoza is a 55 y.o., female.    Anesthesia Evaluation    I have reviewed the Patient Summary Reports.    I have reviewed the Nursing Notes. I have reviewed the NPO Status.   I have reviewed the Medications.     Review of Systems  Anesthesia Hx:  No problems with previous Anesthesia  History of prior surgery of interest to airway management or planning: Previous anesthesia: General 9/19 mast/flap with general anesthesia.  Airway issues documented on chart review include mask, easy, GETA, videolaryngoscope used , view on video-laryngoscopy Grade I    Denies Family Hx of Anesthesia complications.   Denies Personal Hx of Anesthesia complications.   Social:  Non-Smoker    Hematology/Oncology:         -- Anemia: Current/Recent Cancer. Breast left axillary node dissection lymphedema chemotherapy and surgery   Cardiovascular:   Hypertension, well controlled    Pulmonary:  Pulmonary Normal    Renal/:  Renal/ Normal  Renal cyst   Hepatic/GI:  Hepatic/GI Normal    Musculoskeletal:  Musculoskeletal Normal    Neurological:   Diabetic neuropathy of left foot. Tingling hands and feet.   Endocrine:   Diabetes, well controlled, type 2        Physical Exam  General:  Well nourished, Obesity    Airway/Jaw/Neck:  Airway Findings: Mouth Opening: Normal Tongue: Normal  General Airway Assessment: Adult  Mallampati: II  TM Distance: Normal, at least 6 cm         Dental:  Dental Findings: In tact             Anesthesia Plan  Type of Anesthesia, risks & benefits discussed:  Anesthesia Type:  general  Patient's Preference:   Intra-op Monitoring Plan: standard ASA monitors  Intra-op Monitoring Plan Comments:   Post Op Pain Control Plan: per primary service following discharge from PACU  Post Op Pain Control Plan Comments:   Induction:   IV  Beta Blocker:         Informed Consent: Patient understands risks and  agrees with Anesthesia plan.  Questions answered. Anesthesia consent signed with patient.  ASA Score: 2     Day of Surgery Review of History & Physical:    H&P update referred to the surgeon.     Anesthesia Plan Notes: IV and  Devices, need to check with surgeon. Pt known to us and is returning for revision surgery. CBC and CMP are on the paper chart            Ready For Surgery From Anesthesia Perspective.

## 2020-10-26 NOTE — DISCHARGE INSTRUCTIONS
Information to Prepare you for your Surgery    PRE-ADMIT TESTING -  [phone]    [address]  [place] [facility]          Your surgery has been scheduled at [facility]. We are pleased to have the opportunity to serve you. For Further Information please call [phone].    On the day of surgery please report to the Information Desk on the 1st floor.    · CONTACT YOUR PHYSICIAN'S OFFICE THE DAY PRIOR TO YOUR SURGERY TO OBTAIN YOUR ARRIVAL TIME.     · The evening before surgery do not eat anything after 9 p.m. ( this includes hard candy, chewing gum and mints).  You may only have GATORADE, POWERADE AND WATER  from 9 p.m. until you leave your home.   DO NOT DRINK ANY LIQUIDS ON THE WAY TO THE HOSPITAL.      SPECIAL MEDICATION INSTRUCTIONS: TAKE medications checked off by the Anesthesiologist on your Medication List.    Angiogram Patients: Take medications as instructed by your physician, including aspirin.     Surgery Patients:    If you take ASPIRIN - Your PHYSICIAN/SURGEON will need to inform you IF/OR when you need to stop taking aspirin prior to your surgery.     Do Not take any medications containing IBUPROFEN.  Do Not Wear any make-up or dark nail polish   (especially eye make-up) to surgery. If you come to surgery with makeup on you will be required to remove the makeup or nail polish.    Do not shave your surgical area at least 5 days prior to your surgery. The surgical prep will be performed at the hospital according to Infection Control regulations.    Leave all valuables at home.   Do Not wear any jewelry or watches, including any metal in body piercings. Jewelry must be removed prior to coming to the hospital.  There is a possibility that rings that are unable to be removed may be cut off if they are on the surgical extremity.    Contact Lens must be removed before surgery. Either do not wear the contact lens or bring a case and solution for  storage.  Please bring a container for eyeglasses or dentures as required.  Bring any paperwork your physician has provided, such as consent forms,  history and physicals, doctor's orders, etc.   Bring comfortable clothes that are loose fitting to wear upon discharge. Take into consideration the type of surgery being performed.  Maintain your diet as advised per your physician the day prior to surgery.      Adequate rest the night before surgery is advised.   Park in the Parking lot behind the hospital or in the [place] [facility] across the street from the parking lot. Parking is complimentary.  If you will be discharged the same day as your procedure, please arrange for a responsible adult to drive you home or to accompany you if traveling by taxi.   YOU WILL NOT BE PERMITTED TO DRIVE OR TO LEAVE THE HOSPITAL ALONE AFTER SURGERY.   If you are being discharged the same day, it is strongly recommended that you arrange for someone to remain with you for the first 24 hrs following your surgery.    The Surgeon will speak to your family/visitor after your surgery regarding the outcome of your surgery and post op care.  The Surgeon may speak to you after your surgery, but there is a possibility you may not remember the details.  Please check with your family members regarding the conversation with the Surgeon.    We strongly recommend whoever is bringing you home be present for discharge instructions.  This will ensure a thorough understanding for your post op home care.    ALL CHILDREN MUST ALWAYS BE ACCOMPANIED BY AN ADULT.    Visitors-Refer to current Visitor policy handouts.    Thank you for your cooperation.  The Staff of [facility].                Bathing Instructions with Hibiclens     Shower the evening before and morning of your procedure with Hibiclens:   Wash your face with water and your regular face wash/soap   Apply Hibiclens directly on your skin or on a wet washcloth and wash  gently. When showering: Move away from the shower stream when applying Hibiclens to avoid rinsing off too soon.   Rinse thoroughly with warm water   Do not dilute Hibiclens         Dry off as usual, do not use any deodorant, powder, body lotions, perfume, after shave or cologne.

## 2020-11-02 ENCOUNTER — HOSPITAL ENCOUNTER (OUTPATIENT)
Dept: PREADMISSION TESTING | Facility: OTHER | Age: 55
Discharge: HOME | End: 2020-11-02
Attending: ANESTHESIOLOGY
Payer: MEDICAID

## 2020-11-02 DIAGNOSIS — Z01.818 PREOP TESTING: ICD-10-CM

## 2020-11-02 PROCEDURE — U0003 INFECTIOUS AGENT DETECTION BY NUCLEIC ACID (DNA OR RNA); SEVERE ACUTE RESPIRATORY SYNDROME CORONAVIRUS 2 (SARS-COV-2) (CORONAVIRUS DISEASE [COVID-19]), AMPLIFIED PROBE TECHNIQUE, MAKING USE OF HIGH THROUGHPUT TECHNOLOGIES AS DESCRIBED BY CMS-2020-01-R: HCPCS

## 2020-11-04 LAB — SARS-COV-2 RNA RESP QL NAA+PROBE: NOT DETECTED

## 2020-11-05 ENCOUNTER — ANESTHESIA (OUTPATIENT)
Dept: SURGERY | Facility: OTHER | Age: 55
End: 2020-11-05
Payer: MEDICAID

## 2020-11-05 ENCOUNTER — HOSPITAL ENCOUNTER (OUTPATIENT)
Facility: OTHER | Age: 55
Discharge: HOME | End: 2020-11-05
Attending: PLASTIC SURGERY | Admitting: PLASTIC SURGERY
Payer: MEDICAID

## 2020-11-05 VITALS
HEIGHT: 64 IN | SYSTOLIC BLOOD PRESSURE: 137 MMHG | WEIGHT: 184 LBS | DIASTOLIC BLOOD PRESSURE: 77 MMHG | BODY MASS INDEX: 31.41 KG/M2 | TEMPERATURE: 97 F | OXYGEN SATURATION: 99 % | RESPIRATION RATE: 16 BRPM | HEART RATE: 92 BPM

## 2020-11-05 DIAGNOSIS — Z85.3 PERSONAL HISTORY OF MALIGNANT NEOPLASM OF BREAST: Primary | ICD-10-CM

## 2020-11-05 DIAGNOSIS — Z01.818 PREOP TESTING: ICD-10-CM

## 2020-11-05 LAB
POCT GLUCOSE: 150 MG/DL (ref 70–110)
POCT GLUCOSE: 157 MG/DL (ref 70–110)

## 2020-11-05 PROCEDURE — 36000706: Performed by: PLASTIC SURGERY

## 2020-11-05 PROCEDURE — 00402 ANES INTEG SYS RCNSTV BREAST: CPT | Performed by: PLASTIC SURGERY

## 2020-11-05 PROCEDURE — 63600175 PHARM REV CODE 636 W HCPCS: Performed by: SPECIALIST

## 2020-11-05 PROCEDURE — 37000008 HC ANESTHESIA 1ST 15 MINUTES: Performed by: PLASTIC SURGERY

## 2020-11-05 PROCEDURE — 25000003 PHARM REV CODE 250: Performed by: PLASTIC SURGERY

## 2020-11-05 PROCEDURE — 63600175 PHARM REV CODE 636 W HCPCS: Performed by: PLASTIC SURGERY

## 2020-11-05 PROCEDURE — 71000016 HC POSTOP RECOV ADDL HR: Performed by: PLASTIC SURGERY

## 2020-11-05 PROCEDURE — 71000015 HC POSTOP RECOV 1ST HR: Performed by: PLASTIC SURGERY

## 2020-11-05 PROCEDURE — 71000033 HC RECOVERY, INTIAL HOUR: Performed by: PLASTIC SURGERY

## 2020-11-05 PROCEDURE — 82962 GLUCOSE BLOOD TEST: CPT | Performed by: PLASTIC SURGERY

## 2020-11-05 PROCEDURE — 63600175 PHARM REV CODE 636 W HCPCS: Performed by: ANESTHESIOLOGY

## 2020-11-05 PROCEDURE — 25000003 PHARM REV CODE 250: Performed by: ANESTHESIOLOGY

## 2020-11-05 PROCEDURE — 63600175 PHARM REV CODE 636 W HCPCS: Performed by: NURSE ANESTHETIST, CERTIFIED REGISTERED

## 2020-11-05 PROCEDURE — 36000707: Performed by: PLASTIC SURGERY

## 2020-11-05 PROCEDURE — 71000039 HC RECOVERY, EACH ADD'L HOUR: Performed by: PLASTIC SURGERY

## 2020-11-05 PROCEDURE — 25000003 PHARM REV CODE 250: Performed by: SPECIALIST

## 2020-11-05 PROCEDURE — 37000009 HC ANESTHESIA EA ADD 15 MINS: Performed by: PLASTIC SURGERY

## 2020-11-05 PROCEDURE — 25000003 PHARM REV CODE 250: Performed by: NURSE ANESTHETIST, CERTIFIED REGISTERED

## 2020-11-05 RX ORDER — LIDOCAINE HCL/PF 100 MG/5ML
SYRINGE (ML) INTRAVENOUS
Status: DISCONTINUED | OUTPATIENT
Start: 2020-11-05 | End: 2020-11-05

## 2020-11-05 RX ORDER — ONDANSETRON 2 MG/ML
4 INJECTION INTRAMUSCULAR; INTRAVENOUS DAILY PRN
Status: DISCONTINUED | OUTPATIENT
Start: 2020-11-05 | End: 2020-11-05 | Stop reason: HOSPADM

## 2020-11-05 RX ORDER — FAMOTIDINE 20 MG/1
20 TABLET, FILM COATED ORAL
Status: COMPLETED | OUTPATIENT
Start: 2020-11-05 | End: 2020-11-05

## 2020-11-05 RX ORDER — SODIUM CHLORIDE 0.9 % (FLUSH) 0.9 %
3 SYRINGE (ML) INJECTION
Status: DISCONTINUED | OUTPATIENT
Start: 2020-11-05 | End: 2020-11-05 | Stop reason: HOSPADM

## 2020-11-05 RX ORDER — BACITRACIN ZINC 500 UNIT/G
OINTMENT (GRAM) TOPICAL
Status: DISCONTINUED | OUTPATIENT
Start: 2020-11-05 | End: 2020-11-05 | Stop reason: HOSPADM

## 2020-11-05 RX ORDER — FENTANYL CITRATE 50 UG/ML
INJECTION, SOLUTION INTRAMUSCULAR; INTRAVENOUS
Status: DISCONTINUED | OUTPATIENT
Start: 2020-11-05 | End: 2020-11-05

## 2020-11-05 RX ORDER — PROPOFOL 10 MG/ML
VIAL (ML) INTRAVENOUS
Status: DISCONTINUED | OUTPATIENT
Start: 2020-11-05 | End: 2020-11-05

## 2020-11-05 RX ORDER — PHENYLEPHRINE HYDROCHLORIDE 10 MG/ML
INJECTION INTRAVENOUS
Status: DISCONTINUED | OUTPATIENT
Start: 2020-11-05 | End: 2020-11-05

## 2020-11-05 RX ORDER — CELECOXIB 200 MG/1
400 CAPSULE ORAL
Status: COMPLETED | OUTPATIENT
Start: 2020-11-05 | End: 2020-11-05

## 2020-11-05 RX ORDER — DEXAMETHASONE SODIUM PHOSPHATE 4 MG/ML
INJECTION, SOLUTION INTRA-ARTICULAR; INTRALESIONAL; INTRAMUSCULAR; INTRAVENOUS; SOFT TISSUE
Status: DISCONTINUED | OUTPATIENT
Start: 2020-11-05 | End: 2020-11-05

## 2020-11-05 RX ORDER — ROCURONIUM BROMIDE 10 MG/ML
INJECTION, SOLUTION INTRAVENOUS
Status: DISCONTINUED | OUTPATIENT
Start: 2020-11-05 | End: 2020-11-05

## 2020-11-05 RX ORDER — CEFAZOLIN SODIUM 1 G/3ML
2 INJECTION, POWDER, FOR SOLUTION INTRAMUSCULAR; INTRAVENOUS
Status: COMPLETED | OUTPATIENT
Start: 2020-11-05 | End: 2020-11-05

## 2020-11-05 RX ORDER — SODIUM CHLORIDE, SODIUM LACTATE, POTASSIUM CHLORIDE, CALCIUM CHLORIDE 600; 310; 30; 20 MG/100ML; MG/100ML; MG/100ML; MG/100ML
INJECTION, SOLUTION INTRAVENOUS CONTINUOUS
Status: DISCONTINUED | OUTPATIENT
Start: 2020-11-05 | End: 2020-11-05 | Stop reason: HOSPADM

## 2020-11-05 RX ORDER — LIDOCAINE HYDROCHLORIDE 10 MG/ML
0.5 INJECTION, SOLUTION EPIDURAL; INFILTRATION; INTRACAUDAL; PERINEURAL ONCE
Status: DISCONTINUED | OUTPATIENT
Start: 2020-11-05 | End: 2020-11-05 | Stop reason: HOSPADM

## 2020-11-05 RX ORDER — OXYCODONE HYDROCHLORIDE 5 MG/1
5 TABLET ORAL
Status: DISCONTINUED | OUTPATIENT
Start: 2020-11-05 | End: 2020-11-05 | Stop reason: HOSPADM

## 2020-11-05 RX ORDER — ONDANSETRON HYDROCHLORIDE 2 MG/ML
INJECTION, SOLUTION INTRAMUSCULAR; INTRAVENOUS
Status: DISCONTINUED | OUTPATIENT
Start: 2020-11-05 | End: 2020-11-05

## 2020-11-05 RX ORDER — LIDOCAINE HYDROCHLORIDE AND EPINEPHRINE 10; 10 MG/ML; UG/ML
INJECTION, SOLUTION INFILTRATION; PERINEURAL
Status: DISCONTINUED | OUTPATIENT
Start: 2020-11-05 | End: 2020-11-05 | Stop reason: HOSPADM

## 2020-11-05 RX ORDER — DIPHENHYDRAMINE HYDROCHLORIDE 50 MG/ML
25 INJECTION INTRAMUSCULAR; INTRAVENOUS EVERY 6 HOURS PRN
Status: DISCONTINUED | OUTPATIENT
Start: 2020-11-05 | End: 2020-11-05 | Stop reason: HOSPADM

## 2020-11-05 RX ORDER — HYDROMORPHONE HYDROCHLORIDE 2 MG/ML
0.4 INJECTION, SOLUTION INTRAMUSCULAR; INTRAVENOUS; SUBCUTANEOUS EVERY 5 MIN PRN
Status: DISCONTINUED | OUTPATIENT
Start: 2020-11-05 | End: 2020-11-05 | Stop reason: HOSPADM

## 2020-11-05 RX ORDER — HYDROMORPHONE HYDROCHLORIDE 2 MG/ML
INJECTION, SOLUTION INTRAMUSCULAR; INTRAVENOUS; SUBCUTANEOUS
Status: DISCONTINUED | OUTPATIENT
Start: 2020-11-05 | End: 2020-11-05

## 2020-11-05 RX ORDER — MEPERIDINE HYDROCHLORIDE 25 MG/ML
12.5 INJECTION INTRAMUSCULAR; INTRAVENOUS; SUBCUTANEOUS ONCE AS NEEDED
Status: DISCONTINUED | OUTPATIENT
Start: 2020-11-05 | End: 2020-11-05 | Stop reason: HOSPADM

## 2020-11-05 RX ADMIN — DEXAMETHASONE SODIUM PHOSPHATE 8 MG: 4 INJECTION, SOLUTION INTRAMUSCULAR; INTRAVENOUS at 11:11

## 2020-11-05 RX ADMIN — OXYCODONE 5 MG: 5 TABLET ORAL at 01:11

## 2020-11-05 RX ADMIN — HYDROMORPHONE HYDROCHLORIDE 1 MG: 2 INJECTION, SOLUTION INTRAMUSCULAR; INTRAVENOUS; SUBCUTANEOUS at 11:11

## 2020-11-05 RX ADMIN — ROCURONIUM BROMIDE 50 MG: 10 SOLUTION INTRAVENOUS at 10:11

## 2020-11-05 RX ADMIN — ROCURONIUM BROMIDE 15 MG: 10 SOLUTION INTRAVENOUS at 11:11

## 2020-11-05 RX ADMIN — CELECOXIB 400 MG: 200 CAPSULE ORAL at 09:11

## 2020-11-05 RX ADMIN — HYDROMORPHONE HYDROCHLORIDE 0.4 MG: 2 INJECTION, SOLUTION INTRAMUSCULAR; INTRAVENOUS; SUBCUTANEOUS at 01:11

## 2020-11-05 RX ADMIN — SUGAMMADEX 400 MG: 100 INJECTION, SOLUTION INTRAVENOUS at 12:11

## 2020-11-05 RX ADMIN — SODIUM CHLORIDE, SODIUM LACTATE, POTASSIUM CHLORIDE, AND CALCIUM CHLORIDE: 600; 310; 30; 20 INJECTION, SOLUTION INTRAVENOUS at 10:11

## 2020-11-05 RX ADMIN — SODIUM CHLORIDE, SODIUM LACTATE, POTASSIUM CHLORIDE, AND CALCIUM CHLORIDE: 600; 310; 30; 20 INJECTION, SOLUTION INTRAVENOUS at 12:11

## 2020-11-05 RX ADMIN — FAMOTIDINE 20 MG: 20 TABLET ORAL at 09:11

## 2020-11-05 RX ADMIN — LIDOCAINE HYDROCHLORIDE 50 MG: 20 INJECTION, SOLUTION INTRAVENOUS at 10:11

## 2020-11-05 RX ADMIN — CEFAZOLIN 2 G: 330 INJECTION, POWDER, FOR SOLUTION INTRAMUSCULAR; INTRAVENOUS at 11:11

## 2020-11-05 RX ADMIN — FENTANYL CITRATE 100 MCG: 50 INJECTION, SOLUTION INTRAMUSCULAR; INTRAVENOUS at 10:11

## 2020-11-05 RX ADMIN — PHENYLEPHRINE HYDROCHLORIDE 100 MCG: 10 INJECTION INTRAVENOUS at 11:11

## 2020-11-05 RX ADMIN — PROPOFOL 200 MG: 10 INJECTION, EMULSION INTRAVENOUS at 10:11

## 2020-11-05 RX ADMIN — ONDANSETRON 4 MG: 2 INJECTION, SOLUTION INTRAMUSCULAR; INTRAVENOUS at 11:11

## 2020-11-05 NOTE — ANESTHESIA PROCEDURE NOTES
Intubation  Performed by: Ailyn Hughes CRNA  Authorized by: Ace Talavera MD     Intubation:     Induction:  Intravenous    Intubated:  Postinduction    Mask Ventilation:  Easy with oral airway    Attempts:  1    Attempted By:  CRNA    Method of Intubation:  Video laryngoscopy    Blade:  Roberto 3    Laryngeal View Grade: Grade I - full view of chords      Difficult Airway Encountered?: No      Complications:  None    Airway Device:  Oral endotracheal tube    Airway Device Size:  7.0    Style/Cuff Inflation:  Cuffed (inflated to minimal occlusive pressure)    Secured at:  The lips    Placement Verified By:  Capnometry    Complicating Factors:  None    Findings Post-Intubation:  BS equal bilateral and atraumatic/condition of teeth unchanged

## 2020-11-05 NOTE — ANESTHESIA POSTPROCEDURE EVALUATION
Anesthesia Post Evaluation    Patient: Shu Mendoza    Procedure(s) Performed: Procedure(s) (LRB):  BREAST REVISION SURGERY (Left)  REVISION, SCAR, ABDOMINAL DONOR SITE (Bilateral)  REMOVAL, CATHETER, CENTRAL VENOUS, TUNNELED, WITH PORT (Right)  LIPOSUCTION (Left)    Final Anesthesia Type: general    Patient location during evaluation: PACU  Patient participation: Yes- Able to Participate  Level of consciousness: awake and alert  Post-procedure vital signs: reviewed and stable  Pain management: adequate  Airway patency: patent    PONV status at discharge: No PONV  Anesthetic complications: no      Cardiovascular status: blood pressure returned to baseline  Respiratory status: unassisted and room air  Hydration status: euvolemic  Follow-up not needed.          Vitals Value Taken Time   /77 11/05/20 1450   Temp 36.3 °C (97.4 °F) 11/05/20 1420   Pulse 92 11/05/20 1450   Resp 16 11/05/20 1450   SpO2 99 % 11/05/20 1450         Event Time   Out of Recovery 14:14:33         Pain/Min Score: Pain Rating Prior to Med Admin: 7 (11/5/2020  1:36 PM)  Pain Rating Post Med Admin: 5 (11/5/2020  2:10 PM)  Min Score: 10 (11/5/2020  2:50 PM)

## 2020-11-11 LAB
FINAL PATHOLOGIC DIAGNOSIS: NORMAL
GROSS: NORMAL
Lab: NORMAL

## 2020-12-03 ENCOUNTER — OFFICE VISIT (OUTPATIENT)
Dept: HEMATOLOGY/ONCOLOGY | Facility: CLINIC | Age: 55
End: 2020-12-03
Payer: MEDICAID

## 2020-12-03 ENCOUNTER — LAB VISIT (OUTPATIENT)
Dept: LAB | Facility: OTHER | Age: 55
End: 2020-12-03
Attending: INTERNAL MEDICINE
Payer: MEDICAID

## 2020-12-03 VITALS
HEIGHT: 64 IN | DIASTOLIC BLOOD PRESSURE: 82 MMHG | WEIGHT: 186.5 LBS | OXYGEN SATURATION: 100 % | TEMPERATURE: 97 F | HEART RATE: 69 BPM | SYSTOLIC BLOOD PRESSURE: 166 MMHG | RESPIRATION RATE: 12 BRPM | BODY MASS INDEX: 31.84 KG/M2

## 2020-12-03 DIAGNOSIS — E55.9 VITAMIN D DEFICIENCY: ICD-10-CM

## 2020-12-03 DIAGNOSIS — Z17.0 MALIGNANT NEOPLASM OF NIPPLE OF LEFT BREAST IN FEMALE, ESTROGEN RECEPTOR POSITIVE: Primary | ICD-10-CM

## 2020-12-03 DIAGNOSIS — M25.50 ARTHRALGIA, UNSPECIFIED JOINT: ICD-10-CM

## 2020-12-03 DIAGNOSIS — G62.9 NEUROPATHY: ICD-10-CM

## 2020-12-03 DIAGNOSIS — C50.012 MALIGNANT NEOPLASM OF NIPPLE OF LEFT BREAST IN FEMALE, ESTROGEN RECEPTOR POSITIVE: Primary | ICD-10-CM

## 2020-12-03 DIAGNOSIS — Z79.811 AROMATASE INHIBITOR USE: ICD-10-CM

## 2020-12-03 LAB — 25(OH)D3+25(OH)D2 SERPL-MCNC: 66 NG/ML (ref 30–96)

## 2020-12-03 PROCEDURE — 36415 COLL VENOUS BLD VENIPUNCTURE: CPT

## 2020-12-03 PROCEDURE — 99214 OFFICE O/P EST MOD 30 MIN: CPT | Mod: PBBFAC | Performed by: INTERNAL MEDICINE

## 2020-12-03 PROCEDURE — 82306 VITAMIN D 25 HYDROXY: CPT

## 2020-12-03 PROCEDURE — 99214 PR OFFICE/OUTPT VISIT, EST, LEVL IV, 30-39 MIN: ICD-10-PCS | Mod: S$PBB,,, | Performed by: INTERNAL MEDICINE

## 2020-12-03 PROCEDURE — 99214 OFFICE O/P EST MOD 30 MIN: CPT | Mod: S$PBB,,, | Performed by: INTERNAL MEDICINE

## 2020-12-03 PROCEDURE — 99999 PR PBB SHADOW E&M-EST. PATIENT-LVL IV: ICD-10-PCS | Mod: PBBFAC,,, | Performed by: INTERNAL MEDICINE

## 2020-12-03 PROCEDURE — 99999 PR PBB SHADOW E&M-EST. PATIENT-LVL IV: CPT | Mod: PBBFAC,,, | Performed by: INTERNAL MEDICINE

## 2020-12-03 NOTE — PROGRESS NOTES
Subjective:       Patient ID: Shu Mendoza is a 55 y.o. female.     Chief Complaint: follow up for breast cancer     Diagnosis:  Stage IA ([id number]) invasive ductal carcinoma of the left breast, grade 2, ER/MI positive, HER2 negative, s/p left mastectomy and reconstruction on 9/11/2019. Mammaprint high risk     Oncologic History:  1. Ms Mendoza is a 53 yo postmenopausal woman with HTN, DM, who presents today for further management of pathologic stage IA ([id number]) invasive ductal carcinoma of the left breast. She was referred to Dr Mccray for bloody nipple discharge first noted in June 2019. Before then she had a negative mammogram on 5/20/19. She had a left breast US on 6/27/19 in the left breast retroareolar region just behind the nipple, there is an irregular hypoechoic intraductal mass measuring 1.0 cm. She underwent a biopsy on 7/8/2019. It showed invasive ductal carcinoma, ER strongly positive 100%, MI positive 80%, HER2 negative 1+. Ki67 5% activity within invasive component. Patient underwent a left total mastectomy and reconstruction on 9/11/2019. Pathology showed a 1.3 cm invasive ductal carcinoma, grade 2. DCIS present, negative margin, 22 lymph nodes removed, one lymph node positive with macrometastases 13 mm, no extranodal extension. No lymphovascular invasion. Pathologic T1c N1a. Mammaprint high risk with chemo benefit >12%. 5-10 years recurrence risk without chemo 20-25%, with chemo and endocrine therapy 7%. BRCAnalysis from 7/18/19 was negative. Case was discussed at tumor board. Adjuvant chemotherapy followed by endocrine therapy was recommended. Radiation not recommended. Patient presents today for further management. Feeling well. Works at [employer]. She had NILES/BSO in 2000 and was on hormone replacement therapy after that. Stopped in 2019 after she was diagnosed with breast cancer.   2. Port placed by Dr Mccray on 10/25/19. Echo on 10/22/19 showed normal LVEF 59%.   3. Adjuvant DDAC followed by  weekly taxol started on 10/31/2019. cycle 2 on 19. cycle 3 was delayed for a week for umbilical infection, given on 2019, cycle 4 given on 19. Weekly taxol completed on 3/19/19. Started anastrozole in 2020     Interval History:   Ms Mendoza returns today for follow up. Has been on anastrozole since April. Feeling well. Takes gabapentin for neuropathy. Noted hot flashes at night and generalized muscle aches and joint aches.         ECO     ROS:   A ten-point system review is obtained and negative except for what was stated in the Interval History.      Physical Examination:   Vital signs reviewed.   General: well hydrated, well developed, in no acute distress  HEENT: normocephalic, PERRLA, EOMI, anicteric sclerae, oropharynx clear  Neck: supple, no JVD, thyromegaly, cervical or supraclavicular lymphadenopathy  Lungs: clear breath sounds bilaterally, no wheezing, rales, or rhonchi  Heart: RRR, no M/R/G  Abdomen: soft, no tenderness, non-distended, no hepatosplenomegaly, mass, or hernia. BS present. No pus, drainage or abnormality of the umbilicus.   Extremities: no clubbing, cyanosis, or edema  Skin: no rash, ulcer, or open wounds  Neuro: alert and oriented x 4, no focal neuro deficit  Psych: pleasant and appropriate mood and affect  Breasts: s/p left mastectomy and reconstruction, bilateral breasts no abnormal skin nodules, open wounds, masses, or axillary LAD     Objective:      Laboratory Data:  Labs reviewed. vit D level 20 normal. Vit D level from today pending     Imaging Data:  Right Mammogram 20 normal     Bone density 20 normal     Assessment and Plan:      1. Malignant neoplasm of nipple of left breast in female, estrogen receptor positive    2. Aromatase inhibitor use    3. Arthralgia, unspecified joint    4. Vitamin D deficiency    5. Neuropathy        1.2  - Ms Mendoza is a 54 yo postmenopausal woman with stage IA ([id number]) invasive ductal carcinoma of the left breast,  ER/NE positive, HER2 negative, s/p left mastectomy and reconstruction on 9/11/2019. Mammaprint high risk with chemo benefit >12%. 5-10 years recurrence risk without chemo 20-25%, with chemo and endocrine therapy 7%.  - completed adjuvant DDAC followed by weekly taxol  - on anastrozole since April 2020. Continue for at least 5 years  - She is taking vit D 1000 units a day and calcium 600 mg a day. Asked her to increase calcium to 1200 mg a day.   - discussed turmeric for myalgia.   - We again discussed breast cancer survivorship, including regular exercise, daily OTC vit D and calcium, fresh fruits and vegetables, limiting alcohol intake, avoid smoking.   - next R mammogram in June 2021. Next bone density in May 2022  - RTC in 3 months  - port has been removed    3.  - 2/2 anastrozole  - try turmeric    4.  - c/w vit D 1000 units a day    5.  - c/w gabapentin        Follow-up:      RTC in 3 months  Knows to call in the interval if any problems arise.

## 2021-01-04 DIAGNOSIS — G62.9 NEUROPATHY: ICD-10-CM

## 2021-01-04 RX ORDER — GABAPENTIN 300 MG/1
300 CAPSULE ORAL NIGHTLY
Qty: 30 CAPSULE | Refills: 3 | Status: SHIPPED | OUTPATIENT
Start: 2021-01-04 | End: 2021-05-05

## 2021-01-28 ENCOUNTER — TELEPHONE (OUTPATIENT)
Dept: HEMATOLOGY/ONCOLOGY | Facility: CLINIC | Age: 56
End: 2021-01-28

## 2021-02-03 LAB — CRC RECOMMENDATION EXT: NORMAL

## 2021-03-05 ENCOUNTER — OFFICE VISIT (OUTPATIENT)
Dept: OBSTETRICS AND GYNECOLOGY | Facility: CLINIC | Age: 56
End: 2021-03-05
Payer: MEDICAID

## 2021-03-05 VITALS
WEIGHT: 186.94 LBS | BODY MASS INDEX: 32.09 KG/M2 | DIASTOLIC BLOOD PRESSURE: 82 MMHG | SYSTOLIC BLOOD PRESSURE: 138 MMHG

## 2021-03-05 DIAGNOSIS — N89.8 VAGINAL DISCHARGE: Primary | ICD-10-CM

## 2021-03-05 DIAGNOSIS — M54.9 BACK PAIN, UNSPECIFIED BACK LOCATION, UNSPECIFIED BACK PAIN LATERALITY, UNSPECIFIED CHRONICITY: ICD-10-CM

## 2021-03-05 LAB
BILIRUB SERPL-MCNC: NORMAL MG/DL
BLOOD URINE, POC: NORMAL
CLARITY, POC UA: CLEAR
COLOR, POC UA: NORMAL
GLUCOSE UR QL STRIP: NORMAL
KETONES UR QL STRIP: NORMAL
LEUKOCYTE ESTERASE URINE, POC: NORMAL
NITRITE, POC UA: NORMAL
PH, POC UA: 5
PROTEIN, POC: NORMAL
SPECIFIC GRAVITY, POC UA: 1
UROBILINOGEN, POC UA: NORMAL

## 2021-03-05 PROCEDURE — 87660 TRICHOMONAS VAGIN DIR PROBE: CPT | Performed by: OBSTETRICS & GYNECOLOGY

## 2021-03-05 PROCEDURE — 99213 PR OFFICE/OUTPT VISIT, EST, LEVL III, 20-29 MIN: ICD-10-PCS | Mod: S$PBB,,, | Performed by: OBSTETRICS & GYNECOLOGY

## 2021-03-05 PROCEDURE — 99213 OFFICE O/P EST LOW 20 MIN: CPT | Mod: S$PBB,,, | Performed by: OBSTETRICS & GYNECOLOGY

## 2021-03-05 PROCEDURE — 87510 GARDNER VAG DNA DIR PROBE: CPT | Performed by: OBSTETRICS & GYNECOLOGY

## 2021-03-05 PROCEDURE — 99999 PR PBB SHADOW E&M-EST. PATIENT-LVL II: ICD-10-PCS | Mod: PBBFAC,,, | Performed by: OBSTETRICS & GYNECOLOGY

## 2021-03-05 PROCEDURE — 81002 URINALYSIS NONAUTO W/O SCOPE: CPT | Mod: PBBFAC | Performed by: OBSTETRICS & GYNECOLOGY

## 2021-03-05 PROCEDURE — 99999 PR PBB SHADOW E&M-EST. PATIENT-LVL II: CPT | Mod: PBBFAC,,, | Performed by: OBSTETRICS & GYNECOLOGY

## 2021-03-05 PROCEDURE — 99212 OFFICE O/P EST SF 10 MIN: CPT | Mod: PBBFAC | Performed by: OBSTETRICS & GYNECOLOGY

## 2021-03-05 RX ORDER — METRONIDAZOLE 500 MG/1
500 TABLET ORAL EVERY 12 HOURS
Qty: 14 TABLET | Refills: 0 | Status: SHIPPED | OUTPATIENT
Start: 2021-03-05 | End: 2021-03-12

## 2021-03-06 LAB
CANDIDA RRNA VAG QL PROBE: NEGATIVE
G VAGINALIS RRNA GENITAL QL PROBE: NEGATIVE
T VAGINALIS RRNA GENITAL QL PROBE: NEGATIVE

## 2021-03-08 ENCOUNTER — PATIENT MESSAGE (OUTPATIENT)
Dept: OBSTETRICS AND GYNECOLOGY | Facility: CLINIC | Age: 56
End: 2021-03-08

## 2021-03-09 NOTE — ANESTHESIA PREPROCEDURE EVALUATION
10/25/2019  Shu Mendoza is a 54 y.o., female.    Anesthesia Evaluation    I have reviewed the Patient Summary Reports.    I have reviewed the Nursing Notes.   I have reviewed the Medications.     Review of Systems  Anesthesia Hx:  No problems with previous Anesthesia  History of prior surgery of interest to airway management or planning: Previous anesthesia: General 9/19 mast/flap with general anesthesia.  Airway issues documented on chart review include mask, easy, GETA, videolaryngoscope used , view on video-laryngoscopy Grade I    Denies Family Hx of Anesthesia complications.   Denies Personal Hx of Anesthesia complications.   Social:  Non-Smoker    Hematology/Oncology:  Hematology Normal      Current/Recent Cancer. Breast left   Cardiovascular:   Hypertension, well controlled    Pulmonary:  Pulmonary Normal    Renal/:  Renal/ Normal     Hepatic/GI:  Hepatic/GI Normal    Musculoskeletal:  Musculoskeletal Normal    Neurological:   Diabetic neuropathy of left foot   Endocrine:   Diabetes, well controlled, type 2        Physical Exam  General:  Well nourished    Airway/Jaw/Neck:  Airway Findings: Mouth Opening: Normal Tongue: Normal  General Airway Assessment: Adult  Mallampati: II  TM Distance: Normal, at least 6 cm         Dental:  Dental Findings: In tact             Anesthesia Plan  Type of Anesthesia, risks & benefits discussed:  Anesthesia Type:  general  Patient's Preference:   Intra-op Monitoring Plan: standard ASA monitors  Intra-op Monitoring Plan Comments:   Post Op Pain Control Plan: per primary service following discharge from PACU  Post Op Pain Control Plan Comments:   Induction:   IV  Beta Blocker:         Informed Consent: Patient understands risks and agrees with Anesthesia plan.  Questions answered. Anesthesia consent signed with patient.  ASA Score: 2     Day of Surgery Review of  · Patient c/o painful swallowing  · Speech did not appreciate white patches on exam  · Continue Magic mouthwash  · Will transition to nystatin  History & Physical:    H&P update referred to the surgeon.     Anesthesia Plan Notes: IV and  Devices, need to check with surgeon    Day of surgery update: recent labs WNL, glucose 125 today        Ready For Surgery From Anesthesia Perspective.

## 2021-03-11 ENCOUNTER — OFFICE VISIT (OUTPATIENT)
Dept: HEMATOLOGY/ONCOLOGY | Facility: CLINIC | Age: 56
End: 2021-03-11
Payer: MEDICAID

## 2021-03-11 ENCOUNTER — TELEPHONE (OUTPATIENT)
Dept: INFUSION THERAPY | Facility: HOSPITAL | Age: 56
End: 2021-03-11

## 2021-03-11 ENCOUNTER — TELEPHONE (OUTPATIENT)
Dept: OBSTETRICS AND GYNECOLOGY | Facility: CLINIC | Age: 56
End: 2021-03-11

## 2021-03-11 VITALS
OXYGEN SATURATION: 97 % | RESPIRATION RATE: 16 BRPM | HEART RATE: 90 BPM | SYSTOLIC BLOOD PRESSURE: 175 MMHG | DIASTOLIC BLOOD PRESSURE: 86 MMHG | TEMPERATURE: 98 F | HEIGHT: 64 IN | WEIGHT: 192.88 LBS | BODY MASS INDEX: 32.93 KG/M2

## 2021-03-11 DIAGNOSIS — F32.A DEPRESSION, UNSPECIFIED DEPRESSION TYPE: ICD-10-CM

## 2021-03-11 DIAGNOSIS — C50.012 MALIGNANT NEOPLASM OF NIPPLE OF LEFT BREAST IN FEMALE, ESTROGEN RECEPTOR POSITIVE: Primary | ICD-10-CM

## 2021-03-11 DIAGNOSIS — M25.50 ARTHRALGIA, UNSPECIFIED JOINT: ICD-10-CM

## 2021-03-11 DIAGNOSIS — I10 ESSENTIAL HYPERTENSION: ICD-10-CM

## 2021-03-11 DIAGNOSIS — Z17.0 MALIGNANT NEOPLASM OF NIPPLE OF LEFT BREAST IN FEMALE, ESTROGEN RECEPTOR POSITIVE: Primary | ICD-10-CM

## 2021-03-11 PROCEDURE — 99214 PR OFFICE/OUTPT VISIT, EST, LEVL IV, 30-39 MIN: ICD-10-PCS | Mod: S$PBB,,, | Performed by: INTERNAL MEDICINE

## 2021-03-11 PROCEDURE — 99215 OFFICE O/P EST HI 40 MIN: CPT | Mod: PBBFAC | Performed by: INTERNAL MEDICINE

## 2021-03-11 PROCEDURE — 99999 PR PBB SHADOW E&M-EST. PATIENT-LVL V: CPT | Mod: PBBFAC,,, | Performed by: INTERNAL MEDICINE

## 2021-03-11 PROCEDURE — 99214 OFFICE O/P EST MOD 30 MIN: CPT | Mod: S$PBB,,, | Performed by: INTERNAL MEDICINE

## 2021-03-11 PROCEDURE — 99999 PR PBB SHADOW E&M-EST. PATIENT-LVL V: ICD-10-PCS | Mod: PBBFAC,,, | Performed by: INTERNAL MEDICINE

## 2021-03-11 RX ORDER — DULOXETIN HYDROCHLORIDE 30 MG/1
30 CAPSULE, DELAYED RELEASE ORAL DAILY
Qty: 30 CAPSULE | Refills: 11 | Status: SHIPPED | OUTPATIENT
Start: 2021-03-11 | End: 2021-03-25

## 2021-03-15 ENCOUNTER — TELEPHONE (OUTPATIENT)
Dept: HEMATOLOGY/ONCOLOGY | Facility: CLINIC | Age: 56
End: 2021-03-15

## 2021-03-18 ENCOUNTER — CLINICAL SUPPORT (OUTPATIENT)
Dept: REHABILITATION | Facility: HOSPITAL | Age: 56
End: 2021-03-18
Payer: MEDICAID

## 2021-03-18 DIAGNOSIS — C50.012 MALIGNANT NEOPLASM OF NIPPLE OF LEFT BREAST IN FEMALE, ESTROGEN RECEPTOR POSITIVE: ICD-10-CM

## 2021-03-18 DIAGNOSIS — I89.0 LYMPHEDEMA: Primary | ICD-10-CM

## 2021-03-18 DIAGNOSIS — Z17.0 MALIGNANT NEOPLASM OF NIPPLE OF LEFT BREAST IN FEMALE, ESTROGEN RECEPTOR POSITIVE: ICD-10-CM

## 2021-03-18 DIAGNOSIS — M25.50 ARTHRALGIA, UNSPECIFIED JOINT: ICD-10-CM

## 2021-03-18 DIAGNOSIS — Z74.09 DECREASED FUNCTIONAL MOBILITY AND ENDURANCE: ICD-10-CM

## 2021-03-18 PROCEDURE — 97530 THERAPEUTIC ACTIVITIES: CPT

## 2021-03-18 PROCEDURE — 97162 PT EVAL MOD COMPLEX 30 MIN: CPT

## 2021-03-23 ENCOUNTER — CLINICAL SUPPORT (OUTPATIENT)
Dept: REHABILITATION | Facility: HOSPITAL | Age: 56
End: 2021-03-23
Payer: MEDICAID

## 2021-03-23 DIAGNOSIS — I89.0 LYMPHEDEMA: ICD-10-CM

## 2021-03-23 DIAGNOSIS — Z74.09 DECREASED FUNCTIONAL MOBILITY AND ENDURANCE: ICD-10-CM

## 2021-03-23 PROCEDURE — 97140 MANUAL THERAPY 1/> REGIONS: CPT

## 2021-03-24 ENCOUNTER — CLINICAL SUPPORT (OUTPATIENT)
Dept: REHABILITATION | Facility: HOSPITAL | Age: 56
End: 2021-03-24
Payer: MEDICAID

## 2021-03-24 DIAGNOSIS — R53.81 PHYSICAL DECONDITIONING: ICD-10-CM

## 2021-03-24 DIAGNOSIS — C50.012 MALIGNANT NEOPLASM OF NIPPLE OF LEFT BREAST IN FEMALE, ESTROGEN RECEPTOR POSITIVE: Primary | ICD-10-CM

## 2021-03-24 DIAGNOSIS — Z17.0 MALIGNANT NEOPLASM OF NIPPLE OF LEFT BREAST IN FEMALE, ESTROGEN RECEPTOR POSITIVE: Primary | ICD-10-CM

## 2021-03-24 PROCEDURE — 97164 PT RE-EVAL EST PLAN CARE: CPT | Performed by: PHYSICAL MEDICINE & REHABILITATION

## 2021-03-24 PROCEDURE — 97110 THERAPEUTIC EXERCISES: CPT | Performed by: PHYSICAL MEDICINE & REHABILITATION

## 2021-03-25 ENCOUNTER — OFFICE VISIT (OUTPATIENT)
Dept: HEMATOLOGY/ONCOLOGY | Facility: CLINIC | Age: 56
End: 2021-03-25
Payer: MEDICAID

## 2021-03-25 VITALS
SYSTOLIC BLOOD PRESSURE: 181 MMHG | BODY MASS INDEX: 32.18 KG/M2 | WEIGHT: 188.5 LBS | DIASTOLIC BLOOD PRESSURE: 84 MMHG | HEIGHT: 64 IN | HEART RATE: 74 BPM

## 2021-03-25 DIAGNOSIS — N95.1 MENOPAUSAL SYMPTOMS: ICD-10-CM

## 2021-03-25 DIAGNOSIS — M54.50 LOW BACK PAIN, UNSPECIFIED BACK PAIN LATERALITY, UNSPECIFIED CHRONICITY, UNSPECIFIED WHETHER SCIATICA PRESENT: ICD-10-CM

## 2021-03-25 DIAGNOSIS — M25.50 ARTHRALGIA, UNSPECIFIED JOINT: ICD-10-CM

## 2021-03-25 DIAGNOSIS — F32.A DEPRESSION, UNSPECIFIED DEPRESSION TYPE: ICD-10-CM

## 2021-03-25 DIAGNOSIS — N76.0 VAGINOSIS: Primary | ICD-10-CM

## 2021-03-25 DIAGNOSIS — C50.012 MALIGNANT NEOPLASM OF NIPPLE OF LEFT BREAST IN FEMALE, ESTROGEN RECEPTOR POSITIVE: ICD-10-CM

## 2021-03-25 DIAGNOSIS — Z17.0 MALIGNANT NEOPLASM OF NIPPLE OF LEFT BREAST IN FEMALE, ESTROGEN RECEPTOR POSITIVE: ICD-10-CM

## 2021-03-25 DIAGNOSIS — N95.2 VAGINAL ATROPHY: ICD-10-CM

## 2021-03-25 PROCEDURE — 99214 PR OFFICE/OUTPT VISIT, EST, LEVL IV, 30-39 MIN: ICD-10-PCS | Mod: S$PBB,,, | Performed by: PHYSICIAN ASSISTANT

## 2021-03-25 PROCEDURE — 99214 OFFICE O/P EST MOD 30 MIN: CPT | Mod: S$PBB,,, | Performed by: PHYSICIAN ASSISTANT

## 2021-03-25 PROCEDURE — 99214 OFFICE O/P EST MOD 30 MIN: CPT | Mod: PBBFAC | Performed by: PHYSICIAN ASSISTANT

## 2021-03-25 PROCEDURE — 99999 PR PBB SHADOW E&M-EST. PATIENT-LVL IV: CPT | Mod: PBBFAC,,, | Performed by: PHYSICIAN ASSISTANT

## 2021-03-25 PROCEDURE — 87661 TRICHOMONAS VAGINALIS AMPLIF: CPT | Performed by: PHYSICIAN ASSISTANT

## 2021-03-25 PROCEDURE — 87481 CANDIDA DNA AMP PROBE: CPT | Mod: 59 | Performed by: PHYSICIAN ASSISTANT

## 2021-03-25 PROCEDURE — 99999 PR PBB SHADOW E&M-EST. PATIENT-LVL IV: ICD-10-PCS | Mod: PBBFAC,,, | Performed by: PHYSICIAN ASSISTANT

## 2021-03-25 RX ORDER — PRASTERONE 6.5 MG/1
INSERT VAGINAL
Qty: 30 EACH | Refills: 11 | Status: SHIPPED | OUTPATIENT
Start: 2021-03-25 | End: 2022-04-18

## 2021-03-25 RX ORDER — VENLAFAXINE HYDROCHLORIDE 37.5 MG/1
37.5 CAPSULE, EXTENDED RELEASE ORAL DAILY
Qty: 30 CAPSULE | Refills: 2 | Status: SHIPPED | OUTPATIENT
Start: 2021-03-25 | End: 2021-07-06

## 2021-03-26 LAB
BACTERIAL VAGINOSIS DNA: NEGATIVE
CANDIDA GLABRATA DNA: NEGATIVE
CANDIDA KRUSEI DNA: NEGATIVE
CANDIDA RRNA VAG QL PROBE: NEGATIVE
T VAGINALIS RRNA GENITAL QL PROBE: NEGATIVE

## 2021-03-30 ENCOUNTER — CLINICAL SUPPORT (OUTPATIENT)
Dept: REHABILITATION | Facility: HOSPITAL | Age: 56
End: 2021-03-30
Attending: INTERNAL MEDICINE
Payer: MEDICAID

## 2021-03-30 DIAGNOSIS — Z17.0 MALIGNANT NEOPLASM OF NIPPLE OF LEFT BREAST IN FEMALE, ESTROGEN RECEPTOR POSITIVE: Primary | ICD-10-CM

## 2021-03-30 DIAGNOSIS — C50.012 MALIGNANT NEOPLASM OF NIPPLE OF LEFT BREAST IN FEMALE, ESTROGEN RECEPTOR POSITIVE: Primary | ICD-10-CM

## 2021-03-30 DIAGNOSIS — R53.81 PHYSICAL DECONDITIONING: ICD-10-CM

## 2021-03-30 PROCEDURE — 97110 THERAPEUTIC EXERCISES: CPT | Performed by: PHYSICAL MEDICINE & REHABILITATION

## 2021-04-05 ENCOUNTER — TELEPHONE (OUTPATIENT)
Dept: HEMATOLOGY/ONCOLOGY | Facility: CLINIC | Age: 56
End: 2021-04-05

## 2021-04-07 ENCOUNTER — CLINICAL SUPPORT (OUTPATIENT)
Dept: REHABILITATION | Facility: HOSPITAL | Age: 56
End: 2021-04-07
Payer: MEDICAID

## 2021-04-07 DIAGNOSIS — Z17.0 MALIGNANT NEOPLASM OF NIPPLE OF LEFT BREAST IN FEMALE, ESTROGEN RECEPTOR POSITIVE: Primary | ICD-10-CM

## 2021-04-07 DIAGNOSIS — R53.81 PHYSICAL DECONDITIONING: ICD-10-CM

## 2021-04-07 DIAGNOSIS — C50.012 MALIGNANT NEOPLASM OF NIPPLE OF LEFT BREAST IN FEMALE, ESTROGEN RECEPTOR POSITIVE: Primary | ICD-10-CM

## 2021-04-07 PROCEDURE — 97110 THERAPEUTIC EXERCISES: CPT | Performed by: PHYSICAL MEDICINE & REHABILITATION

## 2021-04-08 ENCOUNTER — OFFICE VISIT (OUTPATIENT)
Dept: SURGERY | Facility: CLINIC | Age: 56
End: 2021-04-08
Attending: SURGERY
Payer: MEDICAID

## 2021-04-08 VITALS
SYSTOLIC BLOOD PRESSURE: 142 MMHG | HEART RATE: 74 BPM | WEIGHT: 188 LBS | DIASTOLIC BLOOD PRESSURE: 85 MMHG | BODY MASS INDEX: 32.1 KG/M2 | HEIGHT: 64 IN

## 2021-04-08 DIAGNOSIS — Z12.31 SCREENING MAMMOGRAM, ENCOUNTER FOR: ICD-10-CM

## 2021-04-08 DIAGNOSIS — Z85.3 PERSONAL HISTORY OF BREAST CANCER: Primary | ICD-10-CM

## 2021-04-08 PROCEDURE — 99213 PR OFFICE/OUTPT VISIT, EST, LEVL III, 20-29 MIN: ICD-10-PCS | Mod: S$GLB,,, | Performed by: SURGERY

## 2021-04-08 PROCEDURE — 99213 OFFICE O/P EST LOW 20 MIN: CPT | Mod: S$GLB,,, | Performed by: SURGERY

## 2021-04-09 ENCOUNTER — OFFICE VISIT (OUTPATIENT)
Dept: PSYCHIATRY | Facility: CLINIC | Age: 56
End: 2021-04-09
Payer: MEDICAID

## 2021-04-09 DIAGNOSIS — F43.23 ADJUSTMENT DISORDER WITH MIXED ANXIETY AND DEPRESSED MOOD: Primary | ICD-10-CM

## 2021-04-09 DIAGNOSIS — Z85.3 PERSONAL HISTORY OF MALIGNANT NEOPLASM OF BREAST: ICD-10-CM

## 2021-04-09 DIAGNOSIS — Z79.811 AROMATASE INHIBITOR USE: ICD-10-CM

## 2021-04-09 DIAGNOSIS — R53.81 PHYSICAL DECONDITIONING: ICD-10-CM

## 2021-04-09 DIAGNOSIS — F32.A DEPRESSION, UNSPECIFIED DEPRESSION TYPE: ICD-10-CM

## 2021-04-09 PROCEDURE — 99999 PR PBB SHADOW E&M-EST. PATIENT-LVL II: CPT | Mod: PBBFAC,HB,, | Performed by: PSYCHOLOGIST

## 2021-04-09 PROCEDURE — 90791 PSYCH DIAGNOSTIC EVALUATION: CPT | Mod: HB,,, | Performed by: PSYCHOLOGIST

## 2021-04-09 PROCEDURE — 99212 OFFICE O/P EST SF 10 MIN: CPT | Mod: PBBFAC | Performed by: PSYCHOLOGIST

## 2021-04-09 PROCEDURE — 90791 PR PSYCHIATRIC DIAGNOSTIC EVALUATION: ICD-10-PCS | Mod: HB,,, | Performed by: PSYCHOLOGIST

## 2021-04-09 PROCEDURE — 99999 PR PBB SHADOW E&M-EST. PATIENT-LVL II: ICD-10-PCS | Mod: PBBFAC,HB,, | Performed by: PSYCHOLOGIST

## 2021-04-12 ENCOUNTER — DOCUMENTATION ONLY (OUTPATIENT)
Dept: HEMATOLOGY/ONCOLOGY | Facility: CLINIC | Age: 56
End: 2021-04-12

## 2021-04-14 ENCOUNTER — DOCUMENTATION ONLY (OUTPATIENT)
Dept: HEMATOLOGY/ONCOLOGY | Facility: CLINIC | Age: 56
End: 2021-04-14

## 2021-04-14 ENCOUNTER — CLINICAL SUPPORT (OUTPATIENT)
Dept: REHABILITATION | Facility: HOSPITAL | Age: 56
End: 2021-04-14
Payer: MEDICAID

## 2021-04-14 DIAGNOSIS — Z17.0 MALIGNANT NEOPLASM OF NIPPLE OF LEFT BREAST IN FEMALE, ESTROGEN RECEPTOR POSITIVE: Primary | ICD-10-CM

## 2021-04-14 DIAGNOSIS — R53.81 PHYSICAL DECONDITIONING: ICD-10-CM

## 2021-04-14 DIAGNOSIS — C50.012 MALIGNANT NEOPLASM OF NIPPLE OF LEFT BREAST IN FEMALE, ESTROGEN RECEPTOR POSITIVE: Primary | ICD-10-CM

## 2021-04-14 PROCEDURE — 97110 THERAPEUTIC EXERCISES: CPT | Performed by: PHYSICAL MEDICINE & REHABILITATION

## 2021-04-15 ENCOUNTER — DOCUMENTATION ONLY (OUTPATIENT)
Dept: HEMATOLOGY/ONCOLOGY | Facility: CLINIC | Age: 56
End: 2021-04-15

## 2021-04-21 ENCOUNTER — CLINICAL SUPPORT (OUTPATIENT)
Dept: REHABILITATION | Facility: HOSPITAL | Age: 56
End: 2021-04-21
Payer: MEDICAID

## 2021-04-21 DIAGNOSIS — R53.81 PHYSICAL DECONDITIONING: ICD-10-CM

## 2021-04-21 DIAGNOSIS — C50.012 MALIGNANT NEOPLASM OF NIPPLE OF LEFT BREAST IN FEMALE, ESTROGEN RECEPTOR POSITIVE: Primary | ICD-10-CM

## 2021-04-21 DIAGNOSIS — Z17.0 MALIGNANT NEOPLASM OF NIPPLE OF LEFT BREAST IN FEMALE, ESTROGEN RECEPTOR POSITIVE: Primary | ICD-10-CM

## 2021-04-21 PROCEDURE — 97110 THERAPEUTIC EXERCISES: CPT | Performed by: PHYSICAL MEDICINE & REHABILITATION

## 2021-05-05 ENCOUNTER — CLINICAL SUPPORT (OUTPATIENT)
Dept: REHABILITATION | Facility: HOSPITAL | Age: 56
End: 2021-05-05
Payer: MEDICAID

## 2021-05-05 DIAGNOSIS — R53.81 PHYSICAL DECONDITIONING: ICD-10-CM

## 2021-05-05 DIAGNOSIS — C50.012 MALIGNANT NEOPLASM OF NIPPLE OF LEFT BREAST IN FEMALE, ESTROGEN RECEPTOR POSITIVE: Primary | ICD-10-CM

## 2021-05-05 DIAGNOSIS — Z17.0 MALIGNANT NEOPLASM OF NIPPLE OF LEFT BREAST IN FEMALE, ESTROGEN RECEPTOR POSITIVE: Primary | ICD-10-CM

## 2021-05-05 PROCEDURE — 97110 THERAPEUTIC EXERCISES: CPT | Performed by: PHYSICAL MEDICINE & REHABILITATION

## 2021-05-06 ENCOUNTER — DOCUMENTATION ONLY (OUTPATIENT)
Dept: HEMATOLOGY/ONCOLOGY | Facility: CLINIC | Age: 56
End: 2021-05-06

## 2021-05-13 ENCOUNTER — CLINICAL SUPPORT (OUTPATIENT)
Dept: REHABILITATION | Facility: HOSPITAL | Age: 56
End: 2021-05-13
Payer: MEDICAID

## 2021-05-13 DIAGNOSIS — R53.81 PHYSICAL DECONDITIONING: ICD-10-CM

## 2021-05-13 DIAGNOSIS — C50.012 MALIGNANT NEOPLASM OF NIPPLE OF LEFT BREAST IN FEMALE, ESTROGEN RECEPTOR POSITIVE: Primary | ICD-10-CM

## 2021-05-13 DIAGNOSIS — Z17.0 MALIGNANT NEOPLASM OF NIPPLE OF LEFT BREAST IN FEMALE, ESTROGEN RECEPTOR POSITIVE: Primary | ICD-10-CM

## 2021-05-13 PROCEDURE — 97110 THERAPEUTIC EXERCISES: CPT | Performed by: PHYSICAL MEDICINE & REHABILITATION

## 2021-05-16 DIAGNOSIS — Z17.0 MALIGNANT NEOPLASM OF CENTRAL PORTION OF LEFT BREAST IN FEMALE, ESTROGEN RECEPTOR POSITIVE: ICD-10-CM

## 2021-05-16 DIAGNOSIS — C50.112 MALIGNANT NEOPLASM OF CENTRAL PORTION OF LEFT BREAST IN FEMALE, ESTROGEN RECEPTOR POSITIVE: ICD-10-CM

## 2021-05-16 RX ORDER — ONDANSETRON 8 MG/1
8 TABLET, ORALLY DISINTEGRATING ORAL EVERY 6 HOURS PRN
Qty: 30 TABLET | Refills: 2 | Status: CANCELLED | OUTPATIENT
Start: 2021-05-16

## 2021-05-17 RX ORDER — ONDANSETRON 8 MG/1
8 TABLET, ORALLY DISINTEGRATING ORAL EVERY 6 HOURS PRN
Qty: 30 TABLET | Refills: 2 | Status: SHIPPED | OUTPATIENT
Start: 2021-05-17

## 2021-05-17 RX ORDER — PROMETHAZINE HYDROCHLORIDE 25 MG/1
25 TABLET ORAL EVERY 6 HOURS PRN
Qty: 30 TABLET | Refills: 2 | Status: SHIPPED | OUTPATIENT
Start: 2021-05-17

## 2021-05-26 ENCOUNTER — PATIENT MESSAGE (OUTPATIENT)
Dept: PSYCHIATRY | Facility: CLINIC | Age: 56
End: 2021-05-26

## 2021-05-26 ENCOUNTER — OFFICE VISIT (OUTPATIENT)
Dept: PSYCHIATRY | Facility: CLINIC | Age: 56
End: 2021-05-26
Payer: MEDICAID

## 2021-05-26 DIAGNOSIS — Z79.811 AROMATASE INHIBITOR USE: ICD-10-CM

## 2021-05-26 DIAGNOSIS — F43.23 ADJUSTMENT DISORDER WITH MIXED ANXIETY AND DEPRESSED MOOD: Primary | ICD-10-CM

## 2021-05-26 DIAGNOSIS — R53.81 PHYSICAL DECONDITIONING: ICD-10-CM

## 2021-05-26 DIAGNOSIS — Z85.3 PERSONAL HISTORY OF MALIGNANT NEOPLASM OF BREAST: ICD-10-CM

## 2021-05-26 PROCEDURE — 99999 PR PBB SHADOW E&M-EST. PATIENT-LVL I: ICD-10-PCS | Mod: PBBFAC,HB,, | Performed by: PSYCHOLOGIST

## 2021-05-26 PROCEDURE — 90837 PR PSYCHOTHERAPY W/PATIENT, 60 MIN: ICD-10-PCS | Mod: S$PBB,HB,, | Performed by: PSYCHOLOGIST

## 2021-05-26 PROCEDURE — 99999 PR PBB SHADOW E&M-EST. PATIENT-LVL I: CPT | Mod: PBBFAC,HB,, | Performed by: PSYCHOLOGIST

## 2021-05-26 PROCEDURE — 99211 OFF/OP EST MAY X REQ PHY/QHP: CPT | Mod: PBBFAC | Performed by: PSYCHOLOGIST

## 2021-05-26 PROCEDURE — 90837 PSYTX W PT 60 MINUTES: CPT | Mod: PBBFAC | Performed by: PSYCHOLOGIST

## 2021-05-26 PROCEDURE — 90837 PSYTX W PT 60 MINUTES: CPT | Mod: S$PBB,HB,, | Performed by: PSYCHOLOGIST

## 2021-05-27 ENCOUNTER — CLINICAL SUPPORT (OUTPATIENT)
Dept: REHABILITATION | Facility: HOSPITAL | Age: 56
End: 2021-05-27
Payer: MEDICAID

## 2021-05-27 ENCOUNTER — DOCUMENTATION ONLY (OUTPATIENT)
Dept: HEMATOLOGY/ONCOLOGY | Facility: CLINIC | Age: 56
End: 2021-05-27

## 2021-05-27 DIAGNOSIS — R53.81 PHYSICAL DECONDITIONING: ICD-10-CM

## 2021-05-27 DIAGNOSIS — C50.012 MALIGNANT NEOPLASM OF NIPPLE OF LEFT BREAST IN FEMALE, ESTROGEN RECEPTOR POSITIVE: ICD-10-CM

## 2021-05-27 DIAGNOSIS — Z17.0 MALIGNANT NEOPLASM OF NIPPLE OF LEFT BREAST IN FEMALE, ESTROGEN RECEPTOR POSITIVE: ICD-10-CM

## 2021-05-27 PROCEDURE — 97110 THERAPEUTIC EXERCISES: CPT

## 2021-06-02 ENCOUNTER — CLINICAL SUPPORT (OUTPATIENT)
Dept: REHABILITATION | Facility: HOSPITAL | Age: 56
End: 2021-06-02
Payer: MEDICAID

## 2021-06-02 DIAGNOSIS — Z17.0 MALIGNANT NEOPLASM OF NIPPLE OF LEFT BREAST IN FEMALE, ESTROGEN RECEPTOR POSITIVE: Primary | ICD-10-CM

## 2021-06-02 DIAGNOSIS — R53.81 PHYSICAL DECONDITIONING: ICD-10-CM

## 2021-06-02 DIAGNOSIS — C50.012 MALIGNANT NEOPLASM OF NIPPLE OF LEFT BREAST IN FEMALE, ESTROGEN RECEPTOR POSITIVE: Primary | ICD-10-CM

## 2021-06-02 PROBLEM — C50.912 MALIGNANT NEOPLASM OF LEFT BREAST IN FEMALE, ESTROGEN RECEPTOR POSITIVE: Status: RESOLVED | Noted: 2019-07-23 | Resolved: 2021-06-02

## 2021-06-02 PROCEDURE — 97110 THERAPEUTIC EXERCISES: CPT | Performed by: PHYSICAL MEDICINE & REHABILITATION

## 2021-06-11 ENCOUNTER — OFFICE VISIT (OUTPATIENT)
Dept: HEMATOLOGY/ONCOLOGY | Facility: CLINIC | Age: 56
End: 2021-06-11
Payer: MEDICAID

## 2021-06-11 VITALS
TEMPERATURE: 98 F | DIASTOLIC BLOOD PRESSURE: 67 MMHG | SYSTOLIC BLOOD PRESSURE: 134 MMHG | OXYGEN SATURATION: 98 % | RESPIRATION RATE: 18 BRPM | BODY MASS INDEX: 31.27 KG/M2 | HEIGHT: 64 IN | HEART RATE: 75 BPM | WEIGHT: 183.19 LBS

## 2021-06-11 DIAGNOSIS — I89.0 LYMPHEDEMA: ICD-10-CM

## 2021-06-11 DIAGNOSIS — R23.2 HOT FLASHES: ICD-10-CM

## 2021-06-11 DIAGNOSIS — E55.9 VITAMIN D DEFICIENCY: ICD-10-CM

## 2021-06-11 DIAGNOSIS — C50.112 MALIGNANT NEOPLASM OF CENTRAL PORTION OF LEFT BREAST IN FEMALE, ESTROGEN RECEPTOR POSITIVE: Primary | ICD-10-CM

## 2021-06-11 DIAGNOSIS — F32.A DEPRESSION, UNSPECIFIED DEPRESSION TYPE: ICD-10-CM

## 2021-06-11 DIAGNOSIS — Z17.0 MALIGNANT NEOPLASM OF CENTRAL PORTION OF LEFT BREAST IN FEMALE, ESTROGEN RECEPTOR POSITIVE: Primary | ICD-10-CM

## 2021-06-11 DIAGNOSIS — Z79.811 AROMATASE INHIBITOR USE: ICD-10-CM

## 2021-06-11 PROCEDURE — 99999 PR PBB SHADOW E&M-EST. PATIENT-LVL IV: ICD-10-PCS | Mod: PBBFAC,,, | Performed by: INTERNAL MEDICINE

## 2021-06-11 PROCEDURE — 99999 PR PBB SHADOW E&M-EST. PATIENT-LVL IV: CPT | Mod: PBBFAC,,, | Performed by: INTERNAL MEDICINE

## 2021-06-11 PROCEDURE — 99214 PR OFFICE/OUTPT VISIT, EST, LEVL IV, 30-39 MIN: ICD-10-PCS | Mod: S$PBB,,, | Performed by: INTERNAL MEDICINE

## 2021-06-11 PROCEDURE — 99214 OFFICE O/P EST MOD 30 MIN: CPT | Mod: PBBFAC | Performed by: INTERNAL MEDICINE

## 2021-06-11 PROCEDURE — 99214 OFFICE O/P EST MOD 30 MIN: CPT | Mod: S$PBB,,, | Performed by: INTERNAL MEDICINE

## 2021-06-22 ENCOUNTER — DOCUMENTATION ONLY (OUTPATIENT)
Dept: HEMATOLOGY/ONCOLOGY | Facility: CLINIC | Age: 56
End: 2021-06-22

## 2021-06-23 ENCOUNTER — DOCUMENTATION ONLY (OUTPATIENT)
Dept: HEMATOLOGY/ONCOLOGY | Facility: CLINIC | Age: 56
End: 2021-06-23

## 2021-06-23 ENCOUNTER — OFFICE VISIT (OUTPATIENT)
Dept: PSYCHIATRY | Facility: CLINIC | Age: 56
End: 2021-06-23
Payer: MEDICAID

## 2021-06-23 DIAGNOSIS — Z79.811 AROMATASE INHIBITOR USE: ICD-10-CM

## 2021-06-23 DIAGNOSIS — R53.81 PHYSICAL DECONDITIONING: ICD-10-CM

## 2021-06-23 DIAGNOSIS — Z85.3 PERSONAL HISTORY OF MALIGNANT NEOPLASM OF BREAST: ICD-10-CM

## 2021-06-23 DIAGNOSIS — F43.23 ADJUSTMENT DISORDER WITH MIXED ANXIETY AND DEPRESSED MOOD: Primary | ICD-10-CM

## 2021-06-23 PROCEDURE — 99999 PR PBB SHADOW E&M-EST. PATIENT-LVL I: ICD-10-PCS | Mod: PBBFAC,HB,, | Performed by: PSYCHOLOGIST

## 2021-06-23 PROCEDURE — 90834 PSYTX W PT 45 MINUTES: CPT | Mod: HB,,, | Performed by: PSYCHOLOGIST

## 2021-06-23 PROCEDURE — 99999 PR PBB SHADOW E&M-EST. PATIENT-LVL I: CPT | Mod: PBBFAC,HB,, | Performed by: PSYCHOLOGIST

## 2021-06-23 PROCEDURE — 99211 OFF/OP EST MAY X REQ PHY/QHP: CPT | Mod: PBBFAC | Performed by: PSYCHOLOGIST

## 2021-06-23 PROCEDURE — 90834 PR PSYCHOTHERAPY W/PATIENT, 45 MIN: ICD-10-PCS | Mod: HB,,, | Performed by: PSYCHOLOGIST

## 2021-06-28 ENCOUNTER — HOSPITAL ENCOUNTER (OUTPATIENT)
Dept: RADIOLOGY | Facility: OTHER | Age: 56
Discharge: HOME | End: 2021-06-28
Attending: SURGERY
Payer: MEDICAID

## 2021-06-28 DIAGNOSIS — Z12.31 SCREENING MAMMOGRAM, ENCOUNTER FOR: ICD-10-CM

## 2021-06-28 PROCEDURE — 77067 SCR MAMMO BI INCL CAD: CPT | Mod: 26,,, | Performed by: RADIOLOGY

## 2021-06-28 PROCEDURE — 77067 SCR MAMMO BI INCL CAD: CPT | Mod: TC

## 2021-06-28 PROCEDURE — 77063 BREAST TOMOSYNTHESIS BI: CPT | Mod: 26,,, | Performed by: RADIOLOGY

## 2021-06-28 PROCEDURE — 77067 MAMMO DIGITAL SCREENING RIGHT WITH TOMO: ICD-10-PCS | Mod: 26,,, | Performed by: RADIOLOGY

## 2021-06-28 PROCEDURE — 77063 MAMMO DIGITAL SCREENING RIGHT WITH TOMO: ICD-10-PCS | Mod: 26,,, | Performed by: RADIOLOGY

## 2021-07-23 ENCOUNTER — OFFICE VISIT (OUTPATIENT)
Dept: HEMATOLOGY/ONCOLOGY | Facility: CLINIC | Age: 56
End: 2021-07-23
Payer: MEDICAID

## 2021-07-23 VITALS
HEIGHT: 64 IN | SYSTOLIC BLOOD PRESSURE: 170 MMHG | HEART RATE: 61 BPM | WEIGHT: 183.63 LBS | DIASTOLIC BLOOD PRESSURE: 79 MMHG | BODY MASS INDEX: 31.35 KG/M2

## 2021-07-23 DIAGNOSIS — N89.8 VAGINAL DISCHARGE: ICD-10-CM

## 2021-07-23 DIAGNOSIS — F41.9 ANXIETY: Primary | ICD-10-CM

## 2021-07-23 PROCEDURE — 99213 PR OFFICE/OUTPT VISIT, EST, LEVL III, 20-29 MIN: ICD-10-PCS | Mod: S$PBB,,, | Performed by: OBSTETRICS & GYNECOLOGY

## 2021-07-23 PROCEDURE — 87661 TRICHOMONAS VAGINALIS AMPLIF: CPT | Performed by: OBSTETRICS & GYNECOLOGY

## 2021-07-23 PROCEDURE — 87481 CANDIDA DNA AMP PROBE: CPT | Mod: 59 | Performed by: OBSTETRICS & GYNECOLOGY

## 2021-07-23 PROCEDURE — 99213 OFFICE O/P EST LOW 20 MIN: CPT | Mod: PBBFAC | Performed by: OBSTETRICS & GYNECOLOGY

## 2021-07-23 PROCEDURE — 99213 OFFICE O/P EST LOW 20 MIN: CPT | Mod: S$PBB,,, | Performed by: OBSTETRICS & GYNECOLOGY

## 2021-07-23 PROCEDURE — 99999 PR PBB SHADOW E&M-EST. PATIENT-LVL III: ICD-10-PCS | Mod: PBBFAC,,, | Performed by: OBSTETRICS & GYNECOLOGY

## 2021-07-23 PROCEDURE — 99999 PR PBB SHADOW E&M-EST. PATIENT-LVL III: CPT | Mod: PBBFAC,,, | Performed by: OBSTETRICS & GYNECOLOGY

## 2021-07-23 RX ORDER — VENLAFAXINE HYDROCHLORIDE 75 MG/1
75 CAPSULE, EXTENDED RELEASE ORAL DAILY
Qty: 30 CAPSULE | Refills: 11 | Status: SHIPPED | OUTPATIENT
Start: 2021-07-23 | End: 2022-07-28

## 2021-07-26 ENCOUNTER — OFFICE VISIT (OUTPATIENT)
Dept: PSYCHIATRY | Facility: CLINIC | Age: 56
End: 2021-07-26
Payer: MEDICAID

## 2021-07-26 DIAGNOSIS — R53.81 PHYSICAL DECONDITIONING: ICD-10-CM

## 2021-07-26 DIAGNOSIS — F43.23 ADJUSTMENT DISORDER WITH MIXED ANXIETY AND DEPRESSED MOOD: Primary | ICD-10-CM

## 2021-07-26 DIAGNOSIS — Z85.3 PERSONAL HISTORY OF MALIGNANT NEOPLASM OF BREAST: ICD-10-CM

## 2021-07-26 DIAGNOSIS — Z79.811 AROMATASE INHIBITOR USE: ICD-10-CM

## 2021-07-26 PROCEDURE — 90837 PSYTX W PT 60 MINUTES: CPT | Mod: S$PBB,HB,, | Performed by: PSYCHOLOGIST

## 2021-07-26 PROCEDURE — 99999 PR PBB SHADOW E&M-EST. PATIENT-LVL I: CPT | Mod: PBBFAC,HB,, | Performed by: PSYCHOLOGIST

## 2021-07-26 PROCEDURE — 99999 PR PBB SHADOW E&M-EST. PATIENT-LVL I: ICD-10-PCS | Mod: PBBFAC,HB,, | Performed by: PSYCHOLOGIST

## 2021-07-26 PROCEDURE — 90837 PR PSYCHOTHERAPY W/PATIENT, 60 MIN: ICD-10-PCS | Mod: S$PBB,HB,, | Performed by: PSYCHOLOGIST

## 2021-07-26 PROCEDURE — 90837 PSYTX W PT 60 MINUTES: CPT | Mod: PBBFAC | Performed by: PSYCHOLOGIST

## 2021-07-26 PROCEDURE — 99211 OFF/OP EST MAY X REQ PHY/QHP: CPT | Mod: PBBFAC,25 | Performed by: PSYCHOLOGIST

## 2021-08-24 ENCOUNTER — OFFICE VISIT (OUTPATIENT)
Dept: PSYCHIATRY | Facility: CLINIC | Age: 56
End: 2021-08-24
Payer: MEDICAID

## 2021-08-24 DIAGNOSIS — F32.A DEPRESSION, UNSPECIFIED DEPRESSION TYPE: ICD-10-CM

## 2021-08-24 DIAGNOSIS — Z79.811 AROMATASE INHIBITOR USE: ICD-10-CM

## 2021-08-24 DIAGNOSIS — F43.23 ADJUSTMENT DISORDER WITH MIXED ANXIETY AND DEPRESSED MOOD: Primary | ICD-10-CM

## 2021-08-24 PROCEDURE — 90834 PR PSYCHOTHERAPY W/PATIENT, 45 MIN: ICD-10-PCS | Mod: HB,,, | Performed by: PSYCHOLOGIST

## 2021-08-24 PROCEDURE — 99999 PR PBB SHADOW E&M-EST. PATIENT-LVL I: CPT | Mod: PBBFAC,HB,, | Performed by: PSYCHOLOGIST

## 2021-08-24 PROCEDURE — 99211 OFF/OP EST MAY X REQ PHY/QHP: CPT | Mod: PBBFAC | Performed by: PSYCHOLOGIST

## 2021-08-24 PROCEDURE — 99999 PR PBB SHADOW E&M-EST. PATIENT-LVL I: ICD-10-PCS | Mod: PBBFAC,HB,, | Performed by: PSYCHOLOGIST

## 2021-08-24 PROCEDURE — 90834 PSYTX W PT 45 MINUTES: CPT | Mod: HB,,, | Performed by: PSYCHOLOGIST

## 2021-09-27 ENCOUNTER — OFFICE VISIT (OUTPATIENT)
Dept: PSYCHIATRY | Facility: CLINIC | Age: 56
End: 2021-09-27
Payer: MEDICAID

## 2021-09-27 DIAGNOSIS — F43.23 ADJUSTMENT DISORDER WITH MIXED ANXIETY AND DEPRESSED MOOD: Primary | ICD-10-CM

## 2021-09-27 DIAGNOSIS — Z79.811 AROMATASE INHIBITOR USE: ICD-10-CM

## 2021-09-27 DIAGNOSIS — Z85.3 PERSONAL HISTORY OF MALIGNANT NEOPLASM OF BREAST: ICD-10-CM

## 2021-09-27 PROCEDURE — 99999 PR PBB SHADOW E&M-EST. PATIENT-LVL I: ICD-10-PCS | Mod: PBBFAC,HB,, | Performed by: PSYCHOLOGIST

## 2021-09-27 PROCEDURE — 99999 PR PBB SHADOW E&M-EST. PATIENT-LVL I: CPT | Mod: PBBFAC,HB,, | Performed by: PSYCHOLOGIST

## 2021-09-27 PROCEDURE — 90834 PSYTX W PT 45 MINUTES: CPT | Mod: HB,,, | Performed by: PSYCHOLOGIST

## 2021-09-27 PROCEDURE — 99211 OFF/OP EST MAY X REQ PHY/QHP: CPT | Mod: PBBFAC | Performed by: PSYCHOLOGIST

## 2021-09-27 PROCEDURE — 90834 PR PSYCHOTHERAPY W/PATIENT, 45 MIN: ICD-10-PCS | Mod: HB,,, | Performed by: PSYCHOLOGIST

## 2021-10-13 ENCOUNTER — LAB VISIT (OUTPATIENT)
Dept: LAB | Facility: HOSPITAL | Age: 56
End: 2021-10-13
Attending: INTERNAL MEDICINE
Payer: MEDICAID

## 2021-10-13 ENCOUNTER — TELEPHONE (OUTPATIENT)
Dept: INFUSION THERAPY | Facility: HOSPITAL | Age: 56
End: 2021-10-13

## 2021-10-13 DIAGNOSIS — E55.9 VITAMIN D DEFICIENCY: ICD-10-CM

## 2021-10-13 LAB — 25(OH)D3+25(OH)D2 SERPL-MCNC: 81 NG/ML (ref 30–96)

## 2021-10-13 PROCEDURE — 82306 VITAMIN D 25 HYDROXY: CPT | Performed by: INTERNAL MEDICINE

## 2021-10-13 PROCEDURE — 36415 COLL VENOUS BLD VENIPUNCTURE: CPT | Performed by: INTERNAL MEDICINE

## 2021-10-14 ENCOUNTER — OFFICE VISIT (OUTPATIENT)
Dept: HEMATOLOGY/ONCOLOGY | Facility: CLINIC | Age: 56
End: 2021-10-14
Payer: MEDICAID

## 2021-10-14 VITALS
SYSTOLIC BLOOD PRESSURE: 159 MMHG | BODY MASS INDEX: 30.13 KG/M2 | DIASTOLIC BLOOD PRESSURE: 78 MMHG | HEART RATE: 77 BPM | OXYGEN SATURATION: 96 % | RESPIRATION RATE: 16 BRPM | HEIGHT: 64 IN | WEIGHT: 176.5 LBS | TEMPERATURE: 98 F

## 2021-10-14 DIAGNOSIS — E11.9 TYPE 2 DIABETES MELLITUS WITHOUT COMPLICATION, WITHOUT LONG-TERM CURRENT USE OF INSULIN: ICD-10-CM

## 2021-10-14 DIAGNOSIS — Z85.3 PERSONAL HISTORY OF MALIGNANT NEOPLASM OF BREAST: Primary | ICD-10-CM

## 2021-10-14 DIAGNOSIS — E55.9 VITAMIN D DEFICIENCY: ICD-10-CM

## 2021-10-14 DIAGNOSIS — I10 ESSENTIAL HYPERTENSION: ICD-10-CM

## 2021-10-14 DIAGNOSIS — Z79.811 AROMATASE INHIBITOR USE: ICD-10-CM

## 2021-10-14 PROCEDURE — 99215 OFFICE O/P EST HI 40 MIN: CPT | Mod: PBBFAC | Performed by: INTERNAL MEDICINE

## 2021-10-14 PROCEDURE — 99214 OFFICE O/P EST MOD 30 MIN: CPT | Mod: S$PBB,,, | Performed by: INTERNAL MEDICINE

## 2021-10-14 PROCEDURE — 99999 PR PBB SHADOW E&M-EST. PATIENT-LVL V: CPT | Mod: PBBFAC,,, | Performed by: INTERNAL MEDICINE

## 2021-10-14 PROCEDURE — 99999 PR PBB SHADOW E&M-EST. PATIENT-LVL V: ICD-10-PCS | Mod: PBBFAC,,, | Performed by: INTERNAL MEDICINE

## 2021-10-14 PROCEDURE — 99214 PR OFFICE/OUTPT VISIT, EST, LEVL IV, 30-39 MIN: ICD-10-PCS | Mod: S$PBB,,, | Performed by: INTERNAL MEDICINE

## 2021-12-17 ENCOUNTER — OFFICE VISIT (OUTPATIENT)
Dept: PSYCHIATRY | Facility: CLINIC | Age: 56
End: 2021-12-17
Payer: COMMERCIAL

## 2021-12-17 DIAGNOSIS — Z85.3 PERSONAL HISTORY OF MALIGNANT NEOPLASM OF BREAST: Primary | ICD-10-CM

## 2021-12-17 DIAGNOSIS — F43.23 ADJUSTMENT DISORDER WITH MIXED ANXIETY AND DEPRESSED MOOD: Primary | ICD-10-CM

## 2021-12-17 DIAGNOSIS — Z79.811 AROMATASE INHIBITOR USE: ICD-10-CM

## 2021-12-17 DIAGNOSIS — R51.9 ACUTE INTRACTABLE HEADACHE, UNSPECIFIED HEADACHE TYPE: ICD-10-CM

## 2021-12-17 DIAGNOSIS — Z85.3 PERSONAL HISTORY OF MALIGNANT NEOPLASM OF BREAST: ICD-10-CM

## 2021-12-17 DIAGNOSIS — I10 ESSENTIAL HYPERTENSION: ICD-10-CM

## 2021-12-17 PROCEDURE — 4010F PR ACE/ARB THEARPY RXD/TAKEN: ICD-10-PCS | Mod: HP,HB,CPTII, | Performed by: PSYCHOLOGIST

## 2021-12-17 PROCEDURE — 90834 PR PSYCHOTHERAPY W/PATIENT, 45 MIN: ICD-10-PCS | Mod: HP,HB,, | Performed by: PSYCHOLOGIST

## 2021-12-17 PROCEDURE — 99212 OFFICE O/P EST SF 10 MIN: CPT | Mod: PBBFAC | Performed by: PSYCHOLOGIST

## 2021-12-17 PROCEDURE — 99999 PR PBB SHADOW E&M-EST. PATIENT-LVL II: ICD-10-PCS | Mod: PBBFAC,HB,, | Performed by: PSYCHOLOGIST

## 2021-12-17 PROCEDURE — 99999 PR PBB SHADOW E&M-EST. PATIENT-LVL II: CPT | Mod: PBBFAC,HB,, | Performed by: PSYCHOLOGIST

## 2021-12-17 PROCEDURE — 4010F ACE/ARB THERAPY RXD/TAKEN: CPT | Mod: HP,HB,CPTII, | Performed by: PSYCHOLOGIST

## 2021-12-17 PROCEDURE — 90834 PSYTX W PT 45 MINUTES: CPT | Mod: HP,HB,, | Performed by: PSYCHOLOGIST

## 2021-12-27 DIAGNOSIS — N32.81 OAB (OVERACTIVE BLADDER): ICD-10-CM

## 2021-12-27 RX ORDER — OXYBUTYNIN CHLORIDE 5 MG/1
5 TABLET, EXTENDED RELEASE ORAL DAILY
Qty: 90 TABLET | Refills: 3 | Status: SHIPPED | OUTPATIENT
Start: 2021-12-27 | End: 2022-12-29 | Stop reason: SDUPTHER

## 2022-01-02 ENCOUNTER — HOSPITAL ENCOUNTER (OUTPATIENT)
Dept: RADIOLOGY | Facility: HOSPITAL | Age: 57
Discharge: HOME | End: 2022-01-02
Attending: INTERNAL MEDICINE
Payer: COMMERCIAL

## 2022-01-02 DIAGNOSIS — R51.9 ACUTE INTRACTABLE HEADACHE, UNSPECIFIED HEADACHE TYPE: ICD-10-CM

## 2022-01-02 DIAGNOSIS — Z85.3 PERSONAL HISTORY OF MALIGNANT NEOPLASM OF BREAST: ICD-10-CM

## 2022-01-02 LAB
CREAT SERPL-MCNC: 0.6 MG/DL (ref 0.5–1.4)
SAMPLE: NORMAL

## 2022-01-02 PROCEDURE — 25500020 PHARM REV CODE 255: Performed by: INTERNAL MEDICINE

## 2022-01-02 PROCEDURE — 70553 MRI BRAIN W WO CONTRAST: ICD-10-PCS | Mod: 26,,, | Performed by: RADIOLOGY

## 2022-01-02 PROCEDURE — A9585 GADOBUTROL INJECTION: HCPCS | Performed by: INTERNAL MEDICINE

## 2022-01-02 PROCEDURE — 70553 MRI BRAIN STEM W/O & W/DYE: CPT | Mod: TC

## 2022-01-02 PROCEDURE — 70553 MRI BRAIN STEM W/O & W/DYE: CPT | Mod: 26,,, | Performed by: RADIOLOGY

## 2022-01-02 RX ORDER — GADOBUTROL 604.72 MG/ML
9 INJECTION INTRAVENOUS
Status: COMPLETED | OUTPATIENT
Start: 2022-01-02 | End: 2022-01-02

## 2022-01-02 RX ADMIN — GADOBUTROL 9 ML: 604.72 INJECTION INTRAVENOUS at 12:01

## 2022-01-03 ENCOUNTER — PATIENT MESSAGE (OUTPATIENT)
Dept: HEMATOLOGY/ONCOLOGY | Facility: CLINIC | Age: 57
End: 2022-01-03
Payer: COMMERCIAL

## 2022-01-05 ENCOUNTER — TELEPHONE (OUTPATIENT)
Dept: NEUROLOGY | Facility: CLINIC | Age: 57
End: 2022-01-05
Payer: COMMERCIAL

## 2022-01-05 DIAGNOSIS — R51.9 CHRONIC INTRACTABLE HEADACHE, UNSPECIFIED HEADACHE TYPE: Primary | ICD-10-CM

## 2022-01-05 DIAGNOSIS — G89.29 CHRONIC INTRACTABLE HEADACHE, UNSPECIFIED HEADACHE TYPE: Primary | ICD-10-CM

## 2022-01-05 NOTE — PROGRESS NOTES
Called and spoke with patient. MRI brain showed no metastatic cancer in brain. Only a very small meningioma which is benign and unlikely to be causing her symptoms. She has been having bitemporal headache since last August. She took gabapentin last night which helped with her headache and dizziness. Discussed referral to neurology. Patient agrees. She will also schedule appt with PCP to review medications. Refer to neurology.

## 2022-01-05 NOTE — TELEPHONE ENCOUNTER
----- Message from Sandrine Johns sent at 1/5/2022 10:52 AM CST -----  Good morning,  Can we get this pt scheduled for neurology?    Please schedule patient to see neurology    Thanks,  Sandrine Johns

## 2022-01-05 NOTE — PROGRESS NOTES
The patient location is: work  The chief complaint leading to consultation is: breast cancer; diet education     Visit type: audiovisual    Face to Face time with patient: 41  60 minutes of total time spent on the encounter, which includes face to face time and non-face to face time preparing to see the patient (eg, review of tests), Obtaining and/or reviewing separately obtained history, Documenting clinical information in the electronic or other health record, Independently interpreting results (not separately reported) and communicating results to the patient/family/caregiver, or Care coordination (not separately reported).     Each patient to whom he or she provides medical services by telemedicine is:  (1) informed of the relationship between the physician and patient and the respective role of any other health care provider with respect to management of the patient; and (2) notified that he or she may decline to receive medical services by telemedicine and may withdraw from such care at any time.    Oncology Nutrition Assessment for Medical Nutrition Therapy  Initial Visit    Shu Rameys   1965    Referring Provider:  Marry New, PhD      Reason for Visit: Pt in for education and nutrition counseling     PMHx:   Past Medical History:   Diagnosis Date    Anxiety     Breast cancer     Depression     Diabetes mellitus     Encounter for blood transfusion     Glaucoma 2012    Hypertension     Malignant neoplasm of central portion of left breast in female, estrogen receptor positive 7/23/2019    Renal cyst     Retinal tear of right eye     Substance abuse        Nutrition Assessment    This is a 56 y.o.female with a histroy of breast cancer s/p L mastectomy with reconstruction and Adjuvant DDAC followed by weekly taxol. She is currently on anastrozole. Referred to nutrition for assistance with improving her diet. PMH includes DM2, HLD, and HTN.   She would like to improve her diet to feel  "better and help reduce her medications. Reports a lot of headaches and thinks it could be medication related.   Reports her appetite has not been very good since having sinus cold about a week ago.   If cooking her favorites are -BBQ chicken, pork and beans, beans and rice, pasta with tomato sauce and hot dogs, greens, dirty rice. Has fish if her mom fixes it (she lives next door). Reports mom is a "health nut."   She does not always have a lot of time to cook so she eats out a fair amount- usually fast foods.   She reports her A1c has dropped from around 8 to 6.6. She lost about 40lb during treatment which likely helped lower A1c. She tries to watch what she is eating but still drinks regular sodas and eats a lot of processed foods.     Diet recall:   Breakfast- pickles, 2 tangerines   12pm- 3 slices baked ham  6:30- liver cheese sandwich    Drinks- water, Dr. Pepper, fruit punch   Snacks- peanut bar, potato chips, pickles, fruit     Weight:76.7 kg (169 lb)  Height:5' 4" (1.626 m)  BMI:Body mass index is 29.01 kg/m².   IBW: Ideal body weight: 54.7 kg (120 lb 9.5 oz)  Adjusted ideal body weight: 63.5 kg (139 lb 15.3 oz)    Usual BW: over 200lb   Weight Change: about 40lb loss from chemotherapy     Allergies: Morphine and Percocet [oxycodone-acetaminophen]    Current Medications:    Current Outpatient Medications:     amlodipine-benazepril (LOTREL) 5-40 mg per capsule, TAKE 1 CAPSULE BY ORAL ROUTE EVERY DAY, Disp: , Rfl:     anastrozole (ARIMIDEX) 1 mg Tab, TAKE 1 TABLET(1 MG) BY MOUTH EVERY DAY, Disp: 90 tablet, Rfl: 3    atorvastatin (LIPITOR) 10 MG tablet, TAKE 1 TABLET BY ORAL ROUTE EVERY DAY, Disp: , Rfl:     carvediloL (COREG) 6.25 MG tablet, TK 1 T PO BID WF, Disp: , Rfl:     dorzolamide (TRUSOPT) 2 % ophthalmic solution, Place 1 drop into both eyes 3 (three) times daily. , Disp: , Rfl:     empagliflozin (JARDIANCE) 10 mg Tab, Take 10 mg by mouth once daily. , Disp: , Rfl:     fluticasone propionate " (FLONASE) 50 mcg/actuation nasal spray, 1 spray by Each Nostril route nightly as needed for Rhinitis., Disp: , Rfl:     gabapentin (NEURONTIN) 300 MG capsule, TAKE 1 CAPSULE(300 MG) BY MOUTH EVERY EVENING, Disp: 30 capsule, Rfl: 3    hydroCHLOROthiazide (MICROZIDE) 12.5 mg capsule, TK 1 C PO D, Disp: , Rfl: 2    latanoprost 0.005 % ophthalmic solution, INT 1 GTT IN OU QD HS, Disp: , Rfl: 11    loratadine (CLARITIN ORAL), Take by mouth., Disp: , Rfl:     metFORMIN (GLUCOPHAGE-XR) 500 MG 24 hr tablet, Take 2 tablets (1,000 mg total) by mouth 2 (two) times daily with meals., Disp: 120 tablet, Rfl: 6    multivitamin (ONE DAILY MULTIVITAMIN) per tablet, Take 1 tablet by mouth once daily., Disp: , Rfl:     ondansetron (ZOFRAN-ODT) 8 MG TbDL, Dissolve 1 tablet (8 mg total) by mouth every 6 (six) hours as needed (nausea)., Disp: 30 tablet, Rfl: 2    oxybutynin (DITROPAN-XL) 5 MG TR24, Take 1 tablet (5 mg total) by mouth once daily., Disp: 90 tablet, Rfl: 3    prasterone, dhea, (INTRAROSA) 6.5 mg Inst, Place intravaginally at night, Disp: 30 each, Rfl: 11    promethazine (PHENERGAN) 25 MG tablet, Take 1 tablet (25 mg total) by mouth every 6 (six) hours as needed for Nausea., Disp: 30 tablet, Rfl: 2    venlafaxine (EFFEXOR XR) 75 MG 24 hr capsule, Take 1 capsule (75 mg total) by mouth once daily., Disp: 30 capsule, Rfl: 11  No current facility-administered medications for this visit.    Facility-Administered Medications Ordered in Other Visits:     lidocaine HCL 10 mg/ml (1%) 50 mL, EPINEPHrine 1,000 mcg in lactated Ringers 1,000 mL irrigation, , Irrigation, On Call Procedure, Derek Colin MD    Labs: Reviewed -no recent labs available, reports most recent A1c was 6.6     Nutrition Diagnosis    Problem: nutrition related knowledge deficit   Etiology (related to): lack of prior need for nutrition education  Signs/Symptoms (as evidenced by): poor diet quality    Nutrition Intervention    Nutrition Prescription  "  1900 Kcals (25kcal/kg)  77 g protein (1g/kg)   2656-9494 mL fluid (25-30mL/kg)    Recommendations:  Increase water intake- aim for at least 2 liters per day  If unable to transition to plain water, ok to use sugar free flavored drinks/sparkling water   Eat Fit natalee for easy healthy choices when eating out   Use [employer] plate as a guide for proper portions at meals   Avoid high sodium foods, processed meats and cheeses as much as possible   Balance between cooking at home and eating out  -try meal prepping several times per week, freezing leftovers, packing "cyril box" for lunch   Boiled eggs or omelettes, fruit for breakfast (rather than processed meats)     Materials Provided/Reviewed [employer] Eating Plate     Nutrition Monitoring and Evaluation    Monitor: weight, diet education needs  and adherence to nutrition recommendations     Goals: improved diet quality     Follow up Patient provided with dietitian contact number and advised to call with questions or make future appointment if further intervention is needed.    Communication to referring provider/care team: note available in chart     Counseling time: 30 Minutes    Rose Kennedy, MPH, RD, , LDN, FAND   [phone]                                                                                                                                                                                                                                                                                      "

## 2022-01-06 ENCOUNTER — CLINICAL SUPPORT (OUTPATIENT)
Dept: HEMATOLOGY/ONCOLOGY | Facility: CLINIC | Age: 57
End: 2022-01-06
Payer: COMMERCIAL

## 2022-01-06 VITALS — WEIGHT: 169 LBS | BODY MASS INDEX: 28.85 KG/M2 | HEIGHT: 64 IN

## 2022-01-06 DIAGNOSIS — Z17.0 MALIGNANT NEOPLASM OF CENTRAL PORTION OF LEFT BREAST IN FEMALE, ESTROGEN RECEPTOR POSITIVE: ICD-10-CM

## 2022-01-06 DIAGNOSIS — I10 ESSENTIAL HYPERTENSION: ICD-10-CM

## 2022-01-06 DIAGNOSIS — C50.112 MALIGNANT NEOPLASM OF CENTRAL PORTION OF LEFT BREAST IN FEMALE, ESTROGEN RECEPTOR POSITIVE: ICD-10-CM

## 2022-01-06 DIAGNOSIS — Z79.811 AROMATASE INHIBITOR USE: ICD-10-CM

## 2022-01-06 DIAGNOSIS — Z71.3 NUTRITIONAL COUNSELING: Primary | ICD-10-CM

## 2022-01-06 DIAGNOSIS — E11.9 TYPE 2 DIABETES MELLITUS WITHOUT COMPLICATION, WITHOUT LONG-TERM CURRENT USE OF INSULIN: ICD-10-CM

## 2022-01-06 DIAGNOSIS — Z85.3 PERSONAL HISTORY OF MALIGNANT NEOPLASM OF BREAST: ICD-10-CM

## 2022-01-06 PROCEDURE — 97802 MEDICAL NUTRITION INDIV IN: CPT | Mod: 95,,, | Performed by: DIETITIAN, REGISTERED

## 2022-01-06 PROCEDURE — 97802 PR MED NUTR THER, 1ST, INDIV, EA 15 MIN: ICD-10-PCS | Mod: 95,,, | Performed by: DIETITIAN, REGISTERED

## 2022-01-18 ENCOUNTER — TELEPHONE (OUTPATIENT)
Dept: PODIATRY | Facility: CLINIC | Age: 57
End: 2022-01-18
Payer: COMMERCIAL

## 2022-01-18 ENCOUNTER — OFFICE VISIT (OUTPATIENT)
Dept: PSYCHIATRY | Facility: CLINIC | Age: 57
End: 2022-01-18
Payer: MEDICAID

## 2022-01-18 DIAGNOSIS — Z85.3 PERSONAL HISTORY OF MALIGNANT NEOPLASM OF BREAST: ICD-10-CM

## 2022-01-18 DIAGNOSIS — F43.23 ADJUSTMENT DISORDER WITH MIXED ANXIETY AND DEPRESSED MOOD: Primary | ICD-10-CM

## 2022-01-18 PROCEDURE — 1159F MED LIST DOCD IN RCRD: CPT | Mod: CPTII,S$GLB,, | Performed by: PSYCHOLOGIST

## 2022-01-18 PROCEDURE — 99999 PR PBB SHADOW E&M-EST. PATIENT-LVL I: ICD-10-PCS | Mod: PBBFAC,,, | Performed by: PSYCHOLOGIST

## 2022-01-18 PROCEDURE — 99211 OFF/OP EST MAY X REQ PHY/QHP: CPT | Mod: PBBFAC | Performed by: PSYCHOLOGIST

## 2022-01-18 PROCEDURE — 90834 PR PSYCHOTHERAPY W/PATIENT, 45 MIN: ICD-10-PCS | Mod: S$GLB,,, | Performed by: PSYCHOLOGIST

## 2022-01-18 PROCEDURE — 90834 PSYTX W PT 45 MINUTES: CPT | Mod: S$GLB,,, | Performed by: PSYCHOLOGIST

## 2022-01-18 PROCEDURE — 99999 PR PBB SHADOW E&M-EST. PATIENT-LVL I: CPT | Mod: PBBFAC,,, | Performed by: PSYCHOLOGIST

## 2022-01-18 PROCEDURE — 1159F PR MEDICATION LIST DOCUMENTED IN MEDICAL RECORD: ICD-10-PCS | Mod: CPTII,S$GLB,, | Performed by: PSYCHOLOGIST

## 2022-01-18 NOTE — TELEPHONE ENCOUNTER
Spoke to pt and scheduled her for Dr. Batista's next available. Pt verbalized understanding and call ended.

## 2022-01-18 NOTE — PROGRESS NOTES
INFORMED CONSENT: Shu Mendoza   is known to this provider and identity was confirmed via NAME and .  The patient has been informed of the risks and benefits associated with engaging in psychotherapy, the handling of protected health information, the rights of privacy and the limits of confidentiality. The patient has also been informed of the importance of reporting any suicidal or homicidal ideation to this or any provider to ensure safety of all parties, and the Shu Mendoza expressed understanding. The patient was agreeable to these terms and freely participates in individual psychotherapy.    PSYCHO-ONCOLOGY NOTE/ Individual Psychotherapy     Date: 2022   Site:  [facility]        Therapeutic Intervention: Met with patient.  Outpatient - Behavior modifying psychotherapy 45 min - CPT code 23865      Patient was last seen by me on 2021    Problem list  Patient Active Problem List   Diagnosis    OAB (overactive bladder)    Microhematuria    Frequency of urination    Essential hypertension    Type 2 diabetes mellitus without complication, without long-term current use of insulin    Glaucoma    Lymphedema    Antineoplastic chemotherapy induced anemia    Hypokalemia    Chemotherapy-induced neuropathy    Personal history of malignant neoplasm of breast    Aromatase inhibitor use    Decreased functional mobility and endurance    Adjustment disorder with mixed anxiety and depressed mood       Chief complaint/reason for encounter: depression, anxiety and interpersonal   Met with patient to evaluate psychosocial adaptation to diagnosis/treatment/survivorship of BC    Current Medications  Current Outpatient Medications   Medication    amlodipine-benazepril (LOTREL) 5-40 mg per capsule    anastrozole (ARIMIDEX) 1 mg Tab    atorvastatin (LIPITOR) 10 MG tablet    carvediloL (COREG) 6.25 MG tablet    dorzolamide (TRUSOPT) 2 % ophthalmic solution    empagliflozin (JARDIANCE) 10 mg Tab     fluticasone propionate (FLONASE) 50 mcg/actuation nasal spray    gabapentin (NEURONTIN) 300 MG capsule    hydroCHLOROthiazide (MICROZIDE) 12.5 mg capsule    latanoprost 0.005 % ophthalmic solution    loratadine (CLARITIN ORAL)    metFORMIN (GLUCOPHAGE-XR) 500 MG 24 hr tablet    multivitamin (ONE DAILY MULTIVITAMIN) per tablet    ondansetron (ZOFRAN-ODT) 8 MG TbDL    oxybutynin (DITROPAN-XL) 5 MG TR24    prasterone, dhea, (INTRAROSA) 6.5 mg Inst    promethazine (PHENERGAN) 25 MG tablet    venlafaxine (EFFEXOR XR) 75 MG 24 hr capsule     No current facility-administered medications for this visit.     Facility-Administered Medications Ordered in Other Visits   Medication Frequency    lidocaine HCL 10 mg/ml (1%) 50 mL, EPINEPHrine 1,000 mcg in lactated Ringers 1,000 mL irrigation On Call Procedure       Objective:  Shu Mendoza arrived promptly for the session.   Ms. Mendoza was independently ambulatory at the time of session. The patient was fully cooperative throughout the session.  Appearance: age appropriate, appropriately  dressed, adequately  groomed  Behavior/Cooperation: friendly and cooperative  Speech: normal in rate, volume, and tone and appropriate quality, quantity and organization of sentences  Mood: euthymic  Affect: mood congruent  Thought Process: goal-directed, logical  Thought Content: normal,  No delusions or paranoia; did not appear to be responding to internal stimuli during the session  Orientation: grossly intact  Memory: Grossly intact  Attention Span/Concentration: Attends to session without distraction; reports no difficulty  Fund of Knowledge: average  Estimate of Intelligence: average from verbal skills and history  Cognition: grossly intact  Insight: patient has awareness of illness; good insight into own behavior and behavior of others  Judgment: the patient's behavior is adequate to circumstances    Interval history and content of current session: Patietn referred to neurology   For possible meningioma. Patient with increased CINP- referred to Podiatry.    Discussed diagnosis, treatment, prognosis, current adaptation to disease and treatment status and family's adaptation to disease and treatment status. Reports to be coping in an adequate manner. Evaluated cognitive response, paying particular attention to negative intrusive thoughts of a persistent and detrimental nature. Thoughts of this type are in evidence with mild distress. Provided cognitive behavioral therapy to address negative cognitions. Identified and evaluated psychosocial and environmental stressors secondary to diagnosis and treatment.  Examined proactive behaviors that may be implemented to minimize or ameliorate psychosocial stressors secondary to diagnosis and treatment.     Risk parameters:   Patient reports no suicidal ideation  Patient reports no homicidal ideation  Patient reports no self-injurious behavior  Patient reports no violent behavior   Safety needs:  None at this time      Verbal deficits: None     Patient's response to intervention:The patient's response to intervention is accepting.     Progress toward goals and other mental status changes:  The patient's progress toward goals is good.      Progress to date:Progress as Expected      Goals from last visit: Met     Patient reported outcomes:    Distress Score    Distress Score: 3        Practical Problems Physical Problems   : No Appearance: No   Housing: No Bathing / Dressing: No   Insurance / Financial: No Breathing: No    Transportation: No  Changes in Urination: No    Work / School: No  Constipation: No   Treatment Decisions: No  Diarrhea: No     Eating: Yes    Family Problems Fatigue: Yes    Dealing with Children: Yes Feeling Swollen: No    Dealing with Partner: Yes Fevers: No    Ability to Have Children: No  Getting Around: No       Indigestion: No     Memory / Concentration: Yes   Emotional Problems Mouth Sores: No    Depression: Yes   Nausea: Yes    Fears: Yes  Nose Dry / Congested: Yes    Nervousness: Yes  Pain: Yes    Sadness: Yes Sexual: No    Worry: Yes Skin Dry / Itchy: No    Loss of Interest in Usual Activities: No Sleep: Yes     Substance Abuse: No    Spiritual/Religions Concerns Tingling in Hands / Feet: Yes   Spritual / Mormon Concerns: No         Other Problems              PHQ ANSWERS    Q1. Little interest or pleasure in doing things: (P) Not at all (01/16/22 1437)  Q2. Feeling down, depressed, or hopeless: (P) Several days (01/16/22 1437)  Q3. Trouble falling or staying asleep, or sleeping too much: (P) Several days (01/16/22 1437)  Q4. Feeling tired or having little energy: (P) Several days (01/16/22 1437)  Q5. Poor appetite or overeating: (P) Several days (01/16/22 1437)  Q6. Feeling bad about yourself - or that you are a failure or have let yourself or your family down: (P) Not at all (01/16/22 1437)  Q7. Trouble concentrating on things, such as reading the newspaper or watching television: (P) Not at all (01/16/22 1437)  Q8. Moving or speaking so slowly that other people could have noticed. Or the opposite - being so fidgety or restless that you have been moving around a lot more than usual: (P) Not at all (01/16/22 1437)  Q9.  No SI    PHQ8 Score : (P) 4 (01/16/22 1437)  PHQ-9 Total Score: (P) 4 (01/16/22 1437)     RENITA-7 Answers    GAD7 1/16/2022   1. Feeling nervous, anxious, or on edge? 1   2. Not being able to stop or control worrying? 1   3. Worrying too much about different things? 1   4. Trouble relaxing? 1   5. Being so restless that it is hard to sit still? 0   6. Becoming easily annoyed or irritable? 1   7. Feeling afraid as if something awful might happen? 1   RENITA-7 Score 6     RENITA-7 Score: (P) 6  Interpretation: (P) Mild Anxiety     Client Strengths: verbal, intelligent, successful, good social support, good insight, commitment to wellness, strong fredy, strong cultural traditions     Diagnosis:     ICD-10-CM  ICD-9-CM   1. Adjustment disorder with mixed anxiety and depressed mood  F43.23 309.28   2. Personal history of malignant neoplasm of breast  Z85.3 V10.3     Treatment Plan:individual psychotherapy and medication management by physician  · Target symptoms: depression, anxiety , adjustment  · Why chosen therapy is appropriate versus another modality: relevant to diagnosis, patient responds to this modality, evidence based practice  · Outcome monitoring methods: self-report, observation, checklist/rating scale  · Therapeutic intervention type: behavior modifying psychotherapy  · Prognosis: Good      Behavioral goals:    Exercise:   Stress management: est consistent sleep times when swing shift ends   Social engagement:   Nutrition:   Smoking Cessation:   Therapy:    Return to clinic: 1 month    Length of Service (minutes direct face-to-face contact): 45    Marry New, PhD  Clinical Psychologist  [place] License #[id number]  [place] License #[id number]

## 2022-02-01 ENCOUNTER — OFFICE VISIT (OUTPATIENT)
Dept: PODIATRY | Facility: CLINIC | Age: 57
End: 2022-02-01
Payer: COMMERCIAL

## 2022-02-01 VITALS
BODY MASS INDEX: 30.58 KG/M2 | DIASTOLIC BLOOD PRESSURE: 75 MMHG | HEART RATE: 67 BPM | WEIGHT: 178.13 LBS | SYSTOLIC BLOOD PRESSURE: 128 MMHG

## 2022-02-01 DIAGNOSIS — G62.0 CHEMOTHERAPY-INDUCED NEUROPATHY: ICD-10-CM

## 2022-02-01 DIAGNOSIS — T45.1X5A CHEMOTHERAPY-INDUCED NEUROPATHY: ICD-10-CM

## 2022-02-01 DIAGNOSIS — E11.9 TYPE 2 DIABETES MELLITUS WITHOUT COMPLICATION, WITHOUT LONG-TERM CURRENT USE OF INSULIN: Primary | ICD-10-CM

## 2022-02-01 PROCEDURE — 99203 PR OFFICE/OUTPT VISIT, NEW, LEVL III, 30-44 MIN: ICD-10-PCS | Mod: S$GLB,,, | Performed by: PODIATRIST

## 2022-02-01 PROCEDURE — 4010F ACE/ARB THERAPY RXD/TAKEN: CPT | Mod: CPTII,S$GLB,, | Performed by: PODIATRIST

## 2022-02-01 PROCEDURE — 3078F PR MOST RECENT DIASTOLIC BLOOD PRESSURE < 80 MM HG: ICD-10-PCS | Mod: CPTII,S$GLB,, | Performed by: PODIATRIST

## 2022-02-01 PROCEDURE — 3074F PR MOST RECENT SYSTOLIC BLOOD PRESSURE < 130 MM HG: ICD-10-PCS | Mod: CPTII,S$GLB,, | Performed by: PODIATRIST

## 2022-02-01 PROCEDURE — 99203 OFFICE O/P NEW LOW 30 MIN: CPT | Mod: S$GLB,,, | Performed by: PODIATRIST

## 2022-02-01 PROCEDURE — 3008F PR BODY MASS INDEX (BMI) DOCUMENTED: ICD-10-PCS | Mod: CPTII,S$GLB,, | Performed by: PODIATRIST

## 2022-02-01 PROCEDURE — 4010F PR ACE/ARB THEARPY RXD/TAKEN: ICD-10-PCS | Mod: CPTII,S$GLB,, | Performed by: PODIATRIST

## 2022-02-01 PROCEDURE — 3074F SYST BP LT 130 MM HG: CPT | Mod: CPTII,S$GLB,, | Performed by: PODIATRIST

## 2022-02-01 PROCEDURE — 1159F PR MEDICATION LIST DOCUMENTED IN MEDICAL RECORD: ICD-10-PCS | Mod: CPTII,S$GLB,, | Performed by: PODIATRIST

## 2022-02-01 PROCEDURE — 3008F BODY MASS INDEX DOCD: CPT | Mod: CPTII,S$GLB,, | Performed by: PODIATRIST

## 2022-02-01 PROCEDURE — 1159F MED LIST DOCD IN RCRD: CPT | Mod: CPTII,S$GLB,, | Performed by: PODIATRIST

## 2022-02-01 PROCEDURE — 99999 PR PBB SHADOW E&M-EST. PATIENT-LVL III: CPT | Mod: PBBFAC,,, | Performed by: PODIATRIST

## 2022-02-01 PROCEDURE — 3078F DIAST BP <80 MM HG: CPT | Mod: CPTII,S$GLB,, | Performed by: PODIATRIST

## 2022-02-01 PROCEDURE — 99999 PR PBB SHADOW E&M-EST. PATIENT-LVL III: ICD-10-PCS | Mod: PBBFAC,,, | Performed by: PODIATRIST

## 2022-02-01 RX ORDER — LIDOCAINE HYDROCHLORIDE 20 MG/ML
JELLY TOPICAL
Qty: 30 ML | Refills: 2 | Status: SHIPPED | OUTPATIENT
Start: 2022-02-01

## 2022-02-01 RX ORDER — AMITRIPTYLINE HYDROCHLORIDE 10 MG/1
10 TABLET, FILM COATED ORAL NIGHTLY PRN
Qty: 30 TABLET | Refills: 2 | Status: SHIPPED | OUTPATIENT
Start: 2022-02-01 | End: 2022-05-14

## 2022-02-05 NOTE — PROGRESS NOTES
Subjective:      Patient ID: Shu Mendoza is a 56 y.o. female.    Chief Complaint: Diabetes Mellitus, Diabetic Foot Exam (Yamileth Valencia MD last year ), and Nail Care    Shu is a 56 y.o. female who presents to the clinic for evaluation and treatment of high risk feet. Shu has a past medical history of Anxiety, Breast cancer, Depression, Diabetes mellitus, Encounter for blood transfusion, Glaucoma (2012), Hypertension, Malignant neoplasm of central portion of left breast in female, estrogen receptor positive (7/23/2019), Renal cyst, Retinal tear of right eye, and Substance abuse. The patient's chief complaint is long, thick toenails. This patient has documented high risk feet requiring routine maintenance secondary to peripheral neuropathy.    PCP: Yamileth Valencia MD    Date Last Seen by PCP:   Chief Complaint   Patient presents with    Diabetes Mellitus    Diabetic Foot Exam     Yamileth Valencia MD last year     Nail Care         Current shoe gear:  Affected Foot: Casual shoes     Unaffected Foot: Casual shoes    No results found for: HGBA1C    Review of Systems   Constitutional: Negative for chills, decreased appetite, fever and malaise/fatigue.   HENT: Negative for congestion, hearing loss, nosebleeds and tinnitus.    Eyes: Negative for double vision, pain, photophobia and visual disturbance.   Cardiovascular: Negative for chest pain, claudication, cyanosis and leg swelling.   Respiratory: Negative for cough, hemoptysis, shortness of breath and wheezing.    Endocrine: Negative for cold intolerance and heat intolerance.   Hematologic/Lymphatic: Negative for adenopathy and bleeding problem.   Skin: Negative for color change, dry skin, itching, nail changes and suspicious lesions.   Musculoskeletal: Positive for arthritis. Negative for joint pain, myalgias and stiffness.   Gastrointestinal: Negative for abdominal pain, jaundice, nausea and vomiting.   Genitourinary: Negative for dysuria, frequency and  hematuria.   Neurological: Positive for numbness, paresthesias and sensory change. Negative for difficulty with concentration and loss of balance.   Psychiatric/Behavioral: Negative for altered mental status, hallucinations and suicidal ideas. The patient is not nervous/anxious.    Allergic/Immunologic: Negative for environmental allergies and persistent infections.           Objective:      Physical Exam  Vitals reviewed.   Constitutional:       Appearance: She is well-developed and well-nourished.   HENT:      Head: Normocephalic and atraumatic.   Cardiovascular:      Pulses:           Dorsalis pedis pulses are 2+ on the right side and 2+ on the left side.        Posterior tibial pulses are 2+ on the right side and 2+ on the left side.   Pulmonary:      Effort: Pulmonary effort is normal.   Musculoskeletal:         General: Normal range of motion.      Comments: Inspection and palpation of the muscles joints and bones of both lower extremities reveal that muscle strength for the anterior lateral and posterior muscle groups and intrinsic muscle groups of the foot are all 5 over 5 symmetrical.  Ankle subtalar midtarsal and digital joint range of motion are within normal limits, nonpainful, without crepitus or effusion.  Patient exhibits a normal angle and base of gait.  Palpation of the tendons reveal no defects.   Skin:     General: Skin is warm and dry.      Capillary Refill: Capillary refill takes 2 to 3 seconds.      Comments: Skin turgor is normal bilaterally.  Skin texture is well hydrated to both lower extremities.  No lesions or rashes or wounds appreciated bilaterally.  Nail plates 1 through 5 bilaterally are within normal limits for length and thickness.  No nail clubbing or incurvation noted.   Neurological:      Mental Status: She is alert and oriented to person, place, and time.      Comments: Sharp dull light touch vibratory proprioceptive sensation are intact bilaterally.  Deep tendon reflexes to  patellar and Achilles tendon are symmetrical 2 over 4 bilaterally.    Positive tingling burning bilateral lower extremities specifically the bottoms of both feet and worse at night.  No obvious Tinel sign noted today.  Positive provocation sign noted however.   Psychiatric:         Mood and Affect: Mood and affect normal.         Behavior: Behavior normal.               Assessment:       Encounter Diagnoses   Name Primary?    Type 2 diabetes mellitus without complication, without long-term current use of insulin Yes    Chemotherapy-induced neuropathy          Plan:       Shu was seen today for diabetes mellitus, diabetic foot exam and nail care.    Diagnoses and all orders for this visit:    Type 2 diabetes mellitus without complication, without long-term current use of insulin    Chemotherapy-induced neuropathy    Other orders  -     amitriptyline (ELAVIL) 10 MG tablet; Take 1 tablet (10 mg total) by mouth nightly as needed for Insomnia.  -     LIDOcaine HCL 2% (XYLOCAINE) 2 % jelly; Apply topically as needed. Apply topically once nightly to affected part of foot/feet.      I counseled the patient on her conditions, their implications and medical management.      Discussed options for peripheral neuropathy/nerve entrapment syndrome including nerve block therapy, surgical nerve entrapment decompression procedures, and various vitamins and supplementation available shown to improve nerve function.    Shoe inspection. Diabetic Foot Education. Patient reminded of the importance of good nutrition and blood sugar control to help prevent podiatric complications of diabetes. Patient instructed on proper foot hygeine. We discussed wearing proper shoe gear, daily foot inspections and Diabetic foot education in detail.    Return to clinic in 3-6 months or sooner if problems arise     .

## 2022-02-22 DIAGNOSIS — D84.9 IMMUNOSUPPRESSED STATUS: ICD-10-CM

## 2022-03-18 ENCOUNTER — TELEPHONE (OUTPATIENT)
Dept: HEMATOLOGY/ONCOLOGY | Facility: CLINIC | Age: 57
End: 2022-03-18
Payer: COMMERCIAL

## 2022-03-22 ENCOUNTER — TELEPHONE (OUTPATIENT)
Dept: NEUROLOGY | Facility: CLINIC | Age: 57
End: 2022-03-22
Payer: COMMERCIAL

## 2022-03-22 ENCOUNTER — OFFICE VISIT (OUTPATIENT)
Dept: PSYCHIATRY | Facility: CLINIC | Age: 57
End: 2022-03-22
Payer: COMMERCIAL

## 2022-03-22 DIAGNOSIS — F43.23 ADJUSTMENT DISORDER WITH MIXED ANXIETY AND DEPRESSED MOOD: Primary | ICD-10-CM

## 2022-03-22 DIAGNOSIS — F43.21 GRIEF: ICD-10-CM

## 2022-03-22 DIAGNOSIS — Z85.3 PERSONAL HISTORY OF MALIGNANT NEOPLASM OF BREAST: ICD-10-CM

## 2022-03-22 PROCEDURE — 99999 PR PBB SHADOW E&M-EST. PATIENT-LVL I: ICD-10-PCS | Mod: PBBFAC,,, | Performed by: PSYCHOLOGIST

## 2022-03-22 PROCEDURE — 4010F PR ACE/ARB THEARPY RXD/TAKEN: ICD-10-PCS | Mod: CPTII,S$GLB,, | Performed by: PSYCHOLOGIST

## 2022-03-22 PROCEDURE — 99211 OFF/OP EST MAY X REQ PHY/QHP: CPT | Mod: PBBFAC | Performed by: PSYCHOLOGIST

## 2022-03-22 PROCEDURE — 99999 PR PBB SHADOW E&M-EST. PATIENT-LVL I: CPT | Mod: PBBFAC,,, | Performed by: PSYCHOLOGIST

## 2022-03-22 PROCEDURE — 1159F PR MEDICATION LIST DOCUMENTED IN MEDICAL RECORD: ICD-10-PCS | Mod: CPTII,S$GLB,, | Performed by: PSYCHOLOGIST

## 2022-03-22 PROCEDURE — 90837 PR PSYCHOTHERAPY W/PATIENT, 60 MIN: ICD-10-PCS | Mod: S$GLB,,, | Performed by: PSYCHOLOGIST

## 2022-03-22 PROCEDURE — 4010F ACE/ARB THERAPY RXD/TAKEN: CPT | Mod: CPTII,S$GLB,, | Performed by: PSYCHOLOGIST

## 2022-03-22 PROCEDURE — 90837 PSYTX W PT 60 MINUTES: CPT | Mod: S$GLB,,, | Performed by: PSYCHOLOGIST

## 2022-03-22 PROCEDURE — 1159F MED LIST DOCD IN RCRD: CPT | Mod: CPTII,S$GLB,, | Performed by: PSYCHOLOGIST

## 2022-03-22 NOTE — PROGRESS NOTES
INFORMED CONSENT: Shu Mendoza   is known to this provider and identity was confirmed via NAME and .  The patient has been informed of the risks and benefits associated with engaging in psychotherapy, the handling of protected health information, the rights of privacy and the limits of confidentiality. The patient has also been informed of the importance of reporting any suicidal or homicidal ideation to this or any provider to ensure safety of all parties, and the Shu Mendoza expressed understanding. The patient was agreeable to these terms and freely participates in individual psychotherapy.    PSYCHO-ONCOLOGY NOTE/ Individual Psychotherapy     Date: 3/22/2022   [facility]        Therapeutic Intervention: Met with patient.  Outpatient - Behavior modifying psychotherapy 60 min - CPT code 81665      Patient was last seen by me on 2022    Problem list  Patient Active Problem List   Diagnosis    OAB (overactive bladder)    Microhematuria    Frequency of urination    Essential hypertension    Type 2 diabetes mellitus without complication, without long-term current use of insulin    Glaucoma    Lymphedema    Antineoplastic chemotherapy induced anemia    Hypokalemia    Chemotherapy-induced neuropathy    Personal history of malignant neoplasm of breast    Aromatase inhibitor use    Decreased functional mobility and endurance    Adjustment disorder with mixed anxiety and depressed mood       Chief complaint/reason for encounter: grief   Met with patient to evaluate psychosocial adaptation to diagnosis/treatment/survivorship of BC    Current Medications  Current Outpatient Medications   Medication    amitriptyline (ELAVIL) 10 MG tablet    amlodipine-benazepril (LOTREL) 5-40 mg per capsule    anastrozole (ARIMIDEX) 1 mg Tab    atorvastatin (LIPITOR) 10 MG tablet    carvediloL (COREG) 6.25 MG tablet    dorzolamide (TRUSOPT) 2 % ophthalmic solution    empagliflozin (JARDIANCE) 10 mg  Tab    fluticasone propionate (FLONASE) 50 mcg/actuation nasal spray    gabapentin (NEURONTIN) 300 MG capsule    hydroCHLOROthiazide (MICROZIDE) 12.5 mg capsule    latanoprost 0.005 % ophthalmic solution    LIDOcaine HCL 2% (XYLOCAINE) 2 % jelly    loratadine (CLARITIN ORAL)    metFORMIN (GLUCOPHAGE-XR) 500 MG 24 hr tablet    multivitamin (THERAGRAN) per tablet    ondansetron (ZOFRAN-ODT) 8 MG TbDL    oxybutynin (DITROPAN-XL) 5 MG TR24    prasterone, dhea, (INTRAROSA) 6.5 mg Inst    promethazine (PHENERGAN) 25 MG tablet    venlafaxine (EFFEXOR XR) 75 MG 24 hr capsule     No current facility-administered medications for this visit.     Facility-Administered Medications Ordered in Other Visits   Medication Frequency    lidocaine HCL 10 mg/ml (1%) 50 mL, EPINEPHrine 1,000 mcg in lactated Ringers 1,000 mL irrigation On Call Procedure       Objective:  Shu Mendoza arrived promptly for the session.   Ms. Mendoza was independently ambulatory at the time of session. The patient was fully cooperative throughout the session.  Appearance: age appropriate, appropriately  dressed, adequately  groomed  Behavior/Cooperation: friendly and cooperative  Speech: normal in rate, volume, and tone and appropriate quality, quantity and organization of sentences  Mood: sad  Affect: mood congruent  Thought Process: goal-directed, logical  Thought Content: normal,  No delusions or paranoia; did not appear to be responding to internal stimuli during the session  Orientation: grossly intact  Memory: Grossly intact  Attention Span/Concentration: Attends to session without distraction; reports no difficulty  Fund of Knowledge: average  Estimate of Intelligence: average from verbal skills and history  Cognition: grossly intact  Insight: patient has awareness of illness; good insight into own behavior and behavior of others  Judgment: the patient's behavior is adequate to circumstances    Interval history and content of current  session: Patient's son  2 weeks ago of drug overdose.  Discussed grief and preventative strategies for  services discussed. Reports to be coping in an adequate manner. Evaluated cognitive response, paying particular attention to negative intrusive thoughts of a persistent and detrimental nature. Thoughts of this type are in evidence with severe distress. Provided cognitive behavioral therapy to address negative cognitions. Identified and evaluated psychosocial and environmental stressors secondary to diagnosis and treatment.  Examined proactive behaviors that may be implemented to minimize or ameliorate psychosocial stressors secondary to diagnosis and treatment.     Risk parameters:   Patient reports no suicidal ideation  Patient reports no homicidal ideation  Patient reports no self-injurious behavior  Patient reports no violent behavior   Safety needs:  None at this time      Verbal deficits: None     Patient's response to intervention:The patient's response to intervention is accepting.     Progress toward goals and other mental status changes:  The patient's progress toward goals is good.      Progress to date:Progress as Expected      Goals from last visit: Met     Patient reported outcomes:    Distress Thermometer:   Distress Score    Distress Score: 10        Practical Problems Physical Problems   : No Appearance: No   Housing: No Bathing / Dressing: No   Insurance / Financial: Yes Breathing: No    Transportation: No  Changes in Urination: No    Work / School: No  Constipation: No   Treatment Decisions: Yes  Diarrhea: Yes     Eating: Yes    Family Problems Fatigue: Yes    Dealing with Children: Yes Feeling Swollen: No    Dealing with Partner: No Fevers: No    Ability to Have Children: No  Getting Around: No       Indigestion: Yes     Memory / Concentration: Yes   Emotional Problems Mouth Sores: No    Depression: Yes  Nausea: Yes    Fears: Yes  Nose Dry / Congested: Yes    Nervousness:  Yes  Pain: No    Sadness: Yes Sexual: No    Worry: Yes Skin Dry / Itchy: No    Loss of Interest in Usual Activities: Yes Sleep: Yes     Substance Abuse: No    Spiritual/Religions Concerns Tingling in Hands / Feet: Yes   Spritual / Baptist Concerns: No         Other Problems              PHQ ANSWERS    Q1. Little interest or pleasure in doing things: (P) Several days (03/17/22 1801)  Q2. Feeling down, depressed, or hopeless: (P) Several days (03/17/22 1801)  Q3. Trouble falling or staying asleep, or sleeping too much: (P) Several days (03/17/22 1801)  Q4. Feeling tired or having little energy: (P) Several days (03/17/22 1801)  Q5. Poor appetite or overeating: (P) Several days (03/17/22 1801)  Q6. Feeling bad about yourself - or that you are a failure or have let yourself or your family down: (P) Not at all (03/17/22 1801)  Q7. Trouble concentrating on things, such as reading the newspaper or watching television: (P) Several days (03/17/22 1801)  Q8. Moving or speaking so slowly that other people could have noticed. Or the opposite - being so fidgety or restless that you have been moving around a lot more than usual: (P) Not at all (03/17/22 1801)  Q9.      PHQ8 Score : (P) 6 (03/17/22 1801)  PHQ-9 Total Score: (P) 6 (03/17/22 1801)     RENITA-7 Answers    GAD7 3/17/2022   1. Feeling nervous, anxious, or on edge? 1   2. Not being able to stop or control worrying? 1   3. Worrying too much about different things? 1   4. Trouble relaxing? 1   5. Being so restless that it is hard to sit still? 0   6. Becoming easily annoyed or irritable? 1   7. Feeling afraid as if something awful might happen? 1   RENITA-7 Score 6     RENITA-7 Score: (P) 6  Interpretation: (P) Mild Anxiety     Client Strengths: verbal, intelligent, successful, good social support, good insight, commitment to wellness, strong fredy, strong cultural traditions     Diagnosis:     ICD-10-CM ICD-9-CM   1. Adjustment disorder with mixed anxiety and depressed mood   F43.23 309.28   2. Personal history of malignant neoplasm of breast  Z85.3 V10.3   3. Grief  F43.21 309.0     Treatment Plan:individual psychotherapy and consult psychiatrist for medication evaluation  · Target symptoms: depression, anxiety , grief  · Why chosen therapy is appropriate versus another modality: relevant to diagnosis, patient responds to this modality, evidence based practice  · Outcome monitoring methods: self-report, observation, checklist/rating scale  · Therapeutic intervention type: behavior modifying psychotherapy  · Prognosis: Good      Behavioral goals:    Exercise:   Stress management:   Social engagement:   Nutrition:   Smoking Cessation:   Therapy:  Increase daily self-care and attention to health management  Pleasant events scheduling and increased social interaction  Identify, implement and maintain healthy personal boundaries  Improve self-regulation through CBT strategies    Return to clinic: 1 month       Length of Service (minutes direct face-to-face contact): 60    Marry New, PhD  Clinical Psychologist  [place] License #[id number]  [place] License #[id number]

## 2022-03-22 NOTE — TELEPHONE ENCOUNTER
----- Message from Marry New, PhD sent at 3/22/2022  8:48 AM CDT -----  Hey there. Patient was late to her appointment with you on 3/8. Her son had just  the day before. She would like to reschedule

## 2022-04-28 ENCOUNTER — OFFICE VISIT (OUTPATIENT)
Dept: PSYCHIATRY | Facility: CLINIC | Age: 57
End: 2022-04-28
Payer: COMMERCIAL

## 2022-04-28 DIAGNOSIS — F43.23 ADJUSTMENT DISORDER WITH MIXED ANXIETY AND DEPRESSED MOOD: Primary | ICD-10-CM

## 2022-04-28 DIAGNOSIS — F43.21 GRIEF: ICD-10-CM

## 2022-04-28 DIAGNOSIS — Z85.3 PERSONAL HISTORY OF MALIGNANT NEOPLASM OF BREAST: ICD-10-CM

## 2022-04-28 PROCEDURE — 99999 PR PBB SHADOW E&M-EST. PATIENT-LVL II: CPT | Mod: PBBFAC,,, | Performed by: PSYCHOLOGIST

## 2022-04-28 PROCEDURE — 1159F PR MEDICATION LIST DOCUMENTED IN MEDICAL RECORD: ICD-10-PCS | Mod: CPTII,S$GLB,, | Performed by: PSYCHOLOGIST

## 2022-04-28 PROCEDURE — 90837 PSYTX W PT 60 MINUTES: CPT | Mod: S$GLB,,, | Performed by: PSYCHOLOGIST

## 2022-04-28 PROCEDURE — 4010F PR ACE/ARB THEARPY RXD/TAKEN: ICD-10-PCS | Mod: CPTII,S$GLB,, | Performed by: PSYCHOLOGIST

## 2022-04-28 PROCEDURE — 4010F ACE/ARB THERAPY RXD/TAKEN: CPT | Mod: CPTII,S$GLB,, | Performed by: PSYCHOLOGIST

## 2022-04-28 PROCEDURE — 1159F MED LIST DOCD IN RCRD: CPT | Mod: CPTII,S$GLB,, | Performed by: PSYCHOLOGIST

## 2022-04-28 PROCEDURE — 99999 PR PBB SHADOW E&M-EST. PATIENT-LVL II: ICD-10-PCS | Mod: PBBFAC,,, | Performed by: PSYCHOLOGIST

## 2022-04-28 PROCEDURE — 90837 PR PSYCHOTHERAPY W/PATIENT, 60 MIN: ICD-10-PCS | Mod: S$GLB,,, | Performed by: PSYCHOLOGIST

## 2022-05-19 ENCOUNTER — OFFICE VISIT (OUTPATIENT)
Dept: PODIATRY | Facility: CLINIC | Age: 57
End: 2022-05-19
Payer: COMMERCIAL

## 2022-05-19 ENCOUNTER — OFFICE VISIT (OUTPATIENT)
Dept: NEUROLOGY | Facility: CLINIC | Age: 57
End: 2022-05-19
Payer: COMMERCIAL

## 2022-05-19 VITALS
BODY MASS INDEX: 30.86 KG/M2 | HEART RATE: 80 BPM | DIASTOLIC BLOOD PRESSURE: 80 MMHG | WEIGHT: 180.75 LBS | HEIGHT: 64 IN | SYSTOLIC BLOOD PRESSURE: 168 MMHG

## 2022-05-19 VITALS
BODY MASS INDEX: 30.86 KG/M2 | SYSTOLIC BLOOD PRESSURE: 175 MMHG | HEART RATE: 85 BPM | WEIGHT: 180.75 LBS | HEIGHT: 64 IN | DIASTOLIC BLOOD PRESSURE: 90 MMHG

## 2022-05-19 DIAGNOSIS — G44.229 CHRONIC TENSION-TYPE HEADACHE, NOT INTRACTABLE: Primary | ICD-10-CM

## 2022-05-19 DIAGNOSIS — G57.53 TARSAL TUNNEL SYNDROME OF BOTH LOWER EXTREMITIES: ICD-10-CM

## 2022-05-19 DIAGNOSIS — G62.0 CHEMOTHERAPY-INDUCED NEUROPATHY: ICD-10-CM

## 2022-05-19 DIAGNOSIS — G62.9 NEUROPATHY: ICD-10-CM

## 2022-05-19 DIAGNOSIS — M79.671 FOOT PAIN, BILATERAL: ICD-10-CM

## 2022-05-19 DIAGNOSIS — M79.672 FOOT PAIN, BILATERAL: ICD-10-CM

## 2022-05-19 DIAGNOSIS — E11.9 TYPE 2 DIABETES MELLITUS WITHOUT COMPLICATION, WITHOUT LONG-TERM CURRENT USE OF INSULIN: Primary | ICD-10-CM

## 2022-05-19 DIAGNOSIS — T45.1X5A CHEMOTHERAPY-INDUCED NEUROPATHY: ICD-10-CM

## 2022-05-19 DIAGNOSIS — G43.109 MIGRAINE WITH AURA AND WITHOUT STATUS MIGRAINOSUS, NOT INTRACTABLE: ICD-10-CM

## 2022-05-19 PROCEDURE — 99999 PR PBB SHADOW E&M-EST. PATIENT-LVL IV: ICD-10-PCS | Mod: PBBFAC,,, | Performed by: PSYCHIATRY & NEUROLOGY

## 2022-05-19 PROCEDURE — 99204 PR OFFICE/OUTPT VISIT, NEW, LEVL IV, 45-59 MIN: ICD-10-PCS | Mod: S$GLB,,, | Performed by: PSYCHIATRY & NEUROLOGY

## 2022-05-19 PROCEDURE — 99999 PR PBB SHADOW E&M-EST. PATIENT-LVL IV: CPT | Mod: PBBFAC,,, | Performed by: PSYCHIATRY & NEUROLOGY

## 2022-05-19 PROCEDURE — 3079F PR MOST RECENT DIASTOLIC BLOOD PRESSURE 80-89 MM HG: ICD-10-PCS | Mod: CPTII,S$GLB,, | Performed by: PODIATRIST

## 2022-05-19 PROCEDURE — 99204 OFFICE O/P NEW MOD 45 MIN: CPT | Mod: S$GLB,,, | Performed by: PSYCHIATRY & NEUROLOGY

## 2022-05-19 PROCEDURE — 3080F PR MOST RECENT DIASTOLIC BLOOD PRESSURE >= 90 MM HG: ICD-10-PCS | Mod: CPTII,S$GLB,, | Performed by: PSYCHIATRY & NEUROLOGY

## 2022-05-19 PROCEDURE — 3077F PR MOST RECENT SYSTOLIC BLOOD PRESSURE >= 140 MM HG: ICD-10-PCS | Mod: CPTII,S$GLB,, | Performed by: PODIATRIST

## 2022-05-19 PROCEDURE — 3008F PR BODY MASS INDEX (BMI) DOCUMENTED: ICD-10-PCS | Mod: CPTII,S$GLB,, | Performed by: PSYCHIATRY & NEUROLOGY

## 2022-05-19 PROCEDURE — 99214 PR OFFICE/OUTPT VISIT, EST, LEVL IV, 30-39 MIN: ICD-10-PCS | Mod: 25,S$GLB,, | Performed by: PODIATRIST

## 2022-05-19 PROCEDURE — 1159F PR MEDICATION LIST DOCUMENTED IN MEDICAL RECORD: ICD-10-PCS | Mod: CPTII,S$GLB,, | Performed by: PODIATRIST

## 2022-05-19 PROCEDURE — 3077F SYST BP >= 140 MM HG: CPT | Mod: CPTII,S$GLB,, | Performed by: PSYCHIATRY & NEUROLOGY

## 2022-05-19 PROCEDURE — 3008F BODY MASS INDEX DOCD: CPT | Mod: CPTII,S$GLB,, | Performed by: PSYCHIATRY & NEUROLOGY

## 2022-05-19 PROCEDURE — 3077F PR MOST RECENT SYSTOLIC BLOOD PRESSURE >= 140 MM HG: ICD-10-PCS | Mod: CPTII,S$GLB,, | Performed by: PSYCHIATRY & NEUROLOGY

## 2022-05-19 PROCEDURE — 1159F MED LIST DOCD IN RCRD: CPT | Mod: CPTII,S$GLB,, | Performed by: PODIATRIST

## 2022-05-19 PROCEDURE — 3008F PR BODY MASS INDEX (BMI) DOCUMENTED: ICD-10-PCS | Mod: CPTII,S$GLB,, | Performed by: PODIATRIST

## 2022-05-19 PROCEDURE — 3008F BODY MASS INDEX DOCD: CPT | Mod: CPTII,S$GLB,, | Performed by: PODIATRIST

## 2022-05-19 PROCEDURE — 3080F DIAST BP >= 90 MM HG: CPT | Mod: CPTII,S$GLB,, | Performed by: PSYCHIATRY & NEUROLOGY

## 2022-05-19 PROCEDURE — 4010F ACE/ARB THERAPY RXD/TAKEN: CPT | Mod: CPTII,S$GLB,, | Performed by: PSYCHIATRY & NEUROLOGY

## 2022-05-19 PROCEDURE — 64450 PR NERVE BLOCK INJ, ANES/STEROID, OTHER PERIPHERAL: ICD-10-PCS | Mod: 50,S$GLB,, | Performed by: PODIATRIST

## 2022-05-19 PROCEDURE — 99999 PR PBB SHADOW E&M-EST. PATIENT-LVL IV: ICD-10-PCS | Mod: PBBFAC,,, | Performed by: PODIATRIST

## 2022-05-19 PROCEDURE — 4010F PR ACE/ARB THEARPY RXD/TAKEN: ICD-10-PCS | Mod: CPTII,S$GLB,, | Performed by: PSYCHIATRY & NEUROLOGY

## 2022-05-19 PROCEDURE — 99214 OFFICE O/P EST MOD 30 MIN: CPT | Mod: 25,S$GLB,, | Performed by: PODIATRIST

## 2022-05-19 PROCEDURE — 3079F DIAST BP 80-89 MM HG: CPT | Mod: CPTII,S$GLB,, | Performed by: PODIATRIST

## 2022-05-19 PROCEDURE — 99999 PR PBB SHADOW E&M-EST. PATIENT-LVL IV: CPT | Mod: PBBFAC,,, | Performed by: PODIATRIST

## 2022-05-19 PROCEDURE — 64450 NJX AA&/STRD OTHER PN/BRANCH: CPT | Mod: 50,S$GLB,, | Performed by: PODIATRIST

## 2022-05-19 PROCEDURE — 3077F SYST BP >= 140 MM HG: CPT | Mod: CPTII,S$GLB,, | Performed by: PODIATRIST

## 2022-05-19 PROCEDURE — 4010F PR ACE/ARB THEARPY RXD/TAKEN: ICD-10-PCS | Mod: CPTII,S$GLB,, | Performed by: PODIATRIST

## 2022-05-19 PROCEDURE — 4010F ACE/ARB THERAPY RXD/TAKEN: CPT | Mod: CPTII,S$GLB,, | Performed by: PODIATRIST

## 2022-05-19 RX ORDER — GABAPENTIN 300 MG/1
600 CAPSULE ORAL NIGHTLY
Qty: 180 CAPSULE | Refills: 3 | Status: SHIPPED | OUTPATIENT
Start: 2022-05-19 | End: 2023-10-03 | Stop reason: SDUPTHER

## 2022-05-19 NOTE — PROGRESS NOTES
Subjective:       Patient ID: Shu Mendoza is a 56 y.o. female.    Chief Complaint:  Consult      Consultation Requested by:   Aaareferral Self  No address on file    History of Present Illness  56-year-old female presents for evaluation of headaches.  She notes that she has been having headaches for more than 20 years of her life but they have worsened in the 3 months or so after the death of her son in March.  She notes that she has significant stressors in her life including dealing with breast cancer.  She notes that she works night shifts usually going from 2:30 p.m. to 11:00 p.m. she notes that the gabapentin that she has been prescribed has helped somewhat.  She notes that she takes amitriptyline 10 mg p.o. p.r.n. to help her with her shift work sleep issues.  She is taking Effexor for her anxiety and depression.  She notes overall that she is having up to 16 days of headache per month although initially she reports, 2-3 days per week.  She notes associated +nausea, +dizziness, but denies light and sound sensitivity.  She notes parietal location.  She was found to have an incidental meningioma found on MRI.  This is somewhat concerning given her personal history of cancer, however.      Has tried and failed: elavil, gabapentin, venlafaxine, tylenol, zofran, phenergan, amlodipine, benazepril, carvedilol    Past Medical History:   Diagnosis Date    Anxiety     Breast cancer     Depression     Diabetes mellitus     Encounter for blood transfusion     Glaucoma 2012    Hx of psychiatric care     Hypertension     Malignant neoplasm of central portion of left breast in female, estrogen receptor positive 7/23/2019    Psychiatric problem     Renal cyst     Retinal tear of right eye     Sleep difficulties        Past Surgical History:   Procedure Laterality Date    BREAST BIOPSY      BREAST REVISION SURGERY Left 11/5/2020    Procedure: BREAST REVISION SURGERY;  Surgeon: Derek Colin MD;  Location:  [facility] OR;  Service: Plastics;  Laterality: Left;    EYE SURGERY Bilateral     as a child for cross eye    HERNIA REPAIR  2009    umbilical    HYSTERECTOMY  2000    NILES, BSO secondary to ovarian cyst    INSERTION OF TUNNELED CENTRAL VENOUS CATHETER (CVC) WITH SUBCUTANEOUS PORT Right 10/25/2019    Procedure: [id number]-M-CATH RIGHT (CONSENT AM OF) 1. 0 hr case;  Surgeon: Shikha Mccray MD;  Location: [facility];  Service: General;  Laterality: Right;    LIPOSUCTION Left 11/5/2020    Procedure: LIPOSUCTION;  Surgeon: Derek Colin MD;  Location: [facility] OR;  Service: Plastics;  Laterality: Left;    MASTECTOMY Left 9/11/2019    Procedure: MASTECTOMY;  Surgeon: Shikha Mccray MD;  Location: [facility] OR;  Service: Plastics;  Laterality: Left;    MASTECTOMY WITH SENTINEL NODE BIOPSY AND AXILLARY LYMPH NODE DISSECTION Left 9/11/2019    Procedure: MASTECTOMY, WITH SENTINEL NODE BIOPSY AND AXILLARY LYMPHADENECTOMY LEFT;  Surgeon: Shikha Mccray MD;  Location: [facility] OR;  Service: Plastics;  Laterality: Left;    MEDIPORT REMOVAL Right 11/5/2020    Procedure: REMOVAL, CATHETER, CENTRAL VENOUS, TUNNELED, WITH PORT;  Surgeon: Derek Colin MD;  Location: [facility] OR;  Service: Plastics;  Laterality: Right;    OOPHORECTOMY      RECONSTRUCTION OF BREAST WITH DEEP INFERIOR EPIGASTRIC ARTERY  (SALEEM) FREE FLAP Left 9/11/2019    Procedure: RECONSTRUCTION, BREAST, USING SALEEM FREE FLAP - UNILATERAL;  Surgeon: Derek Colin MD;  Location: [facility];  Service: Plastics;  Laterality: Left;       Family History   Problem Relation Age of Onset    Ovarian cancer Other     Schizophrenia Son     Drug abuse Son     Breast cancer Neg Hx     Colon cancer Neg Hx        Social History     Socioeconomic History    Marital status: Single    Number of children: 3   Tobacco Use    Smoking status: Never Smoker    Smokeless tobacco: Never Used   Substance and Sexual Activity    Alcohol use: Not Currently     Alcohol/week: 0.0  standard drinks     Comment: occasional    Drug use: No    Sexual activity: Not Currently     Partners: Male     Birth control/protection: Post-menopausal, Surgical     Comment: Lack of desire       Review of Systems  Review of Systems   Eyes: Positive for photophobia.   Neurological: Positive for headaches.   Psychiatric/Behavioral: Positive for sleep disturbance.   All other systems reviewed and are negative.      Objective:     Vitals:    05/19/22 1347   BP: (!) 175/90   Pulse: 85      Physical Exam  Vitals reviewed.   Constitutional:       General: She is not in acute distress.     Appearance: She is well-developed.   HENT:      Head: Normocephalic and atraumatic.      Right Ear: Hearing normal.      Left Ear: Hearing normal.   Eyes:      Extraocular Movements: EOM normal.      Right eye: Normal extraocular motion and no nystagmus.      Left eye: Normal extraocular motion and no nystagmus.      Pupils: Pupils are equal, round, and reactive to light.   Musculoskeletal:      Cervical back: Normal range of motion and neck supple. No rigidity. No spinous process tenderness or muscular tenderness.   Neurological:      Mental Status: She is alert and oriented to person, place, and time.      GCS: GCS eye subscore is 4. GCS verbal subscore is 5. GCS motor subscore is 6.      Cranial Nerves: No cranial nerve deficit.      Sensory: No sensory deficit.      Motor: No tremor, atrophy or abnormal muscle tone.      Coordination: Coordination normal. Finger-Nose-Finger Test normal.      Gait: Gait is intact. Gait normal.      Deep Tendon Reflexes: Strength normal and reflexes are normal and symmetric.      Reflex Scores:       Tricep reflexes are 2+ on the right side and 2+ on the left side.       Bicep reflexes are 2+ on the right side and 2+ on the left side.       Brachioradialis reflexes are 2+ on the right side and 2+ on the left side.       Patellar reflexes are 2+ on the right side and 2+ on the left side.        Achilles reflexes are 2+ on the right side and 2+ on the left side.     Comments: Cranial nerves 2-12 are without deficit.   Psychiatric:         Speech: Speech normal.         Behavior: Behavior normal.         Thought Content: Thought content normal.           NEUROLOGICAL EXAMINATION:     MENTAL STATUS   Oriented to person, place, and time.   Registration: recalls 3 of 3 objects. Recall at 5 minutes: recalls 3 of 3 objects. Follows 3 step commands.   Attention: normal. Concentration: normal.   Speech: speech is normal   Level of consciousness: alert  Knowledge: good.   Able to name object. Able to read. Able to repeat. Able to write.     CRANIAL NERVES   Cranial nerves II through XII intact.     CN II   Visual fields full to confrontation.   Visual acuity: normal    CN III, IV, VI   Pupils are equal, round, and reactive to light.  Extraocular motions are normal.     CN V   Facial sensation intact.     CN VII   Facial expression full, symmetric.     CN VIII   CN VIII normal.     CN IX, X   CN IX normal.   CN X normal.     CN XI   CN XI normal.     CN XII   CN XII normal.     MOTOR EXAM   Muscle bulk: normal  Overall muscle tone: normal    Strength   Strength 5/5 throughout.     REFLEXES     Reflexes   Right brachioradialis: 2+  Left brachioradialis: 2+  Right biceps: 2+  Left biceps: 2+  Right triceps: 2+  Left triceps: 2+  Right patellar: 2+  Left patellar: 2+  Right achilles: 2+  Left achilles: 2+    SENSORY EXAM   Light touch normal.     GAIT AND COORDINATION     Gait  Gait: normal     Coordination   Finger to nose coordination: normal           I have spent over 50% of 45 minute visit in guidance, counseling discussion of treatment options  Assessment/Plan:     Problem List Items Addressed This Visit        Neuro    Chemotherapy-induced neuropathy      Other Visit Diagnoses     Chronic tension-type headache, not intractable    -  Primary    Neuropathy        Relevant Medications    gabapentin (NEURONTIN) 300  MG capsule    Migraine with aura and without status migrainosus, not intractable              56-year-old female presents for evaluations of multifactorial headaches.  She has some element of chronic tension-type headache as well as migraine-type headache.  She has had headaches for over 20 years which have worsened in the last few months with significant stress an exacerbation after the death of her son.  At this time we have discussed adjusting her gabapentin to 600 mg p.o. q.h.s. rather than adding on a agent.  I have reviewed her imaging with her and I believe that the intracranial mass with a dural tail is likely a benign meningioma rather than any sort of malignancy.  Believe it is reasonable to obtain follow-up imaging at least once within the upcoming year.  If that is stable, then I would consider repeat imaging once every 5 or 10 years.  I have covered red flags with her including blindness, facial droop, limb weakness or atrophy, gait instability, bladder or bowel incontinence or seizure as reasons for sooner or urgent imaging.  I will see the patient back in 2 or 3 months.    The patient verbalizes understanding and agreement with the treatment plan. Questions were sought and answered to her stated verbal satisfaction.        Darcie Agrawal MD    This note is dictated on M*Modal Fluency speech recognition program. There are word recognition mistakes that are occasionally missed on review.

## 2022-05-19 NOTE — PROGRESS NOTES
Subjective:      Patient ID: Shu Mendoza is a 56 y.o. female.    Chief Complaint: Diabetes Mellitus (Pcp - Yamileth Valencia MD - over 6 months) and Follow-up (Tingling and pain. )    Shu is a 56 y.o. female who presents to the clinic for evaluation and treatment of high risk feet. Shu has a past medical history of Anxiety, Breast cancer, Depression, Diabetes mellitus, Encounter for blood transfusion, Glaucoma (2012), psychiatric care, Hypertension, Malignant neoplasm of central portion of left breast in female, estrogen receptor positive (7/23/2019), Psychiatric problem, Renal cyst, Retinal tear of right eye, and Sleep difficulties. The patient's chief complaint is long, thick toenails. This patient has documented high risk feet requiring routine maintenance secondary to peripheral neuropathy.    PCP: Yamileth Valencia MD    Date Last Seen by PCP:   Chief Complaint   Patient presents with    Diabetes Mellitus     Pcp - Yamileth Valencia MD - over 6 months    Follow-up     Tingling and pain.          Current shoe gear:  Affected Foot: Casual shoes     Unaffected Foot: Casual shoes    No results found for: HGBA1C    Review of Systems   Constitutional: Negative for chills, decreased appetite, fever and malaise/fatigue.   HENT: Negative for congestion, hearing loss, nosebleeds and tinnitus.    Eyes: Negative for double vision, pain, photophobia and visual disturbance.   Cardiovascular: Negative for chest pain, claudication, cyanosis and leg swelling.   Respiratory: Negative for cough, hemoptysis, shortness of breath and wheezing.    Endocrine: Negative for cold intolerance and heat intolerance.   Hematologic/Lymphatic: Negative for adenopathy and bleeding problem.   Skin: Negative for color change, dry skin, itching, nail changes and suspicious lesions.   Musculoskeletal: Positive for arthritis. Negative for joint pain, myalgias and stiffness.   Gastrointestinal: Negative for abdominal pain, jaundice, nausea and  vomiting.   Genitourinary: Negative for dysuria, frequency and hematuria.   Neurological: Positive for numbness, paresthesias and sensory change. Negative for difficulty with concentration and loss of balance.   Psychiatric/Behavioral: Negative for altered mental status, hallucinations and suicidal ideas. The patient is not nervous/anxious.    Allergic/Immunologic: Negative for environmental allergies and persistent infections.           Objective:      Physical Exam  Vitals reviewed.   Constitutional:       Appearance: She is well-developed and well-nourished.   HENT:      Head: Normocephalic and atraumatic.   Cardiovascular:      Pulses:           Dorsalis pedis pulses are 2+ on the right side and 2+ on the left side.        Posterior tibial pulses are 2+ on the right side and 2+ on the left side.   Pulmonary:      Effort: Pulmonary effort is normal.   Musculoskeletal:         General: Normal range of motion.      Comments: Inspection and palpation of the muscles joints and bones of both lower extremities reveal that muscle strength for the anterior lateral and posterior muscle groups and intrinsic muscle groups of the foot are all 5 over 5 symmetrical.  Ankle subtalar midtarsal and digital joint range of motion are within normal limits, nonpainful, without crepitus or effusion.  Patient exhibits a normal angle and base of gait.  Palpation of the tendons reveal no defects.   Skin:     General: Skin is warm and dry.      Capillary Refill: Capillary refill takes 2 to 3 seconds.      Comments: Skin turgor is normal bilaterally.  Skin texture is well hydrated to both lower extremities.  No lesions or rashes or wounds appreciated bilaterally.  Nail plates 1 through 5 bilaterally are within normal limits for length and thickness.  No nail clubbing or incurvation noted.   Neurological:      Mental Status: She is alert and oriented to person, place, and time.      Comments: Sharp dull light touch vibratory proprioceptive  sensation are intact bilaterally.  Deep tendon reflexes to patellar and Achilles tendon are symmetrical 2 over 4 bilaterally.    Positive tingling burning bilateral lower extremities specifically the bottoms of both feet and worse at night.  Positive provocation sign bilateral tarsal tunnel.  Psychiatric:         Mood and Affect: Mood and affect normal.         Behavior: Behavior normal.               Assessment:       Encounter Diagnoses   Name Primary?    Type 2 diabetes mellitus without complication, without long-term current use of insulin Yes    Chemotherapy-induced neuropathy     Foot pain, bilateral     Tarsal tunnel syndrome of both lower extremities          Plan:       Shu was seen today for diabetes mellitus and follow-up.    Diagnoses and all orders for this visit:    Type 2 diabetes mellitus without complication, without long-term current use of insulin    Chemotherapy-induced neuropathy    Foot pain, bilateral    Tarsal tunnel syndrome of both lower extremities      I counseled the patient on her conditions, their implications and medical management.      Discussed options for peripheral neuropathy/nerve entrapment syndrome including nerve block therapy, surgical nerve entrapment decompression procedures, and various vitamins and supplementation available shown to improve nerve function.    The area overlying the bilateral tarsal tunnel nerve(s) was sterilely prepped, verbal consent was obtained, 2 cc's of 0.5% Marcaine plain was infiltrated around the affected nerve(s) for a diagnostic nerve block.  This was well tolerated with no complications.    Shoe inspection. Diabetic Foot Education. Patient reminded of the importance of good nutrition and blood sugar control to help prevent podiatric complications of diabetes. Patient instructed on proper foot hygeine. We discussed wearing proper shoe gear, daily foot inspections and Diabetic foot education in detail.    Return to clinic in 3-6 months  or sooner if problems arise     .

## 2022-06-13 ENCOUNTER — LAB VISIT (OUTPATIENT)
Dept: LAB | Facility: HOSPITAL | Age: 57
End: 2022-06-13
Attending: INTERNAL MEDICINE
Payer: COMMERCIAL

## 2022-06-13 DIAGNOSIS — E55.9 VITAMIN D DEFICIENCY: ICD-10-CM

## 2022-06-13 LAB — 25(OH)D3+25(OH)D2 SERPL-MCNC: 48 NG/ML (ref 30–96)

## 2022-06-13 PROCEDURE — 36415 COLL VENOUS BLD VENIPUNCTURE: CPT | Performed by: INTERNAL MEDICINE

## 2022-06-13 PROCEDURE — 82306 VITAMIN D 25 HYDROXY: CPT | Performed by: INTERNAL MEDICINE

## 2022-06-14 ENCOUNTER — OFFICE VISIT (OUTPATIENT)
Dept: HEMATOLOGY/ONCOLOGY | Facility: CLINIC | Age: 57
End: 2022-06-14
Payer: COMMERCIAL

## 2022-06-14 DIAGNOSIS — I10 ESSENTIAL HYPERTENSION: ICD-10-CM

## 2022-06-14 DIAGNOSIS — M89.8X9 CANCER TREATMENT INDUCED BONE LOSS: ICD-10-CM

## 2022-06-14 DIAGNOSIS — Z79.811 AROMATASE INHIBITOR USE: ICD-10-CM

## 2022-06-14 DIAGNOSIS — Z85.3 PERSONAL HISTORY OF MALIGNANT NEOPLASM OF BREAST: Primary | ICD-10-CM

## 2022-06-14 DIAGNOSIS — G62.9 NEUROPATHY: ICD-10-CM

## 2022-06-14 DIAGNOSIS — T45.1X5A CANCER TREATMENT INDUCED BONE LOSS: ICD-10-CM

## 2022-06-14 PROCEDURE — 99214 PR OFFICE/OUTPT VISIT, EST, LEVL IV, 30-39 MIN: ICD-10-PCS | Mod: 95,,, | Performed by: INTERNAL MEDICINE

## 2022-06-14 PROCEDURE — 4010F PR ACE/ARB THEARPY RXD/TAKEN: ICD-10-PCS | Mod: CPTII,95,, | Performed by: INTERNAL MEDICINE

## 2022-06-14 PROCEDURE — 1159F PR MEDICATION LIST DOCUMENTED IN MEDICAL RECORD: ICD-10-PCS | Mod: CPTII,95,, | Performed by: INTERNAL MEDICINE

## 2022-06-14 PROCEDURE — 1159F MED LIST DOCD IN RCRD: CPT | Mod: CPTII,95,, | Performed by: INTERNAL MEDICINE

## 2022-06-14 PROCEDURE — 1160F RVW MEDS BY RX/DR IN RCRD: CPT | Mod: CPTII,95,, | Performed by: INTERNAL MEDICINE

## 2022-06-14 PROCEDURE — 1160F PR REVIEW ALL MEDS BY PRESCRIBER/CLIN PHARMACIST DOCUMENTED: ICD-10-PCS | Mod: CPTII,95,, | Performed by: INTERNAL MEDICINE

## 2022-06-14 PROCEDURE — 4010F ACE/ARB THERAPY RXD/TAKEN: CPT | Mod: CPTII,95,, | Performed by: INTERNAL MEDICINE

## 2022-06-14 PROCEDURE — 99214 OFFICE O/P EST MOD 30 MIN: CPT | Mod: 95,,, | Performed by: INTERNAL MEDICINE

## 2022-06-14 NOTE — PROGRESS NOTES
Subjective:       Patient ID: Shu Mendoza is a 54 y.o. female.     Chief Complaint: follow up for breast cancer     Diagnosis:  Stage IA ([id number]) invasive ductal carcinoma of the left breast, grade 2, ER/MD positive, HER2 negative, s/p left mastectomy and reconstruction on 9/11/2019. Mammaprint high risk.  She is status post adjuvant chemotherapy and is now on Arimidex.    The patient location is:  home.  The chief complaint leading to consultation is:  Breast cancer.  Visit type: audiovisual  Total time spent with patient: 10 minutes  Each patient to whom he or she provides medical services by telemedicine is:  (1) informed of the relationship between the physician and patient and the respective role of any other health care provider with respect to management of the patient; and (2) notified that he or she may decline to receive medical services by telemedicine and may withdraw from such care at any time.    Notes:  54 year lady who returns to clinic for follow-up of carcinoma of the left breast currently on endocrine therapy with Arimidex.  Her major side effect from that has been some musculoskeletal stiffness.  She does have some residual tingling in her fingers and toes from her chemotherapy.  She denies any shortness of breath or fever.  Her appetite been good she has had some constipation.  She denies any urinary symptoms.  She has not been exercising regularly but plans to start walking.  The lymphedema in her left arm has not responded particularly well to her compression sleeve.  She does have a compression machine at home which she is starting to use.       Oncologic History:     She was referred to Dr Mccray for bloody nipple discharge first noted in June 2019. Before then she had a negative mammogram on 5/20/19. She had a left breast US on 6/27/19 in the left breast retroareolar region just behind the nipple, there is an irregular hypoechoic intraductal mass measuring 1.0 cm.     She underwent a  biopsy on 7/8/2019. It showed invasive ductal carcinoma, ER strongly positive 100%, FL positive 80%, HER2 negative 1+. Ki67 5% activity within invasive component.     Patient underwent a left total mastectomy and reconstruction on 9/11/2019. Pathology showed a 1.3 cm invasive ductal carcinoma, grade 2. DCIS present, negative margin, 22 lymph nodes removed, one lymph node positive with macrometastases 13 mm, no extranodal extension. No lymphovascular invasion. Pathologic T1c N1a.     Mammaprint high risk with chemo benefit >12%. 5-10 years recurrence risk without chemo 20-25%, with chemo and endocrine therapy 7%.     BRCAnalysis from 7/18/19 was negative.   Case was discussed at tumor board. Adjuvant chemotherapy followed by endocrine therapy was recommended. Radiation not recommended. Patient presents today for further management. Feeling well. Works at [employer]. She had NILES/BSO in 2000 and was on hormone replacement therapy after that. Stopped in 2019 after she was diagnosed with breast cancer.    Adjuvant DDAC followed by weekly taxol started on 10/31/2019. cycle 2 on 11/14/19. cycle 3 was delayed for a week for umbilical infection, given on 12/5/2019, cycle 4 given on 12/19/19. Weekly taxol started on 1/2/19.                  Objective:      Bone density-normal     Assessment and Plan:      1. Malignant neoplasm of central portion of left breast in female, estrogen receptor positive        Follow-up:    She will be due for her mammograms in July and will see surgery at that time.  Needs port flush in June  Return to clinic in 6 months.       Anesthesia Type: 1% lidocaine with 1:100,000 epinephrine and a 1:10 solution of 8.4% sodium bicarbonate

## 2022-06-14 NOTE — PROGRESS NOTES
The patient location is: home  The chief complaint leading to consultation is: follow up for breast cancer    Visit type: audiovisual    Face to Face time with patient: 15 min  30 minutes of total time spent on the encounter, which includes face to face time and non-face to face time preparing to see the patient (eg, review of tests), Obtaining and/or reviewing separately obtained history, Documenting clinical information in the electronic or other health record, Independently interpreting results (not separately reported) and communicating results to the patient/family/caregiver, or Care coordination (not separately reported).         Each patient to whom he or she provides medical services by telemedicine is:  (1) informed of the relationship between the physician and patient and the respective role of any other health care provider with respect to management of the patient; and (2) notified that he or she may decline to receive medical services by telemedicine and may withdraw from such care at any time.    Notes:     Subjective:       Patient ID: Shu Mendoza is a 56 y.o. female.     Chief Complaint: follow up for breast cancer     Diagnosis:  Stage IA ([id number]) invasive ductal carcinoma of the left breast, grade 2, ER/VA positive, HER2 negative, s/p left mastectomy and reconstruction on 9/11/2019. Mammaprint high risk     Oncologic History:  1. Ms Mendoza is a 53 yo postmenopausal woman with HTN, DM, who presents today for further management of pathologic stage IA ([id number]) invasive ductal carcinoma of the left breast. She was referred to Dr Mccray for bloody nipple discharge first noted in June 2019. Before then she had a negative mammogram on 5/20/19. She had a left breast US on 6/27/19 in the left breast retroareolar region just behind the nipple, there is an irregular hypoechoic intraductal mass measuring 1.0 cm. She underwent a biopsy on 7/8/2019. It showed invasive ductal carcinoma, ER strongly positive  100%, OH positive 80%, HER2 negative 1+. Ki67 5% activity within invasive component. Patient underwent a left total mastectomy and reconstruction on 2019. Pathology showed a 1.3 cm invasive ductal carcinoma, grade 2. DCIS present, negative margin, 22 lymph nodes removed, one lymph node positive with macrometastases 13 mm, no extranodal extension. No lymphovascular invasion. Pathologic T1c N1a. Mammaprint high risk with chemo benefit >12%. 5-10 years recurrence risk without chemo 20-25%, with chemo and endocrine therapy 7%. BRCAnalysis from 19 was negative. Case was discussed at tumor board. Adjuvant chemotherapy followed by endocrine therapy was recommended. Radiation not recommended. Patient presents today for further management. Feeling well. Works at [employer]. She had NILES/BSO in  and was on hormone replacement therapy after that. Stopped in  after she was diagnosed with breast cancer.   2. Port placed by Dr Mccray on 10/25/19. Echo on 10/22/19 showed normal LVEF 59%.   3. Adjuvant DDAC followed by weekly taxol started on 10/31/2019. cycle 2 on 19. cycle 3 was delayed for a week for umbilical infection, given on 2019, cycle 4 given on 19. Weekly taxol completed on 3/19/19. Started anastrozole in 2020. Port was removed.   4. Colonoscopy 2/3/21. Two colon polyps removed. Path showed tubular adenoma.      Interval History:   Ms Mendoza returns today for follow up. Doing very well. Taking anastrozole. Seen by neurology. Gabapentin increased to 600 mg at bedtime. That helps with tingling in her toes. Taking over the counter vit D every morning. Just lost her son. Followed by psychology which helps her a lot.      ECO     ROS:   A ten-point system review is obtained and negative except for what was stated in the Interval History.      Physical Examination:   General: well hydrated, well developed, in no acute distress  HEENT: normocephalic, EOMI, anicteric sclerae  Neuro:  alert and oriented x 4  Psych: pleasant and appropriate mood and affect     Objective:      Laboratory Data:  Labs reviewed. vit D level normal     Imaging Data:  Right Mammogram 6/28/21 normal     Bone density 5/5/20 normal     Assessment and Plan:      1. Personal history of malignant neoplasm of breast    2. Aromatase inhibitor use    3. Cancer treatment induced bone loss    4. Neuropathy    5. Essential hypertension      1.2  - Ms Mendoza is a 57 yo postmenopausal woman with stage IA ([id number]) invasive ductal carcinoma of the left breast, ER/IL positive, HER2 negative, s/p left mastectomy and reconstruction on 9/11/2019. Mammaprint high risk with chemo benefit >12%. 5-10 years recurrence risk without chemo 20-25%, with chemo and endocrine therapy 7%.  - completed adjuvant DDAC followed by weekly taxol on 3/19/2019.   - on anastrozole since April 2020. Continue for at least 5 years  - due for R mammogram. Will schedule  - due for bone density. Will schedule  - RTC in 6 months     3.  - c/w vit D daily  - due for bone density. Will schedule    4.  - doing well. C/w gabapentin 600 mg qhs    5.  - reviewed with patient that her BP has been elevated during her recent office visits with other specialist. Patient's prior PCP was at [facility]. She would like to find one in [facility] system. Asked patient to make an appointment with new PCP. Patient understands and agrees with the plan.       Follow-up:      RTC in 6 months  Knows to call in the interval if any problems arise.    Route Chart for Scheduling    Med Onc Chart Routing      Follow up with physician 6 months. Please schedule first available R mammogram and bone density. see me in 6 months   Follow up with ELROY    Labs    Imaging Mammogram and DXA scan   First available R mammogram and bone density   Pharmacy appointment    Other referrals                 verbal cues/1 person assist

## 2022-06-15 ENCOUNTER — OFFICE VISIT (OUTPATIENT)
Dept: UROLOGY | Facility: CLINIC | Age: 57
End: 2022-06-15
Attending: UROLOGY
Payer: COMMERCIAL

## 2022-06-15 VITALS
HEART RATE: 66 BPM | DIASTOLIC BLOOD PRESSURE: 85 MMHG | SYSTOLIC BLOOD PRESSURE: 183 MMHG | BODY MASS INDEX: 31.22 KG/M2 | WEIGHT: 181.88 LBS

## 2022-06-15 DIAGNOSIS — R35.0 FREQUENCY OF URINATION: ICD-10-CM

## 2022-06-15 DIAGNOSIS — R31.29 MICROHEMATURIA: Primary | ICD-10-CM

## 2022-06-15 DIAGNOSIS — N32.81 OAB (OVERACTIVE BLADDER): ICD-10-CM

## 2022-06-15 PROCEDURE — 3077F PR MOST RECENT SYSTOLIC BLOOD PRESSURE >= 140 MM HG: ICD-10-PCS | Mod: CPTII,S$GLB,, | Performed by: UROLOGY

## 2022-06-15 PROCEDURE — 99213 PR OFFICE/OUTPT VISIT, EST, LEVL III, 20-29 MIN: ICD-10-PCS | Mod: S$GLB,,, | Performed by: UROLOGY

## 2022-06-15 PROCEDURE — 1159F PR MEDICATION LIST DOCUMENTED IN MEDICAL RECORD: ICD-10-PCS | Mod: CPTII,S$GLB,, | Performed by: UROLOGY

## 2022-06-15 PROCEDURE — 3079F PR MOST RECENT DIASTOLIC BLOOD PRESSURE 80-89 MM HG: ICD-10-PCS | Mod: CPTII,S$GLB,, | Performed by: UROLOGY

## 2022-06-15 PROCEDURE — 1160F RVW MEDS BY RX/DR IN RCRD: CPT | Mod: CPTII,S$GLB,, | Performed by: UROLOGY

## 2022-06-15 PROCEDURE — 4010F ACE/ARB THERAPY RXD/TAKEN: CPT | Mod: CPTII,S$GLB,, | Performed by: UROLOGY

## 2022-06-15 PROCEDURE — 1159F MED LIST DOCD IN RCRD: CPT | Mod: CPTII,S$GLB,, | Performed by: UROLOGY

## 2022-06-15 PROCEDURE — 3079F DIAST BP 80-89 MM HG: CPT | Mod: CPTII,S$GLB,, | Performed by: UROLOGY

## 2022-06-15 PROCEDURE — 3008F PR BODY MASS INDEX (BMI) DOCUMENTED: ICD-10-PCS | Mod: CPTII,S$GLB,, | Performed by: UROLOGY

## 2022-06-15 PROCEDURE — 99213 OFFICE O/P EST LOW 20 MIN: CPT | Mod: S$GLB,,, | Performed by: UROLOGY

## 2022-06-15 PROCEDURE — 3077F SYST BP >= 140 MM HG: CPT | Mod: CPTII,S$GLB,, | Performed by: UROLOGY

## 2022-06-15 PROCEDURE — 1160F PR REVIEW ALL MEDS BY PRESCRIBER/CLIN PHARMACIST DOCUMENTED: ICD-10-PCS | Mod: CPTII,S$GLB,, | Performed by: UROLOGY

## 2022-06-15 PROCEDURE — 3008F BODY MASS INDEX DOCD: CPT | Mod: CPTII,S$GLB,, | Performed by: UROLOGY

## 2022-06-15 PROCEDURE — 4010F PR ACE/ARB THEARPY RXD/TAKEN: ICD-10-PCS | Mod: CPTII,S$GLB,, | Performed by: UROLOGY

## 2022-06-15 NOTE — PROGRESS NOTES
"  Subjective:       Shu Mendoza is a 56 y.o. female who is an established patient who was referred by Dr Laughlin for evaluation of OAB, renal cysts.      She is a previous pt of Dr Shepherd and [facility]. She presents with c/o urge/freq. [facility] records show long standing microhematuria s/p workup. Prior h/o recurrent UTIs, used to take Macrobid prophy. Last UTI in 12/16 per her report. S/p NILES-BSO. Multiple reports of flank pain in the past.     She reports her bladder is "overactive" for about 3-4 months. Frequency up to [id number]. She reports starting Jardiance around that time.     Abd US from  (7/15) - parapelvic cyst with calcification, stable since 2010 per their records.     Significant glucosuria today (on Jardiance). She reports glucose control as fair - last A1c 8.0 per her report. Also takes HCTZ.    PVR (bladder scan) initial visit - 31cc    She was given trial of Ditropan - she has been doing great with this. Now able to hold for q3hrs (rather than [id number]). Nocturia x 0 (was x 2).     Last saw Fela MARMOLEJO 8/19. Now s/p chemo for breast cancer 1 year ago. Still taking Ditropan 5mg with good response. Denies UTIs. Doing well.     Last seen almost 2 years ago. Remains on Ditropan XL 5mg - remains working well. No urinary issues.       The following portions of the patient's history were reviewed and updated as appropriate: allergies, current medications, past family history, past medical history, past social history, past surgical history and problem list.    Review of Systems  Constitutional: no fever or chills  ENT: no nasal congestion or sore throat  Respiratory: no cough or shortness of breath  Cardiovascular: no chest pain or palpitations  Gastrointestinal: no nausea or vomiting, tolerating diet  Genitourinary: as per HPI  Hematologic/Lymphatic: no easy bruising or lymphadenopathy  Musculoskeletal: no arthralgias or myalgias  Skin: no rashes or lesions  Neurological: no seizures or " tremors  Behavioral/Psych: no auditory or visual hallucinations        Objective:    Vitals: BP (!) 183/85 (BP Location: Right arm, Patient Position: Sitting, BP Method: Large (Automatic))   Pulse 66   Wt 82.5 kg (181 lb 14.1 oz)   BMI 31.22 kg/m²     Physical Exam   General: well developed, well nourished in no acute distress  Head: normocephalic, atraumatic  Neck: supple, trachea midline, no obvious enlargement of thyroid  HEENT: EOMI, mucus membranes moist, sclera anicteric, no hearing impairment  Lungs: symmetric expansion, non-labored breathing  Lymphatics: no cervical or inguinal lymphadenopathy  Skin: no rashes or lesions  Neuro: alert and oriented x 3, no gross deficits  Psych: normal judgment and insight, normal mood/affect and non-anxious  Genitourinary:   patient declined exam      Lab Review   Urine analysis today in clinic shows positive for 250 red blood cells    Lab Results   Component Value Date    WBC 6.31 05/05/2020    HGB 12.4 05/05/2020    HCT 38.7 05/05/2020    MCV 88 05/05/2020     05/05/2020     Lab Results   Component Value Date    CREATININE 0.7 05/05/2020    BUN 9 05/05/2020       Imaging  Images and reports were personally reviewed by me and discussed with patient       Assessment/Plan:      1. OAB (overactive bladder)    - Behavioral changes, PFPT, anticholinergics, mirabegron. Botox/InterStim for refractory UUI.   - Likely worsened by Jardiance and HCTZ   - Ditropan XL 5mg - doing well     2. Microhematuria    - Long history   - Prior workup   - Will monitor, okay to hold on repeat w/u     3. Frequency of urination    - Ditropan - doing great         Follow up in 12 months

## 2022-06-28 ENCOUNTER — HOSPITAL ENCOUNTER (OUTPATIENT)
Dept: RADIOLOGY | Facility: OTHER | Age: 57
Discharge: HOME | End: 2022-06-28
Attending: INTERNAL MEDICINE
Payer: COMMERCIAL

## 2022-06-28 ENCOUNTER — OFFICE VISIT (OUTPATIENT)
Dept: PSYCHIATRY | Facility: CLINIC | Age: 57
End: 2022-06-28
Payer: COMMERCIAL

## 2022-06-28 DIAGNOSIS — T45.1X5A CANCER TREATMENT INDUCED BONE LOSS: ICD-10-CM

## 2022-06-28 DIAGNOSIS — M89.8X9 CANCER TREATMENT INDUCED BONE LOSS: ICD-10-CM

## 2022-06-28 DIAGNOSIS — F43.21 GRIEF: ICD-10-CM

## 2022-06-28 DIAGNOSIS — F43.23 ADJUSTMENT DISORDER WITH MIXED ANXIETY AND DEPRESSED MOOD: Primary | ICD-10-CM

## 2022-06-28 DIAGNOSIS — Z85.3 PERSONAL HISTORY OF MALIGNANT NEOPLASM OF BREAST: ICD-10-CM

## 2022-06-28 DIAGNOSIS — Z79.811 AROMATASE INHIBITOR USE: Primary | ICD-10-CM

## 2022-06-28 PROCEDURE — 77080 DXA BONE DENSITY AXIAL: CPT | Mod: TC

## 2022-06-28 PROCEDURE — 4010F ACE/ARB THERAPY RXD/TAKEN: CPT | Mod: CPTII,S$GLB,, | Performed by: PSYCHOLOGIST

## 2022-06-28 PROCEDURE — 99999 PR PBB SHADOW E&M-EST. PATIENT-LVL II: ICD-10-PCS | Mod: PBBFAC,,, | Performed by: PSYCHOLOGIST

## 2022-06-28 PROCEDURE — 90832 PSYTX W PT 30 MINUTES: CPT | Mod: S$GLB,,, | Performed by: PSYCHOLOGIST

## 2022-06-28 PROCEDURE — 1159F PR MEDICATION LIST DOCUMENTED IN MEDICAL RECORD: ICD-10-PCS | Mod: CPTII,S$GLB,, | Performed by: PSYCHOLOGIST

## 2022-06-28 PROCEDURE — 77080 DEXA BONE DENSITY SPINE HIP: ICD-10-PCS | Mod: 26,,, | Performed by: RADIOLOGY

## 2022-06-28 PROCEDURE — 77080 DXA BONE DENSITY AXIAL: CPT | Mod: 26,,, | Performed by: RADIOLOGY

## 2022-06-28 PROCEDURE — 4010F PR ACE/ARB THEARPY RXD/TAKEN: ICD-10-PCS | Mod: CPTII,S$GLB,, | Performed by: PSYCHOLOGIST

## 2022-06-28 PROCEDURE — 90832 PR PSYCHOTHERAPY W/PATIENT, 30 MIN: ICD-10-PCS | Mod: S$GLB,,, | Performed by: PSYCHOLOGIST

## 2022-06-28 PROCEDURE — 1159F MED LIST DOCD IN RCRD: CPT | Mod: CPTII,S$GLB,, | Performed by: PSYCHOLOGIST

## 2022-06-28 PROCEDURE — 99999 PR PBB SHADOW E&M-EST. PATIENT-LVL II: CPT | Mod: PBBFAC,,, | Performed by: PSYCHOLOGIST

## 2022-06-28 RX ORDER — HEPARIN 100 UNIT/ML
500 SYRINGE INTRAVENOUS
OUTPATIENT
Start: 2022-08-29

## 2022-06-28 RX ORDER — SODIUM CHLORIDE 0.9 % (FLUSH) 0.9 %
10 SYRINGE (ML) INJECTION
OUTPATIENT
Start: 2022-08-29

## 2022-06-28 NOTE — PROGRESS NOTES
INFORMED CONSENT: Shu Mendoza   is known to this provider and identity was confirmed via NAME and .  The patient has been informed of the risks and benefits associated with engaging in psychotherapy, the handling of protected health information, the rights of privacy and the limits of confidentiality. The patient has also been informed of the importance of reporting any suicidal or homicidal ideation to this or any provider to ensure safety of all parties, and the Shu Mendoza expressed understanding. The patient was agreeable to these terms and freely participates in individual psychotherapy.    PSYCHO-ONCOLOGY NOTE/ Individual Psychotherapy     Date: 2022   [facility]        Therapeutic Intervention: Met with patient and spouse.  Outpatient - Behavior modifying psychotherapy 30 min - CPT code 20726      Patient was last seen by me on 2022    Problem list  Patient Active Problem List   Diagnosis    OAB (overactive bladder)    Microhematuria    Frequency of urination    Essential hypertension    Type 2 diabetes mellitus without complication, without long-term current use of insulin    Glaucoma    Lymphedema    Antineoplastic chemotherapy induced anemia    Hypokalemia    Chemotherapy-induced neuropathy    Personal history of malignant neoplasm of breast    Aromatase inhibitor use    Decreased functional mobility and endurance    Adjustment disorder with mixed anxiety and depressed mood       Chief complaint/reason for encounter: grief   Met with patient to evaluate psychosocial adaptation to diagnosis/treatment/survivorship of BC, and death of son    Current Medications  Current Outpatient Medications   Medication    amitriptyline (ELAVIL) 10 MG tablet    amlodipine-benazepril (LOTREL) 5-40 mg per capsule    anastrozole (ARIMIDEX) 1 mg Tab    atorvastatin (LIPITOR) 10 MG tablet    carvediloL (COREG) 6.25 MG tablet    dorzolamide (TRUSOPT) 2 % ophthalmic solution     fluticasone propionate (FLONASE) 50 mcg/actuation nasal spray    gabapentin (NEURONTIN) 300 MG capsule    hydroCHLOROthiazide (MICROZIDE) 12.5 mg capsule    latanoprost 0.005 % ophthalmic solution    LIDOcaine HCL 2% (XYLOCAINE) 2 % jelly    loratadine (CLARITIN ORAL)    metFORMIN (GLUCOPHAGE-XR) 500 MG 24 hr tablet    multivitamin (THERAGRAN) per tablet    ondansetron (ZOFRAN-ODT) 8 MG TbDL    oxybutynin (DITROPAN-XL) 5 MG TR24    prasterone, dhea, (INTRAROSA) 6.5 mg Inst    promethazine (PHENERGAN) 25 MG tablet    venlafaxine (EFFEXOR XR) 75 MG 24 hr capsule     No current facility-administered medications for this visit.     Facility-Administered Medications Ordered in Other Visits   Medication Frequency    lidocaine HCL 10 mg/ml (1%) 50 mL, EPINEPHrine 1,000 mcg in lactated Ringers 1,000 mL irrigation On Call Procedure       Objective:  Shu Mendoza arrived promptly for the session. Her  was also present during the interview, with the consent of Shu Mendoza.   Ms. Mendoza was independently ambulatory at the time of session. The patient was fully cooperative throughout the session.  Appearance: age appropriate, appropriately  dressed, adequately  groomed  Behavior/Cooperation: friendly and cooperative  Speech: normal in rate, volume, and tone and appropriate quality, quantity and organization of sentences  Mood: euthymic  Affect: mood congruent  Thought Process: goal-directed, logical  Thought Content: normal,  No delusions or paranoia; did not appear to be responding to internal stimuli during the session  Orientation: grossly intact  Memory: Grossly intact  Attention Span/Concentration: Attends to session without distraction; reports no difficulty  Fund of Knowledge: average  Estimate of Intelligence: average from verbal skills and history  Cognition: grossly intact  Insight: patient has awareness of illness; good insight into own behavior and behavior of others  Judgment: the patient's  behavior is adequate to circumstances    Interval history and content of current session: Discussed grief process with patient and . Patient with intermittent sad mood. Patient avoiding [address]   Discussed diagnosis, treatment, prognosis, current adaptation to disease and treatment status and family's adaptation to disease and treatment status. Reports to be coping in an adequate manner. Evaluated cognitive response, paying particular attention to negative intrusive thoughts of a persistent and detrimental nature. Thoughts of this type are in evidence with mild distress. Provided cognitive behavioral therapy to address negative cognitions. Identified and evaluated psychosocial and environmental stressors secondary to diagnosis and treatment.  Examined proactive behaviors that may be implemented to minimize or ameliorate psychosocial stressors secondary to diagnosis and treatment.     Risk parameters:   Patient reports no suicidal ideation  Patient reports no homicidal ideation  Patient reports no self-injurious behavior  Patient reports no violent behavior   Safety needs:  None at this time      Verbal deficits: None     Patient's response to intervention:The patient's response to intervention is accepting.     Progress toward goals and other mental status changes:  The patient's progress toward goals is good.      Progress to date:Progress as Expected      Goals from last visit: Met     Patient reported outcomes:    Distress Thermometer:   Distress Score    Distress Score: 6        Practical Problems Physical Problems   Retired : (P) No Retire Appearance: (P) No   Retired Housing: (P) No Retire Bathing / Dressing: (P) No   Retired Insurance / Financial: (P) No Retire Breathing: (P) No    Retire Transportation: (P) No  Retire Changes in Urination: (P) No    Retired Work / School: (P) No  Retire Constipation: (P) No   Retired Treatment Decisions: (P) No  Retire Diarrhea: (P) No     Retire  Eating: (P) No    Family Problems Retire Fatigue: (P) No    Retire Dealing with Children: (P) No Retire Feeling Swollen: (P) No    Retire Dealing with Partner: (P) No Retire Fevers: (P) No    Retire Ability to Have Children: (P) No  Retire Getting Around: (P) No       Retire Indigestion: (P) No     Retire Memory / Concentration: (P) No   Emotional Problems Retire Mouth Sores: (P) No    Retire Depression: (P) Yes  Retire Nausea: (P) No    Retire Fears: (P) Yes  Retire Nose Dry / Congested: (P) Yes    Retire Nervousness: (P) Yes  Retire Pain: (P) Yes    Retire Sadness: (P) Yes Retire Sexual: (P) Yes    Retire Worry: (P) Yes Retire Skin Dry / Itchy: (P) No    Retire Loss of Interest in Usual Activities: (P) Yes Retire Sleep: (P) No     Retire Substance Abuse: (P) No    Spiritual/Religions Concerns Retire Tingling in Hands / Feet: (P) Yes   Spritual / Adventism Concerns: No         Other Problems              PHQ ANSWERS    Q1. Little interest or pleasure in doing things: (P) Several days (06/21/22 1445)  Q2. Feeling down, depressed, or hopeless: (P) Several days (06/21/22 1445)  Q3. Trouble falling or staying asleep, or sleeping too much: (P) Not at all (06/21/22 1445)  Q4. Feeling tired or having little energy: (P) Several days (06/21/22 1445)  Q5. Poor appetite or overeating: (P) Not at all (06/21/22 1445)  Q6. Feeling bad about yourself - or that you are a failure or have let yourself or your family down: (P) Not at all (06/21/22 1445)  Q7. Trouble concentrating on things, such as reading the newspaper or watching television: (P) Not at all (06/21/22 1445)  Q8. Moving or speaking so slowly that other people could have noticed. Or the opposite - being so fidgety or restless that you have been moving around a lot more than usual: (P) Not at all (06/21/22 1445)  Q9.  No SI    PHQ8 Score : (P) 3 (06/21/22 1445)  PHQ-9 Total Score: (P) 3 (06/21/22 1445)     RENITA-7 Answers    GAD7 6/21/2022   1. Feeling nervous, anxious,  or on edge? 1   2. Not being able to stop or control worrying? 1   3. Worrying too much about different things? 1   4. Trouble relaxing? 0   5. Being so restless that it is hard to sit still? 0   6. Becoming easily annoyed or irritable? 0   7. Feeling afraid as if something awful might happen? 1   RENITA-7 Score 4     RENITA-7 Score: (P) 4  Interpretation: (P) Normal     Client Strengths: verbal, intelligent, successful, good social support, good insight, commitment to wellness, strong fredy, strong cultural traditions     Diagnosis:     ICD-10-CM ICD-9-CM   1. Adjustment disorder with mixed anxiety and depressed mood  F43.23 309.28   2. Grief  F43.21 309.0   3. Personal history of malignant neoplasm of breast  Z85.3 V10.3     Treatment Plan:individual psychotherapy and medication management by physician  · Target symptoms: grief  · Why chosen therapy is appropriate versus another modality: relevant to diagnosis, patient responds to this modality, evidence based practice  · Outcome monitoring methods: self-report, observation, checklist/rating scale  · Therapeutic intervention type: behavior modifying psychotherapy  · Prognosis: Good      Behavioral goals:    Exercise:   Stress management: Picture collage, item to meaning make son   Social engagement:   Nutrition:   Smoking Cessation:   Therapy:  Increase daily self-care and attention to health management  Pleasant events scheduling and increased social interaction    Return to clinic: as scheduled     Length of Service (minutes direct face-to-face contact): 30    Marry New, PhD  Clinical Psychologist  [place] License #[id number]  [place] License #[id number]

## 2022-06-28 NOTE — PROGRESS NOTES
Called and spoke with patient. Reviewed bone density showing worsening low bone mass from two years ago, 2/2 letrozole. Discussed zometa every 6 months to improve bone density. Discussed side effects including osteonecrosis of the jaw. Asked patient to see a dentist for clearance before we start zometa. Patient understands and agrees with the plan.

## 2022-08-02 ENCOUNTER — OFFICE VISIT (OUTPATIENT)
Dept: INTERNAL MEDICINE | Facility: CLINIC | Age: 57
End: 2022-08-02
Payer: COMMERCIAL

## 2022-08-02 VITALS
DIASTOLIC BLOOD PRESSURE: 83 MMHG | BODY MASS INDEX: 30.63 KG/M2 | WEIGHT: 178.44 LBS | SYSTOLIC BLOOD PRESSURE: 173 MMHG | HEART RATE: 90 BPM | OXYGEN SATURATION: 99 %

## 2022-08-02 DIAGNOSIS — G47.00 INSOMNIA, UNSPECIFIED TYPE: ICD-10-CM

## 2022-08-02 DIAGNOSIS — E78.2 MIXED HYPERLIPIDEMIA: ICD-10-CM

## 2022-08-02 DIAGNOSIS — E11.9 TYPE 2 DIABETES MELLITUS WITHOUT COMPLICATION, WITHOUT LONG-TERM CURRENT USE OF INSULIN: ICD-10-CM

## 2022-08-02 DIAGNOSIS — N32.81 OAB (OVERACTIVE BLADDER): ICD-10-CM

## 2022-08-02 DIAGNOSIS — Z00.00 HEALTH MAINTENANCE EXAMINATION: Primary | ICD-10-CM

## 2022-08-02 DIAGNOSIS — E55.9 VITAMIN D DEFICIENCY: ICD-10-CM

## 2022-08-02 DIAGNOSIS — I10 PRIMARY HYPERTENSION: ICD-10-CM

## 2022-08-02 DIAGNOSIS — F41.1 GENERALIZED ANXIETY DISORDER: ICD-10-CM

## 2022-08-02 DIAGNOSIS — C50.912 INFILTRATING DUCTAL CARCINOMA OF LEFT BREAST: ICD-10-CM

## 2022-08-02 DIAGNOSIS — Z23 NEED FOR VACCINATION: ICD-10-CM

## 2022-08-02 PROCEDURE — 4010F PR ACE/ARB THEARPY RXD/TAKEN: ICD-10-PCS | Mod: CPTII,S$GLB,, | Performed by: STUDENT IN AN ORGANIZED HEALTH CARE EDUCATION/TRAINING PROGRAM

## 2022-08-02 PROCEDURE — 99203 OFFICE O/P NEW LOW 30 MIN: CPT | Mod: 25,S$GLB,, | Performed by: STUDENT IN AN ORGANIZED HEALTH CARE EDUCATION/TRAINING PROGRAM

## 2022-08-02 PROCEDURE — 3008F BODY MASS INDEX DOCD: CPT | Mod: CPTII,S$GLB,, | Performed by: STUDENT IN AN ORGANIZED HEALTH CARE EDUCATION/TRAINING PROGRAM

## 2022-08-02 PROCEDURE — 3077F SYST BP >= 140 MM HG: CPT | Mod: CPTII,S$GLB,, | Performed by: STUDENT IN AN ORGANIZED HEALTH CARE EDUCATION/TRAINING PROGRAM

## 2022-08-02 PROCEDURE — 3077F PR MOST RECENT SYSTOLIC BLOOD PRESSURE >= 140 MM HG: ICD-10-PCS | Mod: CPTII,S$GLB,, | Performed by: STUDENT IN AN ORGANIZED HEALTH CARE EDUCATION/TRAINING PROGRAM

## 2022-08-02 PROCEDURE — 3079F DIAST BP 80-89 MM HG: CPT | Mod: CPTII,S$GLB,, | Performed by: STUDENT IN AN ORGANIZED HEALTH CARE EDUCATION/TRAINING PROGRAM

## 2022-08-02 PROCEDURE — 99386 PREV VISIT NEW AGE 40-64: CPT | Mod: 25,S$GLB,, | Performed by: STUDENT IN AN ORGANIZED HEALTH CARE EDUCATION/TRAINING PROGRAM

## 2022-08-02 PROCEDURE — 99386 PR PREVENTIVE VISIT,NEW,40-64: ICD-10-PCS | Mod: 25,S$GLB,, | Performed by: STUDENT IN AN ORGANIZED HEALTH CARE EDUCATION/TRAINING PROGRAM

## 2022-08-02 PROCEDURE — 99999 PR PBB SHADOW E&M-EST. PATIENT-LVL V: CPT | Mod: PBBFAC,,, | Performed by: STUDENT IN AN ORGANIZED HEALTH CARE EDUCATION/TRAINING PROGRAM

## 2022-08-02 PROCEDURE — 3008F PR BODY MASS INDEX (BMI) DOCUMENTED: ICD-10-PCS | Mod: CPTII,S$GLB,, | Performed by: STUDENT IN AN ORGANIZED HEALTH CARE EDUCATION/TRAINING PROGRAM

## 2022-08-02 PROCEDURE — 99203 PR OFFICE/OUTPT VISIT, NEW, LEVL III, 30-44 MIN: ICD-10-PCS | Mod: 25,S$GLB,, | Performed by: STUDENT IN AN ORGANIZED HEALTH CARE EDUCATION/TRAINING PROGRAM

## 2022-08-02 PROCEDURE — 3066F PR DOCUMENTATION OF TREATMENT FOR NEPHROPATHY: ICD-10-PCS | Mod: CPTII,S$GLB,, | Performed by: STUDENT IN AN ORGANIZED HEALTH CARE EDUCATION/TRAINING PROGRAM

## 2022-08-02 PROCEDURE — 3051F PR MOST RECENT HEMOGLOBIN A1C LEVEL 7.0 - < 8.0%: ICD-10-PCS | Mod: CPTII,S$GLB,, | Performed by: STUDENT IN AN ORGANIZED HEALTH CARE EDUCATION/TRAINING PROGRAM

## 2022-08-02 PROCEDURE — 3061F NEG MICROALBUMINURIA REV: CPT | Mod: CPTII,S$GLB,, | Performed by: STUDENT IN AN ORGANIZED HEALTH CARE EDUCATION/TRAINING PROGRAM

## 2022-08-02 PROCEDURE — 4010F ACE/ARB THERAPY RXD/TAKEN: CPT | Mod: CPTII,S$GLB,, | Performed by: STUDENT IN AN ORGANIZED HEALTH CARE EDUCATION/TRAINING PROGRAM

## 2022-08-02 PROCEDURE — 3066F NEPHROPATHY DOC TX: CPT | Mod: CPTII,S$GLB,, | Performed by: STUDENT IN AN ORGANIZED HEALTH CARE EDUCATION/TRAINING PROGRAM

## 2022-08-02 PROCEDURE — 3051F HG A1C>EQUAL 7.0%<8.0%: CPT | Mod: CPTII,S$GLB,, | Performed by: STUDENT IN AN ORGANIZED HEALTH CARE EDUCATION/TRAINING PROGRAM

## 2022-08-02 PROCEDURE — 3079F PR MOST RECENT DIASTOLIC BLOOD PRESSURE 80-89 MM HG: ICD-10-PCS | Mod: CPTII,S$GLB,, | Performed by: STUDENT IN AN ORGANIZED HEALTH CARE EDUCATION/TRAINING PROGRAM

## 2022-08-02 PROCEDURE — 99999 PR PBB SHADOW E&M-EST. PATIENT-LVL V: ICD-10-PCS | Mod: PBBFAC,,, | Performed by: STUDENT IN AN ORGANIZED HEALTH CARE EDUCATION/TRAINING PROGRAM

## 2022-08-02 PROCEDURE — 3061F PR NEG MICROALBUMINURIA RESULT DOCUMENTED/REVIEW: ICD-10-PCS | Mod: CPTII,S$GLB,, | Performed by: STUDENT IN AN ORGANIZED HEALTH CARE EDUCATION/TRAINING PROGRAM

## 2022-08-02 RX ORDER — MULTIVIT WITH MINERALS/HERBS
1 TABLET ORAL DAILY
COMMUNITY

## 2022-08-02 RX ORDER — HYDROCHLOROTHIAZIDE 12.5 MG/1
12.5 CAPSULE ORAL DAILY
Qty: 90 CAPSULE | Refills: 1 | Status: SHIPPED | OUTPATIENT
Start: 2022-08-02 | End: 2023-03-27 | Stop reason: SDUPTHER

## 2022-08-02 RX ORDER — VENLAFAXINE HYDROCHLORIDE 75 MG/1
75 CAPSULE, EXTENDED RELEASE ORAL DAILY
Qty: 90 CAPSULE | Refills: 1 | Status: SHIPPED | OUTPATIENT
Start: 2022-08-02 | End: 2023-08-14 | Stop reason: SDUPTHER

## 2022-08-02 RX ORDER — ATORVASTATIN CALCIUM 10 MG/1
10 TABLET, FILM COATED ORAL NIGHTLY
Qty: 90 TABLET | Refills: 1 | Status: SHIPPED | OUTPATIENT
Start: 2022-08-02 | End: 2023-03-10 | Stop reason: DRUGHIGH

## 2022-08-02 RX ORDER — METFORMIN HYDROCHLORIDE 500 MG/1
1000 TABLET, EXTENDED RELEASE ORAL 2 TIMES DAILY WITH MEALS
Qty: 360 TABLET | Refills: 3 | Status: SHIPPED | OUTPATIENT
Start: 2022-08-02 | End: 2023-09-09 | Stop reason: SDUPTHER

## 2022-08-02 RX ORDER — CARVEDILOL 6.25 MG/1
6.25 TABLET ORAL 2 TIMES DAILY
Qty: 180 TABLET | Refills: 1 | Status: SHIPPED | OUTPATIENT
Start: 2022-08-02 | End: 2023-03-03 | Stop reason: SDUPTHER

## 2022-08-02 RX ORDER — AMLODIPINE AND BENAZEPRIL HYDROCHLORIDE 10; 40 MG/1; MG/1
1 CAPSULE ORAL DAILY
Qty: 90 CAPSULE | Refills: 3 | Status: SHIPPED | OUTPATIENT
Start: 2022-08-02 | End: 2023-06-13 | Stop reason: ALTCHOICE

## 2022-08-02 RX ORDER — CHOLECALCIFEROL (VITAMIN D3) 25 MCG
1000 TABLET ORAL DAILY
COMMUNITY

## 2022-08-02 NOTE — LETTER
August 2, 2022      [facility]  [address]  [facility] [address]  Phone: [phone]  Fax: [phone]       Patient: Shu Mendoza   YOB: 1965  Date of Visit: 08/02/2022    To Whom It May Concern:    Shu Mendoza  was at [facility] on 08/02/2022. Please excuse her from work related duties for her appointment. If you have any questions or concerns, or if I can be of further assistance, please do not hesitate to contact me.      Sincerely,      Temitope Grant MD

## 2022-08-02 NOTE — PROGRESS NOTES
Subjective:       Patient ID: Shu Mendoza is a 57 y.o. female.    Chief Complaint: Health maintenance examination [Z00.00]    Patient is new to me, here to establish care.    Health maintenance -   Colonoscopy performed FEB2021 at [facility]. Told to repeat in 5 years.  Denies family history of colorectal cancer.   Denies family history of breast cancers.  Family history of ovarian cancers.  Hysterectomy due to AUB and ovarian cyst.   Seeing Dr. Oliva for gynecology.   Denies family history of osteoporosis.  Denies significant family history of cardiac disease.  UTD on COVID19 primary/booster/4th dose, PPSV23 vaccinations.  Due for shingles, Tdap vaccinations.  Never smoker.  Drinks alcohol 1-2 times yearly, 1-3 drinks per sitting.  Denies drug use.  Completed HIV and Hep C screening.    HLD -   Endorses taking atorvastatin as directed  Denies side effects or concerns while taking medication  : 05/05/2020  Lab Results       Component                Value               Date                       LDLCALC                  68.6                05/05/2020            Due for lipid recheck.    HTN -   Currently prescribed HCTZ, amlodipine-benazepril, carvedilol .  Patient endorses not routinely taking medication.  Denies side effects or concerns while taking medication.  Denies headaches, vision changes, CP, palpitations, or other concerning symptoms.  Due for micro albumin creatinine ratio.   BP Readings from Last 3 Encounters:  06/15/22 : (!) 183/85  05/19/22 : (!) 168/80  05/19/22 : (!) 175/90    T2DM -   Currently taking metformin  Due for foot exam, following podiatry   Due for eye exam  Due for A1c    Breast cancer -  Diagnosed 2019 with invasive ductal carcinoma of the left breast, ER positive  Following with Dr. Norris for oncology  S/p mastectomy and completed Taxol treatment  Started on anastrozole APR2020  Has follow up mammogram scheduled 29AUG    Taking venlafaxine and amitriptyline for anxiety and insomnia with good  effect    Following with urology for recurrent UTIs, OAB, and microhematuria  Taking oxybutynin as directed     Vitamin D deficiency -  Currently taking vitamin D supplementation daily      Diabetes Management Status    Statin: Taking  ACE/ARB: Taking    Screening or Prevention Patient's value Goal Complete/Controlled?   HgA1C Testing and Control   Lab Results   Component Value Date    HGBA1C 7.2 (H) 08/10/2022      q6months/Less than 7% No   Lipid profile : 08/10/2022 Annually No   LDL control Lab Results   Component Value Date    LDLCALC 134.8 08/10/2022    Annually/Less than 70 mg/dl  No   Nephropathy screening Lab Results   Component Value Date    MICALBCREAT 26.0 08/10/2022     Lab Results   Component Value Date    CREATININE 0.7 08/10/2022     Lab Results   Component Value Date    PROTEINUA Negative 01/29/2020    Annually No   Blood pressure BP Readings from Last 1 Encounters:   12/01/22 (!) 154/77    Less than 140/90 No   Dilated retinal exam Following q4 months with Dr. Pollo Mccann  Annually Yes   Foot exam   Most Recent Foot Exam Date: Not Found Annually No       Review of Systems   Constitutional:  Negative for appetite change, chills, fatigue, fever and unexpected weight change.   Respiratory:  Negative for cough and shortness of breath.    Cardiovascular:  Negative for chest pain, palpitations and leg swelling.   Gastrointestinal:  Negative for abdominal pain, constipation, diarrhea, nausea and vomiting.   Skin:  Negative for rash.   Neurological:  Negative for dizziness, syncope, weakness and headaches.       Current Outpatient Medications   Medication Instructions    amitriptyline (ELAVIL) 10 MG tablet TAKE 1 TABLET(10 MG) BY MOUTH EVERY NIGHT AS NEEDED FOR INSOMNIA    amlodipine-benazepril (LOTREL) 5-40 mg per capsule TAKE 1 CAPSULE BY ORAL ROUTE EVERY DAY    anastrozole (ARIMIDEX) 1 mg Tab TAKE 1 TABLET(1 MG) BY MOUTH EVERY DAY    atorvastatin (LIPITOR) 10 MG tablet TAKE 1 TABLET BY ORAL ROUTE EVERY  DAY    carvediloL (COREG) 6.25 MG tablet TK 1 T PO BID WF    dorzolamide (TRUSOPT) 2 % ophthalmic solution 1 drop, Both Eyes, 3 times daily    fluticasone propionate (FLONASE) 50 mcg/actuation nasal spray 1 spray, Each Nostril, Nightly PRN    gabapentin (NEURONTIN) 600 mg, Oral, Nightly    hydroCHLOROthiazide (MICROZIDE) 12.5 mg capsule TK 1 C PO D    latanoprost 0.005 % ophthalmic solution INT 1 GTT IN OU QD HS    LIDOcaine HCL 2% (XYLOCAINE) 2 % jelly Topical (Top), As needed (PRN), Apply topically once nightly to affected part of foot/feet.    loratadine (CLARITIN ORAL) Oral    metFORMIN (GLUCOPHAGE-XR) 1,000 mg, Oral, 2 times daily with meals    multivitamin (THERAGRAN) per tablet 1 tablet, Oral, Daily    ondansetron (ZOFRAN-ODT) 8 MG TbDL Dissolve 1 tablet (8 mg total) by mouth every 6 (six) hours as needed (nausea).    oxybutynin (DITROPAN-XL) 5 mg, Oral, Daily    prasterone, dhea, (INTRAROSA) 6.5 mg Inst PLACE 1 INSERT INTRAVAGINALLY AT BEDTIME AS DIRECTED    promethazine (PHENERGAN) 25 mg, Oral, Every 6 hours PRN    venlafaxine (EFFEXOR-XR) 75 MG 24 hr capsule TAKE 1 CAPSULE(75 MG) BY MOUTH EVERY DAY     Objective:      Vitals:    08/02/22 1459 08/02/22 1505 08/02/22 1506 08/02/22 1507   BP: (!) 158/74 (!) 176/83 (!) 176/74 (!) 173/83   Pulse: 86 77 85 90   SpO2: 99%      Weight: 81 kg (178 lb 7.4 oz)      PainSc: 0-No pain        Body mass index is 30.63 kg/m².    Physical Exam  Vitals reviewed.   Constitutional:       General: She is not in acute distress.     Appearance: Normal appearance. She is not ill-appearing or diaphoretic.   HENT:      Head: Normocephalic and atraumatic.      Right Ear: Tympanic membrane, ear canal and external ear normal. There is no impacted cerumen.      Left Ear: Tympanic membrane, ear canal and external ear normal. There is no impacted cerumen.      Nose: Nose normal. No rhinorrhea.      Mouth/Throat:      Mouth: Mucous membranes are moist.      Pharynx: Oropharynx is clear.  No oropharyngeal exudate or posterior oropharyngeal erythema.   Eyes:      General: No scleral icterus.        Right eye: No discharge.         Left eye: No discharge.      Conjunctiva/sclera: Conjunctivae normal.   Neck:      Thyroid: No thyromegaly or thyroid tenderness.      Trachea: Trachea normal.   Cardiovascular:      Rate and Rhythm: Normal rate and regular rhythm.      Heart sounds: Normal heart sounds. No murmur heard.    No friction rub. No gallop.   Pulmonary:      Effort: Pulmonary effort is normal. No respiratory distress.      Breath sounds: Normal breath sounds. No stridor. No wheezing, rhonchi or rales.   Abdominal:      General: There is no distension.      Palpations: Abdomen is soft.      Tenderness: There is no abdominal tenderness. There is no guarding or rebound.   Musculoskeletal:         General: No swelling or deformity.      Cervical back: Neck supple.   Lymphadenopathy:      Head:      Right side of head: No submandibular or posterior auricular adenopathy.      Left side of head: No submandibular or posterior auricular adenopathy.      Cervical: No cervical adenopathy.      Right cervical: No superficial, deep or posterior cervical adenopathy.     Left cervical: No superficial, deep or posterior cervical adenopathy.      Upper Body:      Right upper body: No supraclavicular adenopathy.      Left upper body: No supraclavicular adenopathy.   Skin:     General: Skin is warm and dry.   Neurological:      General: No focal deficit present.      Mental Status: She is alert. Mental status is at baseline.      Gait: Gait normal.   Psychiatric:         Mood and Affect: Mood normal.         Behavior: Behavior normal.       Assessment:       1. Health maintenance examination    2. Primary hypertension    3. Mixed hyperlipidemia    4. Type 2 diabetes mellitus without complication, without long-term current use of insulin    5. Infiltrating ductal carcinoma of left breast    6. Generalized anxiety  disorder    7. Insomnia, unspecified type    8. OAB (overactive bladder)    9. Vitamin D deficiency    10. Need for vaccination        Plan:       Primary hypertension  Continue current medications  Check BP at home 3-4 times weekly, keep log for review.  Follow up BP's in 2-3 weeks  RTC in 3 months for follow up  -     carvediloL (COREG) 6.25 MG tablet; Take 1 tablet (6.25 mg total) by mouth 2 (two) times daily.  -     CBC Auto Differential; Future  -     Comprehensive Metabolic Panel; Future  -     TSH; Future  -     Microalbumin/Creatinine Ratio, Urine; Future  -     amLODIPine-benazepriL (LOTREL) 10-40 mg per capsule; Take 1 capsule by mouth once daily.  -     hydroCHLOROthiazide (MICROZIDE) 12.5 mg capsule; Take 1 capsule (12.5 mg total) by mouth once daily.    Mixed hyperlipidemia  Continue current medications  RTC in 3 months for follow up  -     Lipid Panel; Future  -     atorvastatin (LIPITOR) 10 MG tablet; Take 1 tablet (10 mg total) by mouth every evening.    Type 2 diabetes mellitus without complication, without long-term current use of insulin  Continue current medications  RTC in 3 months for follow up  -     metFORMIN (GLUCOPHAGE-XR) 500 MG ER 24hr tablet; Take 2 tablets (1,000 mg total) by mouth 2 (two) times daily with meals.  -     Hemoglobin A1C; Future  -     Microalbumin/Creatinine Ratio, Urine; Future    Infiltrating ductal carcinoma of left breast  Continue medication, evaluation, and management per oncologist, Dr. Norris.    Generalized anxiety disorder  Insomnia, unspecified type  Continue current medications  RTC in 3 months for follow up  -     venlafaxine (EFFEXOR-XR) 75 MG 24 hr capsule; Take 1 capsule (75 mg total) by mouth once daily.    OAB (overactive bladder)  Continue medication, evaluation, and management per urology.    Vitamin D deficiency  Continue supplementation  -     Vitamin D; Future    Health maintenance examination  Need for vaccination  -     (In Office Administered) Tdap  Vaccine        Temitope Grant MD  12/7/2022

## 2022-08-05 ENCOUNTER — PATIENT MESSAGE (OUTPATIENT)
Dept: HEMATOLOGY/ONCOLOGY | Facility: CLINIC | Age: 57
End: 2022-08-05
Payer: COMMERCIAL

## 2022-08-10 ENCOUNTER — LAB VISIT (OUTPATIENT)
Dept: LAB | Facility: OTHER | Age: 57
End: 2022-08-10
Attending: STUDENT IN AN ORGANIZED HEALTH CARE EDUCATION/TRAINING PROGRAM
Payer: COMMERCIAL

## 2022-08-10 DIAGNOSIS — I10 HYPERTENSION, UNSPECIFIED TYPE: ICD-10-CM

## 2022-08-10 DIAGNOSIS — Z00.00 HEALTH MAINTENANCE EXAMINATION: ICD-10-CM

## 2022-08-10 DIAGNOSIS — E78.5 HYPERLIPIDEMIA, UNSPECIFIED HYPERLIPIDEMIA TYPE: ICD-10-CM

## 2022-08-10 DIAGNOSIS — E11.9 TYPE 2 DIABETES MELLITUS WITHOUT COMPLICATION, WITHOUT LONG-TERM CURRENT USE OF INSULIN: ICD-10-CM

## 2022-08-10 DIAGNOSIS — E55.9 VITAMIN D DEFICIENCY: ICD-10-CM

## 2022-08-10 LAB
25(OH)D3+25(OH)D2 SERPL-MCNC: 50 NG/ML (ref 30–96)
ALBUMIN SERPL BCP-MCNC: 3.8 G/DL (ref 3.5–5.2)
ALBUMIN/CREAT UR: 26 UG/MG (ref 0–30)
ALP SERPL-CCNC: 89 U/L (ref 55–135)
ALT SERPL W/O P-5'-P-CCNC: 27 U/L (ref 10–44)
ANION GAP SERPL CALC-SCNC: 12 MMOL/L (ref 8–16)
AST SERPL-CCNC: 20 U/L (ref 10–40)
BASOPHILS # BLD AUTO: 0.04 K/UL (ref 0–0.2)
BASOPHILS NFR BLD: 0.6 % (ref 0–1.9)
BILIRUB SERPL-MCNC: 0.3 MG/DL (ref 0.1–1)
BUN SERPL-MCNC: 10 MG/DL (ref 6–20)
CALCIUM SERPL-MCNC: 10.1 MG/DL (ref 8.7–10.5)
CHLORIDE SERPL-SCNC: 104 MMOL/L (ref 95–110)
CHOLEST SERPL-MCNC: 193 MG/DL (ref 120–199)
CHOLEST/HDLC SERPL: 3.9 {RATIO} (ref 2–5)
CO2 SERPL-SCNC: 25 MMOL/L (ref 23–29)
CREAT SERPL-MCNC: 0.7 MG/DL (ref 0.5–1.4)
CREAT UR-MCNC: 130.9 MG/DL (ref 15–325)
DIFFERENTIAL METHOD: NORMAL
EOSINOPHIL # BLD AUTO: 0.1 K/UL (ref 0–0.5)
EOSINOPHIL NFR BLD: 1.4 % (ref 0–8)
ERYTHROCYTE [DISTWIDTH] IN BLOOD BY AUTOMATED COUNT: 14.2 % (ref 11.5–14.5)
EST. GFR  (NO RACE VARIABLE): >60 ML/MIN/1.73 M^2
ESTIMATED AVG GLUCOSE: 160 MG/DL (ref 68–131)
GLUCOSE SERPL-MCNC: 135 MG/DL (ref 70–110)
HBA1C MFR BLD: 7.2 % (ref 4–5.6)
HCT VFR BLD AUTO: 39.2 % (ref 37–48.5)
HDLC SERPL-MCNC: 50 MG/DL (ref 40–75)
HDLC SERPL: 25.9 % (ref 20–50)
HGB BLD-MCNC: 12.8 G/DL (ref 12–16)
IMM GRANULOCYTES # BLD AUTO: 0.02 K/UL (ref 0–0.04)
IMM GRANULOCYTES NFR BLD AUTO: 0.3 % (ref 0–0.5)
LDLC SERPL CALC-MCNC: 134.8 MG/DL (ref 63–159)
LYMPHOCYTES # BLD AUTO: 2.6 K/UL (ref 1–4.8)
LYMPHOCYTES NFR BLD: 40.6 % (ref 18–48)
MCH RBC QN AUTO: 29 PG (ref 27–31)
MCHC RBC AUTO-ENTMCNC: 32.7 G/DL (ref 32–36)
MCV RBC AUTO: 89 FL (ref 82–98)
MICROALBUMIN UR DL<=1MG/L-MCNC: 34 UG/ML
MONOCYTES # BLD AUTO: 0.4 K/UL (ref 0.3–1)
MONOCYTES NFR BLD: 5.9 % (ref 4–15)
NEUTROPHILS # BLD AUTO: 3.3 K/UL (ref 1.8–7.7)
NEUTROPHILS NFR BLD: 51.2 % (ref 38–73)
NONHDLC SERPL-MCNC: 143 MG/DL
NRBC BLD-RTO: 0 /100 WBC
PLATELET # BLD AUTO: 283 K/UL (ref 150–450)
PMV BLD AUTO: 9.3 FL (ref 9.2–12.9)
POTASSIUM SERPL-SCNC: 3.6 MMOL/L (ref 3.5–5.1)
PROT SERPL-MCNC: 7.4 G/DL (ref 6–8.4)
RBC # BLD AUTO: 4.42 M/UL (ref 4–5.4)
SODIUM SERPL-SCNC: 141 MMOL/L (ref 136–145)
TRIGL SERPL-MCNC: 41 MG/DL (ref 30–150)
TSH SERPL DL<=0.005 MIU/L-ACNC: 1.61 UIU/ML (ref 0.4–4)
WBC # BLD AUTO: 6.47 K/UL (ref 3.9–12.7)

## 2022-08-10 PROCEDURE — 82570 ASSAY OF URINE CREATININE: CPT | Performed by: STUDENT IN AN ORGANIZED HEALTH CARE EDUCATION/TRAINING PROGRAM

## 2022-08-10 PROCEDURE — 85025 COMPLETE CBC W/AUTO DIFF WBC: CPT | Performed by: STUDENT IN AN ORGANIZED HEALTH CARE EDUCATION/TRAINING PROGRAM

## 2022-08-10 PROCEDURE — 82043 UR ALBUMIN QUANTITATIVE: CPT | Performed by: STUDENT IN AN ORGANIZED HEALTH CARE EDUCATION/TRAINING PROGRAM

## 2022-08-10 PROCEDURE — 36415 COLL VENOUS BLD VENIPUNCTURE: CPT | Performed by: STUDENT IN AN ORGANIZED HEALTH CARE EDUCATION/TRAINING PROGRAM

## 2022-08-10 PROCEDURE — 80053 COMPREHEN METABOLIC PANEL: CPT | Performed by: STUDENT IN AN ORGANIZED HEALTH CARE EDUCATION/TRAINING PROGRAM

## 2022-08-10 PROCEDURE — 83036 HEMOGLOBIN GLYCOSYLATED A1C: CPT | Performed by: STUDENT IN AN ORGANIZED HEALTH CARE EDUCATION/TRAINING PROGRAM

## 2022-08-10 PROCEDURE — 84443 ASSAY THYROID STIM HORMONE: CPT | Performed by: STUDENT IN AN ORGANIZED HEALTH CARE EDUCATION/TRAINING PROGRAM

## 2022-08-10 PROCEDURE — 80061 LIPID PANEL: CPT | Performed by: STUDENT IN AN ORGANIZED HEALTH CARE EDUCATION/TRAINING PROGRAM

## 2022-08-10 PROCEDURE — 82306 VITAMIN D 25 HYDROXY: CPT | Performed by: STUDENT IN AN ORGANIZED HEALTH CARE EDUCATION/TRAINING PROGRAM

## 2022-08-18 ENCOUNTER — OFFICE VISIT (OUTPATIENT)
Dept: PLASTIC SURGERY | Facility: CLINIC | Age: 57
End: 2022-08-18
Attending: PLASTIC SURGERY
Payer: COMMERCIAL

## 2022-08-18 VITALS — SYSTOLIC BLOOD PRESSURE: 160 MMHG | HEART RATE: 84 BPM | DIASTOLIC BLOOD PRESSURE: 85 MMHG

## 2022-08-18 DIAGNOSIS — C50.919 MALIGNANT NEOPLASM OF FEMALE BREAST, UNSPECIFIED ESTROGEN RECEPTOR STATUS, UNSPECIFIED LATERALITY, UNSPECIFIED SITE OF BREAST: Primary | ICD-10-CM

## 2022-08-18 PROCEDURE — 3077F SYST BP >= 140 MM HG: CPT | Mod: CPTII,S$GLB,, | Performed by: PLASTIC SURGERY

## 2022-08-18 PROCEDURE — 3061F PR NEG MICROALBUMINURIA RESULT DOCUMENTED/REVIEW: ICD-10-PCS | Mod: CPTII,S$GLB,, | Performed by: PLASTIC SURGERY

## 2022-08-18 PROCEDURE — 4010F PR ACE/ARB THEARPY RXD/TAKEN: ICD-10-PCS | Mod: CPTII,S$GLB,, | Performed by: PLASTIC SURGERY

## 2022-08-18 PROCEDURE — 3051F HG A1C>EQUAL 7.0%<8.0%: CPT | Mod: CPTII,S$GLB,, | Performed by: PLASTIC SURGERY

## 2022-08-18 PROCEDURE — 3051F PR MOST RECENT HEMOGLOBIN A1C LEVEL 7.0 - < 8.0%: ICD-10-PCS | Mod: CPTII,S$GLB,, | Performed by: PLASTIC SURGERY

## 2022-08-18 PROCEDURE — 4010F ACE/ARB THERAPY RXD/TAKEN: CPT | Mod: CPTII,S$GLB,, | Performed by: PLASTIC SURGERY

## 2022-08-18 PROCEDURE — 3066F NEPHROPATHY DOC TX: CPT | Mod: CPTII,S$GLB,, | Performed by: PLASTIC SURGERY

## 2022-08-18 PROCEDURE — 3061F NEG MICROALBUMINURIA REV: CPT | Mod: CPTII,S$GLB,, | Performed by: PLASTIC SURGERY

## 2022-08-18 PROCEDURE — 99211 PR OFFICE/OUTPT VISIT, EST, LEVL I: ICD-10-PCS | Mod: S$GLB,,, | Performed by: PLASTIC SURGERY

## 2022-08-18 PROCEDURE — 3077F PR MOST RECENT SYSTOLIC BLOOD PRESSURE >= 140 MM HG: ICD-10-PCS | Mod: CPTII,S$GLB,, | Performed by: PLASTIC SURGERY

## 2022-08-18 PROCEDURE — 3079F DIAST BP 80-89 MM HG: CPT | Mod: CPTII,S$GLB,, | Performed by: PLASTIC SURGERY

## 2022-08-18 PROCEDURE — 3079F PR MOST RECENT DIASTOLIC BLOOD PRESSURE 80-89 MM HG: ICD-10-PCS | Mod: CPTII,S$GLB,, | Performed by: PLASTIC SURGERY

## 2022-08-18 PROCEDURE — 3066F PR DOCUMENTATION OF TREATMENT FOR NEPHROPATHY: ICD-10-PCS | Mod: CPTII,S$GLB,, | Performed by: PLASTIC SURGERY

## 2022-08-18 PROCEDURE — 99211 OFF/OP EST MAY X REQ PHY/QHP: CPT | Mod: S$GLB,,, | Performed by: PLASTIC SURGERY

## 2022-08-18 NOTE — PROGRESS NOTES
Patient presents for follow-up.  She is status left breast reconstruction with SALEEM flap in 2019 followed by second-stage in 2020.  She is very pleased with the results she does have just some question regarding sensation.    On exam:  The left reconstructed breast is soft    The nipple the 2 is well taken Ms. Creed symmetry of both breasts    The abdominal donor site is well healed    Patient reports decreased sensation in the left lateral breast which is expected from mastectomy    She also reports some intermittent discomfort at the central or infraumbilical area.  This could not be elicited on clinical exam there is no evidence of hernia or bulges.    Patient also reports some numbness on over her thoracic spine.    Did explain to her that this is nothing to do with her surgery and should consult with a primary good position    Assessment:  Stable    Plan she is to follow up with us on a p.r.n. basis

## 2022-09-26 ENCOUNTER — OFFICE VISIT (OUTPATIENT)
Dept: PSYCHIATRY | Facility: CLINIC | Age: 57
End: 2022-09-26
Payer: COMMERCIAL

## 2022-09-26 DIAGNOSIS — F43.23 ADJUSTMENT DISORDER WITH MIXED ANXIETY AND DEPRESSED MOOD: ICD-10-CM

## 2022-09-26 DIAGNOSIS — F43.21 GRIEF: Primary | ICD-10-CM

## 2022-09-26 DIAGNOSIS — Z85.3 PERSONAL HISTORY OF MALIGNANT NEOPLASM OF BREAST: ICD-10-CM

## 2022-09-26 PROCEDURE — 3061F NEG MICROALBUMINURIA REV: CPT | Mod: CPTII,S$GLB,, | Performed by: PSYCHOLOGIST

## 2022-09-26 PROCEDURE — 3061F PR NEG MICROALBUMINURIA RESULT DOCUMENTED/REVIEW: ICD-10-PCS | Mod: CPTII,S$GLB,, | Performed by: PSYCHOLOGIST

## 2022-09-26 PROCEDURE — 3066F NEPHROPATHY DOC TX: CPT | Mod: CPTII,S$GLB,, | Performed by: PSYCHOLOGIST

## 2022-09-26 PROCEDURE — 1159F PR MEDICATION LIST DOCUMENTED IN MEDICAL RECORD: ICD-10-PCS | Mod: CPTII,S$GLB,, | Performed by: PSYCHOLOGIST

## 2022-09-26 PROCEDURE — 90834 PR PSYCHOTHERAPY W/PATIENT, 45 MIN: ICD-10-PCS | Mod: S$GLB,,, | Performed by: PSYCHOLOGIST

## 2022-09-26 PROCEDURE — 4010F ACE/ARB THERAPY RXD/TAKEN: CPT | Mod: CPTII,S$GLB,, | Performed by: PSYCHOLOGIST

## 2022-09-26 PROCEDURE — 99999 PR PBB SHADOW E&M-EST. PATIENT-LVL II: CPT | Mod: PBBFAC,,, | Performed by: PSYCHOLOGIST

## 2022-09-26 PROCEDURE — 3051F HG A1C>EQUAL 7.0%<8.0%: CPT | Mod: CPTII,S$GLB,, | Performed by: PSYCHOLOGIST

## 2022-09-26 PROCEDURE — 4010F PR ACE/ARB THEARPY RXD/TAKEN: ICD-10-PCS | Mod: CPTII,S$GLB,, | Performed by: PSYCHOLOGIST

## 2022-09-26 PROCEDURE — 90834 PSYTX W PT 45 MINUTES: CPT | Mod: S$GLB,,, | Performed by: PSYCHOLOGIST

## 2022-09-26 PROCEDURE — 3066F PR DOCUMENTATION OF TREATMENT FOR NEPHROPATHY: ICD-10-PCS | Mod: CPTII,S$GLB,, | Performed by: PSYCHOLOGIST

## 2022-09-26 PROCEDURE — 99999 PR PBB SHADOW E&M-EST. PATIENT-LVL II: ICD-10-PCS | Mod: PBBFAC,,, | Performed by: PSYCHOLOGIST

## 2022-09-26 PROCEDURE — 3051F PR MOST RECENT HEMOGLOBIN A1C LEVEL 7.0 - < 8.0%: ICD-10-PCS | Mod: CPTII,S$GLB,, | Performed by: PSYCHOLOGIST

## 2022-09-26 PROCEDURE — 1159F MED LIST DOCD IN RCRD: CPT | Mod: CPTII,S$GLB,, | Performed by: PSYCHOLOGIST

## 2022-09-26 NOTE — PROGRESS NOTES
INFORMED CONSENT: Shu Mendoza   is known to this provider and identity was confirmed via NAME and .  The patient has been informed of the risks and benefits associated with engaging in psychotherapy, the handling of protected health information, the rights of privacy and the limits of confidentiality. The patient has also been informed of the importance of reporting any suicidal or homicidal ideation to this or any provider to ensure safety of all parties, and the Shu Mendoza expressed understanding. The patient was agreeable to these terms and freely participates in individual psychotherapy.    PSYCHO-ONCOLOGY NOTE/ Individual Psychotherapy     Date: 2022   [facility]        Therapeutic Intervention: Met with patient.  Outpatient - Insight oriented psychotherapy 45 min - CPT code 15164      Patient was last seen by me on 2022    Problem list  Patient Active Problem List   Diagnosis    OAB (overactive bladder)    Microhematuria    Frequency of urination    Essential hypertension    Type 2 diabetes mellitus without complication, without long-term current use of insulin    Glaucoma    Lymphedema    Antineoplastic chemotherapy induced anemia    Hypokalemia    Chemotherapy-induced neuropathy    Personal history of malignant neoplasm of breast    Aromatase inhibitor use    Decreased functional mobility and endurance    Adjustment disorder with mixed anxiety and depressed mood    Cancer treatment induced bone loss       Chief complaint/reason for encounter: depression and grief    Met with patient to evaluate psychosocial adaptation to diagnosis/treatment/survivorship of BC    Current Medications  Current Outpatient Medications   Medication    amitriptyline (ELAVIL) 10 MG tablet    amLODIPine-benazepriL (LOTREL) 10-40 mg per capsule    anastrozole (ARIMIDEX) 1 mg Tab    atorvastatin (LIPITOR) 10 MG tablet    b complex vitamins tablet    calcium carbonate 1250 MG capsule    carvediloL (COREG)  6.25 MG tablet    dorzolamide (TRUSOPT) 2 % ophthalmic solution    fluticasone propionate (FLONASE) 50 mcg/actuation nasal spray    gabapentin (NEURONTIN) 300 MG capsule    hydroCHLOROthiazide (MICROZIDE) 12.5 mg capsule    latanoprost 0.005 % ophthalmic solution    LIDOcaine HCL 2% (XYLOCAINE) 2 % jelly    loratadine (CLARITIN ORAL)    metFORMIN (GLUCOPHAGE-XR) 500 MG ER 24hr tablet    multivitamin (THERAGRAN) per tablet    multivitamin capsule    ondansetron (ZOFRAN-ODT) 8 MG TbDL    oxybutynin (DITROPAN-XL) 5 MG TR24    prasterone, dhea, (INTRAROSA) 6.5 mg Inst    promethazine (PHENERGAN) 25 MG tablet    venlafaxine (EFFEXOR-XR) 75 MG 24 hr capsule    vitamin D (VITAMIN D3) 1000 units Tab     No current facility-administered medications for this visit.     Facility-Administered Medications Ordered in Other Visits   Medication Frequency    lidocaine HCL 10 mg/ml (1%) 50 mL, EPINEPHrine 1,000 mcg in lactated Ringers 1,000 mL irrigation On Call Procedure       Objective:  Shu Mendoza arrived promptly for the session.  Ms. Mendoza was independently ambulatory at the time of session. The patient was fully cooperative throughout the session.  Appearance: age appropriate, appropriately  dressed, adequately  groomed  Behavior/Cooperation: friendly and cooperative  Speech: normal in rate, volume, and tone and appropriate quality, quantity and organization of sentences  Mood: sad  Affect: mood congruento=  Thought Process: goal-directed, logical  Thought Content: normal,  No delusions or paranoia; did not appear to be responding to internal stimuli during the session  Orientation: grossly intact  Memory: Grossly intact  Attention Span/Concentration: Attends to session without distraction; reports no difficulty  Fund of Knowledge: average  Estimate of Intelligence: average from verbal skills and history  Cognition: grossly intact  Insight: patient has awareness of illness; good insight into own behavior and behavior of  others  Judgment: the patient's behavior is adequate to circumstances    Interval history and content of current session: Patient with moderate cognitive distortionw regarding guilt/blame related to grief.  Discussed  balancing thoughts and BA  . Reports to be coping with moderate difficulty. Evaluated cognitive response, paying particular attention to negative intrusive thoughts of a persistent and detrimental nature. Thoughts of this type are in evidence with moderate distress. Provided cognitive behavioral therapy to address negative cognitions. Identified and evaluated psychosocial and environmental stressors secondary to diagnosis and treatment.  Examined proactive behaviors that may be implemented to minimize or ameliorate psychosocial stressors secondary to diagnosis and treatment.     Risk parameters:   Patient reports no suicidal ideation  Patient reports no homicidal ideation  Patient reports no self-injurious behavior  Patient reports no violent behavior   Safety needs:  None at this time      Verbal deficits: None     Patient's response to intervention:The patient's response to intervention is accepting.     Progress toward goals and other mental status changes:  The patient's progress toward goals is good.      Progress to date:Progress as Expected      Goals from last visit: Met     Patient reported outcomes:    Distress Thermometer:   Distress Score    Distress Score: 7        Practical Problems Physical Problems                                                   Family Problems                                         Emotional Problems                                                         Spiritual/Religions Concerns     Spiritual / Mandaen Concerns: No         Other Problems               PHQ ANSWERS    Q1. Little interest or pleasure in doing things: (P) Several days (09/23/22 0941)  Q2. Feeling down, depressed, or hopeless: (P) Several days (09/23/22 0941)  Q3. Trouble falling or staying  asleep, or sleeping too much: (P) Several days (09/23/22 0941)  Q4. Feeling tired or having little energy: (P) Several days (09/23/22 0941)  Q5. Poor appetite or overeating: (P) Several days (09/23/22 0941)  Q6. Feeling bad about yourself - or that you are a failure or have let yourself or your family down: (P) Several days (09/23/22 0941)  Q7. Trouble concentrating on things, such as reading the newspaper or watching television: (P) Several days (09/23/22 0941)  Q8. Moving or speaking so slowly that other people could have noticed. Or the opposite - being so fidgety or restless that you have been moving around a lot more than usual: (P) Not at all (09/23/22 0941)  Q9.  No SI    PHQ8 Score : (P) 7 (09/23/22 0941)  PHQ-9 Total Score: (P) 7 (09/23/22 0941)     RENITA-7 Answers    GAD7 9/23/2022   1. Feeling nervous, anxious, or on edge? 1   2. Not being able to stop or control worrying? 1   3. Worrying too much about different things? 1   4. Trouble relaxing? 0   5. Being so restless that it is hard to sit still? 0   6. Becoming easily annoyed or irritable? 1   7. Feeling afraid as if something awful might happen? 1   RENITA-7 Score 5     RENITA-7 Score: (P) 5  Interpretation: (P) Mild Anxiety     Client Strengths: verbal, intelligent, successful, good social support, good insight, commitment to wellness, strong fredy, strong cultural traditions     Diagnosis:     ICD-10-CM ICD-9-CM   1. Grief  F43.21 309.0   2. Personal history of malignant neoplasm of breast  Z85.3 V10.3   3. Adjustment disorder with mixed anxiety and depressed mood  F43.23 309.28       Treatment Plan:individual psychotherapy and medication management by physician  Target symptoms: grief  Why chosen therapy is appropriate versus another modality: relevant to diagnosis, patient responds to this modality, evidence based practice  Outcome monitoring methods: self-report, observation, checklist/rating scale  Therapeutic intervention type: behavior modifying  psychotherapy  Prognosis: Good      Behavioral goals:    Exercise:   Stress management:   Social engagement:   Nutrition:   Smoking Cessation:   Therapy:  Pleasant events scheduling and increased social interaction  Monitor stressors in writing and bring to next visit    Return to clinic: 1 month     Length of Service (minutes direct face-to-face contact): 45    Marry New, PhD  Clinical Psychologist  [place] License #[id number]  [place] License #[id number]

## 2022-10-17 ENCOUNTER — PATIENT MESSAGE (OUTPATIENT)
Dept: HEMATOLOGY/ONCOLOGY | Facility: CLINIC | Age: 57
End: 2022-10-17
Payer: COMMERCIAL

## 2022-10-25 ENCOUNTER — TELEPHONE (OUTPATIENT)
Dept: NEUROLOGY | Facility: CLINIC | Age: 57
End: 2022-10-25
Payer: COMMERCIAL

## 2022-10-25 ENCOUNTER — TELEPHONE (OUTPATIENT)
Dept: OBSTETRICS AND GYNECOLOGY | Facility: CLINIC | Age: 57
End: 2022-10-25
Payer: COMMERCIAL

## 2022-10-25 NOTE — TELEPHONE ENCOUNTER
----- Message from Arielle Escalante sent at 10/25/2022 11:26 AM CDT -----  Pt called in to schedule her annual visit.

## 2022-10-25 NOTE — TELEPHONE ENCOUNTER
----- Message from Jocelynn Garay sent at 10/25/2022 11:35 AM CDT -----  Shu Mendoza calling regarding Appointment Access  (message) for #3 month follow up, from 10/19/22that was cancel   call back [phone]

## 2022-10-31 ENCOUNTER — OFFICE VISIT (OUTPATIENT)
Dept: PSYCHIATRY | Facility: CLINIC | Age: 57
End: 2022-10-31
Payer: COMMERCIAL

## 2022-10-31 DIAGNOSIS — G47.00 INSOMNIA, UNSPECIFIED TYPE: ICD-10-CM

## 2022-10-31 DIAGNOSIS — Z85.3 PERSONAL HISTORY OF MALIGNANT NEOPLASM OF BREAST: ICD-10-CM

## 2022-10-31 DIAGNOSIS — F43.23 ADJUSTMENT DISORDER WITH MIXED ANXIETY AND DEPRESSED MOOD: ICD-10-CM

## 2022-10-31 DIAGNOSIS — F43.21 GRIEF: ICD-10-CM

## 2022-10-31 DIAGNOSIS — R06.83 SNORING: Primary | ICD-10-CM

## 2022-10-31 PROCEDURE — 3051F HG A1C>EQUAL 7.0%<8.0%: CPT | Mod: CPTII,S$GLB,, | Performed by: PSYCHOLOGIST

## 2022-10-31 PROCEDURE — 3061F NEG MICROALBUMINURIA REV: CPT | Mod: CPTII,S$GLB,, | Performed by: PSYCHOLOGIST

## 2022-10-31 PROCEDURE — 1159F PR MEDICATION LIST DOCUMENTED IN MEDICAL RECORD: ICD-10-PCS | Mod: CPTII,S$GLB,, | Performed by: PSYCHOLOGIST

## 2022-10-31 PROCEDURE — 99999 PR PBB SHADOW E&M-EST. PATIENT-LVL II: ICD-10-PCS | Mod: PBBFAC,,, | Performed by: PSYCHOLOGIST

## 2022-10-31 PROCEDURE — 4010F PR ACE/ARB THEARPY RXD/TAKEN: ICD-10-PCS | Mod: CPTII,S$GLB,, | Performed by: PSYCHOLOGIST

## 2022-10-31 PROCEDURE — 90834 PR PSYCHOTHERAPY W/PATIENT, 45 MIN: ICD-10-PCS | Mod: S$GLB,,, | Performed by: PSYCHOLOGIST

## 2022-10-31 PROCEDURE — 3051F PR MOST RECENT HEMOGLOBIN A1C LEVEL 7.0 - < 8.0%: ICD-10-PCS | Mod: CPTII,S$GLB,, | Performed by: PSYCHOLOGIST

## 2022-10-31 PROCEDURE — 3066F PR DOCUMENTATION OF TREATMENT FOR NEPHROPATHY: ICD-10-PCS | Mod: CPTII,S$GLB,, | Performed by: PSYCHOLOGIST

## 2022-10-31 PROCEDURE — 1159F MED LIST DOCD IN RCRD: CPT | Mod: CPTII,S$GLB,, | Performed by: PSYCHOLOGIST

## 2022-10-31 PROCEDURE — 3061F PR NEG MICROALBUMINURIA RESULT DOCUMENTED/REVIEW: ICD-10-PCS | Mod: CPTII,S$GLB,, | Performed by: PSYCHOLOGIST

## 2022-10-31 PROCEDURE — 90834 PSYTX W PT 45 MINUTES: CPT | Mod: S$GLB,,, | Performed by: PSYCHOLOGIST

## 2022-10-31 PROCEDURE — 4010F ACE/ARB THERAPY RXD/TAKEN: CPT | Mod: CPTII,S$GLB,, | Performed by: PSYCHOLOGIST

## 2022-10-31 PROCEDURE — 3066F NEPHROPATHY DOC TX: CPT | Mod: CPTII,S$GLB,, | Performed by: PSYCHOLOGIST

## 2022-10-31 PROCEDURE — 99999 PR PBB SHADOW E&M-EST. PATIENT-LVL II: CPT | Mod: PBBFAC,,, | Performed by: PSYCHOLOGIST

## 2022-10-31 NOTE — PROGRESS NOTES
INFORMED CONSENT: Shu Mendoza   is known to this provider and identity was confirmed via NAME and .  The patient has been informed of the risks and benefits associated with engaging in psychotherapy, the handling of protected health information, the rights of privacy and the limits of confidentiality. The patient has also been informed of the importance of reporting any suicidal or homicidal ideation to this or any provider to ensure safety of all parties, and the Shu Mendoza expressed understanding. The patient was agreeable to these terms and freely participates in individual psychotherapy.      PSYCHO-ONCOLOGY NOTE/ Individual Psychotherapy     Date: 10/31/2022   [facility]        Therapeutic Intervention: Met with patient.  Outpatient - Behavior modifying psychotherapy 45 min - CPT code 10294      Patient was last seen by me on 2022    Problem list  Patient Active Problem List   Diagnosis    OAB (overactive bladder)    Microhematuria    Frequency of urination    Essential hypertension    Type 2 diabetes mellitus without complication, without long-term current use of insulin    Glaucoma    Lymphedema    Antineoplastic chemotherapy induced anemia    Hypokalemia    Chemotherapy-induced neuropathy    Personal history of malignant neoplasm of breast    Aromatase inhibitor use    Decreased functional mobility and endurance    Adjustment disorder with mixed anxiety and depressed mood    Cancer treatment induced bone loss       Chief complaint/reason for encounter: depression and anxiety   Met with patient to evaluate psychosocial adaptation to diagnosis/treatment/survivorship of BC    Current Medications  Current Outpatient Medications   Medication    amitriptyline (ELAVIL) 10 MG tablet    amLODIPine-benazepriL (LOTREL) 10-40 mg per capsule    anastrozole (ARIMIDEX) 1 mg Tab    atorvastatin (LIPITOR) 10 MG tablet    b complex vitamins tablet    calcium carbonate 1250 MG capsule    carvediloL  (COREG) 6.25 MG tablet    dorzolamide (TRUSOPT) 2 % ophthalmic solution    fluticasone propionate (FLONASE) 50 mcg/actuation nasal spray    gabapentin (NEURONTIN) 300 MG capsule    hydroCHLOROthiazide (MICROZIDE) 12.5 mg capsule    latanoprost 0.005 % ophthalmic solution    LIDOcaine HCL 2% (XYLOCAINE) 2 % jelly    loratadine (CLARITIN ORAL)    metFORMIN (GLUCOPHAGE-XR) 500 MG ER 24hr tablet    multivitamin (THERAGRAN) per tablet    multivitamin capsule    ondansetron (ZOFRAN-ODT) 8 MG TbDL    oxybutynin (DITROPAN-XL) 5 MG TR24    prasterone, dhea, (INTRAROSA) 6.5 mg Inst    promethazine (PHENERGAN) 25 MG tablet    venlafaxine (EFFEXOR-XR) 75 MG 24 hr capsule    vitamin D (VITAMIN D3) 1000 units Tab     No current facility-administered medications for this visit.     Facility-Administered Medications Ordered in Other Visits   Medication Frequency    lidocaine HCL 10 mg/ml (1%) 50 mL, EPINEPHrine 1,000 mcg in lactated Ringers 1,000 mL irrigation On Call Procedure       Objective:  Shu Mendoza arrived promptly for the session.   Ms. Mendoza was independently ambulatory at the time of session. The patient was fully cooperative throughout the session.  Appearance: age appropriate, appropriately  dressed, adequately  groomed  Behavior/Cooperation: friendly and cooperative  Speech: normal in rate, volume, and tone and appropriate quality, quantity and organization of sentences  Mood: euthymic  Affect: mood congruent  Thought Process: goal-directed, logical  Thought Content: normal,  No delusions or paranoia; did not appear to be responding to internal stimuli during the session  Orientation: grossly intact  Memory: Grossly intact  Attention Span/Concentration: Attends to session without distraction; reports no difficulty  Fund of Knowledge: average  Estimate of Intelligence: average from verbal skills and history  Cognition: grossly intact  Insight: patient has awareness of illness; good insight into own behavior and  behavior of others  Judgment: the patient's behavior is adequate to circumstances    Interval history and content of current session: Discussed events in the interim.   Discussed diagnosis, treatment, prognosis, current adaptation to disease and treatment status, and family's adaptation to disease and treatment status. Reports to be coping in an adequate manner. Evaluated cognitive response, paying particular attention to negative intrusive thoughts of a persistent and detrimental nature. Thoughts of this type are in evidence with moderate distress. Provided cognitive behavioral therapy to address negative cognitions. Identified and evaluated psychosocial and environmental stressors secondary to diagnosis and treatment.  Examined proactive behaviors that may be implemented to minimize or ameliorate psychosocial stressors secondary to diagnosis and treatment.     Risk parameters:   Patient reports no suicidal ideation  Patient reports no homicidal ideation  Patient reports no self-injurious behavior  Patient reports no violent behavior   Safety needs:  None at this time      Verbal deficits: None     Patient's response to intervention:The patient's response to intervention is accepting.     Progress toward goals and other mental status changes:  The patient's progress toward goals is good.      Progress to date:Progress as Expected      Goals from last visit: Met     Patient reported outcomes:    Distress Thermometer:   Distress Score    Distress Score: 4        Practical Problems Physical Problems                                                   Family Problems                                         Emotional Problems                                                         Spiritual/Religions Concerns     Spiritual / Scientologist Concerns: No         Other Problems               PHQ ANSWERS    Q1. Little interest or pleasure in doing things: (P) Not at all (10/24/22 3267)  Q2. Feeling down, depressed, or hopeless:  (P) Not at all (10/24/22 1247)  Q3. Trouble falling or staying asleep, or sleeping too much: (P) Several days (10/24/22 1247)  Q4. Feeling tired or having little energy: (P) Several days (10/24/22 1247)  Q5. Poor appetite or overeating: (P) Several days (10/24/22 1247)  Q6. Feeling bad about yourself - or that you are a failure or have let yourself or your family down: (P) Not at all (10/24/22 1247)  Q7. Trouble concentrating on things, such as reading the newspaper or watching television: (P) Not at all (10/24/22 1247)  Q8. Moving or speaking so slowly that other people could have noticed. Or the opposite - being so fidgety or restless that you have been moving around a lot more than usual: (P) Not at all (10/24/22 1247)  Q9.  0    PHQ8 Score : (P) 3 (10/24/22 1247)  PHQ-9 Total Score: (P) 3 (10/24/22 1247)     RENITA-7 Answers    GAD7 10/24/2022   1. Feeling nervous, anxious, or on edge? 1   2. Not being able to stop or control worrying? 1   3. Worrying too much about different things? 0   4. Trouble relaxing? 0   5. Being so restless that it is hard to sit still? 0   6. Becoming easily annoyed or irritable? 0   7. Feeling afraid as if something awful might happen? 1   RENITA-7 Score 3     RENITA-7 Score: (P) 3  Interpretation: (P) Normal     Client Strengths: verbal, intelligent, successful, good social support, good insight, commitment to wellness, strong fredy, strong cultural traditions     Diagnosis:     ICD-10-CM ICD-9-CM   1. Adjustment disorder with mixed anxiety and depressed mood  F43.23 309.28   2. Personal history of malignant neoplasm of breast  Z85.3 V10.3   3. Grief  F43.21 309.0       Treatment Plan:individual psychotherapy and medication management by physician  Target symptoms: depression, anxiety , adjustment, grief  Why chosen therapy is appropriate versus another modality: relevant to diagnosis, patient responds to this modality, evidence based practice  Outcome monitoring methods: self-report,  observation, checklist/rating scale  Therapeutic intervention type: behavior modifying psychotherapy  Prognosis: Good      Behavioral goals:    Exercise:   Stress management:   Social engagement:   Nutrition:   Smoking Cessation:   Therapy:  Increase daily self-care and attention to health management  Pleasant events scheduling and increased social interaction  Monitor stressors in writing and bring to next visit    Return to clinic: as scheduled    Next Session:      Length of Service (minutes direct face-to-face contact): 45    Marry New, PhD  Clinical Psychologist  [place] License #[id number]  [place] License #[id number]

## 2022-11-01 ENCOUNTER — IMMUNIZATION (OUTPATIENT)
Dept: PHARMACY | Facility: CLINIC | Age: 57
End: 2022-11-01
Payer: COMMERCIAL

## 2022-11-08 ENCOUNTER — PATIENT MESSAGE (OUTPATIENT)
Dept: PSYCHIATRY | Facility: CLINIC | Age: 57
End: 2022-11-08
Payer: COMMERCIAL

## 2022-12-01 ENCOUNTER — OFFICE VISIT (OUTPATIENT)
Dept: OBSTETRICS AND GYNECOLOGY | Facility: CLINIC | Age: 57
End: 2022-12-01
Attending: OBSTETRICS & GYNECOLOGY
Payer: COMMERCIAL

## 2022-12-01 VITALS
BODY MASS INDEX: 30.59 KG/M2 | SYSTOLIC BLOOD PRESSURE: 154 MMHG | WEIGHT: 179.19 LBS | HEIGHT: 64 IN | DIASTOLIC BLOOD PRESSURE: 77 MMHG

## 2022-12-01 DIAGNOSIS — N89.8 VAGINAL IRRITATION: ICD-10-CM

## 2022-12-01 DIAGNOSIS — Z01.419 ENCOUNTER FOR GYNECOLOGICAL EXAMINATION WITHOUT ABNORMAL FINDING: Primary | ICD-10-CM

## 2022-12-01 DIAGNOSIS — Z85.3 PERSONAL HISTORY OF MALIGNANT NEOPLASM OF BREAST: ICD-10-CM

## 2022-12-01 DIAGNOSIS — Z79.811 AROMATASE INHIBITOR USE: ICD-10-CM

## 2022-12-01 PROCEDURE — 1159F MED LIST DOCD IN RCRD: CPT | Mod: CPTII,S$GLB,, | Performed by: OBSTETRICS & GYNECOLOGY

## 2022-12-01 PROCEDURE — 3078F PR MOST RECENT DIASTOLIC BLOOD PRESSURE < 80 MM HG: ICD-10-PCS | Mod: CPTII,S$GLB,, | Performed by: OBSTETRICS & GYNECOLOGY

## 2022-12-01 PROCEDURE — 3051F HG A1C>EQUAL 7.0%<8.0%: CPT | Mod: CPTII,S$GLB,, | Performed by: OBSTETRICS & GYNECOLOGY

## 2022-12-01 PROCEDURE — 3061F NEG MICROALBUMINURIA REV: CPT | Mod: CPTII,S$GLB,, | Performed by: OBSTETRICS & GYNECOLOGY

## 2022-12-01 PROCEDURE — 81514 NFCT DS BV&VAGINITIS DNA ALG: CPT | Performed by: OBSTETRICS & GYNECOLOGY

## 2022-12-01 PROCEDURE — 3077F SYST BP >= 140 MM HG: CPT | Mod: CPTII,S$GLB,, | Performed by: OBSTETRICS & GYNECOLOGY

## 2022-12-01 PROCEDURE — 4010F ACE/ARB THERAPY RXD/TAKEN: CPT | Mod: CPTII,S$GLB,, | Performed by: OBSTETRICS & GYNECOLOGY

## 2022-12-01 PROCEDURE — 4010F PR ACE/ARB THEARPY RXD/TAKEN: ICD-10-PCS | Mod: CPTII,S$GLB,, | Performed by: OBSTETRICS & GYNECOLOGY

## 2022-12-01 PROCEDURE — 99396 PR PREVENTIVE VISIT,EST,40-64: ICD-10-PCS | Mod: S$GLB,,, | Performed by: OBSTETRICS & GYNECOLOGY

## 2022-12-01 PROCEDURE — 3066F PR DOCUMENTATION OF TREATMENT FOR NEPHROPATHY: ICD-10-PCS | Mod: CPTII,S$GLB,, | Performed by: OBSTETRICS & GYNECOLOGY

## 2022-12-01 PROCEDURE — 99396 PREV VISIT EST AGE 40-64: CPT | Mod: S$GLB,,, | Performed by: OBSTETRICS & GYNECOLOGY

## 2022-12-01 PROCEDURE — 3077F PR MOST RECENT SYSTOLIC BLOOD PRESSURE >= 140 MM HG: ICD-10-PCS | Mod: CPTII,S$GLB,, | Performed by: OBSTETRICS & GYNECOLOGY

## 2022-12-01 PROCEDURE — 3051F PR MOST RECENT HEMOGLOBIN A1C LEVEL 7.0 - < 8.0%: ICD-10-PCS | Mod: CPTII,S$GLB,, | Performed by: OBSTETRICS & GYNECOLOGY

## 2022-12-01 PROCEDURE — 3008F BODY MASS INDEX DOCD: CPT | Mod: CPTII,S$GLB,, | Performed by: OBSTETRICS & GYNECOLOGY

## 2022-12-01 PROCEDURE — 3061F PR NEG MICROALBUMINURIA RESULT DOCUMENTED/REVIEW: ICD-10-PCS | Mod: CPTII,S$GLB,, | Performed by: OBSTETRICS & GYNECOLOGY

## 2022-12-01 PROCEDURE — 1159F PR MEDICATION LIST DOCUMENTED IN MEDICAL RECORD: ICD-10-PCS | Mod: CPTII,S$GLB,, | Performed by: OBSTETRICS & GYNECOLOGY

## 2022-12-01 PROCEDURE — 3008F PR BODY MASS INDEX (BMI) DOCUMENTED: ICD-10-PCS | Mod: CPTII,S$GLB,, | Performed by: OBSTETRICS & GYNECOLOGY

## 2022-12-01 PROCEDURE — 99999 PR PBB SHADOW E&M-EST. PATIENT-LVL IV: ICD-10-PCS | Mod: PBBFAC,,, | Performed by: OBSTETRICS & GYNECOLOGY

## 2022-12-01 PROCEDURE — 3078F DIAST BP <80 MM HG: CPT | Mod: CPTII,S$GLB,, | Performed by: OBSTETRICS & GYNECOLOGY

## 2022-12-01 PROCEDURE — 99999 PR PBB SHADOW E&M-EST. PATIENT-LVL IV: CPT | Mod: PBBFAC,,, | Performed by: OBSTETRICS & GYNECOLOGY

## 2022-12-01 PROCEDURE — 3066F NEPHROPATHY DOC TX: CPT | Mod: CPTII,S$GLB,, | Performed by: OBSTETRICS & GYNECOLOGY

## 2022-12-01 RX ORDER — EMPAGLIFLOZIN 25 MG/1
25 TABLET, FILM COATED ORAL
COMMUNITY
Start: 2022-10-12 | End: 2023-03-27 | Stop reason: SDUPTHER

## 2022-12-01 RX ORDER — B-COMPLEX WITH VITAMIN C
TABLET ORAL
COMMUNITY

## 2022-12-01 RX ORDER — TERCONAZOLE 4 MG/G
1 CREAM VAGINAL NIGHTLY
Qty: 7 G | Refills: 1 | Status: SHIPPED | OUTPATIENT
Start: 2022-12-01

## 2022-12-01 NOTE — PROGRESS NOTES
SUBJECTIVE:   57 y.o. female   for annual routine checkup. No LMP recorded. Patient has had a hysterectomy..  She reports vaignal irritation.She reports for last 4 days has had vaginal itching and irritation. She reports noticing blood when she wipes. She is not sexually active and has been using Intrarosa. She reports Effexor is helping with hot flashes. .      She has a history of ER positive MI positive HER2 negative infiltrating ductal carcinoma left breast.  She is status post left mastectomy with reconstruction 2019.  She underwent adjuvant DD AC x4 cycles and weekly Taxol which she completed 2019.  She started Anastrozole 2020 and will complete 2025.  Past Medical History:   Diagnosis Date    Anxiety     Breast cancer     Depression     Diabetes mellitus     Encounter for blood transfusion     Glaucoma     Hx of psychiatric care     Hypertension     Malignant neoplasm of central portion of left breast in female, estrogen receptor positive 2019    Psychiatric problem     Renal cyst     Retinal tear of right eye     Sleep difficulties      Past Surgical History:   Procedure Laterality Date    BREAST BIOPSY      BREAST REVISION SURGERY Left 2020    Procedure: BREAST REVISION SURGERY;  Surgeon: Derek Colin MD;  Location: [facility];  Service: Plastics;  Laterality: Left;    EYE SURGERY Bilateral     as a child for cross eye    HERNIA REPAIR      umbilical    HYSTERECTOMY      NILES, BSO secondary to ovarian cyst    INSERTION OF TUNNELED CENTRAL VENOUS CATHETER (CVC) WITH SUBCUTANEOUS PORT Right 10/25/2019    Procedure: HBYLIPDLT-KVEE-H-CATH RIGHT (CONSENT AM OF) 1. 0 hr case;  Surgeon: Shikha Mccray MD;  Location: [facility] OR;  Service: General;  Laterality: Right;    LIPOSUCTION Left 2020    Procedure: LIPOSUCTION;  Surgeon: Derek Colin MD;  Location: [facility] OR;  Service: Plastics;  Laterality: Left;    MASTECTOMY Left 2019    Procedure: MASTECTOMY;   Surgeon: Shikha Mccray MD;  Location: [facility];  Service: Plastics;  Laterality: Left;    MASTECTOMY WITH SENTINEL NODE BIOPSY AND AXILLARY LYMPH NODE DISSECTION Left 2019    Procedure: MASTECTOMY, WITH SENTINEL NODE BIOPSY AND AXILLARY LYMPHADENECTOMY LEFT;  Surgeon: Shikha Mccray MD;  Location: [facility];  Service: Plastics;  Laterality: Left;    MEDIPORT REMOVAL Right 2020    Procedure: REMOVAL, CATHETER, CENTRAL VENOUS, TUNNELED, WITH PORT;  Surgeon: Derek Colin MD;  Location: [facility];  Service: Plastics;  Laterality: Right;    OOPHORECTOMY      RECONSTRUCTION OF BREAST WITH DEEP INFERIOR EPIGASTRIC ARTERY  (SALEEM) FREE FLAP Left 2019    Procedure: RECONSTRUCTION, BREAST, USING SALEEM FREE FLAP - UNILATERAL;  Surgeon: Derek Colin MD;  Location: [facility];  Service: Plastics;  Laterality: Left;     Social History     Socioeconomic History    Marital status: Single    Number of children: 3   Tobacco Use    Smoking status: Never    Smokeless tobacco: Never   Substance and Sexual Activity    Alcohol use: Not Currently     Alcohol/week: 0.0 standard drinks     Comment: occasional    Drug use: No    Sexual activity: Not Currently     Partners: Male     Birth control/protection: Post-menopausal, Surgical     Comment: Lack of desire     Family History   Problem Relation Age of Onset    Diabetes Mother     Nephrolithiasis Mother     Diabetes Sister     HIV Sister     Diabetes Maternal Grandmother     Schizophrenia Son     Drug abuse Son     Ovarian cancer Other     Breast cancer Neg Hx     Colon cancer Neg Hx     Heart attack Neg Hx     Stroke Neg Hx     Osteoporosis Neg Hx      OB History    Para Term  AB Living   3 3 3     3   SAB IAB Ectopic Multiple Live Births           3      # Outcome Date GA Lbr Edwar/2nd Weight Sex Delivery Anes PTL Lv   3 Term 10/13/95    F   N MERCED   2 Term 86    M   N MERCED   1 Term 03/10/81    F   N MERCED           Current Outpatient Medications    Medication Sig Dispense Refill    amitriptyline (ELAVIL) 10 MG tablet TAKE 1 TABLET(10 MG) BY MOUTH EVERY NIGHT AS NEEDED FOR INSOMNIA 30 tablet 2    amLODIPine-benazepriL (LOTREL) 10-40 mg per capsule Take 1 capsule by mouth once daily. 90 capsule 3    anastrozole (ARIMIDEX) 1 mg Tab TAKE 1 TABLET(1 MG) BY MOUTH EVERY DAY 90 tablet 3    atorvastatin (LIPITOR) 10 MG tablet Take 1 tablet (10 mg total) by mouth every evening. 90 tablet 1    b complex vitamins tablet Take 1 tablet by mouth once daily.      calcium carbonate 1250 MG capsule Take 1,250 mg by mouth 2 (two) times daily with meals.      carvediloL (COREG) 6.25 MG tablet Take 1 tablet (6.25 mg total) by mouth 2 (two) times daily. 180 tablet 1    dorzolamide (TRUSOPT) 2 % ophthalmic solution Place 1 drop into both eyes 3 (three) times daily.       fluticasone propionate (FLONASE) 50 mcg/actuation nasal spray 1 spray by Each Nostril route nightly as needed for Rhinitis.      gabapentin (NEURONTIN) 300 MG capsule Take 2 capsules (600 mg total) by mouth every evening. (Patient taking differently: Take by mouth every evening.) 180 capsule 3    hydroCHLOROthiazide (MICROZIDE) 12.5 mg capsule Take 1 capsule (12.5 mg total) by mouth once daily. 90 capsule 1    JARDIANCE 25 mg tablet Take 25 mg by mouth.      latanoprost 0.005 % ophthalmic solution INT 1 GTT IN OU QD HS  11    LIDOcaine HCL 2% (XYLOCAINE) 2 % jelly Apply topically as needed. Apply topically once nightly to affected part of foot/feet. 30 mL 2    loratadine (CLARITIN ORAL) Take by mouth.      metFORMIN (GLUCOPHAGE-XR) 500 MG ER 24hr tablet Take 2 tablets (1,000 mg total) by mouth 2 (two) times daily with meals. 360 tablet 3    multivitamin (THERAGRAN) per tablet Take 1 tablet by mouth once daily.      ondansetron (ZOFRAN-ODT) 8 MG TbDL Dissolve 1 tablet (8 mg total) by mouth every 6 (six) hours as needed (nausea). 30 tablet 2    oxybutynin (DITROPAN-XL) 5 MG TR24 Take 1 tablet (5 mg total) by mouth  once daily. 90 tablet 3    prasterone, dhea, (INTRAROSA) 6.5 mg Inst PLACE 1 INSERT INTRAVAGINALLY AT BEDTIME AS DIRECTED 30 each 8    promethazine (PHENERGAN) 25 MG tablet Take 1 tablet (25 mg total) by mouth every 6 (six) hours as needed for Nausea. 30 tablet 2    venlafaxine (EFFEXOR-XR) 75 MG 24 hr capsule Take 1 capsule (75 mg total) by mouth once daily. 90 capsule 1    vitamin D (VITAMIN D3) 1000 units Tab Take 1,000 Units by mouth once daily.      B-complex with vitamin C (Z-BEC OR EQUIV) tablet       flu vacc [id number] 6mos up,PF, 60 mcg (15 mcg x 4)/0.5 mL Syrg Inject 0.5 mLs into the muscle once. for 1 dose 0.5 mL 0    multivitamin capsule Take 1 capsule by mouth once daily.      terconazole (TERAZOL 7) 0.4 % Crea Place 1 applicator vaginally every evening. 7 g 1     No current facility-administered medications for this visit.     Facility-Administered Medications Ordered in Other Visits   Medication Dose Route Frequency Provider Last Rate Last Admin    lidocaine HCL 10 mg/ml (1%) 50 mL, EPINEPHrine 1,000 mcg in lactated Ringers 1,000 mL irrigation   Irrigation On Call Procedure Derek Colin MD         Allergies: Morphine and Percocet [oxycodone-acetaminophen]     The 10-year ASCVD risk score (Mary Jane PARHAM, et al., 2019) is: 24.2%    Values used to calculate the score:      Age: 57 years      Sex: Female      Is Non- : Yes      Diabetic: Yes      Tobacco smoker: No      Systolic Blood Pressure: 154 mmHg      Is BP treated: Yes      HDL Cholesterol: 50 mg/dL      Total Cholesterol: 193 mg/dL      ROS:  Constitutional: no weight loss, weight gain, fever, fatigue  Eyes:  No vision changes, glasses/contacts  ENT/Mouth: No ulcers, sinus problems, ears ringing, headache  Cardiovascular: No inability to lie flat, chest pain, exercise intolerance, swelling, heart palpitations  Respiratory: No wheezing, coughing blood, shortness of breath, or cough  Gastrointestinal: No diarrhea, bloody  stool, nausea/vomiting, constipation, gas, hemorrhoids  Genitourinary: No blood in urine, painful urination, urgency of urination, frequency of urination, incomplete emptying, incontinence, abnormal bleeding, painful periods, heavy periods, vaginal discharge, vaginal odor, painful intercourse, sexual problems, bleeding after intercourse, +vaginal itching.  Musculoskeletal: No muscle weakness  Skin/Breast: No painful breasts, nipple discharge, masses, rash, ulcers, +breast cancer  Neurological: No passing out, seizures, numbness, headache  Endocrine: + diabetes, hypothyroid, hyperthyroid, hot flashes, hair loss, abnormal hair growth, acne  Psychiatric: No depression, crying  Hematologic: No bruises, bleeding, swollen lymph nodes, anemia.      Physical Exam:   Constitutional: She is oriented to person, place, and time. She appears well-developed and well-nourished.      Neck: No tracheal deviation present. No thyromegaly present.    Cardiovascular:       Exam reveals no edema.        Pulmonary/Chest: Effort normal. She exhibits no mass, no tenderness, no deformity and no retraction. Right breast exhibits no inverted nipple, no mass, no nipple discharge, no skin change, no tenderness, presence, no bleeding and no swelling. Left breast exhibits no inverted nipple, no mass, no nipple discharge, no skin change, no tenderness, presence, no bleeding and no swelling. Breasts are symmetrical.        Abdominal: Soft. She exhibits no distension and no mass. There is no abdominal tenderness. There is no rebound and no guarding. No hernia. Hernia confirmed negative in the left inguinal area.     Genitourinary: Rectum:      No external hemorrhoid.   There is no rash, tenderness or lesion on the right labia. There is no rash, tenderness or lesion on the left labia. No no adexnal prolapse. Right adnexum displays no mass, no tenderness and no fullness. Left adnexum displays no mass, no tenderness and no fullness. Vaginal cuff normal.   No  no vaginal discharge, tenderness, bleeding, rectocele, cystocele or unspecified prolapse of vaginal walls in the vagina. Cervix is absent.Uterus is absent.           Musculoskeletal: Normal range of motion and moves all extremeties. No edema.       Neurological: She is alert and oriented to person, place, and time.    Skin: No rash noted. No erythema. No pallor.    Psychiatric: She has a normal mood and affect. Her behavior is normal. Judgment and thought content normal.     S/p left mastectomy with reconstruction.  +vaginal irritation  ASSESSMENT:   well woman  History of breast cancer  Use of aromatase inhibitor  Vaginal dryness  Vaginal irritation  PLAN:   Mammogram- right breast   Follow up affirm  Will prescribe Terazole nightly for 7 nights  Continue Intrarosa  Follow up with Dr. New   Follow up with Dr. Norris  return annually or prn

## 2022-12-02 ENCOUNTER — IMMUNIZATION (OUTPATIENT)
Dept: PHARMACY | Facility: CLINIC | Age: 57
End: 2022-12-02
Payer: COMMERCIAL

## 2022-12-02 LAB
BACTERIAL VAGINOSIS DNA: NEGATIVE
CANDIDA GLABRATA DNA: NEGATIVE
CANDIDA KRUSEI DNA: NEGATIVE
CANDIDA RRNA VAG QL PROBE: POSITIVE
T VAGINALIS RRNA GENITAL QL PROBE: NEGATIVE

## 2022-12-07 PROBLEM — E78.2 MIXED HYPERLIPIDEMIA: Status: ACTIVE | Noted: 2022-12-07

## 2022-12-07 PROBLEM — E55.9 VITAMIN D DEFICIENCY: Status: ACTIVE | Noted: 2022-12-07

## 2022-12-07 PROBLEM — F41.1 GENERALIZED ANXIETY DISORDER: Status: ACTIVE | Noted: 2022-12-07

## 2022-12-08 ENCOUNTER — PATIENT MESSAGE (OUTPATIENT)
Dept: ADMINISTRATIVE | Facility: HOSPITAL | Age: 57
End: 2022-12-08
Payer: COMMERCIAL

## 2022-12-13 ENCOUNTER — HOSPITAL ENCOUNTER (OUTPATIENT)
Dept: RADIOLOGY | Facility: OTHER | Age: 57
Discharge: HOME | End: 2022-12-13
Attending: INTERNAL MEDICINE
Payer: COMMERCIAL

## 2022-12-13 DIAGNOSIS — Z85.3 PERSONAL HISTORY OF MALIGNANT NEOPLASM OF BREAST: ICD-10-CM

## 2022-12-13 PROCEDURE — 77067 SCR MAMMO BI INCL CAD: CPT | Mod: 26,,, | Performed by: RADIOLOGY

## 2022-12-13 PROCEDURE — 77063 BREAST TOMOSYNTHESIS BI: CPT | Mod: 26,,, | Performed by: RADIOLOGY

## 2022-12-13 PROCEDURE — 77067 MAMMO DIGITAL SCREENING RIGHT WITH TOMO: ICD-10-PCS | Mod: 26,,, | Performed by: RADIOLOGY

## 2022-12-13 PROCEDURE — 77067 SCR MAMMO BI INCL CAD: CPT | Mod: TC

## 2022-12-13 PROCEDURE — 77063 BREAST TOMOSYNTHESIS BI: CPT | Mod: TC

## 2022-12-13 PROCEDURE — 77063 MAMMO DIGITAL SCREENING RIGHT WITH TOMO: ICD-10-PCS | Mod: 26,,, | Performed by: RADIOLOGY

## 2022-12-29 DIAGNOSIS — N32.81 OAB (OVERACTIVE BLADDER): ICD-10-CM

## 2022-12-29 RX ORDER — OXYBUTYNIN CHLORIDE 5 MG/1
5 TABLET, EXTENDED RELEASE ORAL DAILY
Qty: 90 TABLET | Refills: 3 | Status: SHIPPED | OUTPATIENT
Start: 2022-12-29 | End: 2024-02-24 | Stop reason: SDUPTHER

## 2023-01-09 ENCOUNTER — OFFICE VISIT (OUTPATIENT)
Dept: PSYCHIATRY | Facility: CLINIC | Age: 58
End: 2023-01-09
Payer: COMMERCIAL

## 2023-01-09 ENCOUNTER — OFFICE VISIT (OUTPATIENT)
Dept: HEMATOLOGY/ONCOLOGY | Facility: CLINIC | Age: 58
End: 2023-01-09
Payer: COMMERCIAL

## 2023-01-09 VITALS
HEIGHT: 64 IN | BODY MASS INDEX: 31.09 KG/M2 | WEIGHT: 182.13 LBS | OXYGEN SATURATION: 97 % | RESPIRATION RATE: 18 BRPM | HEART RATE: 71 BPM | SYSTOLIC BLOOD PRESSURE: 152 MMHG | TEMPERATURE: 98 F | DIASTOLIC BLOOD PRESSURE: 78 MMHG

## 2023-01-09 DIAGNOSIS — T45.1X5A CANCER TREATMENT-INDUCED BONE LOSS: ICD-10-CM

## 2023-01-09 DIAGNOSIS — E55.9 VITAMIN D DEFICIENCY: ICD-10-CM

## 2023-01-09 DIAGNOSIS — C50.912 INFILTRATING DUCTAL CARCINOMA OF LEFT BREAST: Primary | ICD-10-CM

## 2023-01-09 DIAGNOSIS — M89.8X9 CANCER TREATMENT-INDUCED BONE LOSS: ICD-10-CM

## 2023-01-09 DIAGNOSIS — F43.23 ADJUSTMENT DISORDER WITH MIXED ANXIETY AND DEPRESSED MOOD: Primary | ICD-10-CM

## 2023-01-09 DIAGNOSIS — Z85.3 PERSONAL HISTORY OF MALIGNANT NEOPLASM OF BREAST: ICD-10-CM

## 2023-01-09 DIAGNOSIS — Z79.811 AROMATASE INHIBITOR USE: ICD-10-CM

## 2023-01-09 DIAGNOSIS — E11.9 TYPE 2 DIABETES MELLITUS WITHOUT COMPLICATION, WITHOUT LONG-TERM CURRENT USE OF INSULIN: ICD-10-CM

## 2023-01-09 DIAGNOSIS — I10 PRIMARY HYPERTENSION: ICD-10-CM

## 2023-01-09 PROCEDURE — 1159F MED LIST DOCD IN RCRD: CPT | Mod: CPTII,S$GLB,, | Performed by: INTERNAL MEDICINE

## 2023-01-09 PROCEDURE — 3008F BODY MASS INDEX DOCD: CPT | Mod: CPTII,S$GLB,, | Performed by: INTERNAL MEDICINE

## 2023-01-09 PROCEDURE — 3008F PR BODY MASS INDEX (BMI) DOCUMENTED: ICD-10-PCS | Mod: CPTII,S$GLB,, | Performed by: INTERNAL MEDICINE

## 2023-01-09 PROCEDURE — 99999 PR PBB SHADOW E&M-EST. PATIENT-LVL V: CPT | Mod: PBBFAC,,, | Performed by: INTERNAL MEDICINE

## 2023-01-09 PROCEDURE — 90832 PR PSYCHOTHERAPY W/PATIENT, 30 MIN: ICD-10-PCS | Mod: S$GLB,,, | Performed by: PSYCHOLOGIST

## 2023-01-09 PROCEDURE — 1160F RVW MEDS BY RX/DR IN RCRD: CPT | Mod: CPTII,S$GLB,, | Performed by: INTERNAL MEDICINE

## 2023-01-09 PROCEDURE — 99214 OFFICE O/P EST MOD 30 MIN: CPT | Mod: S$GLB,,, | Performed by: INTERNAL MEDICINE

## 2023-01-09 PROCEDURE — 99999 PR PBB SHADOW E&M-EST. PATIENT-LVL I: ICD-10-PCS | Mod: PBBFAC,,, | Performed by: PSYCHOLOGIST

## 2023-01-09 PROCEDURE — 99999 PR PBB SHADOW E&M-EST. PATIENT-LVL V: ICD-10-PCS | Mod: PBBFAC,,, | Performed by: INTERNAL MEDICINE

## 2023-01-09 PROCEDURE — 1160F PR REVIEW ALL MEDS BY PRESCRIBER/CLIN PHARMACIST DOCUMENTED: ICD-10-PCS | Mod: CPTII,S$GLB,, | Performed by: INTERNAL MEDICINE

## 2023-01-09 PROCEDURE — 1159F PR MEDICATION LIST DOCUMENTED IN MEDICAL RECORD: ICD-10-PCS | Mod: CPTII,S$GLB,, | Performed by: INTERNAL MEDICINE

## 2023-01-09 PROCEDURE — 99214 PR OFFICE/OUTPT VISIT, EST, LEVL IV, 30-39 MIN: ICD-10-PCS | Mod: S$GLB,,, | Performed by: INTERNAL MEDICINE

## 2023-01-09 PROCEDURE — 3078F PR MOST RECENT DIASTOLIC BLOOD PRESSURE < 80 MM HG: ICD-10-PCS | Mod: CPTII,S$GLB,, | Performed by: INTERNAL MEDICINE

## 2023-01-09 PROCEDURE — 3078F DIAST BP <80 MM HG: CPT | Mod: CPTII,S$GLB,, | Performed by: INTERNAL MEDICINE

## 2023-01-09 PROCEDURE — 3077F SYST BP >= 140 MM HG: CPT | Mod: CPTII,S$GLB,, | Performed by: INTERNAL MEDICINE

## 2023-01-09 PROCEDURE — 3077F PR MOST RECENT SYSTOLIC BLOOD PRESSURE >= 140 MM HG: ICD-10-PCS | Mod: CPTII,S$GLB,, | Performed by: INTERNAL MEDICINE

## 2023-01-09 PROCEDURE — 99999 PR PBB SHADOW E&M-EST. PATIENT-LVL I: CPT | Mod: PBBFAC,,, | Performed by: PSYCHOLOGIST

## 2023-01-09 PROCEDURE — 90832 PSYTX W PT 30 MINUTES: CPT | Mod: S$GLB,,, | Performed by: PSYCHOLOGIST

## 2023-01-09 NOTE — PROGRESS NOTES
INFORMED CONSENT: Patient was identified using two patient-identifiers. The patient has been informed of the risks and benefits associated with engaging in psychotherapy, the handling of protected health information, the rights of privacy and the limits of confidentiality. The patient has also been informed of the importance of reporting any suicidal or homicidal ideation to this or any provider to ensure safety of all parties, and the Shu MCADAMS Mendoza expressed understanding. The patient was agreeable to these terms and freely participates in individual psychotherapy.    PSYCHO-ONCOLOGY NOTE/ Individual Psychotherapy     Date: 1/9/2023   Site:  [facility]        Therapeutic Intervention: Met with patient.  Outpatient - Supportive psychotherapy 30 min - CPT Code 77769      Patient was last seen by me on 10/31/2022    Problem list  Patient Active Problem List   Diagnosis    OAB (overactive bladder)    Microhematuria    Frequency of urination    Primary hypertension    Type 2 diabetes mellitus without complication, without long-term current use of insulin    Glaucoma    Infiltrating ductal carcinoma of left breast    Lymphedema    Antineoplastic chemotherapy induced anemia    Hypokalemia    Chemotherapy-induced neuropathy    Personal history of malignant neoplasm of breast    Aromatase inhibitor use    Decreased functional mobility and endurance    Adjustment disorder with mixed anxiety and depressed mood    Cancer treatment induced bone loss    Mixed hyperlipidemia    Generalized anxiety disorder    Vitamin D deficiency       Chief complaint/reason for encounter: grief    Met with patient to evaluate psychosocial adaptation to diagnosis/treatment/survivorship of BC    Current Medications  Current Outpatient Medications   Medication    amitriptyline (ELAVIL) 10 MG tablet    amLODIPine-benazepriL (LOTREL) 10-40 mg per capsule    anastrozole (ARIMIDEX) 1 mg Tab    atorvastatin (LIPITOR) 10 MG tablet    b complex  vitamins tablet    B-complex with vitamin C (Z-BEC OR EQUIV) tablet    calcium carbonate 1250 MG capsule    carvediloL (COREG) 6.25 MG tablet    dorzolamide (TRUSOPT) 2 % ophthalmic solution    fluticasone propionate (FLONASE) 50 mcg/actuation nasal spray    gabapentin (NEURONTIN) 300 MG capsule    hydroCHLOROthiazide (MICROZIDE) 12.5 mg capsule    JARDIANCE 25 mg tablet    latanoprost 0.005 % ophthalmic solution    LIDOcaine HCL 2% (XYLOCAINE) 2 % jelly    loratadine (CLARITIN ORAL)    metFORMIN (GLUCOPHAGE-XR) 500 MG ER 24hr tablet    multivitamin (THERAGRAN) per tablet    multivitamin capsule    ondansetron (ZOFRAN-ODT) 8 MG TbDL    oxybutynin (DITROPAN-XL) 5 MG TR24    prasterone, dhea, (INTRAROSA) 6.5 mg Inst    promethazine (PHENERGAN) 25 MG tablet    terconazole (TERAZOL 7) 0.4 % Crea    venlafaxine (EFFEXOR-XR) 75 MG 24 hr capsule    vitamin D (VITAMIN D3) 1000 units Tab     No current facility-administered medications for this visit.     Facility-Administered Medications Ordered in Other Visits   Medication Frequency    lidocaine HCL 10 mg/ml (1%) 50 mL, EPINEPHrine 1,000 mcg in lactated Ringers 1,000 mL irrigation On Call Procedure       Objective:  Shu Mendoza arrived promptly for the session.   The patient was fully cooperative throughout the session.  Appearance: age appropriate, appropriately  dressed, adequately  groomed  Behavior/Cooperation: friendly and cooperative  Speech: normal in rate, volume, and tone and appropriate quality, quantity and organization of sentences  Mood: euthymic  Affect: mood congruent  Thought Process: goal-directed, logical  Thought Content: normal,  No delusions or paranoia; did not appear to be responding to internal stimuli during the session  Orientation: grossly intact  Memory: Grossly intact  Attention Span/Concentration: Attends to session without distraction; reports no difficulty  Fund of Knowledge: average  Estimate of Intelligence: average from verbal skills  and history  Cognition: grossly intact  Insight: patient has awareness of illness; good insight into own behavior and behavior of others  Judgment: the patient's behavior is adequate to circumstances    Interval history and content of current session: Patient with some concerns about dental issues and Zometa-will discuss with Dr. Norris today. Otherwise doing well.  Discussed  grief . Reports to be coping in an adequate manner. Evaluated cognitive response, paying particular attention to negative intrusive thoughts of a persistent and detrimental nature. Thoughts of this type are in evidence with mild distress. Provided cognitive behavioral therapy to address negative cognitions. Identified and evaluated psychosocial and environmental stressors secondary to diagnosis and treatment.  Examined proactive behaviors that may be implemented to minimize or ameliorate psychosocial stressors secondary to diagnosis and treatment.     Risk parameters:   Patient reports no suicidal ideation  Patient reports no homicidal ideation  Patient reports no self-injurious behavior  Patient reports no violent behavior   Safety needs:  None at this time      Verbal deficits: None     Patient's response to intervention:The patient's response to intervention is accepting.     Progress toward goals and other mental status changes:  The patient's progress toward goals is good.      Progress to date:Progress as Expected      Goals from last visit: Met     Patient reported outcomes:    Distress Thermometer:   Distress Score    Distress Score: 2        Practical Problems Physical Problems                                                   Family Problems                                         Emotional Problems                                                         Spiritual/Religions Concerns     Spiritual / Jain Concerns: No         Other Problems                 PHQ ANSWERS    Q1. Little interest or pleasure in doing things: (P) Not at all  (01/09/23 0846)  Q2. Feeling down, depressed, or hopeless: (P) Not at all (01/09/23 0846)  Q3. Trouble falling or staying asleep, or sleeping too much: (P) Several days (01/09/23 0846)  Q4. Feeling tired or having little energy: (P) Several days (01/09/23 0846)  Q5. Poor appetite or overeating: (P) Several days (01/09/23 0846)  Q6. Feeling bad about yourself - or that you are a failure or have let yourself or your family down: (P) Not at all (01/09/23 0846)  Q7. Trouble concentrating on things, such as reading the newspaper or watching television: (P) Not at all (01/09/23 0846)  Q8. Moving or speaking so slowly that other people could have noticed. Or the opposite - being so fidgety or restless that you have been moving around a lot more than usual: (P) Not at all (01/09/23 0846)  Q9.  0    PHQ8 Score : (P) 3 (01/09/23 0846)  PHQ-9 Total Score: (P) 3 (01/09/23 0846)     RENITA-7 Answers    GAD7 1/9/2023   1. Feeling nervous, anxious, or on edge? 1   2. Not being able to stop or control worrying? 2   3. Worrying too much about different things? 1   4. Trouble relaxing? 1   5. Being so restless that it is hard to sit still? 0   6. Becoming easily annoyed or irritable? 1   7. Feeling afraid as if something awful might happen? 1   RENITA-7 Score 7     RENITA-7 Score: (P) 7  Interpretation: (P) Mild Anxiety     Client Strengths: verbal, intelligent, successful, good social support, good insight, commitment to wellness, strong fredy, strong cultural traditions     Diagnosis:     ICD-10-CM ICD-9-CM   1. Adjustment disorder with mixed anxiety and depressed mood  F43.23 309.28   2. Personal history of malignant neoplasm of breast  Z85.3 V10.3       Treatment Plan:individual psychotherapy and medication management by physician  Target symptoms: grief  Why chosen therapy is appropriate versus another modality: relevant to diagnosis, patient responds to this modality, evidence based practice  Outcome monitoring methods: self-report,  observation, checklist/rating scale  Therapeutic intervention type: insight oriented psychotherapy, behavior modifying psychotherapy  Prognosis: Good      Behavioral goals:    Exercise:   Stress management:   Social engagement:   Nutrition:   Smoking Cessation:   Therapy:  Increase daily self-care and attention to health management  Pleasant events scheduling and increased social interaction  Monitor stressors in writing and bring to next visit    Return to clinic: as needed    Next Session:      Length of Service (minutes direct face-to-face contact): 30    Marry New, PhD  Clinical Psychologist  [place] License #[id number]  [place] License #[id number]

## 2023-01-09 NOTE — PROGRESS NOTES
Subjective:       Patient ID: Shu Mendoza is a 57 y.o. female.     Chief Complaint: follow up for breast cancer     Diagnosis:  Stage IA ([id number]) invasive ductal carcinoma of the left breast, grade 2, ER/WA positive, HER2 negative, s/p left mastectomy and reconstruction on 9/11/2019. Mammaprint high risk     Oncologic History:  1. Ms Mendoza is a 53 yo postmenopausal woman with HTN, DM, who presents today for further management of pathologic stage IA ([id number]) invasive ductal carcinoma of the left breast. She was referred to Dr Mccray for bloody nipple discharge first noted in June 2019. Before then she had a negative mammogram on 5/20/19. She had a left breast US on 6/27/19 in the left breast retroareolar region just behind the nipple, there is an irregular hypoechoic intraductal mass measuring 1.0 cm. She underwent a biopsy on 7/8/2019. It showed invasive ductal carcinoma, ER strongly positive 100%, WA positive 80%, HER2 negative 1+. Ki67 5% activity within invasive component. Patient underwent a left total mastectomy and reconstruction on 9/11/2019. Pathology showed a 1.3 cm invasive ductal carcinoma, grade 2. DCIS present, negative margin, 22 lymph nodes removed, one lymph node positive with macrometastases 13 mm, no extranodal extension. No lymphovascular invasion. Pathologic T1c N1a. Mammaprint high risk with chemo benefit >12%. 5-10 years recurrence risk without chemo 20-25%, with chemo and endocrine therapy 7%. BRCAnalysis from 7/18/19 was negative. Case was discussed at tumor board. Adjuvant chemotherapy followed by endocrine therapy was recommended. Radiation not recommended. Patient presents today for further management. Feeling well. Works at [employer]. She had NILES/BSO in 2000 and was on hormone replacement therapy after that. Stopped in 2019 after she was diagnosed with breast cancer.   2. Port placed by Dr Mccray on 10/25/19. Echo on 10/22/19 showed normal LVEF 59%.   3. Adjuvant DDAC followed by  weekly taxol started on 10/31/2019. cycle 2 on 19. cycle 3 was delayed for a week for umbilical infection, given on 2019, cycle 4 given on 19. Weekly taxol completed on 3/19/19. Started anastrozole in 2020. Port was removed.   4. Colonoscopy 2/3/21. Two colon polyps removed. Path showed tubular adenoma.      Interval History:   Ms Mendoza returns today for follow up. Doing very well. Taking anastrozole. We previously discussed zometa for cancer-treatment-induced bone loss on bone scan from 2022. Patient saw a dentist and was told she needs a lot of dental work. Financial cost is a big concern. Patient is seeing another dentist for a second opinion. Taking vit D and calcium.      ECO     ROS:   A ten-point system review is obtained and negative except for what was stated in the Interval History.      Physical Examination:   General: well hydrated, well developed, in no acute distress  HEENT: normocephalic, EOMI, anicteric sclerae  Neck: supple, no JVD, cervical or supraclavicular LAD  Lungs: CTAB, no rales, rhonchi, wheezing  Breasts: s/l left mastectomy and reconstruction. Bilateral breasts no masses, abnormal skin nodules, axillary lymphadenopathy  Heart: RRR, no R/G/M  Abdomen: soft, no tenderness, no distention, no hepatosplenomegaly, hernia or mass. BS present  Ext: no clubbing, cyanosis, edema  Neuro: alert and oriented x 4  Psych: pleasant and appropriate mood and affect     Objective:      Laboratory Data:  Labs reviewed. vit D level normal     Imaging Data:  Right Mammogram 2022:  Impression:   No mammographic evidence of malignancy.     BI-RADS Category 1: Negative     Recommendation:  Routine screening mammogram in 1 year is recommended.       Bone density 20 normal  Bone density 2022:     Osteopenia both hips with decrease in bone mineral density as above.     Consider FDA approved medical therapies in postmenopausal women and men aged 50 years and older, based  on the following:     *A hip or vertebral (clinical or morphometric) fracture  *T score less than or equal to -2.5 at the femoral neck or spine after appropriate evaluation to exclude secondary causes.  *Low bone mass -- also known as osteopenia (T score between -1.0 and -2.5 at the femoral neck or spine) and a 10 year probability of hip fracture greater than or equal to 3% or a 10 year probability of major osteoporosis-related fracture greater than or equal to 20% based on the [place]-adapted WHO algorithm.  *Clinician's judgment and/or patient preference may indicate treatment for people with 10 year fracture probabilities is above or below these levels.     Assessment and Plan:      1. Infiltrating ductal carcinoma of left breast    2. Aromatase inhibitor use    3. Cancer treatment-induced bone loss    4. Type 2 diabetes mellitus without complication, without long-term current use of insulin    5. Primary hypertension      1.2  - Ms Mendoza is a 55 yo postmenopausal woman with stage IA ([id number]) invasive ductal carcinoma of the left breast, ER/IL positive, HER2 negative, s/p left mastectomy and reconstruction on 9/11/2019. Mammaprint high risk with chemo benefit >12%. 5-10 years recurrence risk without chemo 20-25%, with chemo and endocrine therapy 7%.  - completed adjuvant DDAC followed by weekly taxol on 3/19/2019.   - on anastrozole since April 2020. Continue for at least 5 years  - doing well. Reviewed mammogram result which is normal  - management of bone loss see below  - c/w anastrozole  - RTC in 6 months     3.  - long discussion. She has seen a dentist but needs a lot of dental work that might take a while to complete due to costs. Wonders if there is other medication. Discussed other bone modulating agents share similar side effects that include ONJ. Discussed with patient that we will hold off on zometa (ordered last year as patient had medicaid at that time) until it is 3 months after her last dental  procedure. After she finishes all dental work, she needs to get a clearance from dentist and let us know. Patient understands and agrees with the plan.   - c/w vit D and calcium. Discussed weight bearing exercise    4.  - f/u with PCP    5.  - BP slightly elevated  - f/u with PCP    Follow-up:      RTC in 6 months  Knows to call in the interval if any problems arise.    Route Chart for Scheduling    Med Onc Chart Routing      Follow up with physician 6 months. see me in 6 months with vit D   Follow up with [facility]    Infusion scheduling note    Injection scheduling note    Labs Vitamin D   Lab interval:  in 6 months   Imaging    Pharmacy appointment    Other referrals

## 2023-01-11 ENCOUNTER — PATIENT OUTREACH (OUTPATIENT)
Dept: ADMINISTRATIVE | Facility: HOSPITAL | Age: 58
End: 2023-01-11
Payer: COMMERCIAL

## 2023-01-19 ENCOUNTER — OFFICE VISIT (OUTPATIENT)
Dept: INTERNAL MEDICINE | Facility: CLINIC | Age: 58
End: 2023-01-19
Payer: COMMERCIAL

## 2023-01-19 ENCOUNTER — LAB VISIT (OUTPATIENT)
Dept: LAB | Facility: OTHER | Age: 58
End: 2023-01-19
Attending: STUDENT IN AN ORGANIZED HEALTH CARE EDUCATION/TRAINING PROGRAM
Payer: COMMERCIAL

## 2023-01-19 ENCOUNTER — IMMUNIZATION (OUTPATIENT)
Dept: INTERNAL MEDICINE | Facility: CLINIC | Age: 58
End: 2023-01-19
Payer: COMMERCIAL

## 2023-01-19 VITALS
BODY MASS INDEX: 30.99 KG/M2 | SYSTOLIC BLOOD PRESSURE: 144 MMHG | HEART RATE: 72 BPM | OXYGEN SATURATION: 99 % | DIASTOLIC BLOOD PRESSURE: 80 MMHG | WEIGHT: 180.56 LBS

## 2023-01-19 DIAGNOSIS — R09.81 SINUS CONGESTION: ICD-10-CM

## 2023-01-19 DIAGNOSIS — F41.1 GENERALIZED ANXIETY DISORDER: ICD-10-CM

## 2023-01-19 DIAGNOSIS — Z23 NEED FOR VACCINATION: ICD-10-CM

## 2023-01-19 DIAGNOSIS — D32.9 MENINGIOMA: ICD-10-CM

## 2023-01-19 DIAGNOSIS — M85.852 OSTEOPENIA OF BOTH HIPS: ICD-10-CM

## 2023-01-19 DIAGNOSIS — G62.0 CHEMOTHERAPY-INDUCED NEUROPATHY: ICD-10-CM

## 2023-01-19 DIAGNOSIS — E11.9 TYPE 2 DIABETES MELLITUS WITHOUT COMPLICATION, WITHOUT LONG-TERM CURRENT USE OF INSULIN: Primary | ICD-10-CM

## 2023-01-19 DIAGNOSIS — N32.81 OAB (OVERACTIVE BLADDER): ICD-10-CM

## 2023-01-19 DIAGNOSIS — E55.9 VITAMIN D DEFICIENCY: ICD-10-CM

## 2023-01-19 DIAGNOSIS — E11.9 TYPE 2 DIABETES MELLITUS WITHOUT COMPLICATION, WITHOUT LONG-TERM CURRENT USE OF INSULIN: ICD-10-CM

## 2023-01-19 DIAGNOSIS — C50.912 INFILTRATING DUCTAL CARCINOMA OF LEFT BREAST: ICD-10-CM

## 2023-01-19 DIAGNOSIS — G47.00 INSOMNIA, UNSPECIFIED TYPE: ICD-10-CM

## 2023-01-19 DIAGNOSIS — G43.709 CHRONIC MIGRAINE WITHOUT AURA WITHOUT STATUS MIGRAINOSUS, NOT INTRACTABLE: ICD-10-CM

## 2023-01-19 DIAGNOSIS — T45.1X5A CHEMOTHERAPY-INDUCED NEUROPATHY: ICD-10-CM

## 2023-01-19 DIAGNOSIS — I10 PRIMARY HYPERTENSION: ICD-10-CM

## 2023-01-19 DIAGNOSIS — M85.851 OSTEOPENIA OF BOTH HIPS: ICD-10-CM

## 2023-01-19 DIAGNOSIS — E78.2 MIXED HYPERLIPIDEMIA: ICD-10-CM

## 2023-01-19 DIAGNOSIS — Z23 NEED FOR VACCINATION: Primary | ICD-10-CM

## 2023-01-19 LAB
ANION GAP SERPL CALC-SCNC: 9 MMOL/L (ref 8–16)
BASOPHILS # BLD AUTO: 0.06 K/UL (ref 0–0.2)
BASOPHILS NFR BLD: 0.7 % (ref 0–1.9)
BUN SERPL-MCNC: 9 MG/DL (ref 6–20)
CALCIUM SERPL-MCNC: 10.2 MG/DL (ref 8.7–10.5)
CHLORIDE SERPL-SCNC: 104 MMOL/L (ref 95–110)
CO2 SERPL-SCNC: 27 MMOL/L (ref 23–29)
CREAT SERPL-MCNC: 0.6 MG/DL (ref 0.5–1.4)
DIFFERENTIAL METHOD: NORMAL
EOSINOPHIL # BLD AUTO: 0.1 K/UL (ref 0–0.5)
EOSINOPHIL NFR BLD: 1.1 % (ref 0–8)
ERYTHROCYTE [DISTWIDTH] IN BLOOD BY AUTOMATED COUNT: 13.9 % (ref 11.5–14.5)
EST. GFR  (NO RACE VARIABLE): >60 ML/MIN/1.73 M^2
ESTIMATED AVG GLUCOSE: 151 MG/DL (ref 68–131)
GLUCOSE SERPL-MCNC: 118 MG/DL (ref 70–110)
HBA1C MFR BLD: 6.9 % (ref 4–5.6)
HCT VFR BLD AUTO: 41.9 % (ref 37–48.5)
HGB BLD-MCNC: 13.7 G/DL (ref 12–16)
IMM GRANULOCYTES # BLD AUTO: 0.02 K/UL (ref 0–0.04)
IMM GRANULOCYTES NFR BLD AUTO: 0.2 % (ref 0–0.5)
LYMPHOCYTES # BLD AUTO: 3.2 K/UL (ref 1–4.8)
LYMPHOCYTES NFR BLD: 39.3 % (ref 18–48)
MCH RBC QN AUTO: 28.5 PG (ref 27–31)
MCHC RBC AUTO-ENTMCNC: 32.7 G/DL (ref 32–36)
MCV RBC AUTO: 87 FL (ref 82–98)
MONOCYTES # BLD AUTO: 0.5 K/UL (ref 0.3–1)
MONOCYTES NFR BLD: 6.7 % (ref 4–15)
NEUTROPHILS # BLD AUTO: 4.2 K/UL (ref 1.8–7.7)
NEUTROPHILS NFR BLD: 52 % (ref 38–73)
NRBC BLD-RTO: 0 /100 WBC
PLATELET # BLD AUTO: 296 K/UL (ref 150–450)
PMV BLD AUTO: 9.2 FL (ref 9.2–12.9)
POTASSIUM SERPL-SCNC: 3.4 MMOL/L (ref 3.5–5.1)
RBC # BLD AUTO: 4.8 M/UL (ref 4–5.4)
SODIUM SERPL-SCNC: 140 MMOL/L (ref 136–145)
WBC # BLD AUTO: 8.07 K/UL (ref 3.9–12.7)

## 2023-01-19 PROCEDURE — 99999 PR PBB SHADOW E&M-EST. PATIENT-LVL V: CPT | Mod: PBBFAC,,, | Performed by: STUDENT IN AN ORGANIZED HEALTH CARE EDUCATION/TRAINING PROGRAM

## 2023-01-19 PROCEDURE — 91312 COVID-19, MRNA, LNP-S, BIVALENT BOOSTER, PF, 30 MCG/0.3 ML DOSE: CPT | Mod: S$GLB,,, | Performed by: INTERNAL MEDICINE

## 2023-01-19 PROCEDURE — 90471 IMMUNIZATION ADMIN: CPT | Mod: S$GLB,,, | Performed by: STUDENT IN AN ORGANIZED HEALTH CARE EDUCATION/TRAINING PROGRAM

## 2023-01-19 PROCEDURE — 3044F HG A1C LEVEL LT 7.0%: CPT | Mod: CPTII,S$GLB,, | Performed by: STUDENT IN AN ORGANIZED HEALTH CARE EDUCATION/TRAINING PROGRAM

## 2023-01-19 PROCEDURE — 3077F SYST BP >= 140 MM HG: CPT | Mod: CPTII,S$GLB,, | Performed by: STUDENT IN AN ORGANIZED HEALTH CARE EDUCATION/TRAINING PROGRAM

## 2023-01-19 PROCEDURE — 90677 PCV20 VACCINE IM: CPT | Mod: S$GLB,,, | Performed by: STUDENT IN AN ORGANIZED HEALTH CARE EDUCATION/TRAINING PROGRAM

## 2023-01-19 PROCEDURE — 1159F MED LIST DOCD IN RCRD: CPT | Mod: CPTII,S$GLB,, | Performed by: STUDENT IN AN ORGANIZED HEALTH CARE EDUCATION/TRAINING PROGRAM

## 2023-01-19 PROCEDURE — 3077F PR MOST RECENT SYSTOLIC BLOOD PRESSURE >= 140 MM HG: ICD-10-PCS | Mod: CPTII,S$GLB,, | Performed by: STUDENT IN AN ORGANIZED HEALTH CARE EDUCATION/TRAINING PROGRAM

## 2023-01-19 PROCEDURE — 80048 BASIC METABOLIC PNL TOTAL CA: CPT | Performed by: STUDENT IN AN ORGANIZED HEALTH CARE EDUCATION/TRAINING PROGRAM

## 2023-01-19 PROCEDURE — 90677 PNEUMOCOCCAL CONJUGATE VACCINE 20-VALENT: ICD-10-PCS | Mod: S$GLB,,, | Performed by: STUDENT IN AN ORGANIZED HEALTH CARE EDUCATION/TRAINING PROGRAM

## 2023-01-19 PROCEDURE — 4010F PR ACE/ARB THEARPY RXD/TAKEN: ICD-10-PCS | Mod: CPTII,S$GLB,, | Performed by: STUDENT IN AN ORGANIZED HEALTH CARE EDUCATION/TRAINING PROGRAM

## 2023-01-19 PROCEDURE — 90471 PNEUMOCOCCAL CONJUGATE VACCINE 20-VALENT: ICD-10-PCS | Mod: S$GLB,,, | Performed by: STUDENT IN AN ORGANIZED HEALTH CARE EDUCATION/TRAINING PROGRAM

## 2023-01-19 PROCEDURE — 3008F BODY MASS INDEX DOCD: CPT | Mod: CPTII,S$GLB,, | Performed by: STUDENT IN AN ORGANIZED HEALTH CARE EDUCATION/TRAINING PROGRAM

## 2023-01-19 PROCEDURE — 3008F PR BODY MASS INDEX (BMI) DOCUMENTED: ICD-10-PCS | Mod: CPTII,S$GLB,, | Performed by: STUDENT IN AN ORGANIZED HEALTH CARE EDUCATION/TRAINING PROGRAM

## 2023-01-19 PROCEDURE — 3079F DIAST BP 80-89 MM HG: CPT | Mod: CPTII,S$GLB,, | Performed by: STUDENT IN AN ORGANIZED HEALTH CARE EDUCATION/TRAINING PROGRAM

## 2023-01-19 PROCEDURE — 83036 HEMOGLOBIN GLYCOSYLATED A1C: CPT | Performed by: STUDENT IN AN ORGANIZED HEALTH CARE EDUCATION/TRAINING PROGRAM

## 2023-01-19 PROCEDURE — 36415 COLL VENOUS BLD VENIPUNCTURE: CPT | Performed by: STUDENT IN AN ORGANIZED HEALTH CARE EDUCATION/TRAINING PROGRAM

## 2023-01-19 PROCEDURE — 1159F PR MEDICATION LIST DOCUMENTED IN MEDICAL RECORD: ICD-10-PCS | Mod: CPTII,S$GLB,, | Performed by: STUDENT IN AN ORGANIZED HEALTH CARE EDUCATION/TRAINING PROGRAM

## 2023-01-19 PROCEDURE — 85025 COMPLETE CBC W/AUTO DIFF WBC: CPT | Performed by: STUDENT IN AN ORGANIZED HEALTH CARE EDUCATION/TRAINING PROGRAM

## 2023-01-19 PROCEDURE — 99214 PR OFFICE/OUTPT VISIT, EST, LEVL IV, 30-39 MIN: ICD-10-PCS | Mod: 25,S$GLB,, | Performed by: STUDENT IN AN ORGANIZED HEALTH CARE EDUCATION/TRAINING PROGRAM

## 2023-01-19 PROCEDURE — 4010F ACE/ARB THERAPY RXD/TAKEN: CPT | Mod: CPTII,S$GLB,, | Performed by: STUDENT IN AN ORGANIZED HEALTH CARE EDUCATION/TRAINING PROGRAM

## 2023-01-19 PROCEDURE — 91312 COVID-19, MRNA, LNP-S, BIVALENT BOOSTER, PF, 30 MCG/0.3 ML DOSE: ICD-10-PCS | Mod: S$GLB,,, | Performed by: INTERNAL MEDICINE

## 2023-01-19 PROCEDURE — 3044F PR MOST RECENT HEMOGLOBIN A1C LEVEL <7.0%: ICD-10-PCS | Mod: CPTII,S$GLB,, | Performed by: STUDENT IN AN ORGANIZED HEALTH CARE EDUCATION/TRAINING PROGRAM

## 2023-01-19 PROCEDURE — 99214 OFFICE O/P EST MOD 30 MIN: CPT | Mod: 25,S$GLB,, | Performed by: STUDENT IN AN ORGANIZED HEALTH CARE EDUCATION/TRAINING PROGRAM

## 2023-01-19 PROCEDURE — 0124A COVID-19, MRNA, LNP-S, BIVALENT BOOSTER, PF, 30 MCG/0.3 ML DOSE: CPT | Mod: PBBFAC | Performed by: INTERNAL MEDICINE

## 2023-01-19 PROCEDURE — 99999 PR PBB SHADOW E&M-EST. PATIENT-LVL V: ICD-10-PCS | Mod: PBBFAC,,, | Performed by: STUDENT IN AN ORGANIZED HEALTH CARE EDUCATION/TRAINING PROGRAM

## 2023-01-19 PROCEDURE — 3079F PR MOST RECENT DIASTOLIC BLOOD PRESSURE 80-89 MM HG: ICD-10-PCS | Mod: CPTII,S$GLB,, | Performed by: STUDENT IN AN ORGANIZED HEALTH CARE EDUCATION/TRAINING PROGRAM

## 2023-01-19 RX ORDER — AZELASTINE 1 MG/ML
2 SPRAY, METERED NASAL 2 TIMES DAILY
Qty: 30 ML | Refills: 2 | Status: SHIPPED | OUTPATIENT
Start: 2023-01-19 | End: 2024-01-19

## 2023-01-19 NOTE — PROGRESS NOTES
Subjective:       Patient ID: Shu Mendoza is a 57 y.o. female.    Chief Complaint: Type 2 diabetes mellitus without complication, without long-term current use of insulin [E11.9]    Patient is established with me, here today for the following:     HTN, HLD, T2DM, invasive ductal carcinoma of the left breast (ER positive), meningioma, osteopenia, vitamin D deficiency, migraines, anxiety, insomnia, chemotherapy induced neuropathy, OAB, recurrent UTI's     Health maintenance -   Colonoscopy performed FEB2022, with Dr. Clayton. Told to repeat in 5 years.  Denies family history of colorectal cancer.  Denies family history of breast cancer.  Family history of ovarian cancer.  Hysterectomy due to AUB and ovarian cyst.   Seeing Dr. Oliva for gynecology.   Denies family history of osteoporosis.  Denies significant family history of cardiac disease.  UTD on influenza, Tdap, COVID primary/booster, shingles vaccinations.  Due for COVID bivalent, PCV20 vaccinations.  Never smoker.  Drinks alcohol 1-2 times yearly, 1-3 drinks per sitting.  Denies drug use.  Due for HIV and hepatitis C screening.     HLD -   Endorses taking atorvastatin as directed  Denies side effects or concerns while taking medication  : 08/10/2022  Lab Results       Component                Value               Date                       LDLCALC                  134.8               08/10/2022               HTN -   Currently prescribed HCTZ, amlodipine-benazepril, carvedilol .  Patient endorses not routinely taking medication.  Denies side effects or concerns while taking medication.  Denies headaches, vision changes, CP, palpitations, or other concerning symptoms.  Lab Results       Component                Value               Date                       MICALBCREAT              26.0                08/10/2022            BP Readings from Last 3 Encounters:  01/09/23 : (!) 152/78  12/01/22 : (!) 154/77  08/18/22 : (!) 160/85    T2DM -   Currently taking  "metformin, Jardiance   Due for foot exam  Due for eye exam  Lab Results       Component                Value               Date                       HGBA1C                   7.2 (H)             08/10/2022            Lab Results       Component                Value               Date                       MICALBCREAT              26.0                08/10/2022              Breast cancer -  Diagnosed 2019 with invasive ductal carcinoma of the left breast, ER positive  Following with Dr. Norris for oncology  S/p mastectomy and completed Taxol treatment  Started on anastrozole APR2020  Mammogram BI-RADS 1 DEC2022     Osteopenia -   DEXA in JUNE2022 with osteopenia both hips  "FRAX RESULTS:  10-year Probability of Fracture:  Major Osteoporotic Fracture 10.5%.  Hip Fracture 0.5%."    Following with Dr. Agrawal for migraines  Taking gabapentin nightly for neuropathy with good effect  Also following for small brain lesion  MRI JAN2022 with   "Arising from the free edge of the anterior aspect of the left tentorial leaflet, there is an extra-axial homogeneously enhancing mass measuring 1.2 cm.  This is probably a meningioma."    Taking venlafaxine and amitriptyline for anxiety and insomnia with good effect  Following with Dr. New for psychiatry     Following with urology for recurrent UTIs, OAB, and microhematuria  Taking oxybutynin as directed      Vitamin D deficiency -  Currently taking vitamin D supplementation daily    Endorses recent sinus congestion over the past few weeks  Using Flonase PRN with incomplete effect     Review of Systems   Constitutional:  Negative for activity change and unexpected weight change.   HENT:  Negative for hearing loss, rhinorrhea and trouble swallowing.    Eyes:  Negative for discharge and visual disturbance.   Respiratory:  Negative for chest tightness and wheezing.    Cardiovascular:  Negative for chest pain and palpitations.   Gastrointestinal:  Negative for blood in stool, constipation, " diarrhea and vomiting.   Endocrine: Negative for polydipsia and polyuria.   Genitourinary:  Negative for difficulty urinating, dysuria, hematuria and menstrual problem.   Musculoskeletal:  Negative for arthralgias, joint swelling and neck pain.   Neurological:  Negative for weakness and headaches.   Psychiatric/Behavioral:  Negative for confusion and dysphoric mood.        Current Outpatient Medications   Medication Instructions    amitriptyline (ELAVIL) 10 MG tablet TAKE 1 TABLET(10 MG) BY MOUTH EVERY NIGHT AS NEEDED FOR INSOMNIA    amLODIPine-benazepriL (LOTREL) 10-40 mg per capsule 1 capsule, Oral, Daily    anastrozole (ARIMIDEX) 1 mg Tab TAKE 1 TABLET(1 MG) BY MOUTH EVERY DAY    atorvastatin (LIPITOR) 10 mg, Oral, Nightly    b complex vitamins tablet 1 tablet, Oral, Daily    B-complex with vitamin C (Z-BEC OR EQUIV) tablet No dose, route, or frequency recorded.    calcium carbonate 1,250 mg, Oral, 2 times daily with meals    carvediloL (COREG) 6.25 mg, Oral, 2 times daily    dorzolamide (TRUSOPT) 2 % ophthalmic solution 1 drop, Both Eyes, 3 times daily    fluticasone propionate (FLONASE) 50 mcg/actuation nasal spray 1 spray, Each Nostril, Nightly PRN    gabapentin (NEURONTIN) 600 mg, Oral, Nightly    hydroCHLOROthiazide (MICROZIDE) 12.5 mg, Oral, Daily    JARDIANCE 25 mg, Oral    latanoprost 0.005 % ophthalmic solution INT 1 GTT IN OU QD HS    LIDOcaine HCL 2% (XYLOCAINE) 2 % jelly Topical (Top), As needed (PRN), Apply topically once nightly to affected part of foot/feet.    loratadine (CLARITIN ORAL) Oral    metFORMIN (GLUCOPHAGE-XR) 1,000 mg, Oral, 2 times daily with meals    multivitamin (THERAGRAN) per tablet 1 tablet, Oral, Daily    multivitamin capsule 1 capsule, Oral, Daily    ondansetron (ZOFRAN-ODT) 8 MG TbDL Dissolve 1 tablet (8 mg total) by mouth every 6 (six) hours as needed (nausea).    oxybutynin (DITROPAN-XL) 5 mg, Oral, Daily    prasterone, dhea, (INTRAROSA) 6.5 mg Inst PLACE 1 INSERT  INTRAVAGINALLY AT BEDTIME AS DIRECTED    promethazine (PHENERGAN) 25 mg, Oral, Every 6 hours PRN    terconazole (TERAZOL 7) 0.4 % Crea 1 applicator, Vaginal, Nightly    venlafaxine (EFFEXOR-XR) 75 mg, Oral, Daily    vitamin D (VITAMIN D3) 1,000 Units, Oral, Daily     Objective:      Vitals:    01/19/23 0851   BP: (!) 144/80   Pulse: 72   SpO2: 99%   Weight: 81.9 kg (180 lb 8.9 oz)   PainSc: 0-No pain     Body mass index is 30.99 kg/m².    Physical Exam  Vitals reviewed.   Constitutional:       General: She is not in acute distress.     Appearance: Normal appearance. She is not ill-appearing or diaphoretic.   HENT:      Head: Normocephalic and atraumatic.      Right Ear: Tympanic membrane, ear canal and external ear normal. There is no impacted cerumen.      Left Ear: Tympanic membrane, ear canal and external ear normal. There is no impacted cerumen.      Nose: Congestion and rhinorrhea present. Rhinorrhea is clear.      Right Nostril: No foreign body, epistaxis, septal hematoma or occlusion.      Left Nostril: No foreign body, epistaxis, septal hematoma or occlusion.      Right Turbinates: Swollen. Not pale.      Left Turbinates: Swollen. Not pale.      Mouth/Throat:      Mouth: Mucous membranes are moist.      Pharynx: Oropharynx is clear. Posterior oropharyngeal erythema (moderate, posterior oropharynx) present. No pharyngeal swelling, oropharyngeal exudate or uvula swelling.   Eyes:      General: No scleral icterus.        Right eye: No discharge.         Left eye: No discharge.      Conjunctiva/sclera: Conjunctivae normal.   Neck:      Thyroid: No thyromegaly or thyroid tenderness.      Trachea: Trachea normal.   Cardiovascular:      Rate and Rhythm: Normal rate and regular rhythm.      Heart sounds: Normal heart sounds. No murmur heard.    No friction rub. No gallop.   Pulmonary:      Effort: Pulmonary effort is normal. No respiratory distress.      Breath sounds: Normal breath sounds. No stridor. No wheezing,  rhonchi or rales.   Musculoskeletal:      Cervical back: Neck supple.   Lymphadenopathy:      Head:      Right side of head: No submandibular or posterior auricular adenopathy.      Left side of head: No submandibular or posterior auricular adenopathy.      Cervical: No cervical adenopathy.      Right cervical: No superficial, deep or posterior cervical adenopathy.     Left cervical: No superficial, deep or posterior cervical adenopathy.      Upper Body:      Right upper body: No supraclavicular adenopathy.      Left upper body: No supraclavicular adenopathy.   Skin:     General: Skin is warm and dry.   Neurological:      Mental Status: She is alert. Mental status is at baseline.   Psychiatric:         Mood and Affect: Mood normal.         Behavior: Behavior normal.       Assessment:       1. Type 2 diabetes mellitus without complication, without long-term current use of insulin    2. Mixed hyperlipidemia    3. Primary hypertension    4. Infiltrating ductal carcinoma of left breast    5. Osteopenia of both hips    6. Vitamin D deficiency    7. Chronic migraine without aura without status migrainosus, not intractable    8. Meningioma    9. Chemotherapy-induced neuropathy    10. Generalized anxiety disorder    11. Insomnia, unspecified type    12. OAB (overactive bladder)    13. Sinus congestion    14. Need for vaccination        Plan:       Type 2 diabetes mellitus without complication, without long-term current use of insulin  Continue current medications.  RTC in 6 months for follow up.  -     Diabetic Eye Screening Photo; Future  -     Basic Metabolic Panel; Future  -     CBC Auto Differential; Future  -     Hemoglobin A1C; Future    Mixed hyperlipidemia  Continue current medications.  RTC in 6 months for follow up.    Primary hypertension  Continue current medications.  RTC in 6 months for follow up.    Infiltrating ductal carcinoma of left breast  Continue medication, evaluation, and management per  oncologist.    Osteopenia of both hips  Vitamin D deficiency  Continue vitamin D supplementation.  Recommend weight bearing exercises for bone strengthening.  RTC in 6 months for follow up.    Chronic migraine without aura without status migrainosus, not intractable  Meningioma  Chemotherapy-induced neuropathy  Continue medication, evaluation, and management per neurologist.    Generalized anxiety disorder  Insomnia, unspecified type  Continue medication, evaluation, and management per psychiatrist.    OAB (overactive bladder)  Continue medication, evaluation, and management per urologist.    Sinus congestion  Recommend daily antihistamine and nasal corticosteroid.  Try nasal saline rinses using only sterile or distilled water daily.   Trial of azelastine nasal spray for symptom relief PRN.   -     azelastine (ASTELIN) 137 mcg (0.1 %) nasal spray; 2 sprays (274 mcg total) by Nasal route 2 (two) times daily.    Need for vaccination  -     (In Office Administered) Pneumococcal Conjugate Vaccine (20 Valent) (IM)        Temitope Grant MD  1/19/2023

## 2023-01-19 NOTE — PROGRESS NOTES
"Patient was given vaccine information sheet for the [id number] (pneumococcal polyvalent) vaccine. The area of injection was palpated using the acromion process as a landmark. This area was cleaned with alcohol. Using a 25g 1" safety needle, 0.5mL of the vaccine was placed into the right deltoid muscle. The injection site was dressed with a bandage. Patient experienced no complications and was discharged in stable condition. Pneumovax 20 (pneumococcal polyvalent) vaccine Lot: [id number] Exp: 04/2024     "

## 2023-02-13 ENCOUNTER — PATIENT MESSAGE (OUTPATIENT)
Dept: ADMINISTRATIVE | Facility: OTHER | Age: 58
End: 2023-02-13
Payer: COMMERCIAL

## 2023-02-17 ENCOUNTER — PATIENT MESSAGE (OUTPATIENT)
Dept: ADMINISTRATIVE | Facility: HOSPITAL | Age: 58
End: 2023-02-17
Payer: COMMERCIAL

## 2023-02-17 ENCOUNTER — PATIENT OUTREACH (OUTPATIENT)
Dept: ADMINISTRATIVE | Facility: HOSPITAL | Age: 58
End: 2023-02-17
Payer: COMMERCIAL

## 2023-02-28 ENCOUNTER — PATIENT OUTREACH (OUTPATIENT)
Dept: ADMINISTRATIVE | Facility: HOSPITAL | Age: 58
End: 2023-02-28
Payer: COMMERCIAL

## 2023-03-03 ENCOUNTER — PATIENT MESSAGE (OUTPATIENT)
Dept: ADMINISTRATIVE | Facility: OTHER | Age: 58
End: 2023-03-03
Payer: COMMERCIAL

## 2023-03-03 ENCOUNTER — LAB VISIT (OUTPATIENT)
Dept: LAB | Facility: OTHER | Age: 58
End: 2023-03-03
Attending: STUDENT IN AN ORGANIZED HEALTH CARE EDUCATION/TRAINING PROGRAM
Payer: COMMERCIAL

## 2023-03-03 ENCOUNTER — CLINICAL SUPPORT (OUTPATIENT)
Dept: INTERNAL MEDICINE | Facility: CLINIC | Age: 58
End: 2023-03-03
Payer: COMMERCIAL

## 2023-03-03 VITALS — HEART RATE: 82 BPM | OXYGEN SATURATION: 97 % | SYSTOLIC BLOOD PRESSURE: 148 MMHG | DIASTOLIC BLOOD PRESSURE: 84 MMHG

## 2023-03-03 DIAGNOSIS — E78.49 OTHER HYPERLIPIDEMIA: ICD-10-CM

## 2023-03-03 LAB
ALBUMIN SERPL BCP-MCNC: 3.9 G/DL (ref 3.5–5.2)
ALP SERPL-CCNC: 101 U/L (ref 55–135)
ALT SERPL W/O P-5'-P-CCNC: 19 U/L (ref 10–44)
ANION GAP SERPL CALC-SCNC: 9 MMOL/L (ref 8–16)
AST SERPL-CCNC: 15 U/L (ref 10–40)
BILIRUB SERPL-MCNC: 0.3 MG/DL (ref 0.1–1)
BUN SERPL-MCNC: 11 MG/DL (ref 6–20)
CALCIUM SERPL-MCNC: 10.1 MG/DL (ref 8.7–10.5)
CHLORIDE SERPL-SCNC: 104 MMOL/L (ref 95–110)
CHOLEST SERPL-MCNC: 211 MG/DL (ref 120–199)
CHOLEST/HDLC SERPL: 4.1 {RATIO} (ref 2–5)
CO2 SERPL-SCNC: 28 MMOL/L (ref 23–29)
CREAT SERPL-MCNC: 0.7 MG/DL (ref 0.5–1.4)
EST. GFR  (NO RACE VARIABLE): >60 ML/MIN/1.73 M^2
GLUCOSE SERPL-MCNC: 116 MG/DL (ref 70–110)
HDLC SERPL-MCNC: 52 MG/DL (ref 40–75)
HDLC SERPL: 24.6 % (ref 20–50)
LDLC SERPL CALC-MCNC: 146.8 MG/DL (ref 63–159)
NONHDLC SERPL-MCNC: 159 MG/DL
POTASSIUM SERPL-SCNC: 4.2 MMOL/L (ref 3.5–5.1)
PROT SERPL-MCNC: 7.7 G/DL (ref 6–8.4)
SODIUM SERPL-SCNC: 141 MMOL/L (ref 136–145)
TRIGL SERPL-MCNC: 61 MG/DL (ref 30–150)
TSH SERPL DL<=0.005 MIU/L-ACNC: 1.52 UIU/ML (ref 0.4–4)

## 2023-03-03 PROCEDURE — 80053 COMPREHEN METABOLIC PANEL: CPT | Performed by: STUDENT IN AN ORGANIZED HEALTH CARE EDUCATION/TRAINING PROGRAM

## 2023-03-03 PROCEDURE — 80061 LIPID PANEL: CPT | Performed by: STUDENT IN AN ORGANIZED HEALTH CARE EDUCATION/TRAINING PROGRAM

## 2023-03-03 PROCEDURE — 84443 ASSAY THYROID STIM HORMONE: CPT | Performed by: STUDENT IN AN ORGANIZED HEALTH CARE EDUCATION/TRAINING PROGRAM

## 2023-03-03 PROCEDURE — 36415 COLL VENOUS BLD VENIPUNCTURE: CPT | Performed by: STUDENT IN AN ORGANIZED HEALTH CARE EDUCATION/TRAINING PROGRAM

## 2023-03-03 PROCEDURE — 99999 PR PBB SHADOW E&M-EST. PATIENT-LVL II: CPT | Mod: PBBFAC,,,

## 2023-03-03 PROCEDURE — 99999 PR PBB SHADOW E&M-EST. PATIENT-LVL II: ICD-10-PCS | Mod: PBBFAC,,,

## 2023-03-03 NOTE — PROGRESS NOTES
Shu Mendoza 57 y.o. female is here for Blood Pressure check. in person    Manual Blood pressure reading was  148/88 Pulse 83. (Checked at the end of the visit)    If high, was it repeated after 15 minutes? [id number] Pulse 82    Pt's Home blood pressure machine read in office NO Pulse No.     Diagnosed with Hypertension yes.    Patient took blood pressure medication today yes.  Last dose of blood pressure medication was taken at 0730. Patient took 1. Amlodipine-benazepril 10-10 mg daily 2. Hydrochlorothiazide 12.5 mg po daily 3. Carvedilol 6.25 mg BID  ( Last dose 1 week ago. Has to  RX).     All Medications and OTC medication updated yes    Does patient have record of home blood pressure readings / Blood Pressure Log No. Has to buy a cuff. States is checking into digital medicine    Does the pt have any complaints today in regards to their blood pressure medication? no. Complains of None. Patient is asymptomatic.   Were you sitting still for 5-10 minutes prior to taking your Blood pressure? yes Today in clinic  Has your blood pressure monitor ever been checked? no When was last time we checked your blood pressure monitor? No  Updated vitals yes  Given log sheet with information on BP ranges  Follow up date is Has no Follow up appointment.   Dr. Grant  notified.     Creatinine   Date Value Ref Range Status   01/19/2023 0.6 0.5 - 1.4 mg/dL Final     Sodium   Date Value Ref Range Status   01/19/2023 140 136 - 145 mmol/L Final     Potassium   Date Value Ref Range Status   01/19/2023 3.4 (L) 3.5 - 5.1 mmol/L Final

## 2023-03-08 PROBLEM — G47.00 INSOMNIA: Status: ACTIVE | Noted: 2023-03-08

## 2023-03-08 PROBLEM — M85.852 OSTEOPENIA OF BOTH HIPS: Status: ACTIVE | Noted: 2022-06-28

## 2023-03-08 PROBLEM — G43.709 CHRONIC MIGRAINE WITHOUT AURA WITHOUT STATUS MIGRAINOSUS, NOT INTRACTABLE: Status: ACTIVE | Noted: 2023-03-08

## 2023-03-08 PROBLEM — M85.851 OSTEOPENIA OF BOTH HIPS: Status: ACTIVE | Noted: 2022-06-28

## 2023-03-08 PROBLEM — G93.89 BRAIN MASS: Status: ACTIVE | Noted: 2023-03-08

## 2023-03-24 ENCOUNTER — PATIENT OUTREACH (OUTPATIENT)
Dept: ADMINISTRATIVE | Facility: HOSPITAL | Age: 58
End: 2023-03-24
Payer: COMMERCIAL

## 2023-03-24 ENCOUNTER — PATIENT MESSAGE (OUTPATIENT)
Dept: ADMINISTRATIVE | Facility: HOSPITAL | Age: 58
End: 2023-03-24
Payer: COMMERCIAL

## 2023-03-28 ENCOUNTER — DOCUMENTATION ONLY (OUTPATIENT)
Dept: HEMATOLOGY/ONCOLOGY | Facility: CLINIC | Age: 58
End: 2023-03-28
Payer: COMMERCIAL

## 2023-03-28 ENCOUNTER — TELEPHONE (OUTPATIENT)
Dept: PHARMACY | Facility: CLINIC | Age: 58
End: 2023-03-28
Payer: COMMERCIAL

## 2023-03-28 NOTE — TELEPHONE ENCOUNTER
Patient was issued  co-pay cards for both Jardiance and the Intrarosa. Patient will present these co-pay cards to pharmacy for assistance.

## 2023-03-28 NOTE — PROGRESS NOTES
SW attempted to contact patient to discuss referral for assistance. SW left a detailed message and is awaiting a return call. ANGELES will continue to follow.      LOVELY Vazquez, [facility]  Oncology Social Worker   [facility] - Oncology  [phone]

## 2023-03-28 NOTE — PROGRESS NOTES
SW spoke with the patient regarding the referral for medication assistance for her Jardiance and Intarosa. Patient reported she spoke with someone in pharmacy and she now has a coupon for the medication that should make it cheaper. The patient reported if the medication cost her over $100.00 she will not be able to afford the medication. SW encouraged the patient to contact the pharmacy and make sure they apply the coupons and have her correct insurance listed. SW informed the patient to call SW if she need further assistance. ANGELES will continue to follow.     LOVELY Vazquez, [facility]  Oncology Social Worker   [facility] - Oncology  [phone]

## 2023-05-15 ENCOUNTER — PATIENT MESSAGE (OUTPATIENT)
Dept: OTHER | Facility: OTHER | Age: 58
End: 2023-05-15
Payer: COMMERCIAL

## 2023-05-22 DIAGNOSIS — N95.2 VAGINAL ATROPHY: ICD-10-CM

## 2023-05-22 RX ORDER — PRASTERONE 6.5 MG/1
INSERT VAGINAL
Qty: 28 EACH | Refills: 10 | Status: SHIPPED | OUTPATIENT
Start: 2023-05-22 | End: 2024-02-29 | Stop reason: SDUPTHER

## 2023-05-24 ENCOUNTER — PATIENT MESSAGE (OUTPATIENT)
Dept: INTERNAL MEDICINE | Facility: CLINIC | Age: 58
End: 2023-05-24
Payer: COMMERCIAL

## 2023-06-09 ENCOUNTER — PATIENT MESSAGE (OUTPATIENT)
Dept: ADMINISTRATIVE | Facility: HOSPITAL | Age: 58
End: 2023-06-09
Payer: COMMERCIAL

## 2023-06-13 ENCOUNTER — CLINICAL SUPPORT (OUTPATIENT)
Dept: INTERNAL MEDICINE | Facility: CLINIC | Age: 58
End: 2023-06-13
Attending: STUDENT IN AN ORGANIZED HEALTH CARE EDUCATION/TRAINING PROGRAM
Payer: COMMERCIAL

## 2023-06-13 ENCOUNTER — TELEPHONE (OUTPATIENT)
Dept: PHARMACY | Facility: CLINIC | Age: 58
End: 2023-06-13
Payer: COMMERCIAL

## 2023-06-13 ENCOUNTER — OFFICE VISIT (OUTPATIENT)
Dept: INTERNAL MEDICINE | Facility: CLINIC | Age: 58
End: 2023-06-13
Payer: COMMERCIAL

## 2023-06-13 VITALS
DIASTOLIC BLOOD PRESSURE: 80 MMHG | OXYGEN SATURATION: 99 % | SYSTOLIC BLOOD PRESSURE: 130 MMHG | BODY MASS INDEX: 29.93 KG/M2 | WEIGHT: 174.38 LBS | HEART RATE: 67 BPM

## 2023-06-13 DIAGNOSIS — E11.9 TYPE 2 DIABETES MELLITUS WITHOUT COMPLICATION, WITHOUT LONG-TERM CURRENT USE OF INSULIN: ICD-10-CM

## 2023-06-13 DIAGNOSIS — C50.912 INFILTRATING DUCTAL CARCINOMA OF LEFT BREAST: ICD-10-CM

## 2023-06-13 DIAGNOSIS — G43.909 MIGRAINE WITHOUT STATUS MIGRAINOSUS, NOT INTRACTABLE, UNSPECIFIED MIGRAINE TYPE: ICD-10-CM

## 2023-06-13 DIAGNOSIS — R06.83 SNORING: ICD-10-CM

## 2023-06-13 DIAGNOSIS — M85.851 OSTEOPENIA OF BOTH HIPS: ICD-10-CM

## 2023-06-13 DIAGNOSIS — M85.852 OSTEOPENIA OF BOTH HIPS: ICD-10-CM

## 2023-06-13 DIAGNOSIS — E78.2 MIXED HYPERLIPIDEMIA: Primary | ICD-10-CM

## 2023-06-13 DIAGNOSIS — N32.81 OAB (OVERACTIVE BLADDER): ICD-10-CM

## 2023-06-13 DIAGNOSIS — F41.1 GENERALIZED ANXIETY DISORDER: ICD-10-CM

## 2023-06-13 DIAGNOSIS — E55.9 VITAMIN D DEFICIENCY: ICD-10-CM

## 2023-06-13 DIAGNOSIS — I10 PRIMARY HYPERTENSION: ICD-10-CM

## 2023-06-13 PROCEDURE — 99999 PR PBB SHADOW E&M-EST. PATIENT-LVL V: ICD-10-PCS | Mod: PBBFAC,,, | Performed by: STUDENT IN AN ORGANIZED HEALTH CARE EDUCATION/TRAINING PROGRAM

## 2023-06-13 PROCEDURE — 3008F BODY MASS INDEX DOCD: CPT | Mod: CPTII,S$GLB,, | Performed by: STUDENT IN AN ORGANIZED HEALTH CARE EDUCATION/TRAINING PROGRAM

## 2023-06-13 PROCEDURE — 1159F MED LIST DOCD IN RCRD: CPT | Mod: CPTII,S$GLB,, | Performed by: STUDENT IN AN ORGANIZED HEALTH CARE EDUCATION/TRAINING PROGRAM

## 2023-06-13 PROCEDURE — 92228 IMG RTA DETC/MNTR DS PHY/QHP: CPT | Mod: TC,S$GLB,, | Performed by: STUDENT IN AN ORGANIZED HEALTH CARE EDUCATION/TRAINING PROGRAM

## 2023-06-13 PROCEDURE — 99999 PR PBB SHADOW E&M-EST. PATIENT-LVL V: CPT | Mod: PBBFAC,,, | Performed by: STUDENT IN AN ORGANIZED HEALTH CARE EDUCATION/TRAINING PROGRAM

## 2023-06-13 PROCEDURE — 92228 DIABETIC EYE SCREENING PHOTO: ICD-10-PCS | Mod: TC,S$GLB,, | Performed by: STUDENT IN AN ORGANIZED HEALTH CARE EDUCATION/TRAINING PROGRAM

## 2023-06-13 PROCEDURE — 3075F SYST BP GE 130 - 139MM HG: CPT | Mod: CPTII,S$GLB,, | Performed by: STUDENT IN AN ORGANIZED HEALTH CARE EDUCATION/TRAINING PROGRAM

## 2023-06-13 PROCEDURE — 3044F PR MOST RECENT HEMOGLOBIN A1C LEVEL <7.0%: ICD-10-PCS | Mod: CPTII,S$GLB,, | Performed by: STUDENT IN AN ORGANIZED HEALTH CARE EDUCATION/TRAINING PROGRAM

## 2023-06-13 PROCEDURE — 4010F ACE/ARB THERAPY RXD/TAKEN: CPT | Mod: CPTII,S$GLB,, | Performed by: STUDENT IN AN ORGANIZED HEALTH CARE EDUCATION/TRAINING PROGRAM

## 2023-06-13 PROCEDURE — 3079F PR MOST RECENT DIASTOLIC BLOOD PRESSURE 80-89 MM HG: ICD-10-PCS | Mod: CPTII,S$GLB,, | Performed by: STUDENT IN AN ORGANIZED HEALTH CARE EDUCATION/TRAINING PROGRAM

## 2023-06-13 PROCEDURE — 1159F PR MEDICATION LIST DOCUMENTED IN MEDICAL RECORD: ICD-10-PCS | Mod: CPTII,S$GLB,, | Performed by: STUDENT IN AN ORGANIZED HEALTH CARE EDUCATION/TRAINING PROGRAM

## 2023-06-13 PROCEDURE — 3008F PR BODY MASS INDEX (BMI) DOCUMENTED: ICD-10-PCS | Mod: CPTII,S$GLB,, | Performed by: STUDENT IN AN ORGANIZED HEALTH CARE EDUCATION/TRAINING PROGRAM

## 2023-06-13 PROCEDURE — 99214 PR OFFICE/OUTPT VISIT, EST, LEVL IV, 30-39 MIN: ICD-10-PCS | Mod: S$GLB,,, | Performed by: STUDENT IN AN ORGANIZED HEALTH CARE EDUCATION/TRAINING PROGRAM

## 2023-06-13 PROCEDURE — 3079F DIAST BP 80-89 MM HG: CPT | Mod: CPTII,S$GLB,, | Performed by: STUDENT IN AN ORGANIZED HEALTH CARE EDUCATION/TRAINING PROGRAM

## 2023-06-13 PROCEDURE — 3044F HG A1C LEVEL LT 7.0%: CPT | Mod: CPTII,S$GLB,, | Performed by: STUDENT IN AN ORGANIZED HEALTH CARE EDUCATION/TRAINING PROGRAM

## 2023-06-13 PROCEDURE — 92228 IMG RTA DETC/MNTR DS PHY/QHP: CPT | Mod: 26,S$GLB,, | Performed by: OPTOMETRIST

## 2023-06-13 PROCEDURE — 92228 DIABETIC EYE SCREENING PHOTO: ICD-10-PCS | Mod: 26,S$GLB,, | Performed by: OPTOMETRIST

## 2023-06-13 PROCEDURE — 4010F PR ACE/ARB THEARPY RXD/TAKEN: ICD-10-PCS | Mod: CPTII,S$GLB,, | Performed by: STUDENT IN AN ORGANIZED HEALTH CARE EDUCATION/TRAINING PROGRAM

## 2023-06-13 PROCEDURE — 99214 OFFICE O/P EST MOD 30 MIN: CPT | Mod: S$GLB,,, | Performed by: STUDENT IN AN ORGANIZED HEALTH CARE EDUCATION/TRAINING PROGRAM

## 2023-06-13 PROCEDURE — 3075F PR MOST RECENT SYSTOLIC BLOOD PRESS GE 130-139MM HG: ICD-10-PCS | Mod: CPTII,S$GLB,, | Performed by: STUDENT IN AN ORGANIZED HEALTH CARE EDUCATION/TRAINING PROGRAM

## 2023-06-13 RX ORDER — FLASH GLUCOSE SCANNING READER
EACH MISCELLANEOUS
Qty: 1 EACH | Refills: 0 | Status: SHIPPED | OUTPATIENT
Start: 2023-06-13 | End: 2023-11-30

## 2023-06-13 RX ORDER — AMLODIPINE AND VALSARTAN 10; 320 MG/1; MG/1
1 TABLET ORAL DAILY
Qty: 90 TABLET | Refills: 3 | Status: SHIPPED | OUTPATIENT
Start: 2023-06-13 | End: 2024-06-12

## 2023-06-13 RX ORDER — FLASH GLUCOSE SENSOR
KIT MISCELLANEOUS
Qty: 6 KIT | Refills: 1 | Status: SHIPPED | OUTPATIENT
Start: 2023-06-13 | End: 2023-11-30

## 2023-06-13 NOTE — PROGRESS NOTES
Subjective:       Patient ID: Shu Mendoza is a 57 y.o. female.    Chief Complaint: Mixed hyperlipidemia [E78.2]    Patient is established with me, here today for the following:     HTN, HLD, T2DM, invasive ductal carcinoma of the left breast (ER positive), meningioma, osteopenia, vitamin D deficiency, migraines, anxiety, insomnia, chemotherapy induced neuropathy, OAB, recurrent UTI's      Health maintenance -   Colonoscopy performed FEB2022, with Dr. Clayton. Told to repeat in 5 years.  Denies family history of colorectal cancer.  Denies family history of breast cancer.  Family history of ovarian cancer.  Hysterectomy due to AUB and ovarian cyst.   Seeing Dr. Oliva for gynecology.   Denies family history of osteoporosis.  Denies significant family history of cardiac disease.  UTD on influenza, Tdap, COVID primary/booster, shingles, PCV20 vaccinations.  Due for COVID bivalent vaccinations.  Never smoker.  Drinks alcohol 1-2 times yearly, 1-3 drinks per sitting.  Denies drug use.  Due for HIV and hepatitis C screening.     HLD -   Endorses taking atorvastatin as directed  Denies side effects or concerns while taking medication  Lab Results       Component                Value               Date                       CHOL                     211 (H)             03/03/2023            Lab Results       Component                Value               Date                       LDLCALC                  146.8               03/03/2023            Lab Results       Component                Value               Date                       HDL                      52                  03/03/2023            Due for lipid recheck.          HTN -   Currently prescribed HCTZ, amlodipine-benazepril, carvedilol .  Patient endorses not routinely taking medication.  Denies side effects or concerns while taking medication.  Enrolled in digital hypertension medicine program.   Log shows blood pressures at goal (<130/80) 0% of the time.   Due  "for micro albumin creatinine ratio.   Lab Results       Component                Value               Date                       MICALBCREAT              26.0                08/10/2022            BP Readings from Last 5 Encounters:  03/03/23 : (!) 148/84  01/19/23 : (!) 144/80  01/09/23 : (!) 152/78  12/01/22 : (!) 154/77  08/18/22 : (!) 160/85  STOP BANG 4, intermediate risk NADER     T2DM -   Currently taking metformin, Jardiance   Checking blood glucose intermittently  Enrolled in digital diabetes medicine program   Log shows blood sugars at goal () 67% of the time.   Had 1 episode of hypoglycemia, had not eaten that day  Due for foot exam.  Due for eye exam.   Normally sees Dr. Mccann for ophthalmology.  Sees Dr. Batista for podiatry.   Lab Results       Component                Value               Date                       HGBA1C                   6.9 (H)             01/19/2023                 HGBA1C                   7.2 (H)             08/10/2022            Lab Results       Component                Value               Date                       MICALBCREAT              26.0                08/10/2022                 Breast cancer -  Diagnosed 2019 with invasive ductal carcinoma of the left breast, ER positive  Following with Dr. Norris for oncology  Last appt in JAN2023, has 6 month follow up scheduled  S/p mastectomy and completed Taxol treatment  Started on anastrozole APR2020  Mammogram BI-RADS 1 DEC2022     Osteopenia -   Vitamin D deficiency -   Completed DEXA in JUNE2022.  Showed osteopenia of both hips.  "FRAX RESULTS:  10-year Probability of Fracture:  Major Osteoporotic Fracture 10.5%.  Hip Fracture 0.5%."  Currently taking vitamin D3 daily.  Currently taking calcium daily.  Lab Results       Component                Value               Date                       [id number]             50                  08/10/2022                 [id number]             48                  06/13/2022               " "  [id number]             81                  10/13/2021              Following with Dr. Agrawal for migraines, last in MAY2022  Taking gabapentin nightly for neuropathy with good effect  Also following for small brain lesion  MRI JAN2022 with   "Arising from the free edge of the anterior aspect of the left tentorial leaflet, there is an extra-axial homogeneously enhancing mass measuring 1.2 cm.  This is probably a meningioma."     Taking venlafaxine and amitriptyline for anxiety and insomnia with good effect     Following with urology for recurrent UTIs, OAB, and microhematuria  Taking oxybutynin as directed          Diabetes  She has type 2 diabetes mellitus. No MedicAlert identification noted. Hypoglycemia symptoms include headaches, hunger and sleepiness. Pertinent negatives for hypoglycemia include no confusion, dizziness, mood changes, nervousness/anxiousness, pallor, seizures, speech difficulty, sweats or tremors. Associated symptoms include blurred vision, fatigue, polydipsia, polyuria and visual change. Pertinent negatives for diabetes include no chest pain, no foot paresthesias, no foot ulcerations, no polyphagia, no weakness and no weight loss. Pertinent negatives for hypoglycemia complications include no blackouts, no hospitalization, no nocturnal hypoglycemia, no required assistance and no required glucagon injection. Symptoms are stable. Diabetic complications include PVD and retinopathy. Pertinent negatives for diabetic complications include no autonomic neuropathy, CVA, heart disease, nephropathy or peripheral neuropathy. Risk factors for coronary artery disease include dyslipidemia, hypertension, post-menopausal and diabetes mellitus. Current diabetic treatment includes diet and oral agent (dual therapy). She is compliant with treatment all of the time. Her weight is fluctuating dramatically. She is following a generally healthy diet. Meal planning includes avoidance of concentrated sweets. She has " not had a previous visit with a dietitian. She participates in exercise intermittently. She monitors blood glucose at home 1-2 x per week. Blood glucose monitoring compliance is good. Her home blood glucose trend is fluctuating dramatically. She sees a podiatrist.Eye exam is current.     Review of Systems   Constitutional:  Positive for fatigue. Negative for weight loss.   Eyes:  Positive for blurred vision.   Cardiovascular:  Negative for chest pain.   Endocrine: Positive for polydipsia and polyuria. Negative for polyphagia.   Skin:  Negative for pallor.   Neurological:  Positive for headaches. Negative for dizziness, tremors, seizures, speech difficulty and weakness.   Psychiatric/Behavioral:  Negative for confusion. The patient is not nervous/anxious.        Current Outpatient Medications   Medication Instructions    amitriptyline (ELAVIL) 10 MG tablet TAKE 1 TABLET(10 MG) BY MOUTH EVERY NIGHT AS NEEDED FOR INSOMNIA    amLODIPine-benazepriL (LOTREL) 10-40 mg per capsule 1 capsule, Oral, Daily    anastrozole (ARIMIDEX) 1 mg, Oral, Daily    atorvastatin (LIPITOR) 40 mg, Oral, Daily    azelastine (ASTELIN) 274 mcg, Nasal, 2 times daily    b complex vitamins tablet 1 tablet, Oral, Daily    B-complex with vitamin C (Z-BEC OR EQUIV) tablet No dose, route, or frequency recorded.    calcium carbonate 1,250 mg, Oral, 2 times daily with meals    carvediloL (COREG) 6.25 mg, Oral, 2 times daily    dorzolamide (TRUSOPT) 2 % ophthalmic solution 1 drop, Both Eyes, 3 times daily    fluticasone propionate (FLONASE) 50 mcg/actuation nasal spray 1 spray, Each Nostril, Nightly PRN    gabapentin (NEURONTIN) 600 mg, Oral, Nightly    hydroCHLOROthiazide (HYDRODIURIL) 25 mg, Oral, Daily    JARDIANCE 25 mg, Oral, Daily    latanoprost 0.005 % ophthalmic solution INT 1 GTT IN OU QD HS    LIDOcaine HCL 2% (XYLOCAINE) 2 % jelly Topical (Top), As needed (PRN), Apply topically once nightly to affected part of foot/feet.    loratadine (CLARITIN  ORAL) Oral    metFORMIN (GLUCOPHAGE-XR) 1,000 mg, Oral, 2 times daily with meals    multivitamin (THERAGRAN) per tablet 1 tablet, Oral, Daily    multivitamin capsule 1 capsule, Oral, Daily    ondansetron (ZOFRAN-ODT) 8 MG TbDL Dissolve 1 tablet (8 mg total) by mouth every 6 (six) hours as needed (nausea).    oxybutynin (DITROPAN-XL) 5 mg, Oral, Daily    prasterone, dhea, (INTRAROSA) 6.5 mg Inst PLACE 1 INSERT INTRAVAGINALLY AT BEDTIME AS DIRECTED    promethazine (PHENERGAN) 25 mg, Oral, Every 6 hours PRN    terconazole (TERAZOL 7) 0.4 % Crea 1 applicator, Vaginal, Nightly    venlafaxine (EFFEXOR-XR) 75 mg, Oral, Daily    vitamin D (VITAMIN D3) 1,000 Units, Oral, Daily     Objective:      Vitals:    06/13/23 0937   BP: 130/80   Pulse: 67   SpO2: 99%   Weight: 79.1 kg (174 lb 6.1 oz)   PainSc: 0-No pain     Body mass index is 29.93 kg/m².    Physical Exam  Vitals reviewed.   Constitutional:       General: She is not in acute distress.     Appearance: She is not ill-appearing or diaphoretic.   Cardiovascular:      Rate and Rhythm: Normal rate and regular rhythm.      Pulses:           Dorsalis pedis pulses are 2+ on the right side and 2+ on the left side.        Posterior tibial pulses are 2+ on the right side and 2+ on the left side.      Heart sounds: Normal heart sounds. No murmur heard.    No friction rub. No gallop.   Pulmonary:      Effort: Pulmonary effort is normal. No respiratory distress.      Breath sounds: Normal breath sounds. No stridor. No wheezing, rhonchi or rales.   Musculoskeletal:      Right foot: Normal range of motion. No deformity.      Left foot: Normal range of motion. No deformity.   Feet:      Right foot:      Protective Sensation: 10 sites tested.  10 sites sensed.      Skin integrity: Dry skin present. No ulcer, blister, skin breakdown, erythema, warmth, callus or fissure.      Toenail Condition: Right toenails are normal.      Left foot:      Protective Sensation: 10 sites tested.  10  sites sensed.      Skin integrity: Dry skin present. No ulcer, blister, skin breakdown, erythema, warmth, callus or fissure.      Toenail Condition: Fungal disease present.  Skin:     General: Skin is warm and dry.      Comments: Color WNL   Neurological:      Mental Status: She is alert. Mental status is at baseline.   Psychiatric:         Mood and Affect: Mood normal.         Behavior: Behavior normal.       Assessment:       1. Mixed hyperlipidemia    2. Primary hypertension    3. Snoring    4. Type 2 diabetes mellitus without complication, without long-term current use of insulin    5. Infiltrating ductal carcinoma of left breast    6. Osteopenia of both hips    7. Vitamin D deficiency    8. Migraine without status migrainosus, not intractable, unspecified migraine type    9. Generalized anxiety disorder    10. OAB (overactive bladder)        Plan:       Mixed hyperlipidemia  Continue current medications.  RTC in 6 months for follow up.    Primary hypertension  Continue current medications.  RTC in 6 months for follow up.  -     amlodipine-valsartan (EXFORGE)  mg per tablet; Take 1 tablet by mouth once daily.    Snoring  -     Ambulatory referral/consult to Sleep Disorders; Future    Type 2 diabetes mellitus without complication, without long-term current use of insulin  Continue current medications.  RTC in 6 months for follow up.  -     flash glucose sensor (FREESTYLE ALEJANDRINA 14 DAY SENSOR) Kit; Use as directed for blood glucose monitoring. Change site every 14 days.  -     flash glucose scanning reader (FREESTYLE ALEJANDRINA 14 DAY READER) Misc; Use as directed for blood glucose monitoring.  -     Diabetic Eye Screening Photo; Future    Infiltrating ductal carcinoma of left breast  Continue medication, evaluation, and management per oncologist.    Osteopenia of both hips  Vitamin D deficiency  Start vitamin D and calcium supplementation.  Recommend weight bearing exercises for bone strengthening.  RTC in 6  months for follow up.    Migraine without status migrainosus, not intractable, unspecified migraine type  Continue current medications.  RTC in 6 months for follow up.    Generalized anxiety disorder  Continue current medications.  RTC in 6 months for follow up.    OAB (overactive bladder)  Continue current medications.  RTC in 6 months for follow up.      Temitope Grant MD  6/13/2023

## 2023-06-13 NOTE — PROGRESS NOTES
Shu Mendoza is a 57 y.o. female here for a diabetic eye screening with non-dilated fundus photos per Dr. Temitope Grant.    Patient cooperative?: No:   Small pupils?: Yes  Last eye exam: over a year ago and does wear glasses.    For exam results, see Encounter Report.

## 2023-06-19 ENCOUNTER — PATIENT MESSAGE (OUTPATIENT)
Dept: NEUROLOGY | Facility: CLINIC | Age: 58
End: 2023-06-19
Payer: COMMERCIAL

## 2023-08-02 DIAGNOSIS — I10 PRIMARY HYPERTENSION: ICD-10-CM

## 2023-08-03 ENCOUNTER — TELEPHONE (OUTPATIENT)
Dept: PHARMACY | Facility: CLINIC | Age: 58
End: 2023-08-03
Payer: COMMERCIAL

## 2023-08-03 RX ORDER — CARVEDILOL 6.25 MG/1
6.25 TABLET ORAL 2 TIMES DAILY
Qty: 180 TABLET | Refills: 3 | Status: SHIPPED | OUTPATIENT
Start: 2023-08-03 | End: 2023-09-05

## 2023-08-03 NOTE — TELEPHONE ENCOUNTER
Please see message below and its entirety routed to Dr. Grant to inform. Thanks.    Citlalli Baldwin,       The prior authorization for Shu Mendoza's Freestyle bozena  prescription has been DENIED for the following reason(s): Under the patient's insurance plan, the patient must be on an intensive     insulin regimen (three or more insulin injections per day or use a continuous subcutaneous insulin infusion pump).       [facility] will reach out to patient for further correspondence.     If there are any additional questions or concerns, please contact me.     Citlalli Flynn, Certified Pharmacy Tech   Prior Authorization Department   [facility]

## 2023-08-11 NOTE — PROGRESS NOTES
"Referred by Dr. Grant    CHIEF COMPLAINT: Sleep evaluation    HISTORY OF PRESENT ILLNESS: She has never had a sleep study. Told she snores loudly. Drools at times. Unable to sleep flat with 1 pillow, feels like she can't breathe. Sleeps with 3 pillows and 2 travel ones. Occasional am headaches. Occasional witnessed apneic pauses. Disrupted sleep 2-3x, can be also due to stress/anxiety/foot or leg cramps. May take nap when home from work.Not take bp meds yet because she has fasting labs to do.   Flonase or Astelin NS bid  HgBA1c 6.9    On todays [place] Sleepiness Scale the patient scores a 6/24.       FAMILY HISTORY: No known sleep disorders.   SOCIAL HISTORY: , up for work 4a, 6a-2:30p work      BP (!) 172/95 (BP Location: Left arm, Patient Position: Sitting)   Pulse 82   Ht 5' 4" (1.626 m)   Wt 79.3 kg (174 lb 13.2 oz)   LMP  (LMP Unknown)   BMI 30.01 kg/m²   Neck circumference 16"      ASSESSMENT:   Unspecified Sleep Apnea, with symptoms of snoring, witnessed apneic pauses, disrupted sleep and daytime sleepiness,  with medical comorbidities of obesity, hypertension, DM2, migraines. Warrants further investigation for untreated sleep apnea.     PLAN:   1.Home Sleep Study, discussed plan of care    2. Discussed etiology of NADER and potential ramifications of untreated NADER, including heart disease, HTN.  We discussed potential treatment options, which could include weight loss , continuous positive airway pressure (CPAP-definitive), mandibular advancement splint by dentist, or referral for surgical consideration.   3. See pcp DM/HTN mgt/continue meds, take bp meds when home after labwork    Thank you for allowing me the opportunity to participate in the care of your patient        "

## 2023-08-14 DIAGNOSIS — F41.1 GENERALIZED ANXIETY DISORDER: ICD-10-CM

## 2023-08-14 RX ORDER — VENLAFAXINE HYDROCHLORIDE 75 MG/1
75 CAPSULE, EXTENDED RELEASE ORAL DAILY
Qty: 90 CAPSULE | Refills: 1 | Status: SHIPPED | OUTPATIENT
Start: 2023-08-14 | End: 2024-03-10 | Stop reason: SDUPTHER

## 2023-08-14 NOTE — TELEPHONE ENCOUNTER
Care Due:                  Date            Visit Type   Department     Provider  --------------------------------------------------------------------------------                                MYCHART                              FOLLOWUP/OF  [facility] INTERNAL  Last Visit: 06-      FICE VISIT   [facility]       Temitope Grant                              EP -                              PRIMARY      [facility] INTERNAL  Next Visit: 12-      CARE ([facility])   MEDICINE       Temitope Szymanskishira                                                            Last  Test          Frequency    Reason                     Performed    Due Date  --------------------------------------------------------------------------------    HBA1C.......  6 months...  JARDIANCE, metFORMIN.....  01- 07-    [facility] Embedded Care Due Messages. Reference number: [id number].   8/14/2023 10:43:47 AM CDT

## 2023-08-15 ENCOUNTER — LAB VISIT (OUTPATIENT)
Dept: LAB | Facility: OTHER | Age: 58
End: 2023-08-15
Attending: STUDENT IN AN ORGANIZED HEALTH CARE EDUCATION/TRAINING PROGRAM
Payer: COMMERCIAL

## 2023-08-15 ENCOUNTER — OFFICE VISIT (OUTPATIENT)
Dept: SLEEP MEDICINE | Facility: CLINIC | Age: 58
End: 2023-08-15
Payer: COMMERCIAL

## 2023-08-15 VITALS
BODY MASS INDEX: 29.84 KG/M2 | WEIGHT: 174.81 LBS | HEIGHT: 64 IN | DIASTOLIC BLOOD PRESSURE: 95 MMHG | HEART RATE: 82 BPM | SYSTOLIC BLOOD PRESSURE: 172 MMHG

## 2023-08-15 DIAGNOSIS — E11.9 TYPE 2 DIABETES MELLITUS WITHOUT COMPLICATION, WITHOUT LONG-TERM CURRENT USE OF INSULIN: ICD-10-CM

## 2023-08-15 DIAGNOSIS — E78.49 OTHER HYPERLIPIDEMIA: ICD-10-CM

## 2023-08-15 DIAGNOSIS — G47.30 SLEEP APNEA, UNSPECIFIED TYPE: ICD-10-CM

## 2023-08-15 DIAGNOSIS — I10 PRIMARY HYPERTENSION: Primary | ICD-10-CM

## 2023-08-15 DIAGNOSIS — I10 PRIMARY HYPERTENSION: ICD-10-CM

## 2023-08-15 DIAGNOSIS — R06.83 SNORING: ICD-10-CM

## 2023-08-15 LAB
ALBUMIN/CREAT UR: 19.9 UG/MG (ref 0–30)
ANION GAP SERPL CALC-SCNC: 10 MMOL/L (ref 8–16)
BUN SERPL-MCNC: 16 MG/DL (ref 6–20)
CALCIUM SERPL-MCNC: 9.9 MG/DL (ref 8.7–10.5)
CHLORIDE SERPL-SCNC: 105 MMOL/L (ref 95–110)
CHOLEST SERPL-MCNC: 163 MG/DL (ref 120–199)
CHOLEST/HDLC SERPL: 3 {RATIO} (ref 2–5)
CO2 SERPL-SCNC: 27 MMOL/L (ref 23–29)
CREAT SERPL-MCNC: 0.8 MG/DL (ref 0.5–1.4)
CREAT UR-MCNC: 90.6 MG/DL (ref 15–325)
EST. GFR  (NO RACE VARIABLE): >60 ML/MIN/1.73 M^2
ESTIMATED AVG GLUCOSE: 146 MG/DL (ref 68–131)
GLUCOSE SERPL-MCNC: 129 MG/DL (ref 70–110)
HBA1C MFR BLD: 6.7 % (ref 4–5.6)
HDLC SERPL-MCNC: 55 MG/DL (ref 40–75)
HDLC SERPL: 33.7 % (ref 20–50)
LDLC SERPL CALC-MCNC: 97.8 MG/DL (ref 63–159)
MICROALBUMIN UR DL<=1MG/L-MCNC: 18 UG/ML
NONHDLC SERPL-MCNC: 108 MG/DL
POTASSIUM SERPL-SCNC: 4 MMOL/L (ref 3.5–5.1)
SODIUM SERPL-SCNC: 142 MMOL/L (ref 136–145)
TRIGL SERPL-MCNC: 51 MG/DL (ref 30–150)

## 2023-08-15 PROCEDURE — 82570 ASSAY OF URINE CREATININE: CPT | Performed by: STUDENT IN AN ORGANIZED HEALTH CARE EDUCATION/TRAINING PROGRAM

## 2023-08-15 PROCEDURE — 99204 PR OFFICE/OUTPT VISIT, NEW, LEVL IV, 45-59 MIN: ICD-10-PCS | Mod: S$GLB,,, | Performed by: NURSE PRACTITIONER

## 2023-08-15 PROCEDURE — 4010F ACE/ARB THERAPY RXD/TAKEN: CPT | Mod: CPTII,S$GLB,, | Performed by: NURSE PRACTITIONER

## 2023-08-15 PROCEDURE — 3008F PR BODY MASS INDEX (BMI) DOCUMENTED: ICD-10-PCS | Mod: CPTII,S$GLB,, | Performed by: NURSE PRACTITIONER

## 2023-08-15 PROCEDURE — 3080F DIAST BP >= 90 MM HG: CPT | Mod: CPTII,S$GLB,, | Performed by: NURSE PRACTITIONER

## 2023-08-15 PROCEDURE — 4010F PR ACE/ARB THEARPY RXD/TAKEN: ICD-10-PCS | Mod: CPTII,S$GLB,, | Performed by: NURSE PRACTITIONER

## 2023-08-15 PROCEDURE — 3077F PR MOST RECENT SYSTOLIC BLOOD PRESSURE >= 140 MM HG: ICD-10-PCS | Mod: CPTII,S$GLB,, | Performed by: NURSE PRACTITIONER

## 2023-08-15 PROCEDURE — 3077F SYST BP >= 140 MM HG: CPT | Mod: CPTII,S$GLB,, | Performed by: NURSE PRACTITIONER

## 2023-08-15 PROCEDURE — 80061 LIPID PANEL: CPT | Performed by: STUDENT IN AN ORGANIZED HEALTH CARE EDUCATION/TRAINING PROGRAM

## 2023-08-15 PROCEDURE — 99999 PR PBB SHADOW E&M-EST. PATIENT-LVL III: ICD-10-PCS | Mod: PBBFAC,,, | Performed by: NURSE PRACTITIONER

## 2023-08-15 PROCEDURE — 99204 OFFICE O/P NEW MOD 45 MIN: CPT | Mod: S$GLB,,, | Performed by: NURSE PRACTITIONER

## 2023-08-15 PROCEDURE — 3044F HG A1C LEVEL LT 7.0%: CPT | Mod: CPTII,S$GLB,, | Performed by: NURSE PRACTITIONER

## 2023-08-15 PROCEDURE — 99999 PR PBB SHADOW E&M-EST. PATIENT-LVL III: CPT | Mod: PBBFAC,,, | Performed by: NURSE PRACTITIONER

## 2023-08-15 PROCEDURE — 36415 COLL VENOUS BLD VENIPUNCTURE: CPT | Performed by: STUDENT IN AN ORGANIZED HEALTH CARE EDUCATION/TRAINING PROGRAM

## 2023-08-15 PROCEDURE — 3080F PR MOST RECENT DIASTOLIC BLOOD PRESSURE >= 90 MM HG: ICD-10-PCS | Mod: CPTII,S$GLB,, | Performed by: NURSE PRACTITIONER

## 2023-08-15 PROCEDURE — 3008F BODY MASS INDEX DOCD: CPT | Mod: CPTII,S$GLB,, | Performed by: NURSE PRACTITIONER

## 2023-08-15 PROCEDURE — 83036 HEMOGLOBIN GLYCOSYLATED A1C: CPT | Performed by: STUDENT IN AN ORGANIZED HEALTH CARE EDUCATION/TRAINING PROGRAM

## 2023-08-15 PROCEDURE — 80048 BASIC METABOLIC PNL TOTAL CA: CPT | Performed by: STUDENT IN AN ORGANIZED HEALTH CARE EDUCATION/TRAINING PROGRAM

## 2023-08-15 PROCEDURE — 3044F PR MOST RECENT HEMOGLOBIN A1C LEVEL <7.0%: ICD-10-PCS | Mod: CPTII,S$GLB,, | Performed by: NURSE PRACTITIONER

## 2023-08-16 ENCOUNTER — PATIENT MESSAGE (OUTPATIENT)
Dept: PSYCHIATRY | Facility: CLINIC | Age: 58
End: 2023-08-16
Payer: COMMERCIAL

## 2023-08-25 ENCOUNTER — OFFICE VISIT (OUTPATIENT)
Dept: HEMATOLOGY/ONCOLOGY | Facility: CLINIC | Age: 58
End: 2023-08-25
Payer: COMMERCIAL

## 2023-08-25 ENCOUNTER — TELEPHONE (OUTPATIENT)
Dept: SLEEP MEDICINE | Facility: OTHER | Age: 58
End: 2023-08-25
Payer: COMMERCIAL

## 2023-08-25 VITALS
RESPIRATION RATE: 18 BRPM | DIASTOLIC BLOOD PRESSURE: 85 MMHG | BODY MASS INDEX: 29.45 KG/M2 | TEMPERATURE: 98 F | SYSTOLIC BLOOD PRESSURE: 155 MMHG | WEIGHT: 172.5 LBS | HEIGHT: 64 IN | HEART RATE: 72 BPM | OXYGEN SATURATION: 96 %

## 2023-08-25 DIAGNOSIS — C50.912 INFILTRATING DUCTAL CARCINOMA OF LEFT BREAST: Primary | ICD-10-CM

## 2023-08-25 DIAGNOSIS — Z12.39 ENCOUNTER FOR SCREENING FOR MALIGNANT NEOPLASM OF BREAST, UNSPECIFIED SCREENING MODALITY: ICD-10-CM

## 2023-08-25 DIAGNOSIS — Z17.0 MALIGNANT NEOPLASM OF CENTRAL PORTION OF LEFT BREAST IN FEMALE, ESTROGEN RECEPTOR POSITIVE: ICD-10-CM

## 2023-08-25 DIAGNOSIS — C50.112 MALIGNANT NEOPLASM OF CENTRAL PORTION OF LEFT BREAST IN FEMALE, ESTROGEN RECEPTOR POSITIVE: ICD-10-CM

## 2023-08-25 DIAGNOSIS — T45.1X5A CANCER TREATMENT-INDUCED BONE LOSS: ICD-10-CM

## 2023-08-25 DIAGNOSIS — Z79.811 AROMATASE INHIBITOR USE: ICD-10-CM

## 2023-08-25 DIAGNOSIS — M89.8X9 CANCER TREATMENT-INDUCED BONE LOSS: ICD-10-CM

## 2023-08-25 DIAGNOSIS — I10 PRIMARY HYPERTENSION: ICD-10-CM

## 2023-08-25 DIAGNOSIS — E11.9 TYPE 2 DIABETES MELLITUS WITHOUT COMPLICATION, WITHOUT LONG-TERM CURRENT USE OF INSULIN: ICD-10-CM

## 2023-08-25 DIAGNOSIS — E55.9 VITAMIN D DEFICIENCY: ICD-10-CM

## 2023-08-25 PROCEDURE — 99999 PR PBB SHADOW E&M-EST. PATIENT-LVL III: CPT | Mod: PBBFAC,,, | Performed by: INTERNAL MEDICINE

## 2023-08-25 PROCEDURE — 3077F PR MOST RECENT SYSTOLIC BLOOD PRESSURE >= 140 MM HG: ICD-10-PCS | Mod: CPTII,S$GLB,, | Performed by: INTERNAL MEDICINE

## 2023-08-25 PROCEDURE — 1160F PR REVIEW ALL MEDS BY PRESCRIBER/CLIN PHARMACIST DOCUMENTED: ICD-10-PCS | Mod: CPTII,S$GLB,, | Performed by: INTERNAL MEDICINE

## 2023-08-25 PROCEDURE — 1159F PR MEDICATION LIST DOCUMENTED IN MEDICAL RECORD: ICD-10-PCS | Mod: CPTII,S$GLB,, | Performed by: INTERNAL MEDICINE

## 2023-08-25 PROCEDURE — 3008F PR BODY MASS INDEX (BMI) DOCUMENTED: ICD-10-PCS | Mod: CPTII,S$GLB,, | Performed by: INTERNAL MEDICINE

## 2023-08-25 PROCEDURE — 99999 PR PBB SHADOW E&M-EST. PATIENT-LVL III: ICD-10-PCS | Mod: PBBFAC,,, | Performed by: INTERNAL MEDICINE

## 2023-08-25 PROCEDURE — 4010F PR ACE/ARB THEARPY RXD/TAKEN: ICD-10-PCS | Mod: CPTII,S$GLB,, | Performed by: INTERNAL MEDICINE

## 2023-08-25 PROCEDURE — 3044F PR MOST RECENT HEMOGLOBIN A1C LEVEL <7.0%: ICD-10-PCS | Mod: CPTII,S$GLB,, | Performed by: INTERNAL MEDICINE

## 2023-08-25 PROCEDURE — 3079F PR MOST RECENT DIASTOLIC BLOOD PRESSURE 80-89 MM HG: ICD-10-PCS | Mod: CPTII,S$GLB,, | Performed by: INTERNAL MEDICINE

## 2023-08-25 PROCEDURE — 3066F PR DOCUMENTATION OF TREATMENT FOR NEPHROPATHY: ICD-10-PCS | Mod: CPTII,S$GLB,, | Performed by: INTERNAL MEDICINE

## 2023-08-25 PROCEDURE — 3061F NEG MICROALBUMINURIA REV: CPT | Mod: CPTII,S$GLB,, | Performed by: INTERNAL MEDICINE

## 2023-08-25 PROCEDURE — 3077F SYST BP >= 140 MM HG: CPT | Mod: CPTII,S$GLB,, | Performed by: INTERNAL MEDICINE

## 2023-08-25 PROCEDURE — 3061F PR NEG MICROALBUMINURIA RESULT DOCUMENTED/REVIEW: ICD-10-PCS | Mod: CPTII,S$GLB,, | Performed by: INTERNAL MEDICINE

## 2023-08-25 PROCEDURE — 99214 OFFICE O/P EST MOD 30 MIN: CPT | Mod: S$GLB,,, | Performed by: INTERNAL MEDICINE

## 2023-08-25 PROCEDURE — 1160F RVW MEDS BY RX/DR IN RCRD: CPT | Mod: CPTII,S$GLB,, | Performed by: INTERNAL MEDICINE

## 2023-08-25 PROCEDURE — 3008F BODY MASS INDEX DOCD: CPT | Mod: CPTII,S$GLB,, | Performed by: INTERNAL MEDICINE

## 2023-08-25 PROCEDURE — 3066F NEPHROPATHY DOC TX: CPT | Mod: CPTII,S$GLB,, | Performed by: INTERNAL MEDICINE

## 2023-08-25 PROCEDURE — 3079F DIAST BP 80-89 MM HG: CPT | Mod: CPTII,S$GLB,, | Performed by: INTERNAL MEDICINE

## 2023-08-25 PROCEDURE — 99214 PR OFFICE/OUTPT VISIT, EST, LEVL IV, 30-39 MIN: ICD-10-PCS | Mod: S$GLB,,, | Performed by: INTERNAL MEDICINE

## 2023-08-25 PROCEDURE — 4010F ACE/ARB THERAPY RXD/TAKEN: CPT | Mod: CPTII,S$GLB,, | Performed by: INTERNAL MEDICINE

## 2023-08-25 PROCEDURE — 1159F MED LIST DOCD IN RCRD: CPT | Mod: CPTII,S$GLB,, | Performed by: INTERNAL MEDICINE

## 2023-08-25 PROCEDURE — 3044F HG A1C LEVEL LT 7.0%: CPT | Mod: CPTII,S$GLB,, | Performed by: INTERNAL MEDICINE

## 2023-08-25 NOTE — PROGRESS NOTES
Subjective:       Patient ID: Shu Mendoza is a 58 y.o. female.     Chief Complaint: follow up for breast cancer     Diagnosis:  Stage IA ([id number]) invasive ductal carcinoma of the left breast, grade 2, ER/MD positive, HER2 negative, s/p left mastectomy and reconstruction on 9/11/2019. Mammaprint high risk     Oncologic History:  1. Ms Mendoza is a 53 yo postmenopausal woman with HTN, DM, who presents for further management of pathologic stage IA ([id number]) invasive ductal carcinoma of the left breast. She was referred to Dr Mccray for bloody nipple discharge first noted in June 2019. Before then she had a negative mammogram on 5/20/19. She had a left breast US on 6/27/19 in the left breast retroareolar region just behind the nipple, there is an irregular hypoechoic intraductal mass measuring 1.0 cm. She underwent a biopsy on 7/8/2019. It showed invasive ductal carcinoma, ER strongly positive 100%, MD positive 80%, HER2 negative 1+. Ki67 5% activity within invasive component. Patient underwent a left total mastectomy and reconstruction on 9/11/2019. Pathology showed a 1.3 cm invasive ductal carcinoma, grade 2. DCIS present, negative margin, 22 lymph nodes removed, one lymph node positive with macrometastases 13 mm, no extranodal extension. No lymphovascular invasion. Pathologic T1c N1a. Mammaprint high risk with chemo benefit >12%. 5-10 years recurrence risk without chemo 20-25%, with chemo and endocrine therapy 7%. BRCAnalysis from 7/18/19 was negative. Case was discussed at tumor board. Adjuvant chemotherapy followed by endocrine therapy was recommended. Radiation not recommended. Patient presents today for further management. Feeling well. Works at [employer]. She had NILES/BSO in 2000 and was on hormone replacement therapy after that. Stopped in 2019 after she was diagnosed with breast cancer.   2. Port placed by Dr Mccray on 10/25/19. Echo on 10/22/19 showed normal LVEF 59%.   3. Adjuvant DDAC followed by  weekly taxol started on 10/31/2019. cycle 2 on 19. cycle 3 was delayed for a week for umbilical infection, given on 2019, cycle 4 given on 19. Weekly taxol completed on 3/19/19. Started anastrozole in 2020. Port was removed.   4. Colonoscopy 2/3/21. Two colon polyps removed. Path showed tubular adenoma.      Interval History:   Ms Mendoza returns today for follow up. Doing very well. Taking anastrozole. Tolerating it well. Taking vit D and calcium. Still needs to see another dentist for a second opinion as she needs extensive dental work.       ECO     ROS:   A ten-point system review is obtained and negative except for what was stated in the Interval History.      Physical Examination:   General: well hydrated, well developed, in no acute distress  HEENT: normocephalic, EOMI, anicteric sclerae  Neck: supple, no JVD, cervical or supraclavicular LAD  Lungs: CTAB, no rales, rhonchi, wheezing  Breasts: s/l left mastectomy and reconstruction. Bilateral breasts no masses, abnormal skin nodules, axillary lymphadenopathy  Heart: RRR, no R/G/M  Abdomen: soft, no tenderness, no distention, no hepatosplenomegaly, hernia or mass. BS present  Ext: no clubbing, cyanosis, edema  Neuro: alert and oriented x 4  Psych: pleasant and appropriate mood and affect     Objective:      Laboratory Data:  Labs reviewed. prior vit D level had been normal     Imaging Data:  Right Mammogram 2022:  Impression:   No mammographic evidence of malignancy.     BI-RADS Category 1: Negative     Recommendation:  Routine screening mammogram in 1 year is recommended.       Bone density 20 normal  Bone density 2022:     Osteopenia both hips with decrease in bone mineral density as above.     Consider FDA approved medical therapies in postmenopausal women and men aged 50 years and older, based on the following:     *A hip or vertebral (clinical or morphometric) fracture  *T score less than or equal to -2.5 at the  femoral neck or spine after appropriate evaluation to exclude secondary causes.  *Low bone mass -- also known as osteopenia (T score between -1.0 and -2.5 at the femoral neck or spine) and a 10 year probability of hip fracture greater than or equal to 3% or a 10 year probability of major osteoporosis-related fracture greater than or equal to 20% based on the [place]-adapted WHO algorithm.  *Clinician's judgment and/or patient preference may indicate treatment for people with 10 year fracture probabilities is above or below these levels.     Assessment and Plan:      1. Infiltrating ductal carcinoma of left breast    2. Malignant neoplasm of central portion of left breast in female, estrogen receptor positive    3. Aromatase inhibitor use    4. Cancer treatment-induced bone loss    5. Type 2 diabetes mellitus without complication, without long-term current use of insulin    6. Primary hypertension        1-3  - Ms Mendoza is a 57 yo postmenopausal woman with stage IA ([id number]) invasive ductal carcinoma of the left breast, ER/UT positive, HER2 negative, s/p left mastectomy and reconstruction on 9/11/2019. Mammaprint high risk with chemo benefit >12%. 5-10 years recurrence risk without chemo 20-25%, with chemo and endocrine therapy 7%.  - completed adjuvant DDAC followed by weekly taxol on 3/19/2019.   - on anastrozole since April 2020. Continue for at least 5 years  - c/w anastrozole  - due for R mammogram in Dec 2023. Will schedule  - RTC after mammogram     4  - still needs dental work  - hold off on zometa until it is 3 months after her last dental work  - c/w vit D and calcium.     5.  - f/u with PCP    6.  - BP slightly elevated  - f/u with PCP    Follow-up:      RTC after mammogram  Knows to call in the interval if any problems arise.    Route Chart for Scheduling    Med Onc Chart Routing      Follow up with physician 6 months. get R mammogram and vit D level in Dec 2023. see me in Jan to review results   Follow up  with ELROY    Infusion scheduling note    Injection scheduling note    Labs Vitamin D   Scheduling:  Preferred lab:  Lab interval:     Imaging Mammogram      Pharmacy appointment    Other referrals

## 2023-08-30 ENCOUNTER — PATIENT MESSAGE (OUTPATIENT)
Dept: INTERNAL MEDICINE | Facility: CLINIC | Age: 58
End: 2023-08-30
Payer: COMMERCIAL

## 2023-08-30 ENCOUNTER — PATIENT MESSAGE (OUTPATIENT)
Dept: SLEEP MEDICINE | Facility: CLINIC | Age: 58
End: 2023-08-30
Payer: COMMERCIAL

## 2023-08-30 ENCOUNTER — TELEPHONE (OUTPATIENT)
Dept: SLEEP MEDICINE | Facility: OTHER | Age: 58
End: 2023-08-30
Payer: COMMERCIAL

## 2023-08-30 VITALS — DIASTOLIC BLOOD PRESSURE: 91 MMHG | SYSTOLIC BLOOD PRESSURE: 164 MMHG

## 2023-08-31 NOTE — TELEPHONE ENCOUNTER
Patient to bathroom, given enema. Patient held enema in for short time. Then stood in bathroom, urinating on floor and refusing to move. Patient stating \"I need to be admitted to palliative medicine! I'll put in a good word for you to God! I need palliative medicine. If I shit, I won't get admitted to palliative medicine. You might not stand here all night, but I will. Until I'm admitted to palliative medicine. \" ER MD aware. Wife states last time the patient was this confused, he had a UTI. Spoke to Ms. Kelly and confirmed appt date and time with Elena MARINO for Blood pressure.  Patient states understanding.

## 2023-08-31 NOTE — TELEPHONE ENCOUNTER
Please assist patient with scheduling sooner appointment for follow up, we'll need to discuss options for an additional medication to better control blood pressure. Thank you!

## 2023-09-01 ENCOUNTER — TELEPHONE (OUTPATIENT)
Dept: SLEEP MEDICINE | Facility: OTHER | Age: 58
End: 2023-09-01
Payer: COMMERCIAL

## 2023-09-05 ENCOUNTER — HOSPITAL ENCOUNTER (OUTPATIENT)
Dept: SLEEP MEDICINE | Facility: OTHER | Age: 58
Discharge: HOME | End: 2023-09-05
Attending: NURSE PRACTITIONER
Payer: COMMERCIAL

## 2023-09-05 ENCOUNTER — OFFICE VISIT (OUTPATIENT)
Dept: INTERNAL MEDICINE | Facility: CLINIC | Age: 58
End: 2023-09-05
Payer: COMMERCIAL

## 2023-09-05 VITALS
HEIGHT: 64 IN | OXYGEN SATURATION: 98 % | WEIGHT: 177.69 LBS | HEART RATE: 79 BPM | SYSTOLIC BLOOD PRESSURE: 136 MMHG | BODY MASS INDEX: 30.34 KG/M2 | DIASTOLIC BLOOD PRESSURE: 72 MMHG

## 2023-09-05 DIAGNOSIS — I10 PRIMARY HYPERTENSION: ICD-10-CM

## 2023-09-05 DIAGNOSIS — G47.33 OSA (OBSTRUCTIVE SLEEP APNEA): Primary | ICD-10-CM

## 2023-09-05 DIAGNOSIS — G47.30 SLEEP APNEA, UNSPECIFIED TYPE: ICD-10-CM

## 2023-09-05 PROCEDURE — 99214 OFFICE O/P EST MOD 30 MIN: CPT | Mod: S$GLB,,, | Performed by: PHYSICIAN ASSISTANT

## 2023-09-05 PROCEDURE — 3066F NEPHROPATHY DOC TX: CPT | Mod: CPTII,S$GLB,, | Performed by: PHYSICIAN ASSISTANT

## 2023-09-05 PROCEDURE — 99214 PR OFFICE/OUTPT VISIT, EST, LEVL IV, 30-39 MIN: ICD-10-PCS | Mod: S$GLB,,, | Performed by: PHYSICIAN ASSISTANT

## 2023-09-05 PROCEDURE — 3008F BODY MASS INDEX DOCD: CPT | Mod: CPTII,S$GLB,, | Performed by: PHYSICIAN ASSISTANT

## 2023-09-05 PROCEDURE — 3061F PR NEG MICROALBUMINURIA RESULT DOCUMENTED/REVIEW: ICD-10-PCS | Mod: CPTII,S$GLB,, | Performed by: PHYSICIAN ASSISTANT

## 2023-09-05 PROCEDURE — 3078F DIAST BP <80 MM HG: CPT | Mod: CPTII,S$GLB,, | Performed by: PHYSICIAN ASSISTANT

## 2023-09-05 PROCEDURE — 3044F PR MOST RECENT HEMOGLOBIN A1C LEVEL <7.0%: ICD-10-PCS | Mod: CPTII,S$GLB,, | Performed by: PHYSICIAN ASSISTANT

## 2023-09-05 PROCEDURE — 1159F MED LIST DOCD IN RCRD: CPT | Mod: CPTII,S$GLB,, | Performed by: PHYSICIAN ASSISTANT

## 2023-09-05 PROCEDURE — 4010F PR ACE/ARB THEARPY RXD/TAKEN: ICD-10-PCS | Mod: CPTII,S$GLB,, | Performed by: PHYSICIAN ASSISTANT

## 2023-09-05 PROCEDURE — 99999 PR PBB SHADOW E&M-EST. PATIENT-LVL III: CPT | Mod: PBBFAC,,, | Performed by: PHYSICIAN ASSISTANT

## 2023-09-05 PROCEDURE — 3044F HG A1C LEVEL LT 7.0%: CPT | Mod: CPTII,S$GLB,, | Performed by: PHYSICIAN ASSISTANT

## 2023-09-05 PROCEDURE — 3008F PR BODY MASS INDEX (BMI) DOCUMENTED: ICD-10-PCS | Mod: CPTII,S$GLB,, | Performed by: PHYSICIAN ASSISTANT

## 2023-09-05 PROCEDURE — 99999 PR PBB SHADOW E&M-EST. PATIENT-LVL III: ICD-10-PCS | Mod: PBBFAC,,, | Performed by: PHYSICIAN ASSISTANT

## 2023-09-05 PROCEDURE — 3066F PR DOCUMENTATION OF TREATMENT FOR NEPHROPATHY: ICD-10-PCS | Mod: CPTII,S$GLB,, | Performed by: PHYSICIAN ASSISTANT

## 2023-09-05 PROCEDURE — 3061F NEG MICROALBUMINURIA REV: CPT | Mod: CPTII,S$GLB,, | Performed by: PHYSICIAN ASSISTANT

## 2023-09-05 PROCEDURE — 1159F PR MEDICATION LIST DOCUMENTED IN MEDICAL RECORD: ICD-10-PCS | Mod: CPTII,S$GLB,, | Performed by: PHYSICIAN ASSISTANT

## 2023-09-05 PROCEDURE — 4010F ACE/ARB THERAPY RXD/TAKEN: CPT | Mod: CPTII,S$GLB,, | Performed by: PHYSICIAN ASSISTANT

## 2023-09-05 PROCEDURE — 3075F SYST BP GE 130 - 139MM HG: CPT | Mod: CPTII,S$GLB,, | Performed by: PHYSICIAN ASSISTANT

## 2023-09-05 PROCEDURE — 95800 SLP STDY UNATTENDED: CPT

## 2023-09-05 PROCEDURE — 3075F PR MOST RECENT SYSTOLIC BLOOD PRESS GE 130-139MM HG: ICD-10-PCS | Mod: CPTII,S$GLB,, | Performed by: PHYSICIAN ASSISTANT

## 2023-09-05 PROCEDURE — 3078F PR MOST RECENT DIASTOLIC BLOOD PRESSURE < 80 MM HG: ICD-10-PCS | Mod: CPTII,S$GLB,, | Performed by: PHYSICIAN ASSISTANT

## 2023-09-05 RX ORDER — CARVEDILOL 6.25 MG/1
12.5 TABLET ORAL 2 TIMES DAILY
Qty: 360 TABLET | Refills: 3 | Status: SHIPPED | OUTPATIENT
Start: 2023-09-05 | End: 2023-10-03

## 2023-09-05 NOTE — PROGRESS NOTES
INTERNAL MEDICINE PROGRESS NOTE    CHIEF COMPLAINT     Chief Complaint   Patient presents with    Follow-up     HTN       HPI     Shu Mendoza is a 58 y.o. female who presents for a follow-up visit today.    PCP is Temitope Grant MD, patient is new to me.     Patient presents for HTN follow-up. She was seen last by PCP on June of 2023. She manages her BP with multiple medications.   She is on Amlodipine-valsartan , hctz 25mg, and coreg 6.25 bid  She is compliant with this med regimen.   She denies chest pain but does report some occasional HA  She is in the D-HTN program. BP has been high at home  HR is usually in 70-80's at home.   No palpitations  Does not monitor dietary Na closely. Admits she just started eating salami and deli bologna recently. Will start monitoring dietary Na more closely.     Past Medical History:  Past Medical History:   Diagnosis Date    Anxiety     Breast cancer     Depression     Diabetes mellitus     Encounter for blood transfusion     Glaucoma 2012    Hx of psychiatric care     Hypertension     Malignant neoplasm of central portion of left breast in female, estrogen receptor positive 7/23/2019    Psychiatric problem     Renal cyst     Retinal tear of right eye     Sleep difficulties        Home Medications:  Prior to Admission medications    Medication Sig Start Date End Date Taking? Authorizing Provider   amitriptyline (ELAVIL) 10 MG tablet TAKE 1 TABLET(10 MG) BY MOUTH EVERY NIGHT AS NEEDED FOR INSOMNIA 5/14/22  Yes Randy Batista DPM   amlodipine-valsartan (EXFORGE)  mg per tablet Take 1 tablet by mouth once daily. 6/13/23 6/12/24 Yes Temitope Grant MD   anastrozole (ARIMIDEX) 1 mg Tab Take 1 tablet (1 mg total) by mouth once daily. 3/2/23  Yes Eliana Norris MD   atorvastatin (LIPITOR) 40 MG tablet Take 1 tablet (40 mg total) by mouth once daily. 3/10/23 3/9/24 Yes Temitope Grant MD   azelastine (ASTELIN) 137 mcg (0.1 %) nasal spray 2 sprays (274 mcg total)  by Nasal route 2 (two) times daily. 1/19/23 1/19/24 Yes Temitope Grant MD   b complex vitamins tablet Take 1 tablet by mouth once daily.   Yes Provider, Historical   B-complex with vitamin C (Z-BEC OR EQUIV) tablet    Yes Provider, Historical   calcium carbonate 1250 MG capsule Take 1,250 mg by mouth 2 (two) times daily with meals.   Yes Provider, Historical   dorzolamide (TRUSOPT) 2 % ophthalmic solution Place 1 drop into both eyes 3 (three) times daily.    Yes Provider, Historical   flash glucose scanning reader (FREESTYLE ALEJANDRINA 14 DAY READER) Misc Use as directed for blood glucose monitoring. 6/13/23  Yes Temitope Grant MD   flash glucose sensor (FREESTYLE ALEJANDRINA 14 DAY SENSOR) Kit Use as directed for blood glucose monitoring. Change site every 14 days. 6/13/23  Yes Temitope Grant MD   fluticasone propionate (FLONASE) 50 mcg/actuation nasal spray 1 spray by Each Nostril route nightly as needed for Rhinitis.   Yes Provider, Historical   gabapentin (NEURONTIN) 300 MG capsule Take 2 capsules (600 mg total) by mouth every evening.  Patient taking differently: Take by mouth every evening. 5/19/22  Yes Jon Agrawal III, MD   hydroCHLOROthiazide (HYDRODIURIL) 25 MG tablet Take 1 tablet (25 mg total) by mouth once daily. 6/9/23 12/6/23 Yes Temitope Grant MD   JARDIANCE 25 mg tablet Take 1 tablet (25 mg total) by mouth once daily. 3/27/23  Yes Temitope Grant MD   latanoprost 0.005 % ophthalmic solution INT 1 GTT IN OU QD HS 4/16/19  Yes Provider, Historical   loratadine (CLARITIN ORAL) Take by mouth.   Yes Provider, Historical   multivitamin (THERAGRAN) per tablet Take 1 tablet by mouth once daily.   Yes Provider, Historical   multivitamin capsule Take 1 capsule by mouth once daily.   Yes Provider, Historical   ondansetron (ZOFRAN-ODT) 8 MG TbDL Dissolve 1 tablet (8 mg total) by mouth every 6 (six) hours as needed (nausea). 5/17/21  Yes Eliana Norris MD   oxybutynin (DITROPAN-XL) 5 MG TR24 Take 1 tablet (5  mg total) by mouth once daily. 12/29/22  Yes Ameena Golden MD   prasterone, dhea, (INTRAROSA) 6.5 mg Inst PLACE 1 INSERT INTRAVAGINALLY AT BEDTIME AS DIRECTED 5/22/23  Yes Glenys Richards PA-C   promethazine (PHENERGAN) 25 MG tablet Take 1 tablet (25 mg total) by mouth every 6 (six) hours as needed for Nausea. 5/17/21  Yes Eliana Norris MD   terconazole (TERAZOL 7) 0.4 % Crea Place 1 applicator vaginally every evening. 12/1/22  Yes Ameena Tavares MD   venlafaxine (EFFEXOR-XR) 75 MG 24 hr capsule Take 1 capsule (75 mg total) by mouth once daily. 8/14/23 2/10/24 Yes Humble Hackett MD   vitamin D (VITAMIN D3) 1000 units Tab Take 1,000 Units by mouth once daily.   Yes Provider, Historical   carvediloL (COREG) 6.25 MG tablet Take 1 tablet (6.25 mg total) by mouth 2 (two) times daily. 8/3/23 9/5/23 Yes Temitope Grant MD   carvediloL (COREG) 6.25 MG tablet Take 2 tablets (12.5 mg total) by mouth 2 (two) times daily. 9/5/23 8/30/24  Elena Ontiveros PA-C   LIDOcaine HCL 2% (XYLOCAINE) 2 % jelly Apply topically as needed. Apply topically once nightly to affected part of foot/feet.  Patient not taking: Reported on 3/3/2023 2/1/22   Randy Batista DPM   metFORMIN (GLUCOPHAGE-XR) 500 MG ER 24hr tablet Take 2 tablets (1,000 mg total) by mouth 2 (two) times daily with meals. 8/2/22 8/2/23  Temitope Grant MD       Review of Systems:  Review of Systems   Constitutional:  Negative for chills and fever.   HENT:  Negative for sore throat and trouble swallowing.    Eyes:  Negative for visual disturbance.   Respiratory:  Negative for cough and shortness of breath.    Cardiovascular:  Negative for chest pain.   Gastrointestinal:  Negative for abdominal pain, constipation, diarrhea, nausea and vomiting.   Genitourinary:  Negative for dysuria and flank pain.   Musculoskeletal:  Negative for back pain, neck pain and neck stiffness.   Skin:  Negative for rash.   Neurological:  Negative for dizziness,  "syncope, weakness and headaches.   Psychiatric/Behavioral:  Negative for confusion.        Health Maintainence:   Immunizations:  Health Maintenance         Date Due Completion Date    Hepatitis C Screening Never done ---    HIV Screening Never done ---    Influenza Vaccine (1) 09/01/2023 12/1/2022    Mammogram 12/13/2023 12/13/2022    Hemoglobin A1c 02/15/2024 8/15/2023    Low Dose Statin 06/13/2024 6/13/2023    Foot Exam 06/13/2024 6/13/2023    Eye Exam 06/14/2024 6/14/2023    Diabetes Urine Screening 08/15/2024 8/15/2023    Lipid Panel 08/15/2024 8/15/2023    Colorectal Cancer Screening 02/03/2026 2/3/2021    TETANUS VACCINE 08/10/2032 8/10/2022             PHYSICAL EXAM     /72 (BP Location: Left arm, Patient Position: Sitting, BP Method: Large (Manual))   Pulse 79   Ht 5' 4" (1.626 m)   Wt 80.6 kg (177 lb 11.1 oz)   LMP  (LMP Unknown)   SpO2 98%   BMI 30.50 kg/m²     Physical Exam  Vitals and nursing note reviewed.   Constitutional:       Appearance: Normal appearance.      Comments: Healthy appearing female in NAD or apparent pain. She makes good eye contact, speaks in clear full sentences and ambulates with ease.          HENT:      Head: Normocephalic and atraumatic.      Nose: Nose normal.      Mouth/Throat:      Pharynx: Oropharynx is clear.   Eyes:      Conjunctiva/sclera: Conjunctivae normal.   Cardiovascular:      Rate and Rhythm: Normal rate and regular rhythm.      Pulses: Normal pulses.   Pulmonary:      Effort: No respiratory distress.   Abdominal:      Tenderness: There is no abdominal tenderness.   Musculoskeletal:         General: Normal range of motion.      Cervical back: No rigidity.   Skin:     General: Skin is warm and dry.      Capillary Refill: Capillary refill takes less than 2 seconds.      Findings: No rash.   Neurological:      General: No focal deficit present.      Mental Status: She is alert.      Gait: Gait normal.   Psychiatric:         Mood and Affect: Mood normal. "         LABS     Lab Results   Component Value Date    HGBA1C 6.7 (H) 08/15/2023     CMP  Sodium   Date Value Ref Range Status   08/15/2023 142 136 - 145 mmol/L Final     Potassium   Date Value Ref Range Status   08/15/2023 4.0 3.5 - 5.1 mmol/L Final     Chloride   Date Value Ref Range Status   08/15/2023 105 95 - 110 mmol/L Final     CO2   Date Value Ref Range Status   08/15/2023 27 23 - 29 mmol/L Final     Glucose   Date Value Ref Range Status   08/15/2023 129 (H) 70 - 110 mg/dL Final     BUN   Date Value Ref Range Status   08/15/2023 16 6 - 20 mg/dL Final     Creatinine   Date Value Ref Range Status   08/15/2023 0.8 0.5 - 1.4 mg/dL Final     Calcium   Date Value Ref Range Status   08/15/2023 9.9 8.7 - 10.5 mg/dL Final     Total Protein   Date Value Ref Range Status   03/03/2023 7.7 6.0 - 8.4 g/dL Final     Albumin   Date Value Ref Range Status   03/03/2023 3.9 3.5 - 5.2 g/dL Final     Total Bilirubin   Date Value Ref Range Status   03/03/2023 0.3 0.1 - 1.0 mg/dL Final     Comment:     For infants and newborns, interpretation of results should be based  on gestational age, weight and in agreement with clinical  observations.    Premature Infant recommended reference ranges:  Up to 24 hours.............<8.0 mg/dL  Up to 48 hours............<12.0 mg/dL  3-5 days..................<15.0 mg/dL  6-29 days.................<15.0 mg/dL       Alkaline Phosphatase   Date Value Ref Range Status   03/03/2023 101 55 - 135 U/L Final     AST   Date Value Ref Range Status   03/03/2023 15 10 - 40 U/L Final     ALT   Date Value Ref Range Status   03/03/2023 19 10 - 44 U/L Final     Anion Gap   Date Value Ref Range Status   08/15/2023 10 8 - 16 mmol/L Final     eGFR if    Date Value Ref Range Status   05/05/2020 >60 >60 mL/min/1.73 m^2 Final     eGFR if non    Date Value Ref Range Status   05/05/2020 >60 >60 mL/min/1.73 m^2 Final     Comment:     Calculation used to obtain the estimated glomerular  filtration  rate (eGFR) is the CKD-EPI equation.        Lab Results   Component Value Date    WBC 8.07 01/19/2023    HGB 13.7 01/19/2023    HCT 41.9 01/19/2023    MCV 87 01/19/2023     01/19/2023     Lab Results   Component Value Date    CHOL 163 08/15/2023    CHOL 211 (H) 03/03/2023    CHOL 193 08/10/2022     Lab Results   Component Value Date    HDL 55 08/15/2023    HDL 52 03/03/2023    HDL 50 08/10/2022     Lab Results   Component Value Date    LDLCALC 97.8 08/15/2023    LDLCALC 146.8 03/03/2023    LDLCALC 134.8 08/10/2022     Lab Results   Component Value Date    TRIG 51 08/15/2023    TRIG 61 03/03/2023    TRIG 41 08/10/2022     Lab Results   Component Value Date    CHOLHDL 33.7 08/15/2023    CHOLHDL 24.6 03/03/2023    CHOLHDL 25.9 08/10/2022     Lab Results   Component Value Date    TSH 1.519 03/03/2023       ASSESSMENT/PLAN     Shu Mendoza is a 58 y.o. female     Shu was seen today for follow-up. Will increase coreg to 12.5 mg bid and will recheck BP in 2 weeks. She is aware of R/B and SE profile of this medication.     Diagnoses and all orders for this visit:    Primary hypertension  -     carvediloL (COREG) 6.25 MG tablet; Take 2 tablets (12.5 mg total) by mouth 2 (two) times daily.      Elena Ontiveros PA-C   Department of Internal Medicine - [facility]   12:53 PM

## 2023-09-06 ENCOUNTER — PATIENT MESSAGE (OUTPATIENT)
Dept: ADMINISTRATIVE | Facility: OTHER | Age: 58
End: 2023-09-06
Payer: COMMERCIAL

## 2023-09-06 PROBLEM — G47.30 SLEEP APNEA: Status: ACTIVE | Noted: 2023-09-06

## 2023-09-09 DIAGNOSIS — E11.9 TYPE 2 DIABETES MELLITUS WITHOUT COMPLICATION, WITHOUT LONG-TERM CURRENT USE OF INSULIN: ICD-10-CM

## 2023-09-09 NOTE — TELEPHONE ENCOUNTER
No care due was identified.  [facility] Embedded Care Due Messages. Reference number: [id number].   9/09/2023 4:54:30 AM CDT

## 2023-09-11 RX ORDER — METFORMIN HYDROCHLORIDE 500 MG/1
1000 TABLET, EXTENDED RELEASE ORAL 2 TIMES DAILY WITH MEALS
Qty: 360 TABLET | Refills: 3 | Status: SHIPPED | OUTPATIENT
Start: 2023-09-11 | End: 2024-09-10

## 2023-09-12 DIAGNOSIS — C50.912 MALIGNANT NEOPLASM OF LEFT BREAST IN FEMALE, ESTROGEN RECEPTOR POSITIVE, UNSPECIFIED SITE OF BREAST: ICD-10-CM

## 2023-09-12 DIAGNOSIS — Z17.0 MALIGNANT NEOPLASM OF LEFT BREAST IN FEMALE, ESTROGEN RECEPTOR POSITIVE, UNSPECIFIED SITE OF BREAST: ICD-10-CM

## 2023-09-12 PROCEDURE — 95806 SLEEP STUDY UNATT&RESP EFFT: CPT | Mod: 26,,, | Performed by: PSYCHIATRY & NEUROLOGY

## 2023-09-12 PROCEDURE — 95806 PR SLEEP STUDY, UNATTENDED, SIMUL RECORD HR/O2 SAT/RESP FLOW/RESP EFFT: ICD-10-PCS | Mod: 26,,, | Performed by: PSYCHIATRY & NEUROLOGY

## 2023-09-12 RX ORDER — ANASTROZOLE 1 MG/1
1 TABLET ORAL
Qty: 90 TABLET | Refills: 3 | Status: SHIPPED | OUTPATIENT
Start: 2023-09-12

## 2023-09-13 ENCOUNTER — PATIENT MESSAGE (OUTPATIENT)
Dept: SLEEP MEDICINE | Facility: CLINIC | Age: 58
End: 2023-09-13
Payer: COMMERCIAL

## 2023-09-13 DIAGNOSIS — G47.33 OSA (OBSTRUCTIVE SLEEP APNEA): Primary | ICD-10-CM

## 2023-09-13 NOTE — PROCEDURES
PHYSICIAN INTERPRETATION AND COMMENTS: Findings are consistent with mild, obstructive sleep apnea (NADER) (G47.33),  by overall AHI (apnea hypopnea index). However, findings on this study suggest that the degree of sleep disordered  breathing is in the moderate severity range, when RDI is measured. This study was technically adequate to allow for  interpretation.  CLINICAL HISTORY: 58 year old female presented with: 16 inch neck, BMI of 29.5, an [place] sleepiness score of 8, history  of hypertension, diabetes, depression and symptoms of nocturnal snoring and waking up choking. Based on the clinical  history, the patient has a high pre-test probability of having Severe NADER.  SLEEP STUDY FINDINGS: Patient underwent a 1 night Home Sleep Test and by behavioral criteria, slept for approximately  6.76 hours, with a sleep latency of 7 minutes and a sleep efficiency of 96.5%. Mild sleep disordered breathing (AHI=7) is  noted based on a 4% hypopnea desaturation criteria, predominantly in the supine position (8 events/hour). The patient  slept supine 77.1% of the night based on valid recording time of 6.76 hours and is 4 times as likely to have  apneas/hypopneas when supine. When considering more subtle measures of sleep disordered breathing, the overall  respiratory disturbance index is moderte(RDI=19) based on a 1% hypopnea desaturation criteria with confirmation by  surrogate arousal indicators. The apneas/hypopneas are accompanied by minimal oxygen desaturation (percent time  below 90% SpO2: 0.6%, Min SpO2: 86.1%). The average desaturation across all sleep disordered breathing events is 2.8%.  Snoring occurs for 28.4% (30 dB) of the study, 18.5% is very loud. The mean pulse rate is 75.3 BPM, with very frequent pulse  rate variability (109 events with >= 6 BPM increase/decrease per hour).  TREATMENT CONSIDERATIONS: Consider trial of Auto-titrating CPAP 6-20 cm, mask of patient's choice, and heated  humidification. If  patient has difficulty with CPAP adherence or ongoing NADER symptoms or despite CPAP adherence, then  consider an in-lab titration sleep study in order to determine optimal fixed CPAP setting. Alternatively consider oral  appliance fitted by a dentist specializing in these devices, or surgical consultation for uvulopalatopharyngoplasty (UPPP)  for treatment of obstructive sleep apnea.  DISEASE MANAGEMENT CONSIDERATIONS: Definitive treatment for NADER is recommended. Consider Sleep Clinic referral for  NADER management.  Signature:

## 2023-09-19 ENCOUNTER — OFFICE VISIT (OUTPATIENT)
Dept: INTERNAL MEDICINE | Facility: CLINIC | Age: 58
End: 2023-09-19
Payer: COMMERCIAL

## 2023-09-19 VITALS
DIASTOLIC BLOOD PRESSURE: 70 MMHG | BODY MASS INDEX: 30.07 KG/M2 | SYSTOLIC BLOOD PRESSURE: 134 MMHG | HEIGHT: 64 IN | WEIGHT: 176.13 LBS

## 2023-09-19 DIAGNOSIS — Z12.31 ENCOUNTER FOR SCREENING MAMMOGRAM FOR BREAST CANCER: ICD-10-CM

## 2023-09-19 DIAGNOSIS — I10 PRIMARY HYPERTENSION: Primary | ICD-10-CM

## 2023-09-19 PROCEDURE — 99999 PR PBB SHADOW E&M-EST. PATIENT-LVL III: ICD-10-PCS | Mod: PBBFAC,,, | Performed by: PHYSICIAN ASSISTANT

## 2023-09-19 PROCEDURE — 3075F SYST BP GE 130 - 139MM HG: CPT | Mod: CPTII,S$GLB,, | Performed by: PHYSICIAN ASSISTANT

## 2023-09-19 PROCEDURE — 3044F HG A1C LEVEL LT 7.0%: CPT | Mod: CPTII,S$GLB,, | Performed by: PHYSICIAN ASSISTANT

## 2023-09-19 PROCEDURE — 99999 PR PBB SHADOW E&M-EST. PATIENT-LVL III: CPT | Mod: PBBFAC,,, | Performed by: PHYSICIAN ASSISTANT

## 2023-09-19 PROCEDURE — 1159F MED LIST DOCD IN RCRD: CPT | Mod: CPTII,S$GLB,, | Performed by: PHYSICIAN ASSISTANT

## 2023-09-19 PROCEDURE — 3061F NEG MICROALBUMINURIA REV: CPT | Mod: CPTII,S$GLB,, | Performed by: PHYSICIAN ASSISTANT

## 2023-09-19 PROCEDURE — 99214 OFFICE O/P EST MOD 30 MIN: CPT | Mod: S$GLB,,, | Performed by: PHYSICIAN ASSISTANT

## 2023-09-19 PROCEDURE — 3066F NEPHROPATHY DOC TX: CPT | Mod: CPTII,S$GLB,, | Performed by: PHYSICIAN ASSISTANT

## 2023-09-19 PROCEDURE — 3078F PR MOST RECENT DIASTOLIC BLOOD PRESSURE < 80 MM HG: ICD-10-PCS | Mod: CPTII,S$GLB,, | Performed by: PHYSICIAN ASSISTANT

## 2023-09-19 PROCEDURE — 3061F PR NEG MICROALBUMINURIA RESULT DOCUMENTED/REVIEW: ICD-10-PCS | Mod: CPTII,S$GLB,, | Performed by: PHYSICIAN ASSISTANT

## 2023-09-19 PROCEDURE — 3008F BODY MASS INDEX DOCD: CPT | Mod: CPTII,S$GLB,, | Performed by: PHYSICIAN ASSISTANT

## 2023-09-19 PROCEDURE — 3075F PR MOST RECENT SYSTOLIC BLOOD PRESS GE 130-139MM HG: ICD-10-PCS | Mod: CPTII,S$GLB,, | Performed by: PHYSICIAN ASSISTANT

## 2023-09-19 PROCEDURE — 99214 PR OFFICE/OUTPT VISIT, EST, LEVL IV, 30-39 MIN: ICD-10-PCS | Mod: S$GLB,,, | Performed by: PHYSICIAN ASSISTANT

## 2023-09-19 PROCEDURE — 3044F PR MOST RECENT HEMOGLOBIN A1C LEVEL <7.0%: ICD-10-PCS | Mod: CPTII,S$GLB,, | Performed by: PHYSICIAN ASSISTANT

## 2023-09-19 PROCEDURE — 4010F PR ACE/ARB THEARPY RXD/TAKEN: ICD-10-PCS | Mod: CPTII,S$GLB,, | Performed by: PHYSICIAN ASSISTANT

## 2023-09-19 PROCEDURE — 3008F PR BODY MASS INDEX (BMI) DOCUMENTED: ICD-10-PCS | Mod: CPTII,S$GLB,, | Performed by: PHYSICIAN ASSISTANT

## 2023-09-19 PROCEDURE — 1159F PR MEDICATION LIST DOCUMENTED IN MEDICAL RECORD: ICD-10-PCS | Mod: CPTII,S$GLB,, | Performed by: PHYSICIAN ASSISTANT

## 2023-09-19 PROCEDURE — 3078F DIAST BP <80 MM HG: CPT | Mod: CPTII,S$GLB,, | Performed by: PHYSICIAN ASSISTANT

## 2023-09-19 PROCEDURE — 3066F PR DOCUMENTATION OF TREATMENT FOR NEPHROPATHY: ICD-10-PCS | Mod: CPTII,S$GLB,, | Performed by: PHYSICIAN ASSISTANT

## 2023-09-19 PROCEDURE — 4010F ACE/ARB THERAPY RXD/TAKEN: CPT | Mod: CPTII,S$GLB,, | Performed by: PHYSICIAN ASSISTANT

## 2023-09-19 NOTE — PROGRESS NOTES
INTERNAL MEDICINE PROGRESS NOTE    CHIEF COMPLAINT     Chief Complaint   Patient presents with    Hypertension       HPI     Shu Mendoza is a 58 y.o. female who presents for a follow-up visit today.    PCP is Temitope Grant MD, patient is known to me.     Here for BP follow-up.   She is feeling well. BP at home is elevated. She is using D-HTN program. She is compliant with medications.  She denies chest pain, SOB, nausea, HA, dizziness, vision changes.       Past Medical History:  Past Medical History:   Diagnosis Date    Anxiety     Breast cancer     Depression     Diabetes mellitus     Encounter for blood transfusion     Glaucoma 2012    Hx of psychiatric care     Hypertension     Malignant neoplasm of central portion of left breast in female, estrogen receptor positive 7/23/2019    Psychiatric problem     Renal cyst     Retinal tear of right eye     Sleep difficulties        Home Medications:  Prior to Admission medications    Medication Sig Start Date End Date Taking? Authorizing Provider   amitriptyline (ELAVIL) 10 MG tablet TAKE 1 TABLET(10 MG) BY MOUTH EVERY NIGHT AS NEEDED FOR INSOMNIA 5/14/22  Yes Randy Batista DPM   amlodipine-valsartan (EXFORGE)  mg per tablet Take 1 tablet by mouth once daily. 6/13/23 6/12/24 Yes Temitope Grant MD   anastrozole (ARIMIDEX) 1 mg Tab TAKE 1 TABLET(1 MG) BY MOUTH EVERY DAY 9/12/23  Yes Eliana Norris MD   atorvastatin (LIPITOR) 40 MG tablet Take 1 tablet (40 mg total) by mouth once daily. 3/10/23 3/9/24 Yes Temitope Grant MD   azelastine (ASTELIN) 137 mcg (0.1 %) nasal spray 2 sprays (274 mcg total) by Nasal route 2 (two) times daily. 1/19/23 1/19/24 Yes Temitope Grant MD   b complex vitamins tablet Take 1 tablet by mouth once daily.   Yes Provider, Historical   B-complex with vitamin C (Z-BEC OR EQUIV) tablet    Yes Provider, Historical   calcium carbonate 1250 MG capsule Take 1,250 mg by mouth 2 (two) times daily with meals.   Yes Provider,  Historical   carvediloL (COREG) 6.25 MG tablet Take 2 tablets (12.5 mg total) by mouth 2 (two) times daily. 9/5/23 8/30/24 Yes Elena Ontiveros PA-C   dorzolamide (TRUSOPT) 2 % ophthalmic solution Place 1 drop into both eyes 3 (three) times daily.    Yes Provider, Historical   fluticasone propionate (FLONASE) 50 mcg/actuation nasal spray 1 spray by Each Nostril route nightly as needed for Rhinitis.   Yes Provider, Historical   gabapentin (NEURONTIN) 300 MG capsule Take 2 capsules (600 mg total) by mouth every evening.  Patient taking differently: Take by mouth every evening. 5/19/22  Yes Jon Agrawal III, MD   hydroCHLOROthiazide (HYDRODIURIL) 25 MG tablet Take 1 tablet (25 mg total) by mouth once daily. 6/9/23 12/6/23 Yes Temitope Grant MD   JARDIANCE 25 mg tablet Take 1 tablet (25 mg total) by mouth once daily. 3/27/23  Yes Temitope Grant MD   latanoprost 0.005 % ophthalmic solution INT 1 GTT IN OU QD HS 4/16/19  Yes Provider, Historical   loratadine (CLARITIN ORAL) Take by mouth.   Yes Provider, Historical   metFORMIN (GLUCOPHAGE-XR) 500 MG ER 24hr tablet Take 2 tablets (1,000 mg total) by mouth 2 (two) times daily with meals. 9/11/23 9/10/24 Yes Temitope Grant MD   multivitamin (THERAGRAN) per tablet Take 1 tablet by mouth once daily.   Yes Provider, Historical   multivitamin capsule Take 1 capsule by mouth once daily.   Yes Provider, Historical   ondansetron (ZOFRAN-ODT) 8 MG TbDL Dissolve 1 tablet (8 mg total) by mouth every 6 (six) hours as needed (nausea). 5/17/21  Yes Eliana Norris MD   oxybutynin (DITROPAN-XL) 5 MG TR24 Take 1 tablet (5 mg total) by mouth once daily. 12/29/22  Yes Ameena Golden MD   prasterone, dhea, (INTRAROSA) 6.5 mg Inst PLACE 1 INSERT INTRAVAGINALLY AT BEDTIME AS DIRECTED 5/22/23  Yes Glenys Richards PA-C   promethazine (PHENERGAN) 25 MG tablet Take 1 tablet (25 mg total) by mouth every 6 (six) hours as needed for Nausea. 5/17/21  Yes Eliana Norris MD    terconazole (TERAZOL 7) 0.4 % Crea Place 1 applicator vaginally every evening. 12/1/22  Yes Ameena Tavares MD   venlafaxine (EFFEXOR-XR) 75 MG 24 hr capsule Take 1 capsule (75 mg total) by mouth once daily. 8/14/23 2/10/24 Yes Humble Hackett MD   vitamin D (VITAMIN D3) 1000 units Tab Take 1,000 Units by mouth once daily.   Yes Provider, Historical   flash glucose scanning reader (FREESTYLE ALEJANDRINA 14 DAY READER) Misc Use as directed for blood glucose monitoring. 6/13/23   Temitope Grant MD   flash glucose sensor (FREESTYLE ALEJANDRINA 14 DAY SENSOR) Kit Use as directed for blood glucose monitoring. Change site every 14 days. 6/13/23   Temitope Grant MD   LIDOcaine HCL 2% (XYLOCAINE) 2 % jelly Apply topically as needed. Apply topically once nightly to affected part of foot/feet.  Patient not taking: Reported on 3/3/2023 2/1/22   Randy Batsita DPM       Review of Systems:  Review of Systems   Constitutional:  Negative for chills and fever.   HENT:  Negative for sore throat and trouble swallowing.    Eyes:  Negative for visual disturbance.   Respiratory:  Negative for cough and shortness of breath.    Cardiovascular:  Negative for chest pain.   Gastrointestinal:  Negative for abdominal pain, constipation, diarrhea, nausea and vomiting.   Genitourinary:  Negative for dysuria and flank pain.   Musculoskeletal:  Negative for back pain, neck pain and neck stiffness.   Skin:  Negative for rash.   Neurological:  Negative for dizziness, syncope, weakness and headaches.   Psychiatric/Behavioral:  Negative for confusion.        Health Maintainence:   Immunizations:  Health Maintenance         Date Due Completion Date    Hepatitis C Screening Never done ---    HIV Screening Never done ---    Mammogram 12/13/2023 12/13/2022    Hemoglobin A1c 02/15/2024 8/15/2023    Foot Exam 06/13/2024 6/13/2023    Eye Exam 06/14/2024 6/14/2023    Diabetes Urine Screening 08/15/2024 8/15/2023    Lipid Panel 08/15/2024  "8/15/2023    Low Dose Statin 09/19/2024 9/19/2023    Colorectal Cancer Screening 02/03/2026 2/3/2021    TETANUS VACCINE 08/10/2032 8/10/2022             PHYSICAL EXAM     /70   Ht 5' 4" (1.626 m)   Wt 79.9 kg (176 lb 2.4 oz)   LMP  (LMP Unknown)   BMI 30.24 kg/m²     Physical Exam  Vitals and nursing note reviewed.   Constitutional:       Appearance: Normal appearance.      Comments: Healthy appearing female in NAD or apparent pain. She makes good eye contact, speaks in clear full sentences and ambulates with ease.        HENT:      Head: Normocephalic and atraumatic.      Nose: Nose normal.      Mouth/Throat:      Pharynx: Oropharynx is clear.   Eyes:      Conjunctiva/sclera: Conjunctivae normal.   Cardiovascular:      Rate and Rhythm: Normal rate and regular rhythm.      Pulses: Normal pulses.   Pulmonary:      Effort: No respiratory distress.   Abdominal:      Tenderness: There is no abdominal tenderness.   Musculoskeletal:         General: Normal range of motion.      Cervical back: No rigidity.   Skin:     General: Skin is warm and dry.      Capillary Refill: Capillary refill takes less than 2 seconds.      Findings: No rash.   Neurological:      General: No focal deficit present.      Mental Status: She is alert.      Gait: Gait normal.   Psychiatric:         Mood and Affect: Mood normal.         LABS     Lab Results   Component Value Date    HGBA1C 6.7 (H) 08/15/2023     CMP  Sodium   Date Value Ref Range Status   08/15/2023 142 136 - 145 mmol/L Final     Potassium   Date Value Ref Range Status   08/15/2023 4.0 3.5 - 5.1 mmol/L Final     Chloride   Date Value Ref Range Status   08/15/2023 105 95 - 110 mmol/L Final     CO2   Date Value Ref Range Status   08/15/2023 27 23 - 29 mmol/L Final     Glucose   Date Value Ref Range Status   08/15/2023 129 (H) 70 - 110 mg/dL Final     BUN   Date Value Ref Range Status   08/15/2023 16 6 - 20 mg/dL Final     Creatinine   Date Value Ref Range Status   08/15/2023 " 0.8 0.5 - 1.4 mg/dL Final     Calcium   Date Value Ref Range Status   08/15/2023 9.9 8.7 - 10.5 mg/dL Final     Total Protein   Date Value Ref Range Status   03/03/2023 7.7 6.0 - 8.4 g/dL Final     Albumin   Date Value Ref Range Status   03/03/2023 3.9 3.5 - 5.2 g/dL Final     Total Bilirubin   Date Value Ref Range Status   03/03/2023 0.3 0.1 - 1.0 mg/dL Final     Comment:     For infants and newborns, interpretation of results should be based  on gestational age, weight and in agreement with clinical  observations.    Premature Infant recommended reference ranges:  Up to 24 hours.............<8.0 mg/dL  Up to 48 hours............<12.0 mg/dL  3-5 days..................<15.0 mg/dL  6-29 days.................<15.0 mg/dL       Alkaline Phosphatase   Date Value Ref Range Status   03/03/2023 101 55 - 135 U/L Final     AST   Date Value Ref Range Status   03/03/2023 15 10 - 40 U/L Final     ALT   Date Value Ref Range Status   03/03/2023 19 10 - 44 U/L Final     Anion Gap   Date Value Ref Range Status   08/15/2023 10 8 - 16 mmol/L Final     eGFR if    Date Value Ref Range Status   05/05/2020 >60 >60 mL/min/1.73 m^2 Final     eGFR if non    Date Value Ref Range Status   05/05/2020 >60 >60 mL/min/1.73 m^2 Final     Comment:     Calculation used to obtain the estimated glomerular filtration  rate (eGFR) is the CKD-EPI equation.        Lab Results   Component Value Date    WBC 8.07 01/19/2023    HGB 13.7 01/19/2023    HCT 41.9 01/19/2023    MCV 87 01/19/2023     01/19/2023     Lab Results   Component Value Date    CHOL 163 08/15/2023    CHOL 211 (H) 03/03/2023    CHOL 193 08/10/2022     Lab Results   Component Value Date    HDL 55 08/15/2023    HDL 52 03/03/2023    HDL 50 08/10/2022     Lab Results   Component Value Date    LDLCALC 97.8 08/15/2023    LDLCALC 146.8 03/03/2023    LDLCALC 134.8 08/10/2022     Lab Results   Component Value Date    TRIG 51 08/15/2023    TRIG 61 03/03/2023     TRIG 41 08/10/2022     Lab Results   Component Value Date    CHOLHDL 33.7 08/15/2023    CHOLHDL 24.6 03/03/2023    CHOLHDL 25.9 08/10/2022     Lab Results   Component Value Date    TSH 1.519 03/03/2023       ASSESSMENT/PLAN     Shu Mendoza is a 58 y.o. female     Shu was seen today for hypertension. BP is well controlled  here in office but BP at home still on elevated side - will optimize BP technique and will RTC in 2 weeks for BP log review. Will have to add or optimize meds if BP still elevated at next OV. She is amenable to and aware of plan.     Diagnoses and all orders for this visit:    Primary hypertension    Encounter for screening mammogram for breast cancer  -     Mammo Digital Diagnostic Bilat with Patrick; Future      Elena Ontiveros PA-C   Department of Internal Medicine - [facility]   1:12 PM

## 2023-09-25 DIAGNOSIS — Z12.31 ENCOUNTER FOR SCREENING MAMMOGRAM FOR MALIGNANT NEOPLASM OF BREAST: Primary | ICD-10-CM

## 2023-10-03 ENCOUNTER — OFFICE VISIT (OUTPATIENT)
Dept: INTERNAL MEDICINE | Facility: CLINIC | Age: 58
End: 2023-10-03
Payer: COMMERCIAL

## 2023-10-03 ENCOUNTER — HOSPITAL ENCOUNTER (OUTPATIENT)
Dept: RADIOLOGY | Facility: HOSPITAL | Age: 58
Discharge: HOME | End: 2023-10-03
Attending: PODIATRIST
Payer: COMMERCIAL

## 2023-10-03 ENCOUNTER — OFFICE VISIT (OUTPATIENT)
Dept: PODIATRY | Facility: CLINIC | Age: 58
End: 2023-10-03
Payer: COMMERCIAL

## 2023-10-03 VITALS — HEART RATE: 74 BPM | DIASTOLIC BLOOD PRESSURE: 84 MMHG | SYSTOLIC BLOOD PRESSURE: 151 MMHG

## 2023-10-03 VITALS
SYSTOLIC BLOOD PRESSURE: 158 MMHG | DIASTOLIC BLOOD PRESSURE: 78 MMHG | WEIGHT: 177.94 LBS | HEIGHT: 64 IN | HEART RATE: 73 BPM | BODY MASS INDEX: 30.38 KG/M2

## 2023-10-03 DIAGNOSIS — M79.5 FOREIGN BODY (FB) IN SOFT TISSUE: ICD-10-CM

## 2023-10-03 DIAGNOSIS — G62.9 NEUROPATHY: ICD-10-CM

## 2023-10-03 DIAGNOSIS — E11.9 TYPE 2 DIABETES MELLITUS WITHOUT COMPLICATION, WITHOUT LONG-TERM CURRENT USE OF INSULIN: ICD-10-CM

## 2023-10-03 DIAGNOSIS — M79.5 FOREIGN BODY (FB) IN SOFT TISSUE: Primary | ICD-10-CM

## 2023-10-03 DIAGNOSIS — T45.1X5A CHEMOTHERAPY-INDUCED NEUROPATHY: ICD-10-CM

## 2023-10-03 DIAGNOSIS — G62.0 CHEMOTHERAPY-INDUCED NEUROPATHY: ICD-10-CM

## 2023-10-03 DIAGNOSIS — I10 PRIMARY HYPERTENSION: ICD-10-CM

## 2023-10-03 PROCEDURE — 3066F PR DOCUMENTATION OF TREATMENT FOR NEPHROPATHY: ICD-10-PCS | Mod: CPTII,S$GLB,, | Performed by: PODIATRIST

## 2023-10-03 PROCEDURE — 3044F HG A1C LEVEL LT 7.0%: CPT | Mod: CPTII,95,, | Performed by: PHYSICIAN ASSISTANT

## 2023-10-03 PROCEDURE — 3066F NEPHROPATHY DOC TX: CPT | Mod: CPTII,S$GLB,, | Performed by: PODIATRIST

## 2023-10-03 PROCEDURE — 99999 PR PBB SHADOW E&M-EST. PATIENT-LVL III: ICD-10-PCS | Mod: PBBFAC,,, | Performed by: PODIATRIST

## 2023-10-03 PROCEDURE — 3061F NEG MICROALBUMINURIA REV: CPT | Mod: CPTII,95,, | Performed by: PHYSICIAN ASSISTANT

## 2023-10-03 PROCEDURE — 4010F ACE/ARB THERAPY RXD/TAKEN: CPT | Mod: CPTII,S$GLB,, | Performed by: PODIATRIST

## 2023-10-03 PROCEDURE — 3077F PR MOST RECENT SYSTOLIC BLOOD PRESSURE >= 140 MM HG: ICD-10-PCS | Mod: CPTII,95,, | Performed by: PHYSICIAN ASSISTANT

## 2023-10-03 PROCEDURE — 73630 XR FOOT COMPLETE 3 VIEW RIGHT: ICD-10-PCS | Mod: 26,RT,, | Performed by: RADIOLOGY

## 2023-10-03 PROCEDURE — 3066F PR DOCUMENTATION OF TREATMENT FOR NEPHROPATHY: ICD-10-PCS | Mod: CPTII,95,, | Performed by: PHYSICIAN ASSISTANT

## 2023-10-03 PROCEDURE — 3044F PR MOST RECENT HEMOGLOBIN A1C LEVEL <7.0%: ICD-10-PCS | Mod: CPTII,S$GLB,, | Performed by: PODIATRIST

## 2023-10-03 PROCEDURE — 3077F SYST BP >= 140 MM HG: CPT | Mod: CPTII,S$GLB,, | Performed by: PODIATRIST

## 2023-10-03 PROCEDURE — 3044F PR MOST RECENT HEMOGLOBIN A1C LEVEL <7.0%: ICD-10-PCS | Mod: CPTII,95,, | Performed by: PHYSICIAN ASSISTANT

## 2023-10-03 PROCEDURE — 11420 PR EXC SKIN BENIG <0.5 CM REMAINDER BODY: ICD-10-PCS | Mod: S$GLB,,, | Performed by: PODIATRIST

## 2023-10-03 PROCEDURE — 3044F HG A1C LEVEL LT 7.0%: CPT | Mod: CPTII,S$GLB,, | Performed by: PODIATRIST

## 2023-10-03 PROCEDURE — 1159F PR MEDICATION LIST DOCUMENTED IN MEDICAL RECORD: ICD-10-PCS | Mod: CPTII,95,, | Performed by: PHYSICIAN ASSISTANT

## 2023-10-03 PROCEDURE — 3061F PR NEG MICROALBUMINURIA RESULT DOCUMENTED/REVIEW: ICD-10-PCS | Mod: CPTII,95,, | Performed by: PHYSICIAN ASSISTANT

## 2023-10-03 PROCEDURE — 3066F NEPHROPATHY DOC TX: CPT | Mod: CPTII,95,, | Performed by: PHYSICIAN ASSISTANT

## 2023-10-03 PROCEDURE — 99214 OFFICE O/P EST MOD 30 MIN: CPT | Mod: 25,S$GLB,, | Performed by: PODIATRIST

## 2023-10-03 PROCEDURE — 1159F MED LIST DOCD IN RCRD: CPT | Mod: CPTII,95,, | Performed by: PHYSICIAN ASSISTANT

## 2023-10-03 PROCEDURE — 99213 OFFICE O/P EST LOW 20 MIN: CPT | Mod: 95,,, | Performed by: PHYSICIAN ASSISTANT

## 2023-10-03 PROCEDURE — 3078F DIAST BP <80 MM HG: CPT | Mod: CPTII,S$GLB,, | Performed by: PODIATRIST

## 2023-10-03 PROCEDURE — 3008F PR BODY MASS INDEX (BMI) DOCUMENTED: ICD-10-PCS | Mod: CPTII,S$GLB,, | Performed by: PODIATRIST

## 2023-10-03 PROCEDURE — 3061F NEG MICROALBUMINURIA REV: CPT | Mod: CPTII,S$GLB,, | Performed by: PODIATRIST

## 2023-10-03 PROCEDURE — 3061F PR NEG MICROALBUMINURIA RESULT DOCUMENTED/REVIEW: ICD-10-PCS | Mod: CPTII,S$GLB,, | Performed by: PODIATRIST

## 2023-10-03 PROCEDURE — 3077F PR MOST RECENT SYSTOLIC BLOOD PRESSURE >= 140 MM HG: ICD-10-PCS | Mod: CPTII,S$GLB,, | Performed by: PODIATRIST

## 2023-10-03 PROCEDURE — 3079F PR MOST RECENT DIASTOLIC BLOOD PRESSURE 80-89 MM HG: ICD-10-PCS | Mod: CPTII,95,, | Performed by: PHYSICIAN ASSISTANT

## 2023-10-03 PROCEDURE — 3079F DIAST BP 80-89 MM HG: CPT | Mod: CPTII,95,, | Performed by: PHYSICIAN ASSISTANT

## 2023-10-03 PROCEDURE — 99213 PR OFFICE/OUTPT VISIT, EST, LEVL III, 20-29 MIN: ICD-10-PCS | Mod: 95,,, | Performed by: PHYSICIAN ASSISTANT

## 2023-10-03 PROCEDURE — 3078F PR MOST RECENT DIASTOLIC BLOOD PRESSURE < 80 MM HG: ICD-10-PCS | Mod: CPTII,S$GLB,, | Performed by: PODIATRIST

## 2023-10-03 PROCEDURE — 3008F BODY MASS INDEX DOCD: CPT | Mod: CPTII,S$GLB,, | Performed by: PODIATRIST

## 2023-10-03 PROCEDURE — 3077F SYST BP >= 140 MM HG: CPT | Mod: CPTII,95,, | Performed by: PHYSICIAN ASSISTANT

## 2023-10-03 PROCEDURE — 4010F PR ACE/ARB THEARPY RXD/TAKEN: ICD-10-PCS | Mod: CPTII,S$GLB,, | Performed by: PODIATRIST

## 2023-10-03 PROCEDURE — 11420 EXC H-F-NK-SP B9+MARG 0.5/<: CPT | Mod: S$GLB,,, | Performed by: PODIATRIST

## 2023-10-03 PROCEDURE — 99214 PR OFFICE/OUTPT VISIT, EST, LEVL IV, 30-39 MIN: ICD-10-PCS | Mod: 25,S$GLB,, | Performed by: PODIATRIST

## 2023-10-03 PROCEDURE — 4010F PR ACE/ARB THEARPY RXD/TAKEN: ICD-10-PCS | Mod: CPTII,95,, | Performed by: PHYSICIAN ASSISTANT

## 2023-10-03 PROCEDURE — 99999 PR PBB SHADOW E&M-EST. PATIENT-LVL III: CPT | Mod: PBBFAC,,, | Performed by: PODIATRIST

## 2023-10-03 PROCEDURE — 73630 X-RAY EXAM OF FOOT: CPT | Mod: TC,RT

## 2023-10-03 PROCEDURE — 4010F ACE/ARB THERAPY RXD/TAKEN: CPT | Mod: CPTII,95,, | Performed by: PHYSICIAN ASSISTANT

## 2023-10-03 PROCEDURE — 73630 X-RAY EXAM OF FOOT: CPT | Mod: 26,RT,, | Performed by: RADIOLOGY

## 2023-10-03 RX ORDER — AMMONIUM LACTATE 12 G/100G
1 CREAM TOPICAL 2 TIMES DAILY
Qty: 140 G | Refills: 11 | Status: SHIPPED | OUTPATIENT
Start: 2023-10-03

## 2023-10-03 RX ORDER — GABAPENTIN 300 MG/1
600 CAPSULE ORAL NIGHTLY
Qty: 180 CAPSULE | Refills: 3 | Status: SHIPPED | OUTPATIENT
Start: 2023-10-03

## 2023-10-03 RX ORDER — CARVEDILOL 25 MG/1
25 TABLET ORAL 2 TIMES DAILY
Qty: 60 TABLET | Refills: 11 | Status: SHIPPED | OUTPATIENT
Start: 2023-10-03 | End: 2024-09-27

## 2023-10-03 NOTE — PROGRESS NOTES
INTERNAL MEDICINE PROGRESS NOTE    CHIEF COMPLAINT     Chief Complaint   Patient presents with    Follow-up     2 wk BP       HPI     Shu Mendoza is a 58 y.o. female who presents for a follow-up visit today.    PCP is Temitope Grant MD, patient is known to me.     Here today for BP review   At home during VV  Face Time is 2:37 PM to 2:47 PM   She is managing BP with amlodipine 10-valsartan 320, hctz 25, carvedilol 12.5 bid  BP at home is still high despite BP technique improvement         Past Medical History:  Past Medical History:   Diagnosis Date    Anxiety     Breast cancer     Depression     Diabetes mellitus     Encounter for blood transfusion     Glaucoma 2012    Hx of psychiatric care     Hypertension     Malignant neoplasm of central portion of left breast in female, estrogen receptor positive 7/23/2019    Psychiatric problem     Renal cyst     Retinal tear of right eye     Sleep difficulties        Home Medications:  Prior to Admission medications    Medication Sig Start Date End Date Taking? Authorizing Provider   amitriptyline (ELAVIL) 10 MG tablet TAKE 1 TABLET(10 MG) BY MOUTH EVERY NIGHT AS NEEDED FOR INSOMNIA 5/14/22  Yes Randy Batista DPM   amlodipine-valsartan (EXFORGE)  mg per tablet Take 1 tablet by mouth once daily. 6/13/23 6/12/24 Yes Temitope Grant MD   ammonium lactate 12 % Crea Apply 1 application  topically 2 (two) times daily. 10/3/23  Yes Randy Batista DPM   anastrozole (ARIMIDEX) 1 mg Tab TAKE 1 TABLET(1 MG) BY MOUTH EVERY DAY 9/12/23  Yes Eliana Norris MD   atorvastatin (LIPITOR) 40 MG tablet Take 1 tablet (40 mg total) by mouth once daily. 3/10/23 3/9/24 Yes Temitope Grant MD   azelastine (ASTELIN) 137 mcg (0.1 %) nasal spray 2 sprays (274 mcg total) by Nasal route 2 (two) times daily. 1/19/23 1/19/24 Yes Temitope Grant MD   b complex vitamins tablet Take 1 tablet by mouth once daily.   Yes Provider, Historical   B-complex with vitamin C (Z-BEC OR EQUIV)  tablet    Yes Provider, Historical   calcium carbonate 1250 MG capsule Take 1,250 mg by mouth 2 (two) times daily with meals.   Yes Provider, Historical   carvediloL (COREG) 6.25 MG tablet Take 2 tablets (12.5 mg total) by mouth 2 (two) times daily. 9/5/23 8/30/24 Yes Elena Ontiveros PA-C   dorzolamide (TRUSOPT) 2 % ophthalmic solution Place 1 drop into both eyes 3 (three) times daily.    Yes Provider, Historical   fluticasone propionate (FLONASE) 50 mcg/actuation nasal spray 1 spray by Each Nostril route nightly as needed for Rhinitis.   Yes Provider, Historical   gabapentin (NEURONTIN) 300 MG capsule Take 2 capsules (600 mg total) by mouth every evening. 10/3/23  Yes Randy Batista DPM   hydroCHLOROthiazide (HYDRODIURIL) 25 MG tablet Take 1 tablet (25 mg total) by mouth once daily. 6/9/23 12/6/23 Yes Temitope Grant MD   JARDIANCE 25 mg tablet TAKE 1 TABLET(25 MG) BY MOUTH EVERY DAY 9/28/23  Yes Temitope Grant MD   latanoprost 0.005 % ophthalmic solution INT 1 GTT IN OU QD HS 4/16/19  Yes Provider, Historical   LIDOcaine HCL 2% (XYLOCAINE) 2 % jelly Apply topically as needed. Apply topically once nightly to affected part of foot/feet. 2/1/22  Yes Randy Batista DPM   loratadine (CLARITIN ORAL) Take by mouth.   Yes Provider, Historical   metFORMIN (GLUCOPHAGE-XR) 500 MG ER 24hr tablet Take 2 tablets (1,000 mg total) by mouth 2 (two) times daily with meals. 9/11/23 9/10/24 Yes Temitope Grant MD   multivitamin (THERAGRAN) per tablet Take 1 tablet by mouth once daily.   Yes Provider, Historical   multivitamin capsule Take 1 capsule by mouth once daily.   Yes Provider, Historical   ondansetron (ZOFRAN-ODT) 8 MG TbDL Dissolve 1 tablet (8 mg total) by mouth every 6 (six) hours as needed (nausea). 5/17/21  Yes Eliana Norris MD   oxybutynin (DITROPAN-XL) 5 MG TR24 Take 1 tablet (5 mg total) by mouth once daily. 12/29/22  Yes Ameena Golden MD   prasterone, dhea, (INTRAROSA) 6.5 mg Inst PLACE 1  INSERT INTRAVAGINALLY AT BEDTIME AS DIRECTED 5/22/23  Yes Glenys Richards PA-C   promethazine (PHENERGAN) 25 MG tablet Take 1 tablet (25 mg total) by mouth every 6 (six) hours as needed for Nausea. 5/17/21  Yes Eliana Norris MD   terconazole (TERAZOL 7) 0.4 % Crea Place 1 applicator vaginally every evening. 12/1/22  Yes Ameena Tavares MD   venlafaxine (EFFEXOR-XR) 75 MG 24 hr capsule Take 1 capsule (75 mg total) by mouth once daily. 8/14/23 2/10/24 Yes Humble Hackett MD   vitamin D (VITAMIN D3) 1000 units Tab Take 1,000 Units by mouth once daily.   Yes Provider, Historical   flash glucose scanning reader (FREESTYLE ALEJANDRINA 14 DAY READER) Misc Use as directed for blood glucose monitoring. 6/13/23   Temitope Grant MD   flash glucose sensor (FREESTYLE ALEJANDRINA 14 DAY SENSOR) Kit Use as directed for blood glucose monitoring. Change site every 14 days. 6/13/23   Temitope Grant MD   gabapentin (NEURONTIN) 300 MG capsule Take 2 capsules (600 mg total) by mouth every evening.  Patient taking differently: Take by mouth every evening. 5/19/22 10/3/23  Jon Agrawal III, MD       Review of Systems:  Review of Systems   Respiratory:  Negative for shortness of breath.    Cardiovascular:  Negative for chest pain and palpitations.   Neurological:  Positive for headaches.       Health Maintainence:   Immunizations:  Health Maintenance         Date Due Completion Date    Hepatitis C Screening Never done ---    HIV Screening Never done ---    Mammogram 12/13/2023 12/13/2022    Hemoglobin A1c 02/15/2024 8/15/2023    Foot Exam 06/13/2024 6/13/2023    Eye Exam 06/14/2024 6/14/2023    Diabetes Urine Screening 08/15/2024 8/15/2023    Lipid Panel 08/15/2024 8/15/2023    Low Dose Statin 10/03/2024 10/3/2023    Colorectal Cancer Screening 02/03/2026 2/3/2021    TETANUS VACCINE 08/10/2032 8/10/2022             PHYSICAL EXAM     BP (!) 151/84 (BP Location: Left arm, Patient Position: Sitting)   Pulse 74   LMP  (LMP  Unknown)     Physical Exam  Constitutional:       Comments: Healthy appearing female in NAD or apparent pain. She makes good eye contact, speaks in clear full sentences and ambulates with ease.              LABS     Lab Results   Component Value Date    HGBA1C 6.7 (H) 08/15/2023     CMP  Sodium   Date Value Ref Range Status   08/15/2023 142 136 - 145 mmol/L Final     Potassium   Date Value Ref Range Status   08/15/2023 4.0 3.5 - 5.1 mmol/L Final     Chloride   Date Value Ref Range Status   08/15/2023 105 95 - 110 mmol/L Final     CO2   Date Value Ref Range Status   08/15/2023 27 23 - 29 mmol/L Final     Glucose   Date Value Ref Range Status   08/15/2023 129 (H) 70 - 110 mg/dL Final     BUN   Date Value Ref Range Status   08/15/2023 16 6 - 20 mg/dL Final     Creatinine   Date Value Ref Range Status   08/15/2023 0.8 0.5 - 1.4 mg/dL Final     Calcium   Date Value Ref Range Status   08/15/2023 9.9 8.7 - 10.5 mg/dL Final     Total Protein   Date Value Ref Range Status   03/03/2023 7.7 6.0 - 8.4 g/dL Final     Albumin   Date Value Ref Range Status   03/03/2023 3.9 3.5 - 5.2 g/dL Final     Total Bilirubin   Date Value Ref Range Status   03/03/2023 0.3 0.1 - 1.0 mg/dL Final     Comment:     For infants and newborns, interpretation of results should be based  on gestational age, weight and in agreement with clinical  observations.    Premature Infant recommended reference ranges:  Up to 24 hours.............<8.0 mg/dL  Up to 48 hours............<12.0 mg/dL  3-5 days..................<15.0 mg/dL  6-29 days.................<15.0 mg/dL       Alkaline Phosphatase   Date Value Ref Range Status   03/03/2023 101 55 - 135 U/L Final     AST   Date Value Ref Range Status   03/03/2023 15 10 - 40 U/L Final     ALT   Date Value Ref Range Status   03/03/2023 19 10 - 44 U/L Final     Anion Gap   Date Value Ref Range Status   08/15/2023 10 8 - 16 mmol/L Final     eGFR if    Date Value Ref Range Status   05/05/2020 >60 >60  mL/min/1.73 m^2 Final     eGFR if non    Date Value Ref Range Status   05/05/2020 >60 >60 mL/min/1.73 m^2 Final     Comment:     Calculation used to obtain the estimated glomerular filtration  rate (eGFR) is the CKD-EPI equation.        Lab Results   Component Value Date    WBC 8.07 01/19/2023    HGB 13.7 01/19/2023    HCT 41.9 01/19/2023    MCV 87 01/19/2023     01/19/2023     Lab Results   Component Value Date    CHOL 163 08/15/2023    CHOL 211 (H) 03/03/2023    CHOL 193 08/10/2022     Lab Results   Component Value Date    HDL 55 08/15/2023    HDL 52 03/03/2023    HDL 50 08/10/2022     Lab Results   Component Value Date    LDLCALC 97.8 08/15/2023    LDLCALC 146.8 03/03/2023    LDLCALC 134.8 08/10/2022     Lab Results   Component Value Date    TRIG 51 08/15/2023    TRIG 61 03/03/2023    TRIG 41 08/10/2022     Lab Results   Component Value Date    CHOLHDL 33.7 08/15/2023    CHOLHDL 24.6 03/03/2023    CHOLHDL 25.9 08/10/2022     Lab Results   Component Value Date    TSH 1.519 03/03/2023       ASSESSMENT/PLAN     Shu Mendoza is a 58 y.o. female     Shu was seen today for follow-up.  Plan:  Will increase coreg to 25mg bid (previously as 12.5mg bid)   Will have her RTC on VV in 2 weeks for BP check. She is also aware to monitor HR after increasing Coreg dose.   Cut back on dietary Na whenever possible   Hydrate wlel  Graded exercise as tolerated     Diagnoses and all orders for this visit:    Primary hypertension  -     carvediloL (COREG) 25 MG tablet; Take 1 tablet (25 mg total) by mouth 2 (two) times daily.      Elena Ontiveros PA-C   Department of Internal Medicine - [facility]   2:36 PM      Answers submitted by the patient for this visit:  High Blood Pressure Questionnaire (Submitted on 9/26/2023)  Chief Complaint: Hypertension  Chronicity: recurrent  Onset: more than 1 year ago  Progression since onset: gradually improving  Condition status: resistant  anxiety: Yes  blurred  vision: No  malaise/fatigue: Yes  orthopnea: No  peripheral edema: No  PND: No  sweats: No  Agents associated with hypertension: no associated agents  CAD risks: diabetes mellitus, dyslipidemia, obesity, post-menopausal state, stress  Compliance problems: no compliance problems  Past treatments: nothing  Improvement on treatment: moderate

## 2023-10-08 NOTE — PROGRESS NOTES
Subjective:      Patient ID: Shu Mendoza is a 58 y.o. female.    Chief Complaint: Diabetic Foot Exam (PCP-Temitope Grant MD-9/19/2023) and Foot Problem (Right splinter in the ball of foot )    Shu is a 58 y.o. female who presents to the clinic for evaluation and treatment of high risk feet. Shu has a past medical history of Anxiety, Breast cancer, Depression, Diabetes mellitus, Encounter for blood transfusion, Glaucoma (2012), psychiatric care, Hypertension, Malignant neoplasm of central portion of left breast in female, estrogen receptor positive (7/23/2019), Psychiatric problem, Renal cyst, Retinal tear of right eye, and Sleep difficulties. The patient's chief complaint is long, thick toenails. This patient has documented high risk feet requiring routine maintenance secondary to peripheral neuropathy.    PCP: Temitope Grant MD    Date Last Seen by PCP:   Chief Complaint   Patient presents with    Diabetic Foot Exam     PCP-Temitope Grant MD-9/19/2023    Foot Problem     Right splinter in the ball of foot          Current shoe gear:  Affected Foot: Casual shoes     Unaffected Foot: Casual shoes    Hemoglobin A1C   Date Value Ref Range Status   08/15/2023 6.7 (H) 4.0 - 5.6 % Final     Comment:     ADA Screening Guidelines:  5.7-6.4%  Consistent with prediabetes  >or=6.5%  Consistent with diabetes    High levels of fetal hemoglobin interfere with the HbA1C  assay. Heterozygous hemoglobin variants (HbS, HgC, etc)do  not significantly interfere with this assay.   However, presence of multiple variants may affect accuracy.     01/19/2023 6.9 (H) 4.0 - 5.6 % Final     Comment:     ADA Screening Guidelines:  5.7-6.4%  Consistent with prediabetes  >or=6.5%  Consistent with diabetes    High levels of fetal hemoglobin interfere with the HbA1C  assay. Heterozygous hemoglobin variants (HbS, HgC, etc)do  not significantly interfere with this assay.   However, presence of multiple variants may affect  accuracy.     08/10/2022 7.2 (H) 4.0 - 5.6 % Final     Comment:     ADA Screening Guidelines:  5.7-6.4%  Consistent with prediabetes  >or=6.5%  Consistent with diabetes    High levels of fetal hemoglobin interfere with the HbA1C  assay. Heterozygous hemoglobin variants (HbS, HgC, etc)do  not significantly interfere with this assay.   However, presence of multiple variants may affect accuracy.         Review of Systems   Constitutional: Negative for chills, decreased appetite, fever and malaise/fatigue.   HENT: Negative for congestion, hearing loss, nosebleeds and tinnitus.    Eyes: Negative for double vision, pain, photophobia and visual disturbance.   Cardiovascular: Negative for chest pain, claudication, cyanosis and leg swelling.   Respiratory: Negative for cough, hemoptysis, shortness of breath and wheezing.    Endocrine: Negative for cold intolerance and heat intolerance.   Hematologic/Lymphatic: Negative for adenopathy and bleeding problem.   Skin: Negative for color change, dry skin, itching, nail changes and suspicious lesions.   Musculoskeletal: Positive for arthritis. Negative for joint pain, myalgias and stiffness.   Gastrointestinal: Negative for abdominal pain, jaundice, nausea and vomiting.   Genitourinary: Negative for dysuria, frequency and hematuria.   Neurological: Positive for numbness, paresthesias and sensory change. Negative for difficulty with concentration and loss of balance.   Psychiatric/Behavioral: Negative for altered mental status, hallucinations and suicidal ideas. The patient is not nervous/anxious.    Allergic/Immunologic: Negative for environmental allergies and persistent infections.           Objective:      Physical Exam  Vitals reviewed.   Constitutional:       Appearance: She is well-developed and well-nourished.   HENT:      Head: Normocephalic and atraumatic.   Cardiovascular:      Pulses:           Dorsalis pedis pulses are 2+ on the right side and 2+ on the left side.         Posterior tibial pulses are 2+ on the right side and 2+ on the left side.   Pulmonary:      Effort: Pulmonary effort is normal.   Musculoskeletal:         General: Normal range of motion.      Comments: Inspection and palpation of the muscles joints and bones of both lower extremities reveal that muscle strength for the anterior lateral and posterior muscle groups and intrinsic muscle groups of the foot are all 5 over 5 symmetrical.  Ankle subtalar midtarsal and digital joint range of motion are within normal limits, nonpainful, without crepitus or effusion.  Patient exhibits a normal angle and base of gait.  Palpation of the tendons reveal no defects.   Skin:     General: Skin is warm and dry.      Capillary Refill: Capillary refill takes 2 to 3 seconds.      Comments: Skin turgor is normal bilaterally.  Skin texture is well hydrated to both lower extremities.  No lesions or rashes or wounds appreciated bilaterally.  Nail plates 1 through 5 bilaterally are within normal limits for length and thickness.  No nail clubbing or incurvation noted.   Porokeratotic lesion noted to the plantar aspect of the plantar right foot with hyperkeratotic tissue, central nucleated core, and moderate to severe discomfort/tenderness with direct palpation.    Neurological:      Mental Status: She is alert and oriented to person, place, and time.      Comments: Sharp dull light touch vibratory proprioceptive sensation are intact bilaterally.  Deep tendon reflexes to patellar and Achilles tendon are symmetrical 2 over 4 bilaterally.    Positive tingling burning bilateral lower extremities specifically the bottoms of both feet and worse at night.  Positive provocation sign bilateral tarsal tunnel.  Psychiatric:         Mood and Affect: Mood and affect normal.         Behavior: Behavior normal.               Assessment:       Encounter Diagnoses   Name Primary?    Foreign body (FB) in soft tissue Yes    Neuropathy     Type 2 diabetes  mellitus without complication, without long-term current use of insulin     Chemotherapy-induced neuropathy          Plan:       Shu was seen today for diabetic foot exam and foot problem.    Diagnoses and all orders for this visit:    Foreign body (FB) in soft tissue  -     X-Ray Foot Complete Right; Future    Neuropathy  -     gabapentin (NEURONTIN) 300 MG capsule; Take 2 capsules (600 mg total) by mouth every evening.    Type 2 diabetes mellitus without complication, without long-term current use of insulin    Chemotherapy-induced neuropathy    Other orders  -     ammonium lactate 12 % Crea; Apply 1 application  topically 2 (two) times daily.      I counseled the patient on her conditions, their implications and medical management.      Discussed options for peripheral neuropathy/nerve entrapment syndrome including nerve block therapy, surgical nerve entrapment decompression procedures, and various vitamins and supplementation available shown to improve nerve function.    Lesion Excision     Performed by: Randy Batista DPM  Authorized by: Patient     Consent Done?:  Yes (Verbal)     Care Type:  Debride/Excise  Location(s):  Plantar right foot  Patient tolerance:  Patient tolerated the procedure well with no immediate complications      With patient's permission, the lesion(s) mentioned above were aggressively reduced and debrided using a 15 blade, tissue nipper, and curette to remove all lesion and associated debris. Topical trichloroacetic acid applied with a sterile cover. The patient will continue to monitor the areas daily, inspect the feet, wear protective shoe gear when ambulatory, and moisturizer to maintain skin integrity.          Shoe inspection. Diabetic Foot Education. Patient reminded of the importance of good nutrition and blood sugar control to help prevent podiatric complications of diabetes. Patient instructed on proper foot hygeine. We discussed wearing proper shoe gear, daily foot  inspections and Diabetic foot education in detail.    Return to clinic in 3-6 months or sooner if problems arise     .

## 2023-10-17 ENCOUNTER — OFFICE VISIT (OUTPATIENT)
Dept: INTERNAL MEDICINE | Facility: CLINIC | Age: 58
End: 2023-10-17
Payer: COMMERCIAL

## 2023-10-17 VITALS — HEART RATE: 68 BPM | SYSTOLIC BLOOD PRESSURE: 158 MMHG | DIASTOLIC BLOOD PRESSURE: 84 MMHG

## 2023-10-17 DIAGNOSIS — I10 PRIMARY HYPERTENSION: Primary | ICD-10-CM

## 2023-10-17 PROCEDURE — 1159F MED LIST DOCD IN RCRD: CPT | Mod: CPTII,95,, | Performed by: PHYSICIAN ASSISTANT

## 2023-10-17 PROCEDURE — 3066F PR DOCUMENTATION OF TREATMENT FOR NEPHROPATHY: ICD-10-PCS | Mod: CPTII,95,, | Performed by: PHYSICIAN ASSISTANT

## 2023-10-17 PROCEDURE — 3061F NEG MICROALBUMINURIA REV: CPT | Mod: CPTII,95,, | Performed by: PHYSICIAN ASSISTANT

## 2023-10-17 PROCEDURE — 3061F PR NEG MICROALBUMINURIA RESULT DOCUMENTED/REVIEW: ICD-10-PCS | Mod: CPTII,95,, | Performed by: PHYSICIAN ASSISTANT

## 2023-10-17 PROCEDURE — 4010F ACE/ARB THERAPY RXD/TAKEN: CPT | Mod: CPTII,95,, | Performed by: PHYSICIAN ASSISTANT

## 2023-10-17 PROCEDURE — 4010F PR ACE/ARB THEARPY RXD/TAKEN: ICD-10-PCS | Mod: CPTII,95,, | Performed by: PHYSICIAN ASSISTANT

## 2023-10-17 PROCEDURE — 3077F SYST BP >= 140 MM HG: CPT | Mod: CPTII,95,, | Performed by: PHYSICIAN ASSISTANT

## 2023-10-17 PROCEDURE — 3066F NEPHROPATHY DOC TX: CPT | Mod: CPTII,95,, | Performed by: PHYSICIAN ASSISTANT

## 2023-10-17 PROCEDURE — 99213 OFFICE O/P EST LOW 20 MIN: CPT | Mod: 95,,, | Performed by: PHYSICIAN ASSISTANT

## 2023-10-17 PROCEDURE — 3079F PR MOST RECENT DIASTOLIC BLOOD PRESSURE 80-89 MM HG: ICD-10-PCS | Mod: CPTII,95,, | Performed by: PHYSICIAN ASSISTANT

## 2023-10-17 PROCEDURE — 1159F PR MEDICATION LIST DOCUMENTED IN MEDICAL RECORD: ICD-10-PCS | Mod: CPTII,95,, | Performed by: PHYSICIAN ASSISTANT

## 2023-10-17 PROCEDURE — 3079F DIAST BP 80-89 MM HG: CPT | Mod: CPTII,95,, | Performed by: PHYSICIAN ASSISTANT

## 2023-10-17 PROCEDURE — 99213 PR OFFICE/OUTPT VISIT, EST, LEVL III, 20-29 MIN: ICD-10-PCS | Mod: 95,,, | Performed by: PHYSICIAN ASSISTANT

## 2023-10-17 PROCEDURE — 3077F PR MOST RECENT SYSTOLIC BLOOD PRESSURE >= 140 MM HG: ICD-10-PCS | Mod: CPTII,95,, | Performed by: PHYSICIAN ASSISTANT

## 2023-10-17 PROCEDURE — 3044F PR MOST RECENT HEMOGLOBIN A1C LEVEL <7.0%: ICD-10-PCS | Mod: CPTII,95,, | Performed by: PHYSICIAN ASSISTANT

## 2023-10-17 PROCEDURE — 3044F HG A1C LEVEL LT 7.0%: CPT | Mod: CPTII,95,, | Performed by: PHYSICIAN ASSISTANT

## 2023-10-17 NOTE — PROGRESS NOTES
INTERNAL MEDICINE PROGRESS NOTE    CHIEF COMPLAINT     Chief Complaint   Patient presents with    Follow-up     2 wk BP       HPI     Shu Mendoza is a 58 y.o. female who presents for a follow-up visit today.    PCP is Temitope Grant MD, patient is known to me.     BP is still not optimized with the increased dose of coreg.   She reports that she is taking her BP medications regularly but I had to remind her that Coreg is bid.   She reports that she is feeling well -no chest pain, HA, dizziness, n,v.   She has only been taking coreg for one week.   She is at home during this VV -   Face Time is 10 mins     Past Medical History:  Past Medical History:   Diagnosis Date    Anxiety     Breast cancer     Depression     Diabetes mellitus     Encounter for blood transfusion     Glaucoma 2012    Hx of psychiatric care     Hypertension     Malignant neoplasm of central portion of left breast in female, estrogen receptor positive 7/23/2019    Psychiatric problem     Renal cyst     Retinal tear of right eye     Sleep difficulties        Home Medications:  Prior to Admission medications    Medication Sig Start Date End Date Taking? Authorizing Provider   amitriptyline (ELAVIL) 10 MG tablet TAKE 1 TABLET(10 MG) BY MOUTH EVERY NIGHT AS NEEDED FOR INSOMNIA 5/14/22  Yes Randy Batista DPM   amlodipine-valsartan (EXFORGE)  mg per tablet Take 1 tablet by mouth once daily. 6/13/23 6/12/24 Yes Temitope Grant MD   ammonium lactate 12 % Crea Apply 1 application  topically 2 (two) times daily. 10/3/23  Yes Randy Batista DPM   anastrozole (ARIMIDEX) 1 mg Tab TAKE 1 TABLET(1 MG) BY MOUTH EVERY DAY 9/12/23  Yes Eliana Norris MD   atorvastatin (LIPITOR) 40 MG tablet Take 1 tablet (40 mg total) by mouth once daily. 3/10/23 3/9/24 Yes Temitope Grant MD   azelastine (ASTELIN) 137 mcg (0.1 %) nasal spray 2 sprays (274 mcg total) by Nasal route 2 (two) times daily. 1/19/23 1/19/24 Yes Temitope Grant MD   b complex  vitamins tablet Take 1 tablet by mouth once daily.   Yes Provider, Historical   B-complex with vitamin C (Z-BEC OR EQUIV) tablet    Yes Provider, Historical   calcium carbonate 1250 MG capsule Take 1,250 mg by mouth 2 (two) times daily with meals.   Yes Provider, Historical   carvediloL (COREG) 25 MG tablet Take 1 tablet (25 mg total) by mouth 2 (two) times daily. 10/3/23 9/27/24 Yes Elena Ontiveros PA-C   dorzolamide (TRUSOPT) 2 % ophthalmic solution Place 1 drop into both eyes 3 (three) times daily.    Yes Provider, Historical   fluticasone propionate (FLONASE) 50 mcg/actuation nasal spray 1 spray by Each Nostril route nightly as needed for Rhinitis.   Yes Provider, Historical   gabapentin (NEURONTIN) 300 MG capsule Take 2 capsules (600 mg total) by mouth every evening. 10/3/23  Yes Randy Batista DPM   hydroCHLOROthiazide (HYDRODIURIL) 25 MG tablet Take 1 tablet (25 mg total) by mouth once daily. 6/9/23 12/6/23 Yes Temitope Grant MD   JARDIANCE 25 mg tablet TAKE 1 TABLET(25 MG) BY MOUTH EVERY DAY 9/28/23  Yes Temitope Grant MD   latanoprost 0.005 % ophthalmic solution INT 1 GTT IN OU QD HS 4/16/19  Yes Provider, Historical   LIDOcaine HCL 2% (XYLOCAINE) 2 % jelly Apply topically as needed. Apply topically once nightly to affected part of foot/feet. 2/1/22  Yes Randy Batista DPM   loratadine (CLARITIN ORAL) Take by mouth.   Yes Provider, Historical   metFORMIN (GLUCOPHAGE-XR) 500 MG ER 24hr tablet Take 2 tablets (1,000 mg total) by mouth 2 (two) times daily with meals. 9/11/23 9/10/24 Yes Temitope Grant MD   multivitamin (THERAGRAN) per tablet Take 1 tablet by mouth once daily.   Yes Provider, Historical   multivitamin capsule Take 1 capsule by mouth once daily.   Yes Provider, Historical   ondansetron (ZOFRAN-ODT) 8 MG TbDL Dissolve 1 tablet (8 mg total) by mouth every 6 (six) hours as needed (nausea). 5/17/21  Yes Eliana Norris MD   oxybutynin (DITROPAN-XL) 5 MG TR24 Take 1 tablet (5 mg  total) by mouth once daily. 12/29/22  Yes Ameena Golden MD   prasterone, dhea, (INTRAROSA) 6.5 mg Inst PLACE 1 INSERT INTRAVAGINALLY AT BEDTIME AS DIRECTED 5/22/23  Yes Glenys Richards PA-C   promethazine (PHENERGAN) 25 MG tablet Take 1 tablet (25 mg total) by mouth every 6 (six) hours as needed for Nausea. 5/17/21  Yes Elinaa Norris MD   terconazole (TERAZOL 7) 0.4 % Crea Place 1 applicator vaginally every evening. 12/1/22  Yes Ameena Tavares MD   venlafaxine (EFFEXOR-XR) 75 MG 24 hr capsule Take 1 capsule (75 mg total) by mouth once daily. 8/14/23 2/10/24 Yes Humble Hackett MD   vitamin D (VITAMIN D3) 1000 units Tab Take 1,000 Units by mouth once daily.   Yes Provider, Historical   flash glucose scanning reader (FREESTYLE ALEJANDRINA 14 DAY READER) Misc Use as directed for blood glucose monitoring. 6/13/23   Temitope Grant MD   flash glucose sensor (FREESTYLE ALEJANDRINA 14 DAY SENSOR) Kit Use as directed for blood glucose monitoring. Change site every 14 days. 6/13/23   Temitope Grant MD       Review of Systems:  Review of Systems   Constitutional:  Negative for chills and fever.   HENT:  Negative for sore throat and trouble swallowing.    Eyes:  Negative for visual disturbance.   Respiratory:  Negative for cough and shortness of breath.    Cardiovascular:  Negative for chest pain.   Gastrointestinal:  Negative for abdominal pain, constipation, diarrhea, nausea and vomiting.   Genitourinary:  Negative for dysuria and flank pain.   Musculoskeletal:  Negative for back pain, neck pain and neck stiffness.   Skin:  Negative for rash.   Neurological:  Negative for dizziness, syncope, weakness and headaches.   Psychiatric/Behavioral:  Negative for confusion.        Health Maintainence:   Immunizations:  Health Maintenance         Date Due Completion Date    Hepatitis C Screening Never done ---    HIV Screening Never done ---    COVID-19 Vaccine (6 - 2023-24 season) 09/01/2023 1/19/2023     Mammogram 12/13/2023 12/13/2022    Hemoglobin A1c 02/15/2024 8/15/2023    Foot Exam 06/13/2024 6/13/2023    Eye Exam 06/14/2024 6/14/2023    Diabetes Urine Screening 08/15/2024 8/15/2023    Lipid Panel 08/15/2024 8/15/2023    Low Dose Statin 10/03/2024 10/3/2023    Colorectal Cancer Screening 02/03/2026 2/3/2021    TETANUS VACCINE 08/10/2032 8/10/2022             PHYSICAL EXAM     BP (!) 158/84 (BP Location: Left arm, Patient Position: Sitting, BP Method: Large (Manual))   Pulse 68   LMP  (LMP Unknown)     Physical Exam  Vitals and nursing note reviewed.   Constitutional:       Appearance: Normal appearance.      Comments: Healthy appearing female in NAD or apparent pain. She makes good eye contact, speaks in clear full sentences and does not ambulate on my exam.        HENT:      Head: Normocephalic and atraumatic.      Nose: Nose normal.      Mouth/Throat:      Pharynx: Oropharynx is clear.   Eyes:      Conjunctiva/sclera: Conjunctivae normal.   Cardiovascular:      Rate and Rhythm: Normal rate and regular rhythm.      Pulses: Normal pulses.   Pulmonary:      Effort: No respiratory distress.   Abdominal:      Tenderness: There is no abdominal tenderness.   Musculoskeletal:         General: Normal range of motion.      Cervical back: No rigidity.   Skin:     General: Skin is warm and dry.      Capillary Refill: Capillary refill takes less than 2 seconds.      Findings: No rash.   Neurological:      General: No focal deficit present.      Mental Status: She is alert.      Gait: Gait normal.   Psychiatric:         Mood and Affect: Mood normal.         LABS     Lab Results   Component Value Date    HGBA1C 6.7 (H) 08/15/2023     CMP  Sodium   Date Value Ref Range Status   08/15/2023 142 136 - 145 mmol/L Final     Potassium   Date Value Ref Range Status   08/15/2023 4.0 3.5 - 5.1 mmol/L Final     Chloride   Date Value Ref Range Status   08/15/2023 105 95 - 110 mmol/L Final     CO2   Date Value Ref Range Status    08/15/2023 27 23 - 29 mmol/L Final     Glucose   Date Value Ref Range Status   08/15/2023 129 (H) 70 - 110 mg/dL Final     BUN   Date Value Ref Range Status   08/15/2023 16 6 - 20 mg/dL Final     Creatinine   Date Value Ref Range Status   08/15/2023 0.8 0.5 - 1.4 mg/dL Final     Calcium   Date Value Ref Range Status   08/15/2023 9.9 8.7 - 10.5 mg/dL Final     Total Protein   Date Value Ref Range Status   03/03/2023 7.7 6.0 - 8.4 g/dL Final     Albumin   Date Value Ref Range Status   03/03/2023 3.9 3.5 - 5.2 g/dL Final     Total Bilirubin   Date Value Ref Range Status   03/03/2023 0.3 0.1 - 1.0 mg/dL Final     Comment:     For infants and newborns, interpretation of results should be based  on gestational age, weight and in agreement with clinical  observations.    Premature Infant recommended reference ranges:  Up to 24 hours.............<8.0 mg/dL  Up to 48 hours............<12.0 mg/dL  3-5 days..................<15.0 mg/dL  6-29 days.................<15.0 mg/dL       Alkaline Phosphatase   Date Value Ref Range Status   03/03/2023 101 55 - 135 U/L Final     AST   Date Value Ref Range Status   03/03/2023 15 10 - 40 U/L Final     ALT   Date Value Ref Range Status   03/03/2023 19 10 - 44 U/L Final     Anion Gap   Date Value Ref Range Status   08/15/2023 10 8 - 16 mmol/L Final     eGFR if    Date Value Ref Range Status   05/05/2020 >60 >60 mL/min/1.73 m^2 Final     eGFR if non    Date Value Ref Range Status   05/05/2020 >60 >60 mL/min/1.73 m^2 Final     Comment:     Calculation used to obtain the estimated glomerular filtration  rate (eGFR) is the CKD-EPI equation.        Lab Results   Component Value Date    WBC 8.07 01/19/2023    HGB 13.7 01/19/2023    HCT 41.9 01/19/2023    MCV 87 01/19/2023     01/19/2023     Lab Results   Component Value Date    CHOL 163 08/15/2023    CHOL 211 (H) 03/03/2023    CHOL 193 08/10/2022     Lab Results   Component Value Date    HDL 55 08/15/2023     HDL 52 03/03/2023    HDL 50 08/10/2022     Lab Results   Component Value Date    LDLCALC 97.8 08/15/2023    LDLCALC 146.8 03/03/2023    LDLCALC 134.8 08/10/2022     Lab Results   Component Value Date    TRIG 51 08/15/2023    TRIG 61 03/03/2023    TRIG 41 08/10/2022     Lab Results   Component Value Date    CHOLHDL 33.7 08/15/2023    CHOLHDL 24.6 03/03/2023    CHOLHDL 25.9 08/10/2022     Lab Results   Component Value Date    TSH 1.519 03/03/2023       ASSESSMENT/PLAN     Shu Mendoza is a 58 y.o. female     Shu was seen today for follow-up.     Diagnoses and all orders for this visit:    Primary hypertension   -not optimized   -continue current regimen   -discussed timing and med regimen compliance   -rt-check BP in 2 weeks   -continue with D-HTN       Elena Ontiveros PA-C   Department of Internal Medicine - [facility]   2:18 PM      Answers submitted by the patient for this visit:  High Blood Pressure Questionnaire (Submitted on 10/15/2023)  Chief Complaint: Hypertension  Chronicity: recurrent  Onset: more than 1 year ago  Progression since onset: gradually improving  Condition status: controlled  anxiety: No  blurred vision: Yes  malaise/fatigue: Yes  orthopnea: Yes  peripheral edema: No  PND: Yes  sweats: No  Agents associated with hypertension: no associated agents  CAD risks: diabetes mellitus, obesity, stress  Compliance problems: diet, exercise  Past treatments: nothing  Improvement on treatment: moderate

## 2023-10-20 ENCOUNTER — TELEPHONE (OUTPATIENT)
Dept: PODIATRY | Facility: CLINIC | Age: 58
End: 2023-10-20
Payer: COMMERCIAL

## 2023-10-20 NOTE — TELEPHONE ENCOUNTER
Spoke to pt and rescheduled appt due to provider being on call. Original appt:11/14/2023. New Appt:11/27/2023. Pt verbalized understanding.

## 2023-10-23 NOTE — PLAN OF CARE
Taxol infusion complete. Pt tolerated well. VSS. NAD. Port to right chest de-accessed after heparinized per protocol. Pt verbalized understanding of discharge instructions before leaving with mother.    
yes

## 2023-11-06 ENCOUNTER — PATIENT MESSAGE (OUTPATIENT)
Dept: ADMINISTRATIVE | Facility: HOSPITAL | Age: 58
End: 2023-11-06
Payer: COMMERCIAL

## 2023-11-30 ENCOUNTER — ON-DEMAND VIRTUAL (OUTPATIENT)
Dept: URGENT CARE | Facility: CLINIC | Age: 58
End: 2023-11-30
Payer: COMMERCIAL

## 2023-11-30 ENCOUNTER — PATIENT MESSAGE (OUTPATIENT)
Dept: ADMINISTRATIVE | Facility: HOSPITAL | Age: 58
End: 2023-11-30
Payer: COMMERCIAL

## 2023-11-30 ENCOUNTER — PATIENT OUTREACH (OUTPATIENT)
Dept: ADMINISTRATIVE | Facility: HOSPITAL | Age: 58
End: 2023-11-30
Payer: COMMERCIAL

## 2023-11-30 ENCOUNTER — OFFICE VISIT (OUTPATIENT)
Dept: NEUROLOGY | Facility: CLINIC | Age: 58
End: 2023-11-30
Payer: COMMERCIAL

## 2023-11-30 VITALS
BODY MASS INDEX: 29.19 KG/M2 | DIASTOLIC BLOOD PRESSURE: 89 MMHG | SYSTOLIC BLOOD PRESSURE: 137 MMHG | HEART RATE: 74 BPM | HEIGHT: 64 IN | WEIGHT: 171 LBS

## 2023-11-30 DIAGNOSIS — C50.912 INFILTRATING DUCTAL CARCINOMA OF LEFT BREAST: ICD-10-CM

## 2023-11-30 DIAGNOSIS — Z85.3 PERSONAL HISTORY OF MALIGNANT NEOPLASM OF BREAST: ICD-10-CM

## 2023-11-30 DIAGNOSIS — G62.0 CHEMOTHERAPY-INDUCED NEUROPATHY: ICD-10-CM

## 2023-11-30 DIAGNOSIS — D32.9 MENINGIOMA: ICD-10-CM

## 2023-11-30 DIAGNOSIS — G44.229 CHRONIC TENSION-TYPE HEADACHE, NOT INTRACTABLE: Primary | ICD-10-CM

## 2023-11-30 DIAGNOSIS — J06.9 BACTERIAL UPPER RESPIRATORY INFECTION: Primary | ICD-10-CM

## 2023-11-30 DIAGNOSIS — B96.89 BACTERIAL UPPER RESPIRATORY INFECTION: Primary | ICD-10-CM

## 2023-11-30 DIAGNOSIS — T45.1X5A CHEMOTHERAPY-INDUCED NEUROPATHY: ICD-10-CM

## 2023-11-30 DIAGNOSIS — R41.3 MEMORY LOSS DUE TO MEDICAL CONDITION: ICD-10-CM

## 2023-11-30 PROCEDURE — 3008F BODY MASS INDEX DOCD: CPT | Mod: CPTII,S$GLB,, | Performed by: PSYCHIATRY & NEUROLOGY

## 2023-11-30 PROCEDURE — 1159F MED LIST DOCD IN RCRD: CPT | Mod: CPTII,S$GLB,, | Performed by: PSYCHIATRY & NEUROLOGY

## 2023-11-30 PROCEDURE — 3008F PR BODY MASS INDEX (BMI) DOCUMENTED: ICD-10-PCS | Mod: CPTII,S$GLB,, | Performed by: PSYCHIATRY & NEUROLOGY

## 2023-11-30 PROCEDURE — 99999 PR PBB SHADOW E&M-EST. PATIENT-LVL IV: ICD-10-PCS | Mod: PBBFAC,,, | Performed by: PSYCHIATRY & NEUROLOGY

## 2023-11-30 PROCEDURE — 3044F PR MOST RECENT HEMOGLOBIN A1C LEVEL <7.0%: ICD-10-PCS | Mod: CPTII,S$GLB,, | Performed by: PSYCHIATRY & NEUROLOGY

## 2023-11-30 PROCEDURE — 3075F SYST BP GE 130 - 139MM HG: CPT | Mod: CPTII,S$GLB,, | Performed by: PSYCHIATRY & NEUROLOGY

## 2023-11-30 PROCEDURE — 99213 OFFICE O/P EST LOW 20 MIN: CPT | Mod: 95,,,

## 2023-11-30 PROCEDURE — 99999 PR PBB SHADOW E&M-EST. PATIENT-LVL IV: CPT | Mod: PBBFAC,,, | Performed by: PSYCHIATRY & NEUROLOGY

## 2023-11-30 PROCEDURE — 4010F ACE/ARB THERAPY RXD/TAKEN: CPT | Mod: CPTII,S$GLB,, | Performed by: PSYCHIATRY & NEUROLOGY

## 2023-11-30 PROCEDURE — 3066F NEPHROPATHY DOC TX: CPT | Mod: CPTII,S$GLB,, | Performed by: PSYCHIATRY & NEUROLOGY

## 2023-11-30 PROCEDURE — 3061F NEG MICROALBUMINURIA REV: CPT | Mod: CPTII,S$GLB,, | Performed by: PSYCHIATRY & NEUROLOGY

## 2023-11-30 PROCEDURE — 99214 PR OFFICE/OUTPT VISIT, EST, LEVL IV, 30-39 MIN: ICD-10-PCS | Mod: S$GLB,,, | Performed by: PSYCHIATRY & NEUROLOGY

## 2023-11-30 PROCEDURE — 3066F PR DOCUMENTATION OF TREATMENT FOR NEPHROPATHY: ICD-10-PCS | Mod: CPTII,S$GLB,, | Performed by: PSYCHIATRY & NEUROLOGY

## 2023-11-30 PROCEDURE — 3061F PR NEG MICROALBUMINURIA RESULT DOCUMENTED/REVIEW: ICD-10-PCS | Mod: CPTII,S$GLB,, | Performed by: PSYCHIATRY & NEUROLOGY

## 2023-11-30 PROCEDURE — 3079F PR MOST RECENT DIASTOLIC BLOOD PRESSURE 80-89 MM HG: ICD-10-PCS | Mod: CPTII,S$GLB,, | Performed by: PSYCHIATRY & NEUROLOGY

## 2023-11-30 PROCEDURE — 99214 OFFICE O/P EST MOD 30 MIN: CPT | Mod: S$GLB,,, | Performed by: PSYCHIATRY & NEUROLOGY

## 2023-11-30 PROCEDURE — 4010F PR ACE/ARB THEARPY RXD/TAKEN: ICD-10-PCS | Mod: CPTII,S$GLB,, | Performed by: PSYCHIATRY & NEUROLOGY

## 2023-11-30 PROCEDURE — 1159F PR MEDICATION LIST DOCUMENTED IN MEDICAL RECORD: ICD-10-PCS | Mod: CPTII,S$GLB,, | Performed by: PSYCHIATRY & NEUROLOGY

## 2023-11-30 PROCEDURE — 3075F PR MOST RECENT SYSTOLIC BLOOD PRESS GE 130-139MM HG: ICD-10-PCS | Mod: CPTII,S$GLB,, | Performed by: PSYCHIATRY & NEUROLOGY

## 2023-11-30 PROCEDURE — 3044F HG A1C LEVEL LT 7.0%: CPT | Mod: CPTII,S$GLB,, | Performed by: PSYCHIATRY & NEUROLOGY

## 2023-11-30 PROCEDURE — 3079F DIAST BP 80-89 MM HG: CPT | Mod: CPTII,S$GLB,, | Performed by: PSYCHIATRY & NEUROLOGY

## 2023-11-30 PROCEDURE — 99213 PR OFFICE/OUTPT VISIT, EST, LEVL III, 20-29 MIN: ICD-10-PCS | Mod: 95,,,

## 2023-11-30 RX ORDER — DOXYCYCLINE 100 MG/1
100 CAPSULE ORAL 2 TIMES DAILY
Qty: 14 CAPSULE | Refills: 0 | Status: SHIPPED | OUTPATIENT
Start: 2023-11-30 | End: 2023-12-07

## 2023-11-30 NOTE — PROGRESS NOTES
Subjective:       Patient ID: Shu Mendoza is a 58 y.o. female.    Reason for Consult: Follow-up      Interval History:  Shu Mendoza is here for follow up. Their condition Has clinically improved from a headache standpoint.  The patient notes that she has 2-3 days of headache per month.  She continues to be compliant with the venlafaxine 75 mg and notes that this is quite helpful for her.  She notes that when she has a headache she will take over-the-counter Tylenol or over-the-counter Aleve.  She notes that the Tylenol is only helpful for if she is able to take a nap at the same time that she takes the Tylenol.  We have discussed that the Aleve seems to be more helpful for her she does not have to nap with the over-the-counter medication and it does stop her headaches completely.  The patient notes that she has a new complaint of memory loss.  She notes that she feels as if she does not write something down she will forget it.  She gives an example of answering the phone and within seconds of the phone call finishing, if she does not write down what is occurring during the phone call she will forget the contents of the phone call.  She also notes that when she is in conversation with family friends or coworkers, she notes that if she becomes interrupted her distracted in the conversation she will forget what she is saying.  She notes that sometimes her thought will return to her later in the day but sometimes she completely loses her train of thought.  We have discussed that she currently takes an anticancer medication but it does not have memory loss is 1 of its side effects.  We have discussed the possibility of a paraneoplastic issue going on because of her previous history of breast cancer and discussed her prior history of intracranial meningioma as well.      Objective:     Vitals:    11/30/23 0914   BP: 137/89   Pulse: 74     Patient is awake alert oriented to person place and time.  Moves all 4  extremities against gravity.  Gait and station within normal limits.  Cranial nerves 2-12 were without focal deficits.  Focused examination was undertaken today. Most of the visit time was spent giving guidance, counseling and discussing treatment options.    I have personally reviewed and interpreted the patient's imaging and related my impression and interpretation to her showing a left middle fossa meningioma.  Results for orders placed or performed during the hospital encounter of 01/02/22   MRI Brain W WO Contrast    Narrative    EXAMINATION:  MRI BRAIN W WO CONTRAST    CLINICAL HISTORY:  treated breast cancer, severe headache, r/o brain mets; Personal history of malignant neoplasm of breast    TECHNIQUE:  Multiplanar multisequence MR imaging of the brain was performed before and after the administration of 9 mL Gadavist intravenous contrast.    COMPARISON:  None    FINDINGS:  No intracranial hemorrhage or acute infarction.  No hydrocephalus.    Expected flow voids are identified in all major vascular territories.    Arising from the free edge of the anterior aspect of the left tentorial leaflet, there is an extra-axial homogeneously enhancing mass measuring 1.2 cm.  This is probably a meningioma.  No enhancing intra-axial brain mass.    Unremarkable scalp and calvarium.    Mild mucosal hypertrophy in the bilateral maxillary and right frontal sinuses.  Mild mucosal hypertrophy in the bilateral ethmoid air cells.  Small mucous retention cyst versus polyp in the right maxillary sinus.      Impression    Findings suspicious for anterior free edge left tentorial leaflet meningioma (incisural meningioma).  Consider neurosurgery consultation, as clinically indicated.    No MR evidence of intracranial breast cancer metastasis.    This report was flagged in Epic as abnormal..      Electronically signed by: Nicolas Gilman MD  Date:    01/02/2022  Time:    13:21     *Note: Due to a large number of results and/or  encounters for the requested time period, some results have not been displayed. A complete set of results can be found in Results Review.       Assessment/Plan:     Problem List Items Addressed This Visit          Neuro    Chemotherapy-induced neuropathy       Oncology    Infiltrating ductal carcinoma of left breast    Personal history of malignant neoplasm of breast    Relevant Orders    Paraneoplastic Autoantibody Evaulation, Serum    Vitamin B6    Vitamin B2    Vitamin B1    Vitamin B12 Deficiency Panel     Other Visit Diagnoses       Chronic tension-type headache, not intractable    -  Primary    Meningioma        Memory loss due to medical condition        Relevant Orders    Vitamin B6    Vitamin B2    Vitamin B1    Vitamin B12 Deficiency Panel          58-year-old female presents for evaluation of multiple neurologic issues as outlined above.  Her headaches are stable currently controlled by venlafaxine 75 mg daily and gabapentin 600 mg nightly.  She also takes the gabapentin 600 mg nightly for her peripheral neuropathy which is relatively under control at this time.  I have reviewed her HbA1c and interpreted that she is still diabetic however her number is lower than before which is good.  We have discussed that her memory loss may be related to nutritional deficiency so I will obtain laboratories this as outlined above.  I will also send for paraneoplastic antibodies as the patient does have a personal history of cancer.  We have discussed red flags on today's visit including blindness, facial droop, limb weakness, ataxia, nausea and vomiting, bowel or bladder incontinence, seizure.  If she has any of these I would likely update imaging of the brain with and without contrast sooner rather than later.  I will see the patient back in about 3 months.      The patient verbalizes understanding and agreement with the treatment plan. I have discussed risks, benefits and alternatives to the treatment plan. Questions  were sought and answered to her stated verbal satisfaction.          Darcie Agrawal MD, FAAN    This note is dictated on M*Modal Fluency Direct word recognition program. There are word recognition mistakes that are occasionally missed on review.

## 2023-11-30 NOTE — PROGRESS NOTES
Subjective:      Patient ID: Shu Mendoza is a 58 y.o. female.    Vitals:  vitals were not taken for this visit.     Chief Complaint: Sinus Problem      Visit Type: TELE AUDIOVISUAL    Present with the patient at the time of consultation: TELEMED PRESENT WITH PATIENT: None    Past Medical History:   Diagnosis Date    Anxiety     Breast cancer     Depression     Diabetes mellitus     Encounter for blood transfusion     Glaucoma 2012    Hx of psychiatric care     Hypertension     Malignant neoplasm of central portion of left breast in female, estrogen receptor positive 7/23/2019    Psychiatric problem     Renal cyst     Retinal tear of right eye     Sleep difficulties      Past Surgical History:   Procedure Laterality Date    BREAST BIOPSY      BREAST REVISION SURGERY Left 11/5/2020    Procedure: BREAST REVISION SURGERY;  Surgeon: Derek Colin MD;  Location: [facility];  Service: Plastics;  Laterality: Left;    EYE SURGERY Bilateral     as a child for cross eye    HERNIA REPAIR  2009    umbilical    HYSTERECTOMY  2000    NILES, BSO secondary to ovarian cyst    INSERTION OF TUNNELED CENTRAL VENOUS CATHETER (CVC) WITH SUBCUTANEOUS PORT Right 10/25/2019    Procedure: NVXDRZOSA-PAUY-U-CATH RIGHT (CONSENT AM OF) 1. 0 hr case;  Surgeon: Shikha Mccray MD;  Location: [facility];  Service: General;  Laterality: Right;    LIPOSUCTION Left 11/5/2020    Procedure: LIPOSUCTION;  Surgeon: Derek Colin MD;  Location: [facility] OR;  Service: Plastics;  Laterality: Left;    MASTECTOMY Left 9/11/2019    Procedure: MASTECTOMY;  Surgeon: Shikha Mccray MD;  Location: [facility];  Service: Plastics;  Laterality: Left;    MASTECTOMY WITH SENTINEL NODE BIOPSY AND AXILLARY LYMPH NODE DISSECTION Left 9/11/2019    Procedure: MASTECTOMY, WITH SENTINEL NODE BIOPSY AND AXILLARY LYMPHADENECTOMY LEFT;  Surgeon: Shikha Mccray MD;  Location: [facility];  Service: Plastics;  Laterality: Left;    MEDIPORT REMOVAL Right 11/5/2020    Procedure: REMOVAL,  CATHETER, CENTRAL VENOUS, TUNNELED, WITH PORT;  Surgeon: Derek Colin MD;  Location: [facility] OR;  Service: Plastics;  Laterality: Right;    OOPHORECTOMY      RECONSTRUCTION OF BREAST WITH DEEP INFERIOR EPIGASTRIC ARTERY  (SALEEM) FREE FLAP Left 9/11/2019    Procedure: RECONSTRUCTION, BREAST, USING SALEEM FREE FLAP - UNILATERAL;  Surgeon: Derek Colin MD;  Location: [facility] OR;  Service: Plastics;  Laterality: Left;     Review of patient's allergies indicates:   Allergen Reactions    Morphine Itching    Percocet [oxycodone-acetaminophen] Itching     Current Outpatient Medications on File Prior to Visit   Medication Sig Dispense Refill    amlodipine-valsartan (EXFORGE)  mg per tablet Take 1 tablet by mouth once daily. 90 tablet 3    ammonium lactate 12 % Crea Apply 1 application  topically 2 (two) times daily. 140 g 11    anastrozole (ARIMIDEX) 1 mg Tab TAKE 1 TABLET(1 MG) BY MOUTH EVERY DAY 90 tablet 3    atorvastatin (LIPITOR) 40 MG tablet Take 1 tablet (40 mg total) by mouth once daily. 90 tablet 1    azelastine (ASTELIN) 137 mcg (0.1 %) nasal spray 2 sprays (274 mcg total) by Nasal route 2 (two) times daily. 30 mL 2    b complex vitamins tablet Take 1 tablet by mouth once daily.      B-complex with vitamin C (Z-BEC OR EQUIV) tablet       calcium carbonate 1250 MG capsule Take 1,250 mg by mouth 2 (two) times daily with meals.      carvediloL (COREG) 25 MG tablet Take 1 tablet (25 mg total) by mouth 2 (two) times daily. 60 tablet 11    dorzolamide (TRUSOPT) 2 % ophthalmic solution Place 1 drop into both eyes 3 (three) times daily.       fluticasone propionate (FLONASE) 50 mcg/actuation nasal spray 1 spray by Each Nostril route nightly as needed for Rhinitis.      gabapentin (NEURONTIN) 300 MG capsule Take 2 capsules (600 mg total) by mouth every evening. 180 capsule 3    hydroCHLOROthiazide (HYDRODIURIL) 25 MG tablet Take 1 tablet (25 mg total) by mouth once daily. 90 tablet 1    JARDIANCE 25 mg  tablet TAKE 1 TABLET(25 MG) BY MOUTH EVERY DAY 90 tablet 1    latanoprost 0.005 % ophthalmic solution INT 1 GTT IN OU QD HS  11    LIDOcaine HCL 2% (XYLOCAINE) 2 % jelly Apply topically as needed. Apply topically once nightly to affected part of foot/feet. 30 mL 2    loratadine (CLARITIN ORAL) Take by mouth.      metFORMIN (GLUCOPHAGE-XR) 500 MG ER 24hr tablet Take 2 tablets (1,000 mg total) by mouth 2 (two) times daily with meals. 360 tablet 3    multivitamin (THERAGRAN) per tablet Take 1 tablet by mouth once daily.      multivitamin capsule Take 1 capsule by mouth once daily.      ondansetron (ZOFRAN-ODT) 8 MG TbDL Dissolve 1 tablet (8 mg total) by mouth every 6 (six) hours as needed (nausea). 30 tablet 2    oxybutynin (DITROPAN-XL) 5 MG TR24 Take 1 tablet (5 mg total) by mouth once daily. 90 tablet 3    prasterone, dhea, (INTRAROSA) 6.5 mg Inst PLACE 1 INSERT INTRAVAGINALLY AT BEDTIME AS DIRECTED 28 each 10    promethazine (PHENERGAN) 25 MG tablet Take 1 tablet (25 mg total) by mouth every 6 (six) hours as needed for Nausea. 30 tablet 2    terconazole (TERAZOL 7) 0.4 % Crea Place 1 applicator vaginally every evening. 7 g 1    venlafaxine (EFFEXOR-XR) 75 MG 24 hr capsule Take 1 capsule (75 mg total) by mouth once daily. 90 capsule 1    vitamin D (VITAMIN D3) 1000 units Tab Take 1,000 Units by mouth once daily.      [DISCONTINUED] amitriptyline (ELAVIL) 10 MG tablet TAKE 1 TABLET(10 MG) BY MOUTH EVERY NIGHT AS NEEDED FOR INSOMNIA 30 tablet 2    [DISCONTINUED] flash glucose scanning reader (FREESTYLE ALEJANDRINA 14 DAY READER) Misc Use as directed for blood glucose monitoring. 1 each 0    [DISCONTINUED] flash glucose sensor (FREESTYLE ALEJANDRINA 14 DAY SENSOR) Kit Use as directed for blood glucose monitoring. Change site every 14 days. 6 kit 1     Current Facility-Administered Medications on File Prior to Visit   Medication Dose Route Frequency Provider Last Rate Last Admin    lidocaine HCL 10 mg/ml (1%) 50 mL, EPINEPHrine  1,000 mcg in lactated Ringers 1,000 mL irrigation   Irrigation On Call Procedure Derek Colin MD         Family History   Problem Relation Age of Onset    Diabetes Mother     Nephrolithiasis Mother     Diabetes Sister     HIV Sister     Diabetes Maternal Grandmother     Schizophrenia Son     Drug abuse Son     Ovarian cancer Other     Breast cancer Neg Hx     Colon cancer Neg Hx     Heart attack Neg Hx     Stroke Neg Hx     Osteoporosis Neg Hx        Medications Ordered                [facility][id number] - [address] & [address] [address]    Telephone: [phone]   Fax: [phone]   Hours: Not open 24 hours                         E-Prescribed (1 of 1)              doxycycline (VIBRAMYCIN) 100 MG Cap    Sig: Take 1 capsule (100 mg total) by mouth 2 (two) times daily. for 7 days       Start: 11/30/23     Quantity: 14 capsule Refills: 0                           Ohs Peq Odvv Intake    11/30/2023 12:10 PM CST - Filed by Patient   Describe your reason for todays visit When I cough my chest hurts. The glands around my thoat area is a little tender   What is your current physical address in the event of a medical emergency? [address] [place] [phone]   Are you able to take your vital signs? No   Please attach any relevant images or files          Patient states that yesterday she began to have a sore throat, chest congestion, tenderness cervical lymph nodes,  nasal drainage and cough.  Patient states that she is not having any fever. Patient states that her cough is a dry cough. Patient states that her nasal drainage is clear in color. Patient states that when she coughs she call feel the mucous in her chest. Patient denies any other symptoms at this time.         Constitution: Negative.   HENT:  Positive for congestion, postnasal drip and sore throat.    Neck: neck negative. Positive for painful lymph nodes.    Cardiovascular: Negative.    Eyes: Negative.    Respiratory:  Positive for cough.    Gastrointestinal: Negative.    Endocrine: negative.   Genitourinary: Negative.    Musculoskeletal: Negative.    Skin: Negative.    Allergic/Immunologic: Negative.    Neurological: Negative.    Hematologic/Lymphatic: Positive for swollen lymph nodes.   Psychiatric/Behavioral: Negative.          Objective:   The physical exam was conducted virtually.  Physical Exam   Constitutional: She is oriented to person, place, and time.   HENT:   Head: Normocephalic and atraumatic.   Eyes: Conjunctivae are normal. Pupils are equal, round, and reactive to light. Extraocular movement intact   Neck: Neck supple.   Pulmonary/Chest: Effort normal.   Abdominal: Normal appearance.   Musculoskeletal: Normal range of motion.         General: Normal range of motion.   Neurological: no focal deficit. She is alert, oriented to person, place, and time and at baseline.   Skin: Skin is warm.   Psychiatric: Her behavior is normal. Mood, judgment and thought content normal.       Assessment:     1. Bacterial upper respiratory infection        Plan:       Bacterial upper respiratory infection  -     doxycycline (VIBRAMYCIN) 100 MG Cap; Take 1 capsule (100 mg total) by mouth 2 (two) times daily. for 7 days  Dispense: 14 capsule; Refill: 0

## 2023-12-01 ENCOUNTER — LAB VISIT (OUTPATIENT)
Dept: LAB | Facility: OTHER | Age: 58
End: 2023-12-01
Attending: PSYCHIATRY & NEUROLOGY
Payer: COMMERCIAL

## 2023-12-01 DIAGNOSIS — R41.3 MEMORY LOSS DUE TO MEDICAL CONDITION: ICD-10-CM

## 2023-12-01 DIAGNOSIS — Z85.3 PERSONAL HISTORY OF MALIGNANT NEOPLASM OF BREAST: ICD-10-CM

## 2023-12-01 PROCEDURE — 84252 ASSAY OF VITAMIN B-2: CPT | Performed by: PSYCHIATRY & NEUROLOGY

## 2023-12-01 PROCEDURE — 84425 ASSAY OF VITAMIN B-1: CPT | Performed by: PSYCHIATRY & NEUROLOGY

## 2023-12-01 PROCEDURE — 84207 ASSAY OF VITAMIN B-6: CPT | Performed by: PSYCHIATRY & NEUROLOGY

## 2023-12-01 PROCEDURE — 36415 COLL VENOUS BLD VENIPUNCTURE: CPT | Performed by: PSYCHIATRY & NEUROLOGY

## 2023-12-01 PROCEDURE — 86255 FLUORESCENT ANTIBODY SCREEN: CPT | Mod: 59 | Performed by: PSYCHIATRY & NEUROLOGY

## 2023-12-01 PROCEDURE — 82607 VITAMIN B-12: CPT | Performed by: PSYCHIATRY & NEUROLOGY

## 2023-12-04 LAB — VIT B12 SERPL-MCNC: 935 NG/L (ref 180–914)

## 2023-12-05 LAB
PYRIDOXAL SERPL-MCNC: 11 UG/L (ref 5–50)
VIT B1 BLD-MCNC: 76 UG/L (ref 38–122)
VIT B2 SERPL-MCNC: 20 MCG/L (ref 1–19)

## 2023-12-11 LAB
AMPHIPHYSIN AB TITR SER: NEGATIVE {TITER}
ANNOTATION COMMENT IMP: NORMAL
CV2 IGG TITR SER: NEGATIVE {TITER}
GLIAL NUC TYPE 1 AB TITR SER: NEGATIVE {TITER}
HU1 AB TITR SER: NEGATIVE {TITER}
HU2 AB TITR SER IF: NEGATIVE {TITER}
HU3 AB TITR SER: NEGATIVE {TITER}
IMMUNOLOGIST REVIEW: NORMAL
PCA-1 AB TITR SER: NEGATIVE {TITER}
PCA-TR AB TITR SER: NEGATIVE {TITER}
PURKINJE CELL CYTOPLASMIC AB TYPE2: NEGATIVE
VGCC-P/Q BIND AB SER-SCNC: 0 NMOL/L
VGKC AB SER-SCNC: 0 NMOL/L

## 2024-01-09 ENCOUNTER — LAB VISIT (OUTPATIENT)
Dept: LAB | Facility: OTHER | Age: 59
End: 2024-01-09
Attending: INTERNAL MEDICINE
Payer: COMMERCIAL

## 2024-01-09 DIAGNOSIS — E11.9 TYPE 2 DIABETES MELLITUS WITHOUT COMPLICATION, WITHOUT LONG-TERM CURRENT USE OF INSULIN: ICD-10-CM

## 2024-01-09 DIAGNOSIS — E78.49 OTHER HYPERLIPIDEMIA: ICD-10-CM

## 2024-01-09 DIAGNOSIS — E55.9 VITAMIN D DEFICIENCY: ICD-10-CM

## 2024-01-09 LAB
25(OH)D3+25(OH)D2 SERPL-MCNC: 47 NG/ML (ref 30–96)
ALBUMIN/CREAT UR: 17.7 UG/MG (ref 0–30)
CHOLEST SERPL-MCNC: 158 MG/DL (ref 120–199)
CHOLEST/HDLC SERPL: 2.9 {RATIO} (ref 2–5)
CREAT UR-MCNC: 28.3 MG/DL (ref 15–325)
ESTIMATED AVG GLUCOSE: 157 MG/DL (ref 68–131)
HBA1C MFR BLD: 7.1 % (ref 4–5.6)
HDLC SERPL-MCNC: 54 MG/DL (ref 40–75)
HDLC SERPL: 34.2 % (ref 20–50)
LDLC SERPL CALC-MCNC: 93.8 MG/DL (ref 63–159)
MICROALBUMIN UR DL<=1MG/L-MCNC: 5 UG/ML
NONHDLC SERPL-MCNC: 104 MG/DL
TRIGL SERPL-MCNC: 51 MG/DL (ref 30–150)

## 2024-01-09 PROCEDURE — 82306 VITAMIN D 25 HYDROXY: CPT | Performed by: INTERNAL MEDICINE

## 2024-01-09 PROCEDURE — 82043 UR ALBUMIN QUANTITATIVE: CPT | Performed by: STUDENT IN AN ORGANIZED HEALTH CARE EDUCATION/TRAINING PROGRAM

## 2024-01-09 PROCEDURE — 80061 LIPID PANEL: CPT | Performed by: STUDENT IN AN ORGANIZED HEALTH CARE EDUCATION/TRAINING PROGRAM

## 2024-01-09 PROCEDURE — 36415 COLL VENOUS BLD VENIPUNCTURE: CPT | Performed by: INTERNAL MEDICINE

## 2024-01-09 PROCEDURE — 83036 HEMOGLOBIN GLYCOSYLATED A1C: CPT | Performed by: STUDENT IN AN ORGANIZED HEALTH CARE EDUCATION/TRAINING PROGRAM

## 2024-01-10 DIAGNOSIS — E78.49 OTHER HYPERLIPIDEMIA: ICD-10-CM

## 2024-01-10 RX ORDER — ATORVASTATIN CALCIUM 80 MG/1
80 TABLET, FILM COATED ORAL DAILY
Qty: 90 TABLET | Refills: 3 | Status: SHIPPED | OUTPATIENT
Start: 2024-01-10 | End: 2025-01-09

## 2024-02-24 ENCOUNTER — PATIENT MESSAGE (OUTPATIENT)
Dept: ADMINISTRATIVE | Facility: OTHER | Age: 59
End: 2024-02-24
Payer: COMMERCIAL

## 2024-02-24 DIAGNOSIS — N32.81 OAB (OVERACTIVE BLADDER): ICD-10-CM

## 2024-02-26 RX ORDER — OXYBUTYNIN CHLORIDE 5 MG/1
5 TABLET, EXTENDED RELEASE ORAL DAILY
Qty: 90 TABLET | Refills: 3 | Status: SHIPPED | OUTPATIENT
Start: 2024-02-26

## 2024-02-29 DIAGNOSIS — N95.2 VAGINAL ATROPHY: ICD-10-CM

## 2024-03-01 RX ORDER — PRASTERONE 6.5 MG/1
INSERT VAGINAL
Qty: 28 EACH | Refills: 0 | Status: SHIPPED | OUTPATIENT
Start: 2024-03-01 | End: 2024-03-12

## 2024-03-04 ENCOUNTER — PATIENT MESSAGE (OUTPATIENT)
Dept: ADMINISTRATIVE | Facility: OTHER | Age: 59
End: 2024-03-04
Payer: COMMERCIAL

## 2024-03-04 ENCOUNTER — PATIENT MESSAGE (OUTPATIENT)
Dept: ADMINISTRATIVE | Facility: HOSPITAL | Age: 59
End: 2024-03-04
Payer: COMMERCIAL

## 2024-03-04 ENCOUNTER — PATIENT OUTREACH (OUTPATIENT)
Dept: ADMINISTRATIVE | Facility: HOSPITAL | Age: 59
End: 2024-03-04
Payer: COMMERCIAL

## 2024-03-10 DIAGNOSIS — F41.1 GENERALIZED ANXIETY DISORDER: ICD-10-CM

## 2024-03-11 RX ORDER — VENLAFAXINE HYDROCHLORIDE 75 MG/1
75 CAPSULE, EXTENDED RELEASE ORAL DAILY
Qty: 90 CAPSULE | Refills: 1 | Status: SHIPPED | OUTPATIENT
Start: 2024-03-11 | End: 2024-05-18 | Stop reason: SDUPTHER

## 2024-03-11 NOTE — TELEPHONE ENCOUNTER
Care Due:                  Date            Visit Type   Department     Provider  --------------------------------------------------------------------------------                                MYCHART                              FOLLOWUP/OF  [facility] INTERNAL  Last Visit: 06-      FICE VISIT   MEDICINE       Temitope  McMahshira                              MYCHART                              FOLLOWUP/OF  [facility] INTERNAL  Next Visit: 03-      FICE VISIT   MEDICINE       Temitope  McMahill                                                            Last  Test          Frequency    Reason                     Performed    Due Date  --------------------------------------------------------------------------------    CMP.........  12 months..  atorvastatin.............  03- 02-    [facility] Embedded Care Due Messages. Reference number: [id number].   3/10/2024 9:24:44 PM CDT

## 2024-03-12 DIAGNOSIS — N95.2 VAGINAL ATROPHY: ICD-10-CM

## 2024-03-12 RX ORDER — PRASTERONE 6.5 MG/1
INSERT VAGINAL
Qty: 28 EACH | Refills: 1 | Status: SHIPPED | OUTPATIENT
Start: 2024-03-12

## 2024-04-10 ENCOUNTER — HOSPITAL ENCOUNTER (OUTPATIENT)
Dept: RADIOLOGY | Facility: OTHER | Age: 59
Discharge: HOME | End: 2024-04-10
Attending: INTERNAL MEDICINE
Payer: COMMERCIAL

## 2024-04-10 DIAGNOSIS — Z12.31 ENCOUNTER FOR SCREENING MAMMOGRAM FOR BREAST CANCER: ICD-10-CM

## 2024-04-10 DIAGNOSIS — Z12.39 ENCOUNTER FOR SCREENING FOR MALIGNANT NEOPLASM OF BREAST, UNSPECIFIED SCREENING MODALITY: ICD-10-CM

## 2024-04-10 PROCEDURE — 77067 SCR MAMMO BI INCL CAD: CPT | Mod: TC,52

## 2024-04-10 PROCEDURE — 77067 SCR MAMMO BI INCL CAD: CPT | Mod: 26,52,, | Performed by: RADIOLOGY

## 2024-04-10 PROCEDURE — 77063 BREAST TOMOSYNTHESIS BI: CPT | Mod: 26,52,, | Performed by: RADIOLOGY

## 2024-04-29 DIAGNOSIS — E11.9 TYPE 2 DIABETES MELLITUS WITHOUT COMPLICATION, WITHOUT LONG-TERM CURRENT USE OF INSULIN: ICD-10-CM

## 2024-04-30 RX ORDER — EMPAGLIFLOZIN 25 MG/1
25 TABLET, FILM COATED ORAL
Qty: 90 TABLET | Refills: 0 | Status: SHIPPED | OUTPATIENT
Start: 2024-04-30

## 2024-04-30 NOTE — TELEPHONE ENCOUNTER
Provider Staff:  Action required for this patient    Requires labs      Please see care gap opportunities below in Care Due Message.    Thanks!  [facility]     Appointments      Date Provider   Last Visit   6/13/2023 Temitope Grant MD   Next Visit   6/13/2024 Temitope Grant MD     Refill Decision Note   Shu Mendoza  is requesting a refill authorization.    Brief Assessment and Rationale for Refill:  Approve       Medication Therapy Plan:         Comments:     Note composed:10:29 AM 04/30/2024

## 2024-04-30 NOTE — TELEPHONE ENCOUNTER
Care Due:                  Date            Visit Type   Department     Provider  --------------------------------------------------------------------------------                                MYCHART                              FOLLOWUP/OF  [facility] INTERNAL  Last Visit: 06-      FICE VISIT   MEDICINE       Temitope  McMahill                              MYCHART                              [facility]  Next Visit: 06-      FICE VISIT   MEDICINE       Temitope  McMahill                                                            Last  Test          Frequency    Reason                     Performed    Due Date  --------------------------------------------------------------------------------    HBA1C.......  6 months...  JARDIANCE, metFORMIN.....  01-   07-    [facility] Embedded Care Due Messages. Reference number: [id number].   4/30/2024 10:09:36 AM CDT

## 2024-05-13 ENCOUNTER — OFFICE VISIT (OUTPATIENT)
Dept: HEMATOLOGY/ONCOLOGY | Facility: CLINIC | Age: 59
End: 2024-05-13
Payer: COMMERCIAL

## 2024-05-13 VITALS
WEIGHT: 177.94 LBS | RESPIRATION RATE: 18 BRPM | HEART RATE: 80 BPM | SYSTOLIC BLOOD PRESSURE: 143 MMHG | TEMPERATURE: 98 F | BODY MASS INDEX: 30.38 KG/M2 | DIASTOLIC BLOOD PRESSURE: 80 MMHG | HEIGHT: 64 IN | OXYGEN SATURATION: 98 %

## 2024-05-13 DIAGNOSIS — C50.912 INFILTRATING DUCTAL CARCINOMA OF LEFT BREAST: Primary | ICD-10-CM

## 2024-05-13 DIAGNOSIS — M89.8X9 CANCER TREATMENT-INDUCED BONE LOSS: ICD-10-CM

## 2024-05-13 DIAGNOSIS — T45.1X5A CANCER TREATMENT-INDUCED BONE LOSS: ICD-10-CM

## 2024-05-13 DIAGNOSIS — C50.112 MALIGNANT NEOPLASM OF CENTRAL PORTION OF LEFT BREAST IN FEMALE, ESTROGEN RECEPTOR POSITIVE: ICD-10-CM

## 2024-05-13 DIAGNOSIS — Z17.0 MALIGNANT NEOPLASM OF CENTRAL PORTION OF LEFT BREAST IN FEMALE, ESTROGEN RECEPTOR POSITIVE: ICD-10-CM

## 2024-05-13 DIAGNOSIS — Z79.811 AROMATASE INHIBITOR USE: ICD-10-CM

## 2024-05-13 PROCEDURE — G2211 COMPLEX E/M VISIT ADD ON: HCPCS | Mod: S$GLB,,, | Performed by: INTERNAL MEDICINE

## 2024-05-13 PROCEDURE — 1160F RVW MEDS BY RX/DR IN RCRD: CPT | Mod: CPTII,S$GLB,, | Performed by: INTERNAL MEDICINE

## 2024-05-13 PROCEDURE — 3008F BODY MASS INDEX DOCD: CPT | Mod: CPTII,S$GLB,, | Performed by: INTERNAL MEDICINE

## 2024-05-13 PROCEDURE — 3079F DIAST BP 80-89 MM HG: CPT | Mod: CPTII,S$GLB,, | Performed by: INTERNAL MEDICINE

## 2024-05-13 PROCEDURE — 3061F NEG MICROALBUMINURIA REV: CPT | Mod: CPTII,S$GLB,, | Performed by: INTERNAL MEDICINE

## 2024-05-13 PROCEDURE — 1159F MED LIST DOCD IN RCRD: CPT | Mod: CPTII,S$GLB,, | Performed by: INTERNAL MEDICINE

## 2024-05-13 PROCEDURE — 99999 PR PBB SHADOW E&M-EST. PATIENT-LVL V: CPT | Mod: PBBFAC,,, | Performed by: INTERNAL MEDICINE

## 2024-05-13 PROCEDURE — 3051F HG A1C>EQUAL 7.0%<8.0%: CPT | Mod: CPTII,S$GLB,, | Performed by: INTERNAL MEDICINE

## 2024-05-13 PROCEDURE — 99214 OFFICE O/P EST MOD 30 MIN: CPT | Mod: S$GLB,,, | Performed by: INTERNAL MEDICINE

## 2024-05-13 PROCEDURE — 3077F SYST BP >= 140 MM HG: CPT | Mod: CPTII,S$GLB,, | Performed by: INTERNAL MEDICINE

## 2024-05-13 PROCEDURE — 3066F NEPHROPATHY DOC TX: CPT | Mod: CPTII,S$GLB,, | Performed by: INTERNAL MEDICINE

## 2024-05-13 PROCEDURE — 4010F ACE/ARB THERAPY RXD/TAKEN: CPT | Mod: CPTII,S$GLB,, | Performed by: INTERNAL MEDICINE

## 2024-05-13 NOTE — PROGRESS NOTES
Subjective:       Patient ID: Shu Mendoza is a 58 y.o. female.     Chief Complaint: follow up for breast cancer     Diagnosis:  Stage IA ([id number]) invasive ductal carcinoma of the left breast, grade 2, ER/DC positive, HER2 negative, s/p left mastectomy and reconstruction on 9/11/2019. Mammaprint high risk     Oncologic History:  1. Ms Mendoza is a 55 yo postmenopausal woman with HTN, DM, who presents for further management of pathologic stage IA ([id number]) invasive ductal carcinoma of the left breast. She was referred to Dr Mccray for bloody nipple discharge first noted in June 2019. Before then she had a negative mammogram on 5/20/19. She had a left breast US on 6/27/19 in the left breast retroareolar region just behind the nipple, there is an irregular hypoechoic intraductal mass measuring 1.0 cm. She underwent a biopsy on 7/8/2019. It showed invasive ductal carcinoma, ER strongly positive 100%, DC positive 80%, HER2 negative 1+. Ki67 5% activity within invasive component. Patient underwent a left total mastectomy and reconstruction on 9/11/2019. Pathology showed a 1.3 cm invasive ductal carcinoma, grade 2. DCIS present, negative margin, 22 lymph nodes removed, one lymph node positive with macrometastases 13 mm, no extranodal extension. No lymphovascular invasion. Pathologic T1c N1a. Mammaprint high risk with chemo benefit >12%. 5-10 years recurrence risk without chemo 20-25%, with chemo and endocrine therapy 7%. BRCAnalysis from 7/18/19 was negative. Case was discussed at tumor board. Adjuvant chemotherapy followed by endocrine therapy was recommended. Radiation not recommended. Patient presents today for further management. Feeling well. Works at [employer]. She had NILES/BSO in 2000 and was on hormone replacement therapy after that. Stopped in 2019 after she was diagnosed with breast cancer.   2. Port placed by Dr Mccray on 10/25/19. Echo on 10/22/19 showed normal LVEF 59%.   3. Adjuvant DDAC followed by  weekly taxol started on 10/31/2019. cycle 2 on 19. cycle 3 was delayed for a week for umbilical infection, given on 2019, cycle 4 given on 19. Weekly taxol completed on 3/19/19. Started anastrozole in 2020. Port was removed.   4. Colonoscopy 2/3/21. Two colon polyps removed. Path showed tubular adenoma.      Interval History:   Ms Mendoza returns today for follow up. Doing very well. Taking anastrozole. Tolerating it well. Still has more dental work to do. Noted worse memory.      ECO     ROS:   A ten-point system review is obtained and negative except for what was stated in the Interval History.      Physical Examination:   General: well hydrated, well developed, in no acute distress  HEENT: normocephalic, EOMI, anicteric sclerae  Neck: supple, no JVD, cervical or supraclavicular LAD  Lungs: CTAB, no rales, rhonchi, wheezing  Breasts: s/l left mastectomy and reconstruction. Bilateral breasts no masses, abnormal skin nodules, axillary lymphadenopathy  Heart: RRR, no R/G/M  Abdomen: soft, no tenderness, no distention, no hepatosplenomegaly, hernia or mass. BS present  Ext: no clubbing, cyanosis, edema  Neuro: alert and oriented x 4  Psych: pleasant and appropriate mood and affect     Objective:      Laboratory Data:  Labs reviewed. prior vit D level had been normal     Imaging Data:  Right Mammogram 4/10/2024:  Impression:   No mammographic evidence of malignancy.     BI-RADS Category 1: Negative     Recommendation:  Routine screening mammogram in 1 year is recommended.     Bone density 20 normal  Bone density 2022:     Osteopenia both hips with decrease in bone mineral density as above.     Consider FDA approved medical therapies in postmenopausal women and men aged 50 years and older, based on the following:     *A hip or vertebral (clinical or morphometric) fracture  *T score less than or equal to -2.5 at the femoral neck or spine after appropriate evaluation to exclude secondary  causes.  *Low bone mass -- also known as osteopenia (T score between -1.0 and -2.5 at the femoral neck or spine) and a 10 year probability of hip fracture greater than or equal to 3% or a 10 year probability of major osteoporosis-related fracture greater than or equal to 20% based on the [place]-adapted WHO algorithm.  *Clinician's judgment and/or patient preference may indicate treatment for people with 10 year fracture probabilities is above or below these levels.     Assessment and Plan:      1. Infiltrating ductal carcinoma of left breast    2. Malignant neoplasm of central portion of left breast in female, estrogen receptor positive    3. Aromatase inhibitor use    4. Cancer treatment-induced bone loss      1-3  - Ms Mendoza is a 57 yo postmenopausal woman with stage IA ([id number]) invasive ductal carcinoma of the left breast, ER/ME positive, HER2 negative, s/p left mastectomy and reconstruction on 9/11/2019. Mammaprint high risk with chemo benefit >12%. 5-10 years recurrence risk without chemo 20-25%, with chemo and endocrine therapy 7%.  - completed adjuvant DDAC followed by weekly taxol on 3/19/2019.   - on anastrozole since April 2020. Continue for at least 5 years  - today we discussed breast cancer index. Discussed the rationale and benefit. Patient understands and agrees with the plan.   - send breast cancer index  - c/w anastrozole  - R mammogram in April 2024 normal. Next due in April 2025     4  - still needs dental work  - hold off on zometa until it is 3 months after her last dental work  - c/w vit D and calcium.     Follow-up:      RTC in 6 months  Knows to call in the interval if any problems arise.    Route Chart for Scheduling    Med Onc Chart Routing      Follow up with physician 6 months. see me in 6 months   Follow up with ELROY    Infusion scheduling note    Injection scheduling note    Labs    Imaging    Pharmacy appointment    Other referrals

## 2024-05-18 DIAGNOSIS — F41.1 GENERALIZED ANXIETY DISORDER: ICD-10-CM

## 2024-05-18 NOTE — TELEPHONE ENCOUNTER
No care due was identified.  [facility] Embedded Care Due Messages. Reference number: [id number].   5/18/2024 10:21:23 AM CDT

## 2024-05-20 RX ORDER — VENLAFAXINE HYDROCHLORIDE 75 MG/1
75 CAPSULE, EXTENDED RELEASE ORAL DAILY
Qty: 90 CAPSULE | Refills: 1 | Status: SHIPPED | OUTPATIENT
Start: 2024-05-20 | End: 2024-11-16

## 2024-05-24 ENCOUNTER — PATIENT MESSAGE (OUTPATIENT)
Dept: HEMATOLOGY/ONCOLOGY | Facility: CLINIC | Age: 59
End: 2024-05-24
Payer: COMMERCIAL

## 2024-05-28 ENCOUNTER — PATIENT MESSAGE (OUTPATIENT)
Dept: HEMATOLOGY/ONCOLOGY | Facility: CLINIC | Age: 59
End: 2024-05-28
Payer: COMMERCIAL

## 2024-06-06 DIAGNOSIS — C50.112 MALIGNANT NEOPLASM OF CENTRAL PORTION OF LEFT BREAST IN FEMALE, ESTROGEN RECEPTOR POSITIVE: ICD-10-CM

## 2024-06-06 DIAGNOSIS — Z17.0 MALIGNANT NEOPLASM OF CENTRAL PORTION OF LEFT BREAST IN FEMALE, ESTROGEN RECEPTOR POSITIVE: ICD-10-CM

## 2024-06-06 DIAGNOSIS — N95.2 VAGINAL ATROPHY: ICD-10-CM

## 2024-06-06 RX ORDER — PRASTERONE 6.5 MG/1
INSERT VAGINAL
OUTPATIENT
Start: 2024-06-06

## 2024-06-07 RX ORDER — PROMETHAZINE HYDROCHLORIDE 25 MG/1
25 TABLET ORAL EVERY 6 HOURS PRN
Qty: 30 TABLET | Refills: 2 | Status: SHIPPED | OUTPATIENT
Start: 2024-06-07 | End: 2024-06-13 | Stop reason: SDUPTHER

## 2024-06-07 RX ORDER — ONDANSETRON 8 MG/1
8 TABLET, ORALLY DISINTEGRATING ORAL EVERY 6 HOURS PRN
Qty: 30 TABLET | Refills: 2 | Status: SHIPPED | OUTPATIENT
Start: 2024-06-07

## 2024-06-13 ENCOUNTER — OFFICE VISIT (OUTPATIENT)
Dept: INTERNAL MEDICINE | Facility: CLINIC | Age: 59
End: 2024-06-13
Payer: COMMERCIAL

## 2024-06-13 ENCOUNTER — LAB VISIT (OUTPATIENT)
Dept: LAB | Facility: OTHER | Age: 59
End: 2024-06-13
Attending: STUDENT IN AN ORGANIZED HEALTH CARE EDUCATION/TRAINING PROGRAM
Payer: COMMERCIAL

## 2024-06-13 VITALS
WEIGHT: 178.56 LBS | SYSTOLIC BLOOD PRESSURE: 142 MMHG | DIASTOLIC BLOOD PRESSURE: 80 MMHG | HEART RATE: 71 BPM | OXYGEN SATURATION: 98 % | BODY MASS INDEX: 30.65 KG/M2

## 2024-06-13 DIAGNOSIS — E11.9 TYPE 2 DIABETES MELLITUS WITHOUT COMPLICATION, WITHOUT LONG-TERM CURRENT USE OF INSULIN: ICD-10-CM

## 2024-06-13 DIAGNOSIS — F41.1 GENERALIZED ANXIETY DISORDER: ICD-10-CM

## 2024-06-13 DIAGNOSIS — Z00.00 HEALTH MAINTENANCE EXAMINATION: Primary | ICD-10-CM

## 2024-06-13 DIAGNOSIS — M85.851 OSTEOPENIA OF BOTH HIPS: ICD-10-CM

## 2024-06-13 DIAGNOSIS — I10 PRIMARY HYPERTENSION: ICD-10-CM

## 2024-06-13 DIAGNOSIS — Z11.4 SCREENING FOR HIV WITHOUT PRESENCE OF RISK FACTORS: ICD-10-CM

## 2024-06-13 DIAGNOSIS — G43.709 CHRONIC MIGRAINE WITHOUT AURA WITHOUT STATUS MIGRAINOSUS, NOT INTRACTABLE: ICD-10-CM

## 2024-06-13 DIAGNOSIS — E78.2 MIXED HYPERLIPIDEMIA: ICD-10-CM

## 2024-06-13 DIAGNOSIS — Z11.59 ENCOUNTER FOR HEPATITIS C SCREENING TEST FOR LOW RISK PATIENT: ICD-10-CM

## 2024-06-13 DIAGNOSIS — E55.9 VITAMIN D DEFICIENCY: ICD-10-CM

## 2024-06-13 DIAGNOSIS — Z00.00 HEALTH MAINTENANCE EXAMINATION: ICD-10-CM

## 2024-06-13 DIAGNOSIS — N32.81 OAB (OVERACTIVE BLADDER): ICD-10-CM

## 2024-06-13 DIAGNOSIS — M85.852 OSTEOPENIA OF BOTH HIPS: ICD-10-CM

## 2024-06-13 DIAGNOSIS — C50.112 MALIGNANT NEOPLASM OF CENTRAL PORTION OF LEFT BREAST IN FEMALE, ESTROGEN RECEPTOR POSITIVE: ICD-10-CM

## 2024-06-13 DIAGNOSIS — G62.9 NEUROPATHY: ICD-10-CM

## 2024-06-13 DIAGNOSIS — G47.00 INSOMNIA, UNSPECIFIED TYPE: ICD-10-CM

## 2024-06-13 DIAGNOSIS — Z17.0 MALIGNANT NEOPLASM OF CENTRAL PORTION OF LEFT BREAST IN FEMALE, ESTROGEN RECEPTOR POSITIVE: ICD-10-CM

## 2024-06-13 LAB
ALBUMIN SERPL BCP-MCNC: 4 G/DL (ref 3.5–5.2)
ALP SERPL-CCNC: 100 U/L (ref 55–135)
ALT SERPL W/O P-5'-P-CCNC: 20 U/L (ref 10–44)
ANION GAP SERPL CALC-SCNC: 12 MMOL/L (ref 8–16)
AST SERPL-CCNC: 16 U/L (ref 10–40)
BASOPHILS # BLD AUTO: 0.06 K/UL (ref 0–0.2)
BASOPHILS NFR BLD: 0.9 % (ref 0–1.9)
BILIRUB SERPL-MCNC: 0.3 MG/DL (ref 0.1–1)
BUN SERPL-MCNC: 13 MG/DL (ref 6–20)
CALCIUM SERPL-MCNC: 10 MG/DL (ref 8.7–10.5)
CHLORIDE SERPL-SCNC: 106 MMOL/L (ref 95–110)
CO2 SERPL-SCNC: 24 MMOL/L (ref 23–29)
CREAT SERPL-MCNC: 0.7 MG/DL (ref 0.5–1.4)
DIFFERENTIAL METHOD BLD: ABNORMAL
EOSINOPHIL # BLD AUTO: 0.1 K/UL (ref 0–0.5)
EOSINOPHIL NFR BLD: 1.8 % (ref 0–8)
ERYTHROCYTE [DISTWIDTH] IN BLOOD BY AUTOMATED COUNT: 14.6 % (ref 11.5–14.5)
EST. GFR  (NO RACE VARIABLE): >60 ML/MIN/1.73 M^2
ESTIMATED AVG GLUCOSE: 157 MG/DL (ref 68–131)
GLUCOSE SERPL-MCNC: 125 MG/DL (ref 70–110)
HBA1C MFR BLD: 7.1 % (ref 4–5.6)
HCT VFR BLD AUTO: 43.7 % (ref 37–48.5)
HCV AB SERPL QL IA: NEGATIVE
HGB BLD-MCNC: 13.8 G/DL (ref 12–16)
HIV 1+2 AB+HIV1 P24 AG SERPL QL IA: NEGATIVE
IMM GRANULOCYTES # BLD AUTO: 0.02 K/UL (ref 0–0.04)
IMM GRANULOCYTES NFR BLD AUTO: 0.3 % (ref 0–0.5)
LYMPHOCYTES # BLD AUTO: 2.5 K/UL (ref 1–4.8)
LYMPHOCYTES NFR BLD: 37.6 % (ref 18–48)
MCH RBC QN AUTO: 27.9 PG (ref 27–31)
MCHC RBC AUTO-ENTMCNC: 31.6 G/DL (ref 32–36)
MCV RBC AUTO: 88 FL (ref 82–98)
MONOCYTES # BLD AUTO: 0.4 K/UL (ref 0.3–1)
MONOCYTES NFR BLD: 5.6 % (ref 4–15)
NEUTROPHILS # BLD AUTO: 3.6 K/UL (ref 1.8–7.7)
NEUTROPHILS NFR BLD: 53.8 % (ref 38–73)
NRBC BLD-RTO: 0 /100 WBC
PLATELET # BLD AUTO: 314 K/UL (ref 150–450)
PMV BLD AUTO: 9.4 FL (ref 9.2–12.9)
POTASSIUM SERPL-SCNC: 3.6 MMOL/L (ref 3.5–5.1)
PROT SERPL-MCNC: 7.9 G/DL (ref 6–8.4)
RBC # BLD AUTO: 4.95 M/UL (ref 4–5.4)
SODIUM SERPL-SCNC: 142 MMOL/L (ref 136–145)
TSH SERPL DL<=0.005 MIU/L-ACNC: 1.05 UIU/ML (ref 0.4–4)
WBC # BLD AUTO: 6.6 K/UL (ref 3.9–12.7)

## 2024-06-13 PROCEDURE — 3008F BODY MASS INDEX DOCD: CPT | Mod: CPTII,S$GLB,, | Performed by: STUDENT IN AN ORGANIZED HEALTH CARE EDUCATION/TRAINING PROGRAM

## 2024-06-13 PROCEDURE — 82088 ASSAY OF ALDOSTERONE: CPT | Performed by: STUDENT IN AN ORGANIZED HEALTH CARE EDUCATION/TRAINING PROGRAM

## 2024-06-13 PROCEDURE — 84443 ASSAY THYROID STIM HORMONE: CPT | Performed by: STUDENT IN AN ORGANIZED HEALTH CARE EDUCATION/TRAINING PROGRAM

## 2024-06-13 PROCEDURE — 1159F MED LIST DOCD IN RCRD: CPT | Mod: CPTII,S$GLB,, | Performed by: STUDENT IN AN ORGANIZED HEALTH CARE EDUCATION/TRAINING PROGRAM

## 2024-06-13 PROCEDURE — 36415 COLL VENOUS BLD VENIPUNCTURE: CPT | Performed by: STUDENT IN AN ORGANIZED HEALTH CARE EDUCATION/TRAINING PROGRAM

## 2024-06-13 PROCEDURE — 83036 HEMOGLOBIN GLYCOSYLATED A1C: CPT | Performed by: STUDENT IN AN ORGANIZED HEALTH CARE EDUCATION/TRAINING PROGRAM

## 2024-06-13 PROCEDURE — 3079F DIAST BP 80-89 MM HG: CPT | Mod: CPTII,S$GLB,, | Performed by: STUDENT IN AN ORGANIZED HEALTH CARE EDUCATION/TRAINING PROGRAM

## 2024-06-13 PROCEDURE — 85025 COMPLETE CBC W/AUTO DIFF WBC: CPT | Performed by: STUDENT IN AN ORGANIZED HEALTH CARE EDUCATION/TRAINING PROGRAM

## 2024-06-13 PROCEDURE — 3066F NEPHROPATHY DOC TX: CPT | Mod: CPTII,S$GLB,, | Performed by: STUDENT IN AN ORGANIZED HEALTH CARE EDUCATION/TRAINING PROGRAM

## 2024-06-13 PROCEDURE — 87389 HIV-1 AG W/HIV-1&-2 AB AG IA: CPT | Performed by: STUDENT IN AN ORGANIZED HEALTH CARE EDUCATION/TRAINING PROGRAM

## 2024-06-13 PROCEDURE — 4010F ACE/ARB THERAPY RXD/TAKEN: CPT | Mod: CPTII,S$GLB,, | Performed by: STUDENT IN AN ORGANIZED HEALTH CARE EDUCATION/TRAINING PROGRAM

## 2024-06-13 PROCEDURE — 99215 OFFICE O/P EST HI 40 MIN: CPT | Mod: S$GLB,,, | Performed by: STUDENT IN AN ORGANIZED HEALTH CARE EDUCATION/TRAINING PROGRAM

## 2024-06-13 PROCEDURE — 86803 HEPATITIS C AB TEST: CPT | Performed by: STUDENT IN AN ORGANIZED HEALTH CARE EDUCATION/TRAINING PROGRAM

## 2024-06-13 PROCEDURE — 3077F SYST BP >= 140 MM HG: CPT | Mod: CPTII,S$GLB,, | Performed by: STUDENT IN AN ORGANIZED HEALTH CARE EDUCATION/TRAINING PROGRAM

## 2024-06-13 PROCEDURE — 99999 PR PBB SHADOW E&M-EST. PATIENT-LVL V: CPT | Mod: PBBFAC,,, | Performed by: STUDENT IN AN ORGANIZED HEALTH CARE EDUCATION/TRAINING PROGRAM

## 2024-06-13 PROCEDURE — 3051F HG A1C>EQUAL 7.0%<8.0%: CPT | Mod: CPTII,S$GLB,, | Performed by: STUDENT IN AN ORGANIZED HEALTH CARE EDUCATION/TRAINING PROGRAM

## 2024-06-13 PROCEDURE — 3061F NEG MICROALBUMINURIA REV: CPT | Mod: CPTII,S$GLB,, | Performed by: STUDENT IN AN ORGANIZED HEALTH CARE EDUCATION/TRAINING PROGRAM

## 2024-06-13 PROCEDURE — 80053 COMPREHEN METABOLIC PANEL: CPT | Performed by: STUDENT IN AN ORGANIZED HEALTH CARE EDUCATION/TRAINING PROGRAM

## 2024-06-13 RX ORDER — GABAPENTIN 300 MG/1
CAPSULE ORAL
Qty: 360 CAPSULE | Refills: 3 | Status: SHIPPED | OUTPATIENT
Start: 2024-06-13

## 2024-06-13 RX ORDER — SPIRONOLACTONE 25 MG/1
25 TABLET ORAL DAILY
Qty: 90 TABLET | Refills: 1 | Status: SHIPPED | OUTPATIENT
Start: 2024-06-13

## 2024-06-13 RX ORDER — PROMETHAZINE HYDROCHLORIDE 25 MG/1
25 TABLET ORAL EVERY 6 HOURS PRN
Qty: 30 TABLET | Refills: 0 | Status: SHIPPED | OUTPATIENT
Start: 2024-06-13

## 2024-06-13 RX ORDER — AMLODIPINE AND VALSARTAN 10; 320 MG/1; MG/1
1 TABLET ORAL DAILY
Qty: 90 TABLET | Refills: 3 | Status: SHIPPED | OUTPATIENT
Start: 2024-06-13 | End: 2025-06-13

## 2024-06-13 NOTE — PATIENT INSTRUCTIONS
Can try magnesium glycinate 200-400 mg nightly for cramping  If no improvement with magnesium, can try vitamin B12 1000 mcg before bed.   Make sure you're well hydrated.  Try stretching at night before bed to prevent cramping.

## 2024-06-13 NOTE — PROGRESS NOTES
Subjective:       Patient ID: Shu Mendoza is a 58 y.o. female.    Chief Complaint: Health maintenance examination [Z00.00]    Patient is established with me, here today for the following:     HTN, HLD, T2DM, invasive ductal carcinoma of the left breast (ER positive), meningioma, osteopenia, vitamin D deficiency, migraines, anxiety, insomnia, chemotherapy induced neuropathy, OAB, recurrent UTI's, NADER     Health maintenance -   Colonoscopy performed FEB2022, with Dr. Clayton. Told to repeat in 5 years.  Denies family history of colorectal cancer.  Denies family history of breast cancer.  Family history of ovarian cancer.  Hysterectomy due to AUB and ovarian cyst.   Seeing Dr. Oliva for gynecology.   Denies family history of osteoporosis.  Denies significant family history of cardiac disease.  UTD on Tdap, COVID primary/booster, shingles, PCV20 vaccinations.  Due for COVID vaccinations.  Never smoker.  Denies drug use.  Due for HIV and hepatitis C screening.     HLD -   Endorses taking atorvastatin as directed  Denies side effects or concerns while taking medication  Lab Results       Component                Value               Date                       CHOL                     158                 01/09/2024            Lab Results       Component                Value               Date                       TRIG                     51                  01/09/2024            Lab Results       Component                Value               Date                       LDLCALC                  93.8                01/09/2024            Lab Results       Component                Value               Date                       HDL                      54                  01/09/2024               HTN -   Currently prescribed HCTZ, amlodipine-valsartan, carvedilol.   Patient endorses not routinely taking medication.  Per digital medicine team, concern for compliance  Denies side effects or concerns while taking  "medication.  Enrolled in digital hypertension medicine program.   Log shows blood pressures at goal (<130/80) 0% of the time.   TSH WNL previously  Has not had renin-aldosterone ratio checked  Lab Results       Component                Value               Date                       MICALBCREAT              17.7                01/09/2024            BP Readings from Last 5 Encounters:  05/13/24 : (!) 143/80  11/30/23 : 137/89  10/17/23 : (!) 158/84  10/03/23 : (!) 151/84  10/03/23 : (!) 158/78    Blood pressure cuff: 160/87  In office pressure: 142/80     T2DM -   Currently taking metformin, Jardiance   Checking blood glucose infrequently  Enrolled in digital diabetes medicine program   Log shows blood sugars at goal () 100% of the time.   Denies blood sugars < 70 mg/dL  Due for foot exam.  Due for eye exam.   Normally sees Dr. Mccann for ophthalmology.  Lab Results       Component                Value               Date                       HGBA1C                   7.1 (H)             01/09/2024                 HGBA1C                   6.7 (H)             08/15/2023                 HGBA1C                   6.9 (H)             01/19/2023            Lab Results       Component                Value               Date                       MICALBCREAT              17.7                01/09/2024                Breast cancer -  Diagnosed 2019 with invasive ductal carcinoma of the left breast, ER positive  Following with Dr. Tena for oncology  S/p mastectomy and completed Taxol treatment  Started on anastrozole APR2020  Mammogram BI-RADS 1 in APR2024     Osteopenia -   Vitamin D deficiency -   Completed DEXA in JUNE2022.  Showed osteopenia of both hips.  "10-year Probability of Fracture:  Major Osteoporotic Fracture 10.5%.  Hip Fracture 0.5%."  Currently taking vitamin D3 daily.  Currently taking calcium daily.  Lab Results       Component                Value               Date                       " "[id number]             47                  01/09/2024                 [id number]             50                  08/10/2022                 [id number]             48                  06/13/2022                        Following with Dr. Agrawal for migraines, needs to establish with new neurologist with Dr. Agrawal leaving  Taking gabapentin nightly for neuropathy with good effect  MRI JAN2022 with  "Arising from the free edge of the anterior aspect of the left tentorial leaflet, there is an extra-axial homogeneously enhancing mass measuring 1.2 cm.  This is probably a meningioma."     Taking venlafaxine for anxiety with good effect  Endorses grandfrancie recently killed, has experienced more stress after his death  No longer taking amitriptyline for insomnia  Endorses sleeping well      Taking oxybutynin for OAB with good effect  Previously following with Dr. Golden for urology.      NADER -   CPAP was recalled and insurance not covering a replacement  Last sleep study SEP2023, mild NADER           Review of Systems   Constitutional:  Negative for activity change and unexpected weight change.   HENT:  Negative for hearing loss, rhinorrhea and trouble swallowing.    Eyes:  Negative for discharge and visual disturbance.   Respiratory:  Negative for chest tightness and wheezing.    Cardiovascular:  Negative for chest pain and palpitations.   Gastrointestinal:  Negative for blood in stool, constipation, diarrhea and vomiting.   Endocrine: Negative for polydipsia and polyuria.   Genitourinary:  Negative for difficulty urinating, dysuria, hematuria and menstrual problem.   Musculoskeletal:  Positive for arthralgias and joint swelling. Negative for neck pain.   Neurological:  Positive for weakness and headaches.   Psychiatric/Behavioral:  Positive for dysphoric mood. Negative for confusion.          Current Outpatient Medications   Medication Instructions    amlodipine-valsartan (EXFORGE)  mg per tablet 1 tablet, Oral, " Daily    ammonium lactate 12 % Crea 1 application , Topical (Top), 2 times daily    anastrozole (ARIMIDEX) 1 mg, Oral    atorvastatin (LIPITOR) 80 mg, Oral, Daily    azelastine (ASTELIN) 274 mcg, Nasal, 2 times daily    b complex vitamins tablet 1 tablet, Oral, Daily    B-complex with vitamin C (Z-BEC OR EQUIV) tablet No dose, route, or frequency recorded.    calcium carbonate 1,250 mg, Oral, 2 times daily with meals    carvediloL (COREG) 25 mg, Oral, 2 times daily    dorzolamide (TRUSOPT) 2 % ophthalmic solution 1 drop, Both Eyes, 3 times daily    fluticasone propionate (FLONASE) 50 mcg/actuation nasal spray 1 spray, Each Nostril, Nightly PRN    gabapentin (NEURONTIN) 300 MG capsule Take 2 capsules (600 mg total) by mouth every evening AND 1 capsule (300 mg total) once daily AND 1 capsule (300 mg total) with lunch.    hydroCHLOROthiazide (HYDRODIURIL) 25 mg, Oral, Daily    JARDIANCE 25 mg, Oral    latanoprost 0.005 % ophthalmic solution INT 1 GTT IN OU QD HS    LIDOcaine HCL 2% (XYLOCAINE) 2 % jelly Topical (Top), As needed (PRN), Apply topically once nightly to affected part of foot/feet.    loratadine (CLARITIN ORAL) Oral    metFORMIN (GLUCOPHAGE-XR) 1,000 mg, Oral, 2 times daily with meals    multivitamin (THERAGRAN) per tablet 1 tablet, Oral, Daily    multivitamin capsule 1 capsule, Oral, Daily    ondansetron (ZOFRAN-ODT) 8 MG TbDL Dissolve 1 tablet (8 mg total) by mouth every 6 (six) hours as needed (nausea).    oxybutynin (DITROPAN-XL) 5 mg, Oral, Daily    prasterone, dhea, (INTRAROSA) 6.5 mg Inst PLACE 1 INSERT INTRAVAGINALLY AT BEDTIME AS DIRECTED    promethazine (PHENERGAN) 25 mg, Oral, Every 6 hours PRN    spironolactone (ALDACTONE) 25 mg, Oral, Daily    terconazole (TERAZOL 7) 0.4 % Crea 1 applicator, Vaginal, Nightly    venlafaxine (EFFEXOR-XR) 75 mg, Oral, Daily    vitamin D (VITAMIN D3) 1,000 Units, Oral, Daily     Objective:      Vitals:    06/13/24 0833   BP: (!) 142/80   Pulse: 71   SpO2: 98%    Weight: 81 kg (178 lb 9.2 oz)   PainSc: 0-No pain     Body mass index is 30.65 kg/m².    Physical Exam  Vitals reviewed.   Constitutional:       General: She is not in acute distress.     Appearance: Normal appearance. She is not ill-appearing or diaphoretic.   HENT:      Head: Normocephalic and atraumatic.      Right Ear: Tympanic membrane, ear canal and external ear normal. There is no impacted cerumen.      Left Ear: Tympanic membrane, ear canal and external ear normal. There is no impacted cerumen.      Nose: Nose normal. No rhinorrhea.      Mouth/Throat:      Mouth: Mucous membranes are moist.      Pharynx: Oropharynx is clear. No oropharyngeal exudate or posterior oropharyngeal erythema.   Eyes:      General: No scleral icterus.        Right eye: No discharge.         Left eye: No discharge.      Conjunctiva/sclera: Conjunctivae normal.   Neck:      Thyroid: No thyromegaly or thyroid tenderness.      Trachea: Trachea normal.   Cardiovascular:      Rate and Rhythm: Normal rate and regular rhythm.      Heart sounds: Normal heart sounds. No murmur heard.     No friction rub. No gallop.   Pulmonary:      Effort: Pulmonary effort is normal. No respiratory distress.      Breath sounds: Normal breath sounds. No stridor. No wheezing, rhonchi or rales.   Abdominal:      General: There is no distension.      Palpations: Abdomen is soft.      Tenderness: There is no abdominal tenderness. There is no guarding or rebound.   Musculoskeletal:         General: No swelling or deformity.      Cervical back: Neck supple.   Lymphadenopathy:      Head:      Right side of head: No submandibular or posterior auricular adenopathy.      Left side of head: No submandibular or posterior auricular adenopathy.      Cervical: No cervical adenopathy.      Right cervical: No superficial, deep or posterior cervical adenopathy.     Left cervical: No superficial, deep or posterior cervical adenopathy.      Upper Body:      Right upper body:  No supraclavicular adenopathy.      Left upper body: No supraclavicular adenopathy.   Skin:     General: Skin is warm and dry.   Neurological:      General: No focal deficit present.      Mental Status: She is alert. Mental status is at baseline.      Gait: Gait normal.   Psychiatric:         Mood and Affect: Mood normal.         Behavior: Behavior normal.         Assessment:       1. Health maintenance examination    2. Mixed hyperlipidemia    3. Primary hypertension    4. Type 2 diabetes mellitus without complication, without long-term current use of insulin    5. Malignant neoplasm of central portion of left breast in female, estrogen receptor positive    6. Osteopenia of both hips    7. Vitamin D deficiency    8. Chronic migraine without aura without status migrainosus, not intractable    9. Neuropathy    10. OAB (overactive bladder)    11. Generalized anxiety disorder    12. Insomnia, unspecified type    13. Encounter for hepatitis C screening test for low risk patient    14. Screening for HIV without presence of risk factors        Plan:       Mixed hyperlipidemia  Continue current medications.  RTC in 6 months for follow up.    Primary hypertension  Continue amlodipine-valsartan, carvedilol, and HCTZ  Start spironolactone  Check BP at home 3-4 times weekly, keep log for review.   Contact office with home blood pressure logs in 2-3 weeks.   RTC in 6 months for follow up.  -     Comprehensive Metabolic Panel; Future  -     TSH; Future  -     Aldosterone/Renin Activity Ratio; Future  -     spironolactone (ALDACTONE) 25 MG tablet; Take 1 tablet (25 mg total) by mouth once daily.  -     amlodipine-valsartan (EXFORGE)  mg per tablet; Take 1 tablet by mouth once daily.    Type 2 diabetes mellitus without complication, without long-term current use of insulin  -     Comprehensive Metabolic Panel; Future  -     Hemoglobin A1C; Future  -     CBC Auto Differential; Future  -     Ambulatory referral/consult to  Ophthalmology; Future    Malignant neoplasm of central portion of left breast in female, estrogen receptor positive  Continue medication, evaluation, and management per oncologist.  -     promethazine (PHENERGAN) 25 MG tablet; Take 1 tablet (25 mg total) by mouth every 6 (six) hours as needed for Nausea.    Osteopenia of both hips  Vitamin D deficiency  Continue supplementation.  RTC in 6 months for follow up.    Chronic migraine without aura without status migrainosus, not intractable  Neuropathy  Continue gabapentin 600 mg nightly, start 300 mg in morning and 300 mg in afternoon as well  -     Ambulatory referral/consult to Neurology; Future  -     gabapentin (NEURONTIN) 300 MG capsule; Take 2 capsules (600 mg total) by mouth every evening AND 1 capsule (300 mg total) once daily AND 1 capsule (300 mg total) with lunch.  -     Ambulatory referral/consult to Neurology; Future    OAB (overactive bladder)  Continue medication, evaluation, and management per urologist.    Generalized anxiety disorder  Continue current medications.  RTC in 6 months for follow up.    Insomnia, unspecified type  Resolved     Health maintenance examination  Reviewed and discussed age appropriate screenings and immunizations.  -     Comprehensive Metabolic Panel; Future  -     Hemoglobin A1C; Future  -     CBC Auto Differential; Future  -     HIV 1/2 Ag/Ab (4th Gen); Future  -     Hepatitis C Antibody; Future  -     TSH; Future  -     Aldosterone/Renin Activity Ratio; Future    Encounter for hepatitis C screening test for low risk patient  -     Hepatitis C Antibody; Future    Screening for HIV without presence of risk factors  -     HIV 1/2 Ag/Ab (4th Gen); Future      45 minutes were spent in chart review, documentation and review of results, and evaluation, treatment, and counseling of patient on the same day of service.    Temitope Grant MD  6/13/2024

## 2024-06-16 LAB
ALDOST SERPL-MCNC: 22.1 NG/DL
ALDOST/RENIN PLAS-RTO: 221 RATIO
RENIN PLAS-CCNC: 0.1 NG/ML/HR

## 2024-06-17 ENCOUNTER — TELEPHONE (OUTPATIENT)
Dept: INTERNAL MEDICINE | Facility: CLINIC | Age: 59
End: 2024-06-17
Payer: COMMERCIAL

## 2024-06-17 DIAGNOSIS — I10 PRIMARY HYPERTENSION: ICD-10-CM

## 2024-06-17 DIAGNOSIS — E26.9 HIGH ALDOSTERONE TO RENIN RATIO: Primary | ICD-10-CM

## 2024-06-17 NOTE — TELEPHONE ENCOUNTER
----- Message from Temitope Grant MD sent at 6/17/2024 12:51 PM CDT -----  Please assist patient with scheduling endocrinologist appt, thank you!

## 2024-07-01 ENCOUNTER — PATIENT MESSAGE (OUTPATIENT)
Dept: HEMATOLOGY/ONCOLOGY | Facility: CLINIC | Age: 59
End: 2024-07-01
Payer: COMMERCIAL

## 2024-07-03 ENCOUNTER — PATIENT MESSAGE (OUTPATIENT)
Dept: HEMATOLOGY/ONCOLOGY | Facility: CLINIC | Age: 59
End: 2024-07-03
Payer: COMMERCIAL

## 2024-07-03 DIAGNOSIS — I10 PRIMARY HYPERTENSION: ICD-10-CM

## 2024-07-05 RX ORDER — SPIRONOLACTONE 50 MG/1
50 TABLET, FILM COATED ORAL DAILY
Qty: 90 TABLET | Refills: 3 | Status: SHIPPED | OUTPATIENT
Start: 2024-07-05

## 2024-07-25 ENCOUNTER — PATIENT MESSAGE (OUTPATIENT)
Dept: HEMATOLOGY/ONCOLOGY | Facility: CLINIC | Age: 59
End: 2024-07-25
Payer: COMMERCIAL

## 2024-08-31 ENCOUNTER — PATIENT MESSAGE (OUTPATIENT)
Dept: ADMINISTRATIVE | Facility: OTHER | Age: 59
End: 2024-08-31
Payer: COMMERCIAL

## 2024-09-01 DIAGNOSIS — E11.9 TYPE 2 DIABETES MELLITUS WITHOUT COMPLICATION, WITHOUT LONG-TERM CURRENT USE OF INSULIN: ICD-10-CM

## 2024-09-01 RX ORDER — METFORMIN HYDROCHLORIDE 500 MG/1
1000 TABLET, EXTENDED RELEASE ORAL 2 TIMES DAILY WITH MEALS
Qty: 360 TABLET | Refills: 1 | Status: SHIPPED | OUTPATIENT
Start: 2024-09-01

## 2024-09-01 NOTE — TELEPHONE ENCOUNTER
No care due was identified.  [facility] Embedded Care Due Messages. Reference number: [id number].   9/01/2024 10:23:39 AM CDT

## 2024-09-02 NOTE — TELEPHONE ENCOUNTER
Refill Decision Note   Shu Mendoza  is requesting a refill authorization.  Brief Assessment and Rationale for Refill:  Approve     Medication Therapy Plan:        Comments:     Note composed:8:13 PM 09/01/2024

## 2024-09-10 NOTE — PROGRESS NOTES
The patient location is: [place]  The chief complaint leading to consultation is: NADER    Visit type: audiovisual    Face to Face time with patient: 15minutes of total time spent on the encounter, which includes face to face time and non-face to face time preparing to see the patient (eg, review of tests), Obtaining and/or reviewing separately obtained history, Documenting clinical information in the electronic or other health record, Independently interpreting results (not separately reported) and communicating results to the patient/family/caregiver, or Care coordination (not separately reported). Each patient to whom he or she provides medical services by telemedicine is:  (1) informed of the relationship between the physician and patient and the respective role of any other health care provider with respect to management of the patient; and (2) notified that he or she may decline to receive medical services by telemedicine and may withdraw from such care at any time.    Since seen she had HST and began apap 6-12cm 10/2/23. Using nose mask w/ resolution of snoring and sleep overall more consolidated. More disrupted/ mask off however since death of grandson this year. Sleep impacted. Former FFM use/irritated skin. ESS=11    10/2/23-12/2/23  Average usage (days used) 5 hours 49 minutes    95th percentile: 9.7cm  AHI: 0.5      SH , up for work 4a, 6a-2:30p work    HST 9/2023 AHI 7(RDI 19)      ASSESSMENT:   NADER, mild. Adherent, benefiting from therapy. AHI<5. Needs supplies  She has medical comorbidities of hypertension, DM2, migraines.       PLAN:   1.APAP 6-12cm continue, DME [facility] supplies    2. Discussed control of NADER  3. See pcp DM/HTN mgt/continue meds

## 2024-09-11 ENCOUNTER — OFFICE VISIT (OUTPATIENT)
Dept: SLEEP MEDICINE | Facility: CLINIC | Age: 59
End: 2024-09-11
Payer: COMMERCIAL

## 2024-09-11 ENCOUNTER — TELEPHONE (OUTPATIENT)
Dept: NEUROLOGY | Facility: CLINIC | Age: 59
End: 2024-09-11
Payer: COMMERCIAL

## 2024-09-11 DIAGNOSIS — G47.33 OSA (OBSTRUCTIVE SLEEP APNEA): Primary | ICD-10-CM

## 2024-09-11 PROCEDURE — 99214 OFFICE O/P EST MOD 30 MIN: CPT | Mod: 95,,, | Performed by: NURSE PRACTITIONER

## 2024-09-11 PROCEDURE — 3066F NEPHROPATHY DOC TX: CPT | Mod: CPTII,95,, | Performed by: NURSE PRACTITIONER

## 2024-09-11 PROCEDURE — 3051F HG A1C>EQUAL 7.0%<8.0%: CPT | Mod: CPTII,95,, | Performed by: NURSE PRACTITIONER

## 2024-09-11 PROCEDURE — 3061F NEG MICROALBUMINURIA REV: CPT | Mod: CPTII,95,, | Performed by: NURSE PRACTITIONER

## 2024-09-11 PROCEDURE — 4010F ACE/ARB THERAPY RXD/TAKEN: CPT | Mod: CPTII,95,, | Performed by: NURSE PRACTITIONER

## 2024-09-11 NOTE — PROGRESS NOTES
[facility]  Virtual Visit      [facility] - NEUROLOGY 7TH [place] [facility], [place]    Date: 9/12/24  Patient Name: Shu Mendoza   MRN: [id number]   PCP: Temitope Grant  Referring Provider: Temitope Grant MD    The patient location is: [facility]  The chief complaint leading to consultation is: headache  Visit type: Virtual visit with synchronous audio and video  Total time spent with patient: 18 minutes  Each patient to whom he or she provides medical services by telemedicine is:  (1) informed of the relationship between the physician and patient and the respective role of any other health care provider with respect to management of the patient; and (2) notified that he or she may decline to receive medical services by telemedicine and may withdraw from such care at any time.        Assessment:   Shu Mendoza is a 59 y.o. female presenting for follow-up for headache.  She has had a slight increase in the frequency of her headaches related to life stressors, however her headache frequency is still not excessive; about 3 to 4 times a month.  We discussed possibly increasing her gabapentin but she does get drowsiness during the day.  Overall, she is satisfied with her headache management on her current regimens we will continue the venlafaxine and gabapentin as she is currently taking.  She has not report any new concerning neurological symptoms however it has been a few years since she had the meningioma discovered on her MRI brain.  We will obtain a repeat study just to ensure stability the meningioma.  We will plan on having a follow up in about 6 months, sooner if any concerns arise.    Plan:     Problem List Items Addressed This Visit          Neuro    Chronic migraine without aura without status migrainosus, not intractable     Other Visit Diagnoses       Meningioma    -  Primary    Relevant Orders    MRI Brain W WO Contrast    Creatinine, serum     "Neuropathy                Hue Alaniz MD  [facility]   Department of Neurology    Patient note was created using Dodonationodal Dictation.  Any errors in syntax or even information may not have been identified and edited on initial review prior to signing this note.  Subjective:   Patient seen in consultation at the request of Temitope Grant MD for the evaluation of headaches. A copy of this note will be sent to the referring physician.       Previously saw Dr Agrawal as follows:  "Shu Mendoza is here for follow up. Their condition Has clinically improved from a headache standpoint.  The patient notes that she has 2-3 days of headache per month.  She continues to be compliant with the venlafaxine 75 mg and notes that this is quite helpful for her.  She notes that when she has a headache she will take over-the-counter Tylenol or over-the-counter Aleve.  She notes that the Tylenol is only helpful for if she is able to take a nap at the same time that she takes the Tylenol.  We have discussed that the Aleve seems to be more helpful for her she does not have to nap with the over-the-counter medication and it does stop her headaches completely.  The patient notes that she has a new complaint of memory loss.  She notes that she feels as if she does not write something down she will forget it.  She gives an example of answering the phone and within seconds of the phone call finishing, if she does not write down what is occurring during the phone call she will forget the contents of the phone call.  She also notes that when she is in conversation with family friends or coworkers, she notes that if she becomes interrupted her distracted in the conversation she will forget what she is saying.  She notes that sometimes her thought will return to her later in the day but sometimes she completely loses her train of thought.  We have discussed that she currently takes an anticancer medication but it does not have " "memory loss is 1 of its side effects.  We have discussed the possibility of a paraneoplastic issue going on because of her previous history of breast cancer and discussed her prior history of intracranial meningioma as well.  "    HPI 2022  "56-year-old female presents for evaluation of headaches.  She notes that she has been having headaches for more than 20 years of her life but they have worsened in the 3 months or so after the death of her son in March.  She notes that she has significant stressors in her life including dealing with breast cancer.  She notes that she works night shifts usually going from 2:30 p.m. to 11:00 p.m. she notes that the gabapentin that she has been prescribed has helped somewhat.  She notes that she takes amitriptyline 10 mg p.o. p.r.n. to help her with her shift work sleep issues.  She is taking Effexor for her anxiety and depression.  She notes overall that she is having up to 16 days of headache per month although initially she reports, 2-3 days per week.  She notes associated +nausea, +dizziness, but denies light and sound sensitivity.  She notes parietal location.  She was found to have an incidental meningioma found on MRI.  This is somewhat concerning given her personal history of cancer, however.  "     Has tried and failed: elavil, gabapentin, venlafaxine, tylenol, zofran, phenergan, amlodipine, benazepril, carvedilol    Interval history 9/12/24:  She reports her headaches have increased a little recently.  Unfortunately she lost her grandson last May and has been under increased stress.  She also lost a son that 2 years ago.  She reports that her current headache frequency is about 1 time per week.  It had gone down to about 2-3 times per month.  When she gets a headache she usually takes Tylenol in the morning because the gabapentin makes her very sleepy at work.  If she still has a headache later in the day she will go home and take 2 of the gabapentin for a total of 600 " mg.  She is on venlafaxine 75 mg daily and reports that this is helpful with her headaches as well as her mood.  In terms of stress management and grief management, she does have good support from her girlfriend's and there was another lady at work that also lost her son and they talk to each other and support each other.  She denies any new neurological symptoms such as aphasia, weakness, numbness, double vision, ataxia, or difficulties with her gait.        PAST MEDICAL HISTORY:  Past Medical History:   Diagnosis Date    Anxiety     Breast cancer     Depression     Diabetes mellitus     Encounter for blood transfusion     Glaucoma 2012    Hx of psychiatric care     Hypertension     Malignant neoplasm of central portion of left breast in female, estrogen receptor positive 7/23/2019    Psychiatric problem     Renal cyst     Retinal tear of right eye     Sleep difficulties        PAST SURGICAL HISTORY:  Past Surgical History:   Procedure Laterality Date    BREAST BIOPSY      BREAST REVISION SURGERY Left 11/5/2020    Procedure: BREAST REVISION SURGERY;  Surgeon: Derek Colin MD;  Location: [facility];  Service: Plastics;  Laterality: Left;    EYE SURGERY Bilateral     as a child for cross eye    HERNIA REPAIR  2009    umbilical    HYSTERECTOMY  2000    NILES, BSO secondary to ovarian cyst    INSERTION OF TUNNELED CENTRAL VENOUS CATHETER (CVC) WITH SUBCUTANEOUS PORT Right 10/25/2019    Procedure: [id number] RIGHT (CONSENT AM OF) 1. 0 hr case;  Surgeon: Shikha Mccray MD;  Location: [facility];  Service: General;  Laterality: Right;    LIPOSUCTION Left 11/5/2020    Procedure: LIPOSUCTION;  Surgeon: Derek Colin MD;  Location: [facility];  Service: Plastics;  Laterality: Left;    MASTECTOMY Left 9/11/2019    Procedure: MASTECTOMY;  Surgeon: Shikha Mccray MD;  Location: [facility];  Service: Plastics;  Laterality: Left;    MASTECTOMY WITH SENTINEL NODE BIOPSY AND AXILLARY LYMPH NODE DISSECTION Left 9/11/2019     Procedure: MASTECTOMY, WITH SENTINEL NODE BIOPSY AND AXILLARY LYMPHADENECTOMY LEFT;  Surgeon: Shikha Mccray MD;  Location: [facility] OR;  Service: Plastics;  Laterality: Left;    MEDIPORT REMOVAL Right 11/5/2020    Procedure: REMOVAL, CATHETER, CENTRAL VENOUS, TUNNELED, WITH PORT;  Surgeon: Derek Colin MD;  Location: [facility] OR;  Service: Plastics;  Laterality: Right;    OOPHORECTOMY      RECONSTRUCTION OF BREAST WITH DEEP INFERIOR EPIGASTRIC ARTERY  (SALEEM) FREE FLAP Left 9/11/2019    Procedure: RECONSTRUCTION, BREAST, USING SALEEM FREE FLAP - UNILATERAL;  Surgeon: Derek Colin MD;  Location: [facility] OR;  Service: Plastics;  Laterality: Left;       CURRENT MEDS:  Current Outpatient Medications   Medication Sig Dispense Refill    amlodipine-valsartan (EXFORGE)  mg per tablet Take 1 tablet by mouth once daily. 90 tablet 3    ammonium lactate 12 % Crea Apply 1 application  topically 2 (two) times daily. 140 g 11    anastrozole (ARIMIDEX) 1 mg Tab TAKE 1 TABLET(1 MG) BY MOUTH EVERY DAY 90 tablet 3    atorvastatin (LIPITOR) 80 MG tablet Take 1 tablet (80 mg total) by mouth once daily. 90 tablet 3    azelastine (ASTELIN) 137 mcg (0.1 %) nasal spray 2 sprays (274 mcg total) by Nasal route 2 (two) times daily. 30 mL 2    b complex vitamins tablet Take 1 tablet by mouth once daily.      B-complex with vitamin C (Z-BEC OR EQUIV) tablet       calcium carbonate 1250 MG capsule Take 1,250 mg by mouth 2 (two) times daily with meals.      carvediloL (COREG) 25 MG tablet Take 1 tablet (25 mg total) by mouth 2 (two) times daily. 60 tablet 11    dorzolamide (TRUSOPT) 2 % ophthalmic solution Place 1 drop into both eyes 3 (three) times daily.       fluticasone propionate (FLONASE) 50 mcg/actuation nasal spray 1 spray by Each Nostril route nightly as needed for Rhinitis.      gabapentin (NEURONTIN) 300 MG capsule Take 2 capsules (600 mg total) by mouth every evening AND 1 capsule (300 mg total) once daily AND 1 capsule  (300 mg total) with lunch. 360 capsule 3    hydroCHLOROthiazide (HYDRODIURIL) 25 MG tablet Take 1 tablet (25 mg total) by mouth once daily. 90 tablet 0    JARDIANCE 25 mg tablet TAKE 1 TABLET(25 MG) BY MOUTH EVERY DAY 90 tablet 0    latanoprost 0.005 % ophthalmic solution INT 1 GTT IN OU QD HS (Patient not taking: Reported on 6/13/2024)  11    LIDOcaine HCL 2% (XYLOCAINE) 2 % jelly Apply topically as needed. Apply topically once nightly to affected part of foot/feet. (Patient not taking: Reported on 6/13/2024) 30 mL 2    loratadine (CLARITIN ORAL) Take by mouth.      metFORMIN (GLUCOPHAGE-XR) 500 MG ER 24hr tablet TAKE 2 TABLETS(1000 MG) BY MOUTH TWICE DAILY WITH MEALS 360 tablet 1    multivitamin (THERAGRAN) per tablet Take 1 tablet by mouth once daily.      multivitamin capsule Take 1 capsule by mouth once daily.      ondansetron (ZOFRAN-ODT) 8 MG TbDL Dissolve 1 tablet (8 mg total) by mouth every 6 (six) hours as needed (nausea). 30 tablet 2    oxybutynin (DITROPAN-XL) 5 MG TR24 Take 1 tablet (5 mg total) by mouth once daily. 90 tablet 3    prasterone, dhea, (INTRAROSA) 6.5 mg Inst PLACE 1 INSERT INTRAVAGINALLY AT BEDTIME AS DIRECTED 28 each 1    promethazine (PHENERGAN) 25 MG tablet Take 1 tablet (25 mg total) by mouth every 6 (six) hours as needed for Nausea. 30 tablet 0    spironolactone (ALDACTONE) 50 MG tablet Take 1 tablet (50 mg total) by mouth once daily. 90 tablet 3    terconazole (TERAZOL 7) 0.4 % Crea Place 1 applicator vaginally every evening. 7 g 1    venlafaxine (EFFEXOR-XR) 75 MG 24 hr capsule Take 1 capsule (75 mg total) by mouth once daily. 90 capsule 1    vitamin D (VITAMIN D3) 1000 units Tab Take 1,000 Units by mouth once daily.       No current facility-administered medications for this visit.     Facility-Administered Medications Ordered in Other Visits   Medication Dose Route Frequency Provider Last Rate Last Admin    lidocaine HCL 10 mg/ml (1%) 50 mL, EPINEPHrine 1,000 mcg in lactated  Ringers 1,000 mL irrigation   Irrigation On Call Procedure Derek Colin MD           ALLERGIES:  Review of patient's allergies indicates:   Allergen Reactions    Morphine Itching    Percocet [oxycodone-acetaminophen] Itching       FAMILY HISTORY:  Family History   Problem Relation Name Age of Onset    Diabetes Mother      Nephrolithiasis Mother      Diabetes Sister      HIV Sister      Diabetes Maternal Grandmother      Schizophrenia Son      Drug abuse Son      Ovarian cancer Other mgreat grandmother     Breast cancer Neg Hx      Colon cancer Neg Hx      Heart attack Neg Hx      Stroke Neg Hx      Osteoporosis Neg Hx         SOCIAL HISTORY:  Social History     Tobacco Use    Smoking status: Never    Smokeless tobacco: Never   Substance Use Topics    Alcohol use: Not Currently     Alcohol/week: 0.0 standard drinks of alcohol     Comment: occasional    Drug use: No       Review of Systems:  12 system review of systems is negative except for the symptoms mentioned in HPI.      Objective:   There were no vitals filed for this visit.  General: NAD, well nourished   Eyes: no tearing, discharge, no erythema   ENT: moist mucous membranes of the oral cavity, nares patent    Neck: Supple, full range of motion  Cardiovascular: Appears well perfused  Lungs: Normal work of breathing, normal chest wall excursions  Skin: No rash, lesions, or breakdown on exposed skin  Psychiatry: Mood and affect are appropriate   Abdomen: nondistended, non tender  Extremeties: No cyanosis, clubbing or edema visible    Neurological   MENTAL STATUS: Alert and oriented to person, place, and time. Attention and concentration within normal limits. Speech without dysarthria, able to name and repeat without difficulty. Recent and remote memory within normal limits   CRANIAL NERVES:  EOMI.  Face symmetrical. Hearing grossly intact. Full shoulder shrug bilaterally. Tongue protrudes midline   SENSORY: Sensation is intact to light touch throughout.     MOTOR: Normal bulk No pronator drift.  Moves all extremities symmetrically.  REFLEXES: Deferred due to virtual visit  CEREBELLAR/COORDINATION/GAIT: Gait steady with normal arm swing and stride length.  Finger to nose intact. Normal rapid alternating movements.

## 2024-09-12 ENCOUNTER — OFFICE VISIT (OUTPATIENT)
Dept: NEUROLOGY | Facility: CLINIC | Age: 59
End: 2024-09-12
Payer: COMMERCIAL

## 2024-09-12 DIAGNOSIS — G43.709 CHRONIC MIGRAINE WITHOUT AURA WITHOUT STATUS MIGRAINOSUS, NOT INTRACTABLE: ICD-10-CM

## 2024-09-12 DIAGNOSIS — G62.9 NEUROPATHY: ICD-10-CM

## 2024-09-12 DIAGNOSIS — D32.9 MENINGIOMA: Primary | ICD-10-CM

## 2024-09-12 PROCEDURE — 3061F NEG MICROALBUMINURIA REV: CPT | Mod: CPTII,95,, | Performed by: STUDENT IN AN ORGANIZED HEALTH CARE EDUCATION/TRAINING PROGRAM

## 2024-09-12 PROCEDURE — 3066F NEPHROPATHY DOC TX: CPT | Mod: CPTII,95,, | Performed by: STUDENT IN AN ORGANIZED HEALTH CARE EDUCATION/TRAINING PROGRAM

## 2024-09-12 PROCEDURE — 4010F ACE/ARB THERAPY RXD/TAKEN: CPT | Mod: CPTII,95,, | Performed by: STUDENT IN AN ORGANIZED HEALTH CARE EDUCATION/TRAINING PROGRAM

## 2024-09-12 PROCEDURE — 3051F HG A1C>EQUAL 7.0%<8.0%: CPT | Mod: CPTII,95,, | Performed by: STUDENT IN AN ORGANIZED HEALTH CARE EDUCATION/TRAINING PROGRAM

## 2024-09-12 PROCEDURE — 99212 OFFICE O/P EST SF 10 MIN: CPT | Mod: 95,,, | Performed by: STUDENT IN AN ORGANIZED HEALTH CARE EDUCATION/TRAINING PROGRAM

## 2024-09-12 NOTE — PATIENT INSTRUCTIONS
Good to meet you today.  We will get an updated MRI of your brain to check on the meningioma.  I expect that it will be stable.  Please continue your gabapentin and venlafaxine as you have been taking them.  Follow up in 6 months - sooner if any concerns arise.

## 2024-09-13 DIAGNOSIS — Z17.0 MALIGNANT NEOPLASM OF LEFT BREAST IN FEMALE, ESTROGEN RECEPTOR POSITIVE, UNSPECIFIED SITE OF BREAST: ICD-10-CM

## 2024-09-13 DIAGNOSIS — C50.912 MALIGNANT NEOPLASM OF LEFT BREAST IN FEMALE, ESTROGEN RECEPTOR POSITIVE, UNSPECIFIED SITE OF BREAST: ICD-10-CM

## 2024-09-13 RX ORDER — ANASTROZOLE 1 MG/1
1 TABLET ORAL
Qty: 90 TABLET | Refills: 3 | Status: SHIPPED | OUTPATIENT
Start: 2024-09-13

## 2024-09-18 DIAGNOSIS — E11.9 TYPE 2 DIABETES MELLITUS WITHOUT COMPLICATION, WITHOUT LONG-TERM CURRENT USE OF INSULIN: ICD-10-CM

## 2024-09-18 NOTE — TELEPHONE ENCOUNTER
Care Due:                  Date            Visit Type   Department     Provider  --------------------------------------------------------------------------------                                MYCHART                              FOLLOWUP/OF  [facility] INTERNAL  Last Visit: 06-      FICE VISIT   MEDICINE       Temitope Grant  Next Visit: None Scheduled  None         None Found                                                            Last  Test          Frequency    Reason                     Performed    Due Date  --------------------------------------------------------------------------------    HBA1C.......  6 months...  MATTHEW metFORMIN.....  06-   12-    [facility] Embedded Care Due Messages. Reference number: [id number].   9/18/2024 8:04:53 AM CDT

## 2024-09-19 RX ORDER — EMPAGLIFLOZIN 25 MG/1
25 TABLET, FILM COATED ORAL
Qty: 90 TABLET | Refills: 0 | Status: SHIPPED | OUTPATIENT
Start: 2024-09-19

## 2024-09-19 NOTE — TELEPHONE ENCOUNTER
Provider Staff:  Action required for this patient    Requires labs      Please see care gap opportunities below in Care Due Message.    Thanks!  [facility]     Appointments      Date Provider   Last Visit   6/13/2024 Temitope Grant MD   Next Visit   Visit date not found Temitope Grant MD     Refill Decision Note   Shu Mendoza  is requesting a refill authorization.  Brief Assessment and Rationale for Refill:  Approve     Medication Therapy Plan:        Comments:     Note composed:7:51 AM 09/19/2024

## 2024-10-05 ENCOUNTER — PATIENT MESSAGE (OUTPATIENT)
Dept: OTHER | Facility: OTHER | Age: 59
End: 2024-10-05
Payer: COMMERCIAL

## 2024-10-05 DIAGNOSIS — G62.9 NEUROPATHY: ICD-10-CM

## 2024-10-07 DIAGNOSIS — F41.1 GENERALIZED ANXIETY DISORDER: ICD-10-CM

## 2024-10-07 RX ORDER — AMMONIUM LACTATE 12 G/100G
CREAM TOPICAL
Qty: 280 G | Refills: 6 | Status: SHIPPED | OUTPATIENT
Start: 2024-10-07

## 2024-10-07 RX ORDER — GABAPENTIN 300 MG/1
CAPSULE ORAL
Qty: 180 CAPSULE | Refills: 2 | Status: SHIPPED | OUTPATIENT
Start: 2024-10-07

## 2024-10-07 NOTE — TELEPHONE ENCOUNTER
Care Due:                  Date            Visit Type   Department     Provider  --------------------------------------------------------------------------------                                MYCHART                              FOLLOWUP/OF  [facility] INTERNAL  Last Visit: 06-      FICE VISIT   MEDICINE       Temitope Grant  Next Visit: None Scheduled  None         None Found                                                            Last  Test          Frequency    Reason                     Performed    Due Date  --------------------------------------------------------------------------------    Lipid Panel.  12 months..  atorvastatin.............  01- 01-    [facility] Embedded Care Due Messages. Reference number: [id number].   10/07/2024 3:16:01 PM CDT

## 2024-10-08 RX ORDER — VENLAFAXINE HYDROCHLORIDE 75 MG/1
75 CAPSULE, EXTENDED RELEASE ORAL DAILY
Qty: 90 CAPSULE | Refills: 3 | Status: SHIPPED | OUTPATIENT
Start: 2024-10-08 | End: 2025-10-03

## 2024-10-21 ENCOUNTER — OFFICE VISIT (OUTPATIENT)
Dept: HEMATOLOGY/ONCOLOGY | Facility: CLINIC | Age: 59
End: 2024-10-21
Payer: COMMERCIAL

## 2024-10-21 VITALS
HEIGHT: 64 IN | TEMPERATURE: 98 F | SYSTOLIC BLOOD PRESSURE: 123 MMHG | BODY MASS INDEX: 30.77 KG/M2 | DIASTOLIC BLOOD PRESSURE: 69 MMHG | HEART RATE: 72 BPM | RESPIRATION RATE: 16 BRPM | WEIGHT: 180.25 LBS | OXYGEN SATURATION: 97 %

## 2024-10-21 DIAGNOSIS — T45.1X5A CANCER TREATMENT INDUCED BONE LOSS: ICD-10-CM

## 2024-10-21 DIAGNOSIS — R09.81 SINUS CONGESTION: ICD-10-CM

## 2024-10-21 DIAGNOSIS — Z17.0 MALIGNANT NEOPLASM OF LEFT BREAST IN FEMALE, ESTROGEN RECEPTOR POSITIVE, UNSPECIFIED SITE OF BREAST: Primary | ICD-10-CM

## 2024-10-21 DIAGNOSIS — I10 PRIMARY HYPERTENSION: ICD-10-CM

## 2024-10-21 DIAGNOSIS — M89.8X9 CANCER TREATMENT INDUCED BONE LOSS: ICD-10-CM

## 2024-10-21 DIAGNOSIS — C50.912 MALIGNANT NEOPLASM OF LEFT BREAST IN FEMALE, ESTROGEN RECEPTOR POSITIVE, UNSPECIFIED SITE OF BREAST: Primary | ICD-10-CM

## 2024-10-21 DIAGNOSIS — Z79.811 AROMATASE INHIBITOR USE: ICD-10-CM

## 2024-10-21 PROCEDURE — 4010F ACE/ARB THERAPY RXD/TAKEN: CPT | Mod: CPTII,S$GLB,, | Performed by: INTERNAL MEDICINE

## 2024-10-21 PROCEDURE — 3051F HG A1C>EQUAL 7.0%<8.0%: CPT | Mod: CPTII,S$GLB,, | Performed by: INTERNAL MEDICINE

## 2024-10-21 PROCEDURE — 3066F NEPHROPATHY DOC TX: CPT | Mod: CPTII,S$GLB,, | Performed by: INTERNAL MEDICINE

## 2024-10-21 PROCEDURE — 3074F SYST BP LT 130 MM HG: CPT | Mod: CPTII,S$GLB,, | Performed by: INTERNAL MEDICINE

## 2024-10-21 PROCEDURE — 1159F MED LIST DOCD IN RCRD: CPT | Mod: CPTII,S$GLB,, | Performed by: INTERNAL MEDICINE

## 2024-10-21 PROCEDURE — G2211 COMPLEX E/M VISIT ADD ON: HCPCS | Mod: S$GLB,,, | Performed by: INTERNAL MEDICINE

## 2024-10-21 PROCEDURE — 3008F BODY MASS INDEX DOCD: CPT | Mod: CPTII,S$GLB,, | Performed by: INTERNAL MEDICINE

## 2024-10-21 PROCEDURE — 3061F NEG MICROALBUMINURIA REV: CPT | Mod: CPTII,S$GLB,, | Performed by: INTERNAL MEDICINE

## 2024-10-21 PROCEDURE — 3078F DIAST BP <80 MM HG: CPT | Mod: CPTII,S$GLB,, | Performed by: INTERNAL MEDICINE

## 2024-10-21 PROCEDURE — 99999 PR PBB SHADOW E&M-EST. PATIENT-LVL IV: CPT | Mod: PBBFAC,,, | Performed by: INTERNAL MEDICINE

## 2024-10-21 PROCEDURE — 1160F RVW MEDS BY RX/DR IN RCRD: CPT | Mod: CPTII,S$GLB,, | Performed by: INTERNAL MEDICINE

## 2024-10-21 PROCEDURE — 99214 OFFICE O/P EST MOD 30 MIN: CPT | Mod: S$GLB,,, | Performed by: INTERNAL MEDICINE

## 2024-10-21 RX ORDER — HEPARIN 100 UNIT/ML
500 SYRINGE INTRAVENOUS
OUTPATIENT
Start: 2024-10-21

## 2024-10-21 RX ORDER — SODIUM CHLORIDE 0.9 % (FLUSH) 0.9 %
10 SYRINGE (ML) INJECTION
OUTPATIENT
Start: 2024-10-21

## 2024-10-21 NOTE — PROGRESS NOTES
Subjective:       Patient ID: Shu Mendoza is a 59 y.o. female.     Chief Complaint: follow up for breast cancer     Diagnosis:  Stage IA ([id number]) invasive ductal carcinoma of the left breast, grade 2, ER/RI positive, HER2 negative, s/p left mastectomy and reconstruction on 9/11/2019. Mammaprint high risk     Oncologic History:  1. Ms Mendoza is a 53 yo postmenopausal woman with HTN, DM, who presents for further management of pathologic stage IA ([id number]) invasive ductal carcinoma of the left breast. She was referred to Dr Mccray for bloody nipple discharge first noted in June 2019. Before then she had a negative mammogram on 5/20/19. She had a left breast US on 6/27/19 in the left breast retroareolar region just behind the nipple, there is an irregular hypoechoic intraductal mass measuring 1.0 cm. She underwent a biopsy on 7/8/2019. It showed invasive ductal carcinoma, ER strongly positive 100%, RI positive 80%, HER2 negative 1+. Ki67 5% activity within invasive component. Patient underwent a left total mastectomy and reconstruction on 9/11/2019. Pathology showed a 1.3 cm invasive ductal carcinoma, grade 2. DCIS present, negative margin, 22 lymph nodes removed, one lymph node positive with macrometastases 13 mm, no extranodal extension. No lymphovascular invasion. Pathologic T1c N1a. Mammaprint high risk with chemo benefit >12%. 5-10 years recurrence risk without chemo 20-25%, with chemo and endocrine therapy 7%. BRCAnalysis from 7/18/19 was negative. Case was discussed at tumor board. Adjuvant chemotherapy followed by endocrine therapy was recommended. Radiation not recommended. Patient presents today for further management. Feeling well. Works at [employer]. She had NILES/BSO in 2000 and was on hormone replacement therapy after that. Stopped in 2019 after she was diagnosed with breast cancer.   2. Port placed by Dr Mccray on 10/25/19. Echo on 10/22/19 showed normal LVEF 59%.   3. Adjuvant DDAC followed by  weekly taxol started on 10/31/2019. cycle 2 on 19. cycle 3 was delayed for a week for umbilical infection, given on 2019, cycle 4 given on 19. Weekly taxol completed on 3/19/19. Started anastrozole in 2020. Port was removed.   4. Colonoscopy 2/3/21. Two colon polyps removed. Path showed tubular adenoma.      Interval History:   Ms Mendoza returns today for follow up. Taking anastrozole. Tolerating it well. A lot of stress in life. Has sinus congesting and postnasal drip. Migraine worse with stress from work. Sees a new neurologist.     ECO     ROS:   A ten-point system review is obtained and negative except for what was stated in the Interval History.      Physical Examination:   General: well hydrated, well developed, in no acute distress  HEENT: normocephalic, EOMI, anicteric sclerae  Neck: supple, no JVD, cervical or supraclavicular LAD  Lungs: CTAB, no rales, rhonchi, wheezing  Breasts: s/l left mastectomy and reconstruction. Bilateral breasts no masses, abnormal skin nodules, axillary lymphadenopathy  Heart: RRR, no R/G/M  Abdomen: soft, no tenderness, no distention, no hepatosplenomegaly, hernia or mass. BS present  Ext: no clubbing, cyanosis, edema  Neuro: alert and oriented x 4  Psych: pleasant and appropriate mood and affect     Objective:      Laboratory Data:  Labs reviewed. prior vit D level has been normal     Imaging Data:  Right Mammogram 4/10/2024:  Impression:   No mammographic evidence of malignancy.     BI-RADS Category 1: Negative     Recommendation:  Routine screening mammogram in 1 year is recommended.     Bone density 20 normal  Bone density 2022:     Osteopenia both hips with decrease in bone mineral density as above.     Consider FDA approved medical therapies in postmenopausal women and men aged 50 years and older, based on the following:     *A hip or vertebral (clinical or morphometric) fracture  *T score less than or equal to -2.5 at the femoral neck or  spine after appropriate evaluation to exclude secondary causes.  *Low bone mass -- also known as osteopenia (T score between -1.0 and -2.5 at the femoral neck or spine) and a 10 year probability of hip fracture greater than or equal to 3% or a 10 year probability of major osteoporosis-related fracture greater than or equal to 20% based on the [place]-adapted WHO algorithm.  *Clinician's judgment and/or patient preference may indicate treatment for people with 10 year fracture probabilities is above or below these levels.     Assessment and Plan:      1. Malignant neoplasm of left breast in female, estrogen receptor positive, unspecified site of breast    2. Aromatase inhibitor use    3. Cancer treatment induced bone loss    4. Primary hypertension    5. Sinus congestion      1-2  - Ms Mendoza is a 60 yo postmenopausal woman with stage IA ([id number]) invasive ductal carcinoma of the left breast, ER/CA positive, HER2 negative, s/p left mastectomy and reconstruction on 9/11/2019. Mammaprint high risk with chemo benefit >12%. 5-10 years recurrence risk without chemo 20-25%, with chemo and endocrine therapy 7%.  - completed adjuvant DDAC followed by weekly taxol on 3/19/2019.   - on anastrozole since April 2020.   - reviewed BCI results with patient. Risk of recurrence 10% with 5 or 10 years of endocrine therapy. Extended endocrine therapy does not provide additional benefit  - will do a total of 5 years of anastrozole, through April 2025  - R mammogram in April 2024 normal. Next due in April 2025     3.  - still needs dental work  - asked patient to finish dental work and get dental clearance letter before we start zometa  - hold off on zometa until it is 3 months after her last dental work  - c/w vit D and calcium.     4  - BP controlled  - c/w current medication    5.  - discussed OTC medication such as mucinex    Follow-up:      RTC in 6 months  Knows to call in the interval if any problems arise.    Route Chart for  Scheduling    Med Onc Chart Routing      Follow up with physician 4 months. get bone density, CMP, vitamin D level, phosphorus and mammogram in April 2025. see me one week after. verify with prior auth re zometa (patient still needs to get dental clearance)   Follow up with ELROY    Infusion scheduling note    Injection scheduling note    Labs CMP, vitamin D and phosphorus   Scheduling:  Preferred lab:  Lab interval:     Imaging MRI and DXA scan      Pharmacy appointment    Other referrals

## 2024-12-02 DIAGNOSIS — E11.9 TYPE 2 DIABETES MELLITUS WITHOUT COMPLICATION, WITHOUT LONG-TERM CURRENT USE OF INSULIN: ICD-10-CM

## 2024-12-02 NOTE — TELEPHONE ENCOUNTER
Care Due:                  Date            Visit Type   Department     Provider  --------------------------------------------------------------------------------                                MYCHART                              FOLLOWUP/OF  [facility] INTERNAL  Last Visit: 06-      FICE VISIT   MEDICINE       Temitope Grant  Next Visit: None Scheduled  None         None Found                                                            Last  Test          Frequency    Reason                     Performed    Due Date  --------------------------------------------------------------------------------    HBA1C.......  6 months...  MATTHEW metFORMIN.....  06-   12-    [facility] Embedded Care Due Messages. Reference number: [id number].   12/02/2024 5:17:47 PM CST

## 2024-12-03 RX ORDER — EMPAGLIFLOZIN 25 MG/1
25 TABLET, FILM COATED ORAL DAILY
Qty: 90 TABLET | Refills: 1 | Status: SHIPPED | OUTPATIENT
Start: 2024-12-03

## 2024-12-03 NOTE — TELEPHONE ENCOUNTER
Refill Encounter  Allergies: Confirmed    PCP Visits: Recent Visits  Date Type Provider Dept   06/13/24 Office Visit Temitope Grant MD [facility]   Showing recent visits within past 360 days and meeting all other requirements  Future Appointments  No visits were found meeting these conditions.  Showing future appointments within next 720 days and meeting all other requirements     Last 3 Blood Pressure:   BP Readings from Last 3 Encounters:   10/21/24 123/69   06/13/24 (!) 142/80   05/13/24 (!) 143/80     Preferred Pharmacy:   [id number] [place] [address] [address] [facility] & [place] [address] [address] [address]  Phone: [phone] Fax: [phone]    Requested RX:  Requested Prescriptions     Pending Prescriptions Disp Refills    JARDIANCE 25 mg tablet 90 tablet 0     Sig: Take 1 tablet (25 mg total) by mouth once daily.      RX Route: Normal

## 2024-12-09 ENCOUNTER — HOSPITAL ENCOUNTER (OUTPATIENT)
Dept: RADIOLOGY | Facility: OTHER | Age: 59
Discharge: HOME | End: 2024-12-09
Attending: INTERNAL MEDICINE
Payer: COMMERCIAL

## 2024-12-09 DIAGNOSIS — C50.912 MALIGNANT NEOPLASM OF LEFT BREAST IN FEMALE, ESTROGEN RECEPTOR POSITIVE, UNSPECIFIED SITE OF BREAST: ICD-10-CM

## 2024-12-09 DIAGNOSIS — Z17.0 MALIGNANT NEOPLASM OF LEFT BREAST IN FEMALE, ESTROGEN RECEPTOR POSITIVE, UNSPECIFIED SITE OF BREAST: ICD-10-CM

## 2024-12-09 PROCEDURE — 77080 DXA BONE DENSITY AXIAL: CPT | Mod: TC

## 2024-12-09 PROCEDURE — 77080 DXA BONE DENSITY AXIAL: CPT | Mod: 26,,, | Performed by: RADIOLOGY

## 2024-12-23 ENCOUNTER — PATIENT MESSAGE (OUTPATIENT)
Dept: NEUROLOGY | Facility: HOSPITAL | Age: 59
End: 2024-12-23
Payer: COMMERCIAL

## 2024-12-23 ENCOUNTER — HOSPITAL ENCOUNTER (OUTPATIENT)
Dept: RADIOLOGY | Facility: OTHER | Age: 59
Discharge: HOME | End: 2024-12-23
Attending: STUDENT IN AN ORGANIZED HEALTH CARE EDUCATION/TRAINING PROGRAM
Payer: COMMERCIAL

## 2024-12-23 DIAGNOSIS — D32.9 MENINGIOMA: ICD-10-CM

## 2024-12-23 DIAGNOSIS — G93.89 BRAIN MASS: Primary | ICD-10-CM

## 2024-12-23 PROCEDURE — 70553 MRI BRAIN STEM W/O & W/DYE: CPT | Mod: 26,,, | Performed by: RADIOLOGY

## 2024-12-23 PROCEDURE — 25500020 PHARM REV CODE 255: Performed by: STUDENT IN AN ORGANIZED HEALTH CARE EDUCATION/TRAINING PROGRAM

## 2024-12-23 PROCEDURE — A9585 GADOBUTROL INJECTION: HCPCS | Performed by: STUDENT IN AN ORGANIZED HEALTH CARE EDUCATION/TRAINING PROGRAM

## 2024-12-23 PROCEDURE — 70553 MRI BRAIN STEM W/O & W/DYE: CPT | Mod: TC

## 2024-12-23 RX ORDER — GADOBUTROL 604.72 MG/ML
8 INJECTION INTRAVENOUS
Status: COMPLETED | OUTPATIENT
Start: 2024-12-23 | End: 2024-12-23

## 2024-12-23 RX ADMIN — GADOBUTROL 8 ML: 604.72 INJECTION INTRAVENOUS at 11:12

## 2025-01-08 NOTE — PLAN OF CARE
Taxol administered, no reaction. Patient tolerated well. Port A Cath 20 gauge 3/4 inch needle deaccessed/ removed. No apparent distress noted. Discharge instructions given to patient. Patient understands instructions. Follow up appointment scheduled.    
42

## 2025-01-14 DIAGNOSIS — E78.49 OTHER HYPERLIPIDEMIA: ICD-10-CM

## 2025-01-14 NOTE — TELEPHONE ENCOUNTER
LVM for patient to give us an call back to set up an appointment  For: 6 month hypertension follow-up

## 2025-01-14 NOTE — TELEPHONE ENCOUNTER
Care Due:                  Date            Visit Type   Department     Provider  --------------------------------------------------------------------------------                                MYCHART                              FOLLOWUP/OF  [facility] INTERNAL  Last Visit: 06-      FICE VISIT   MEDICINE       Temitope Grant  Next Visit: None Scheduled  None         None Found                                                            Last  Test          Frequency    Reason                     Performed    Due Date  --------------------------------------------------------------------------------    Lipid Panel.  12 months..  atorvastatin.............  01- 01-    [facility] Embedded Care Due Messages. Reference number: [id number].   1/14/2025 3:36:07 PM CST

## 2025-01-14 NOTE — TELEPHONE ENCOUNTER
Refill Routing Note   Medication(s) are not appropriate for processing by [facility] for the following reason(s):        Required labs outdated    [facility] action(s):  Defer     Requires labs : Yes           Appointments  past 12m or future 3m with PCP    Date Provider   Last Visit   6/13/2024 Temitope Grant MD   Next Visit   Visit date not found Temitope Grant MD   ED visits in past 90 days: 0        Note composed:3:43 PM 01/14/2025

## 2025-01-14 NOTE — TELEPHONE ENCOUNTER
Refill Encounter    PCP Visits: Recent Visits  Date Type Provider Dept   06/13/24 Office Visit Temitope Grant MD [facility] Medicine   Showing recent visits within past 360 days and meeting all other requirements  Future Appointments  No visits were found meeting these conditions.  Showing future appointments within next 720 days and meeting all other requirements     Last 3 Blood Pressure:   BP Readings from Last 3 Encounters:   10/21/24 123/69   06/13/24 (!) 142/80   05/13/24 (!) 143/80     Preferred Pharmacy:   [id number] [place] [address] [address] & [place] [address] [facility] [address]  Phone: [phone] Fax: [phone]    Requested RX:  Requested Prescriptions     Pending Prescriptions Disp Refills    atorvastatin (LIPITOR) 80 MG tablet 90 tablet 3     Sig: Take 1 tablet (80 mg total) by mouth once daily.      RX Route: Normal

## 2025-01-15 RX ORDER — ATORVASTATIN CALCIUM 80 MG/1
80 TABLET, FILM COATED ORAL DAILY
Qty: 90 TABLET | Refills: 0 | Status: SHIPPED | OUTPATIENT
Start: 2025-01-15

## 2025-01-15 NOTE — TELEPHONE ENCOUNTER
----- Message from Leatha sent at 1/14/2025  4:35 PM CST -----  Regarding: missed call  Type:  Patient Returning Call    Who Called: pt  Who Left Message for Patient:Rachel Leon   Does the patient know what this is regarding?:   Would the patient rather a call back or a response via [facility]? call  Best Call Back Number: [phone]   Additional Information:

## 2025-01-22 ENCOUNTER — PATIENT MESSAGE (OUTPATIENT)
Dept: OBSTETRICS AND GYNECOLOGY | Facility: CLINIC | Age: 60
End: 2025-01-22
Payer: COMMERCIAL

## 2025-01-29 ENCOUNTER — OFFICE VISIT (OUTPATIENT)
Dept: UROLOGY | Facility: CLINIC | Age: 60
End: 2025-01-29
Attending: UROLOGY
Payer: COMMERCIAL

## 2025-01-29 ENCOUNTER — TELEPHONE (OUTPATIENT)
Dept: UROLOGY | Facility: CLINIC | Age: 60
End: 2025-01-29

## 2025-01-29 VITALS
HEIGHT: 64 IN | BODY MASS INDEX: 30.78 KG/M2 | DIASTOLIC BLOOD PRESSURE: 82 MMHG | WEIGHT: 180.31 LBS | SYSTOLIC BLOOD PRESSURE: 131 MMHG | HEART RATE: 66 BPM

## 2025-01-29 DIAGNOSIS — R31.29 MICROHEMATURIA: ICD-10-CM

## 2025-01-29 DIAGNOSIS — N32.81 OAB (OVERACTIVE BLADDER): Primary | ICD-10-CM

## 2025-01-29 DIAGNOSIS — R35.0 FREQUENCY OF URINATION: ICD-10-CM

## 2025-01-29 PROCEDURE — 3079F DIAST BP 80-89 MM HG: CPT | Mod: CPTII,S$GLB,, | Performed by: UROLOGY

## 2025-01-29 PROCEDURE — 4010F ACE/ARB THERAPY RXD/TAKEN: CPT | Mod: CPTII,S$GLB,, | Performed by: UROLOGY

## 2025-01-29 PROCEDURE — 1160F RVW MEDS BY RX/DR IN RCRD: CPT | Mod: CPTII,S$GLB,, | Performed by: UROLOGY

## 2025-01-29 PROCEDURE — 99214 OFFICE O/P EST MOD 30 MIN: CPT | Mod: S$GLB,,, | Performed by: UROLOGY

## 2025-01-29 PROCEDURE — 3075F SYST BP GE 130 - 139MM HG: CPT | Mod: CPTII,S$GLB,, | Performed by: UROLOGY

## 2025-01-29 PROCEDURE — 3008F BODY MASS INDEX DOCD: CPT | Mod: CPTII,S$GLB,, | Performed by: UROLOGY

## 2025-01-29 PROCEDURE — 1159F MED LIST DOCD IN RCRD: CPT | Mod: CPTII,S$GLB,, | Performed by: UROLOGY

## 2025-01-29 RX ORDER — OXYBUTYNIN CHLORIDE 10 MG/1
10 TABLET, EXTENDED RELEASE ORAL DAILY
Qty: 90 TABLET | Refills: 3 | Status: SHIPPED | OUTPATIENT
Start: 2025-01-29 | End: 2026-01-29

## 2025-01-29 NOTE — PROGRESS NOTES
"  Subjective:       Shu Mendoza is a 59 y.o. female who is an established patient who was referred by Dr Laughlin  for evaluation of OAB, renal cysts.      She is a previous pt of Dr Shepherd and Lorraine. She presents with c/o urge/freq. [facility] records show long standing microhematuria s/p workup. Prior h/o recurrent UTIs, used to take Macrobid prophy. Last UTI in 12/16 per her report. S/p NILES-BSO. Multiple reports of flank pain in the past.     She reports her bladder is "overactive" for about 3-4 months. Frequency up to [id number]. She reports starting Jardiance around that time.     Abd US from  (7/15) - parapelvic cyst with calcification, stable since 2010 per their records.     Significant glucosuria today (on Jardiance). She reports glucose control as fair - last A1c 8.0 per her report. Also takes HCTZ.    PVR (bladder scan) initial visit - 31cc    She was given trial of Ditropan - she has been doing great with this. Now able to hold for q3hrs (rather than [id number]). Nocturia x 0 (was x 2).     Last saw Fela JALEEL 8/19. Now s/p chemo for breast cancer 1 year ago. Still taking Ditropan 5mg with good response. Denies UTIs. Doing well.     6/15/2022  Last seen almost 2 years ago. Remains on Ditropan XL 5mg - remains working well. No urinary issues.     1/29/2025  Last seen >2 years ago. Wearing pads now due to urgency and UUI at work. Reports drinking more fluids at work. Denies dysuria, gross hematuria. +HCTZ, spironolactone.       The following portions of the patient's history were reviewed and updated as appropriate: allergies, current medications, past family history, past medical history, past social history, past surgical history and problem list.    Review of Systems  Constitutional: no fever or chills  ENT: no nasal congestion or sore throat  Respiratory: no cough or shortness of breath  Cardiovascular: no chest pain or palpitations  Gastrointestinal: no nausea or vomiting, tolerating diet  Genitourinary: " "as per HPI  Hematologic/Lymphatic: no easy bruising or lymphadenopathy  Musculoskeletal: no arthralgias or myalgias  Skin: no rashes or lesions  Neurological: no seizures or tremors  Behavioral/Psych: no auditory or visual hallucinations        Objective:    Vitals: /82 (Patient Position: Sitting)   Pulse 66   Ht 5' 4" (1.626 m)   Wt 81.8 kg (180 lb 5.4 oz)   LMP  (LMP Unknown)   BMI 30.95 kg/m²     Physical Exam   General: well developed, well nourished in no acute distress  Head: normocephalic, atraumatic  Neck: supple, trachea midline, no obvious enlargement of thyroid  HEENT: EOMI, mucus membranes moist, sclera anicteric, no hearing impairment  Lungs: symmetric expansion, non-labored breathing  Neuro: alert and oriented x 3, no gross deficits  Psych: normal judgment and insight, normal mood/affect and non-anxious  Genitourinary:   deferred      Lab Review   Urine analysis today in clinic shows - no urine    Lab Results   Component Value Date    WBC 6.60 06/13/2024    HGB 13.8 06/13/2024    HCT 43.7 06/13/2024    MCV 88 06/13/2024     06/13/2024     Lab Results   Component Value Date    CREATININE 0.8 12/23/2024    BUN 18 12/23/2024       Imaging  Images and reports were personally reviewed by me and discussed with patient       Assessment/Plan:      1. OAB (overactive bladder)    - Behavioral changes, PFPT, anticholinergics, mirabegron. Botox/InterStim for refractory UUI.   - Likely worsened by Jardiance and HCTZ   - Ditropan XL 5mg - now less effective. Will increase to 10mg.      2. Microhematuria    - Long history   - Prior workup   - Will monitor, okay to hold on repeat w/u     3. Frequency of urination    - Ditropan - will increase to 10mg   - Discussed diuretic x 2 will make for more frequency as well         Follow up in 12 months            "

## 2025-02-12 LAB
LEFT EYE DM RETINOPATHY: NEGATIVE
RIGHT EYE DM RETINOPATHY: NEGATIVE

## 2025-02-13 ENCOUNTER — OFFICE VISIT (OUTPATIENT)
Dept: NEUROSURGERY | Facility: CLINIC | Age: 60
End: 2025-02-13
Payer: COMMERCIAL

## 2025-02-13 VITALS
HEART RATE: 74 BPM | WEIGHT: 180.31 LBS | SYSTOLIC BLOOD PRESSURE: 151 MMHG | BODY MASS INDEX: 30.78 KG/M2 | DIASTOLIC BLOOD PRESSURE: 84 MMHG | HEIGHT: 64 IN

## 2025-02-13 DIAGNOSIS — D32.9 MENINGIOMA: Primary | ICD-10-CM

## 2025-02-13 DIAGNOSIS — G93.89 BRAIN MASS: ICD-10-CM

## 2025-02-13 PROCEDURE — 1159F MED LIST DOCD IN RCRD: CPT | Mod: CPTII,S$GLB,, | Performed by: NEUROLOGICAL SURGERY

## 2025-02-13 PROCEDURE — 3008F BODY MASS INDEX DOCD: CPT | Mod: CPTII,S$GLB,, | Performed by: NEUROLOGICAL SURGERY

## 2025-02-13 PROCEDURE — 3079F DIAST BP 80-89 MM HG: CPT | Mod: CPTII,S$GLB,, | Performed by: NEUROLOGICAL SURGERY

## 2025-02-13 PROCEDURE — 1160F RVW MEDS BY RX/DR IN RCRD: CPT | Mod: CPTII,S$GLB,, | Performed by: NEUROLOGICAL SURGERY

## 2025-02-13 PROCEDURE — 99204 OFFICE O/P NEW MOD 45 MIN: CPT | Mod: S$GLB,,, | Performed by: NEUROLOGICAL SURGERY

## 2025-02-13 PROCEDURE — 99999 PR PBB SHADOW E&M-EST. PATIENT-LVL V: CPT | Mod: PBBFAC,,, | Performed by: NEUROLOGICAL SURGERY

## 2025-02-13 PROCEDURE — 3077F SYST BP >= 140 MM HG: CPT | Mod: CPTII,S$GLB,, | Performed by: NEUROLOGICAL SURGERY

## 2025-02-13 PROCEDURE — 4010F ACE/ARB THERAPY RXD/TAKEN: CPT | Mod: CPTII,S$GLB,, | Performed by: NEUROLOGICAL SURGERY

## 2025-02-13 NOTE — PROGRESS NOTES
Neurosurgery  History & Physical    SUBJECTIVE:     Chief Complaint:  Meningioma.    History of Present Illness:  Ms. Mendoza is a 59-year-old female who was referred to me by Dr. Neelima Alaniz.  Her past medical history is significant for anxiety, breast cancer, depression, diabetes, glaucoma, hypertension, renal cyst, right eye retinal tear, and sleep difficulties.  She presents with a longstanding history of headaches.  She is currently taking venlafaxine and gabapentin for her headaches and she feels as if her headaches are reasonably controlled on this medication regimen.  She initially had an MRI with and without contrast of the brain nearly 3 years ago for headaches.  This showed an incidental finding of a left tentorial small meningioma.  She was not referred to Neurosurgery at that time.  A follow-up MRI with and without contrast of the brain showed a slight increase in the size of the known meningioma.  Therefore, she was referred to Neurosurgery for continued follow-up.  She denies any increase in her headaches at this time.  She denies any weakness or seizure-like activity.    Review of patient's allergies indicates:   Allergen Reactions    Morphine Itching    Percocet [oxycodone-acetaminophen] Itching       Current Outpatient Medications   Medication Sig Dispense Refill    amlodipine-valsartan (EXFORGE)  mg per tablet Take 1 tablet by mouth once daily. 90 tablet 3    ammonium lactate 12 % Crea APPLY TOPICALLY TO THE SKIN TWICE DAILY 280 g 6    anastrozole (ARIMIDEX) 1 mg Tab TAKE 1 TABLET(1 MG) BY MOUTH EVERY DAY 90 tablet 3    atorvastatin (LIPITOR) 80 MG tablet Take 1 tablet (80 mg total) by mouth once daily. 90 tablet 0    b complex vitamins tablet Take 1 tablet by mouth once daily.      B-complex with vitamin C (Z-BEC OR EQUIV) tablet       calcium carbonate 1250 MG capsule Take 1,250 mg by mouth 2 (two) times daily with meals.      carvediloL (COREG) 25 MG tablet Take 1 tablet (25 mg  total) by mouth 2 (two) times daily. 60 tablet 5    dorzolamide (TRUSOPT) 2 % ophthalmic solution Place 1 drop into both eyes 3 (three) times daily.       fluticasone propionate (FLONASE) 50 mcg/actuation nasal spray 1 spray by Each Nostril route nightly as needed for Rhinitis.      gabapentin (NEURONTIN) 300 MG capsule TAKE 2 CAPSULES(600 MG) BY MOUTH EVERY EVENING 180 capsule 2    hydroCHLOROthiazide (HYDRODIURIL) 25 MG tablet Take 1 tablet (25 mg total) by mouth once daily. 90 tablet 0    JARDIANCE 25 mg tablet Take 1 tablet (25 mg total) by mouth once daily. 90 tablet 1    latanoprost 0.005 % ophthalmic solution   11    LIDOcaine HCL 2% (XYLOCAINE) 2 % jelly Apply topically as needed. Apply topically once nightly to affected part of foot/feet. 30 mL 2    loratadine (CLARITIN ORAL) Take by mouth.      metFORMIN (GLUCOPHAGE-XR) 500 MG ER 24hr tablet TAKE 2 TABLETS(1000 MG) BY MOUTH TWICE DAILY WITH MEALS 360 tablet 1    multivitamin (THERAGRAN) per tablet Take 1 tablet by mouth once daily.      ondansetron (ZOFRAN-ODT) 8 MG TbDL Dissolve 1 tablet (8 mg total) by mouth every 6 (six) hours as needed (nausea). 30 tablet 2    oxybutynin (DITROPAN-XL) 10 MG 24 hr tablet Take 1 tablet (10 mg total) by mouth once daily. 90 tablet 3    prasterone, dhea, (INTRAROSA) 6.5 mg Inst PLACE 1 INSERT INTRAVAGINALLY AT BEDTIME AS DIRECTED 28 each 1    promethazine (PHENERGAN) 25 MG tablet Take 1 tablet (25 mg total) by mouth every 6 (six) hours as needed for Nausea. 30 tablet 0    spironolactone (ALDACTONE) 50 MG tablet Take 1 tablet (50 mg total) by mouth once daily. 90 tablet 3    terconazole (TERAZOL 7) 0.4 % Crea Place 1 applicator vaginally every evening. 7 g 1    venlafaxine (EFFEXOR-XR) 75 MG 24 hr capsule Take 1 capsule (75 mg total) by mouth once daily. 90 capsule 3    vitamin D (VITAMIN D3) 1000 units Tab Take 1,000 Units by mouth once daily.       No current facility-administered medications for this visit.      Facility-Administered Medications Ordered in Other Visits   Medication Dose Route Frequency Provider Last Rate Last Admin    lidocaine HCL 10 mg/ml (1%) 50 mL, EPINEPHrine 1,000 mcg in lactated Ringers 1,000 mL irrigation   Irrigation On Call Procedure Derek Colin MD           Past Medical History:   Diagnosis Date    Anxiety     Breast cancer     Depression     Diabetes mellitus     Encounter for blood transfusion     Glaucoma 2012    Hx of psychiatric care     Hypertension     Malignant neoplasm of central portion of left breast in female, estrogen receptor positive 7/23/2019    Psychiatric problem     Renal cyst     Retinal tear of right eye     Sleep difficulties      Past Surgical History:   Procedure Laterality Date    BREAST BIOPSY      BREAST REVISION SURGERY Left 11/5/2020    Procedure: BREAST REVISION SURGERY;  Surgeon: Derek Colin MD;  Location: [facility];  Service: Plastics;  Laterality: Left;    EYE SURGERY Bilateral     as a child for cross eye    HERNIA REPAIR  2009    umbilical    HYSTERECTOMY  2000    NILES, BSO secondary to ovarian cyst    INSERTION OF TUNNELED CENTRAL VENOUS CATHETER (CVC) WITH SUBCUTANEOUS PORT Right 10/25/2019    Procedure: [id number]-CATH RIGHT (CONSENT AM OF) 1. 0 hr case;  Surgeon: Shikha Mccray MD;  Location: [facility];  Service: General;  Laterality: Right;    LIPOSUCTION Left 11/5/2020    Procedure: LIPOSUCTION;  Surgeon: Derek Colin MD;  Location: [facility] OR;  Service: Plastics;  Laterality: Left;    MASTECTOMY Left 9/11/2019    Procedure: MASTECTOMY;  Surgeon: Shikha Mccray MD;  Location: [facility];  Service: Plastics;  Laterality: Left;    MASTECTOMY WITH SENTINEL NODE BIOPSY AND AXILLARY LYMPH NODE DISSECTION Left 9/11/2019    Procedure: MASTECTOMY, WITH SENTINEL NODE BIOPSY AND AXILLARY LYMPHADENECTOMY LEFT;  Surgeon: Shikha Mccray MD;  Location: [facility];  Service: Plastics;  Laterality: Left;    MEDIPORT REMOVAL Right 11/5/2020    Procedure:  REMOVAL, CATHETER, CENTRAL VENOUS, TUNNELED, WITH PORT;  Surgeon: Derek Colin MD;  Location: [facility] [place] OR;  Service: Plastics;  Laterality: Right;    OOPHORECTOMY      RECONSTRUCTION OF BREAST WITH DEEP INFERIOR EPIGASTRIC ARTERY  (SALEEM) FREE FLAP Left 9/11/2019    Procedure: RECONSTRUCTION, BREAST, USING SALEEM FREE FLAP - UNILATERAL;  Surgeon: Derek Colin MD;  Location: [facility] [place] [facility];  Service: Plastics;  Laterality: Left;     Family History       Problem Relation (Age of Onset)    Diabetes Mother, Sister, Maternal Grandmother    Drug abuse Son    HIV Sister    Nephrolithiasis Mother    Ovarian cancer Other    Schizophrenia Son          Social History     Socioeconomic History    Marital status: Single    Number of children: 3   Tobacco Use    Smoking status: Never    Smokeless tobacco: Never   Substance and Sexual Activity    Alcohol use: Not Currently     Alcohol/week: 0.0 standard drinks of alcohol     Comment: occasional    Drug use: No    Sexual activity: Not Currently     Partners: Male     Birth control/protection: Post-menopausal, Surgical     Comment: Lack of desire     Social Drivers of Health     Financial Resource Strain: High Risk (6/6/2024)    Overall Financial Resource Strain (CARDIA)     Difficulty of Paying Living Expenses: Hard   Food Insecurity: Food Insecurity Present (6/6/2024)    Hunger Vital Sign     Worried About Running Out of Food in the Last Year: Sometimes true     Ran Out of Food in the Last Year: Sometimes true   Transportation Needs: Unmet Transportation Needs (5/11/2024)    PRAPARE - Transportation     Lack of Transportation (Medical): Yes     Lack of Transportation (Non-Medical): Yes   Physical Activity: Inactive (6/6/2024)    Exercise Vital Sign     Days of Exercise per Week: 0 days     Minutes of Exercise per Session: 0 min   Stress: No Stress Concern Present (6/6/2024)    [employer] - Occupational Stress Questionnaire     Feeling of Stress  ": Only a little   Housing Stability: High Risk (6/6/2024)    Housing Stability Vital Sign     Unable to Pay for Housing in the Last Year: Yes       Review of Systems   Constitutional:  Positive for diaphoresis and unexpected weight change. Negative for activity change, fatigue and fever.   HENT:  Positive for nosebleeds and tinnitus. Negative for hearing loss and trouble swallowing.    Eyes:  Positive for visual disturbance.   Respiratory:  Negative for cough, shortness of breath and wheezing.    Cardiovascular:  Positive for palpitations. Negative for chest pain.   Gastrointestinal:  Positive for nausea and vomiting. Negative for abdominal distention, abdominal pain, blood in stool, constipation and diarrhea.   Endocrine: Positive for polydipsia. Negative for cold intolerance and heat intolerance.   Genitourinary:  Negative for difficulty urinating, dysuria, frequency and urgency.   Musculoskeletal:  Negative for back pain, gait problem, joint swelling, myalgias, neck pain and neck stiffness.   Skin:  Negative for color change, rash and wound.   Allergic/Immunologic: Negative for environmental allergies and food allergies.   Neurological:  Positive for dizziness, numbness and headaches. Negative for seizures, facial asymmetry, speech difficulty, weakness and light-headedness.   Hematological:  Does not bruise/bleed easily.   Psychiatric/Behavioral:  Negative for agitation, behavioral problems, dysphoric mood and hallucinations. The patient is not nervous/anxious.        OBJECTIVE:     Vital Signs  Pulse: 74  BP: (!) 151/84  Pain Score: 0-No pain  Height: 5' 4" (162.6 cm)  Weight: 81.8 kg (180 lb 5.4 oz)  Body mass index is 30.95 kg/m².      Physical Exam:  Vitals reviewed.    Constitutional: She appears well-developed and well-nourished. No distress.     Eyes: Pupils are equal, round, and reactive to light. Conjunctivae and EOM are normal.     Cardiovascular: Normal rate, regular rhythm, normal pulses and no edema. "     Abdominal: Soft. Bowel sounds are normal.     Skin: Skin displays no rash on trunk and no rash on extremities. Skin displays no lesions on trunk and no lesions on extremities.     Psych/Behavior: She is alert. She is oriented to person, place, and time. She has a normal mood and affect.     Musculoskeletal: Gait is normal.        Neck: Range of motion is full. There is no tenderness. Muscle strength is 5/5. Tone is normal.        Back: Range of motion is full. There is no tenderness. Muscle strength is 5/5. Tone is normal.        Right Upper Extremities: Range of motion is full. There is no tenderness. Muscle strength is 5/5. Tone is normal.        Left Upper Extremities: Range of motion is full. There is no tenderness. Muscle strength is 5/5. Tone is normal.       Right Lower Extremities: Range of motion is full. There is no tenderness. Muscle strength is 5/5. Tone is normal.        Left Lower Extremities: Range of motion is full. There is no tenderness. Muscle strength is 5/5. Tone is normal.     Neurological:        Coordination: She has a normal Romberg Test, normal finger to nose coordination and normal tandem walking coordination.        Sensory: There is no sensory deficit in the trunk. There is no sensory deficit in the extremities.        DTRs: DTRs are DTRS NORMAL AND SYMMETRICnormal and symmetric. She displays no Babinski's sign on the right side. She displays no Babinski's sign on the left side.        Cranial nerves: Cranial nerve(s) II, III, IV, V, VI, VII, VIII, IX, X, XI and XII are intact.         Diagnostic Results:  Is an MRI brain with and without con on an MRI without contrast of the brain nearly 3 years ago, it measured 9 x 6 mm.  There is no surrounding vasogenic edema.  trast available for review which I personally reviewed.  This demonstrates a small homogeneously enhancing mass centered along the left tentorial incisura projecting into the supratentorial space.  This is most consistent  with a meningioma.  It currently measures 11 x 8 mm.    ASSESSMENT/PLAN:     Ms. Mendoza is a 59-year-old female with an incidentally found left tentorial meningioma as described above.  She has an MRI from approximately 3 years ago.  Since this last MRI 3 years ago, there has been a slight interval increase in size of the known meningioma.  However, there continues to be no surrounding vasogenic edema of the surrounding brain parenchyma.  Despite the small increase in size of the meningioma, there has been quite a long time span in between the interval scans.  Furthermore, there was no significant surrounding vasogenic edema or mass effect on the brain.  I believe the patient to be asymptomatic from this meningioma.  Therefore, we will continue to follow conservatively.  We will plan on a repeat MRI with and without contrast of the brain in 1 year.  I will see her back at that time.  She knows she can call with any further questions or concerns in the meantime.      Note dictated with voice recognition software, please excuse any grammatical errors.

## 2025-03-17 ENCOUNTER — PATIENT MESSAGE (OUTPATIENT)
Dept: INTERNAL MEDICINE | Facility: CLINIC | Age: 60
End: 2025-03-17
Payer: COMMERCIAL

## 2025-03-17 DIAGNOSIS — E11.9 TYPE 2 DIABETES MELLITUS WITHOUT COMPLICATION, WITHOUT LONG-TERM CURRENT USE OF INSULIN: ICD-10-CM

## 2025-03-17 NOTE — TELEPHONE ENCOUNTER
Care Due:                  Date            Visit Type   Department     Provider  --------------------------------------------------------------------------------                                MYCHART                              FOLLOWUP/OF  [facility] INTERNAL  Last Visit: 06-      FICE VISIT   MEDICINE       Temitope  McMahshira                              MYCHART                              FOLLOWUP/OF  [facility] INTERNAL  Next Visit: 03-      FICE VISIT   MEDICINE       Temitope  McMahill                                                            Last  Test          Frequency    Reason                     Performed    Due Date  --------------------------------------------------------------------------------    HBA1C.......  6 months...  JARDIANCE, metFORMIN.....  06-   12-    Lipid Panel.  12 months..  atorvastatin.............  01- 01-    [facility] Embedded Care Due Messages. Reference number: [id number].   3/17/2025 9:29:10 AM CDT

## 2025-03-18 ENCOUNTER — PATIENT OUTREACH (OUTPATIENT)
Dept: ADMINISTRATIVE | Facility: HOSPITAL | Age: 60
End: 2025-03-18
Payer: COMMERCIAL

## 2025-03-18 ENCOUNTER — PATIENT MESSAGE (OUTPATIENT)
Dept: ADMINISTRATIVE | Facility: HOSPITAL | Age: 60
End: 2025-03-18
Payer: COMMERCIAL

## 2025-03-18 ENCOUNTER — OFFICE VISIT (OUTPATIENT)
Dept: INTERNAL MEDICINE | Facility: CLINIC | Age: 60
End: 2025-03-18
Payer: COMMERCIAL

## 2025-03-18 VITALS
SYSTOLIC BLOOD PRESSURE: 132 MMHG | DIASTOLIC BLOOD PRESSURE: 80 MMHG | OXYGEN SATURATION: 98 % | BODY MASS INDEX: 30.68 KG/M2 | WEIGHT: 179.69 LBS | HEART RATE: 71 BPM | HEIGHT: 64 IN

## 2025-03-18 DIAGNOSIS — E11.9 TYPE 2 DIABETES MELLITUS WITHOUT COMPLICATION, WITHOUT LONG-TERM CURRENT USE OF INSULIN: ICD-10-CM

## 2025-03-18 DIAGNOSIS — E78.49 OTHER HYPERLIPIDEMIA: ICD-10-CM

## 2025-03-18 DIAGNOSIS — I10 PRIMARY HYPERTENSION: ICD-10-CM

## 2025-03-18 DIAGNOSIS — R41.3 MEMORY LOSS: Primary | ICD-10-CM

## 2025-03-18 DIAGNOSIS — Z00.00 HEALTH MAINTENANCE EXAMINATION: ICD-10-CM

## 2025-03-18 DIAGNOSIS — G47.33 OSA (OBSTRUCTIVE SLEEP APNEA): ICD-10-CM

## 2025-03-18 PROCEDURE — 3075F SYST BP GE 130 - 139MM HG: CPT | Mod: CPTII,S$GLB,, | Performed by: STUDENT IN AN ORGANIZED HEALTH CARE EDUCATION/TRAINING PROGRAM

## 2025-03-18 PROCEDURE — 1159F MED LIST DOCD IN RCRD: CPT | Mod: CPTII,S$GLB,, | Performed by: STUDENT IN AN ORGANIZED HEALTH CARE EDUCATION/TRAINING PROGRAM

## 2025-03-18 PROCEDURE — 3079F DIAST BP 80-89 MM HG: CPT | Mod: CPTII,S$GLB,, | Performed by: STUDENT IN AN ORGANIZED HEALTH CARE EDUCATION/TRAINING PROGRAM

## 2025-03-18 PROCEDURE — 3008F BODY MASS INDEX DOCD: CPT | Mod: CPTII,S$GLB,, | Performed by: STUDENT IN AN ORGANIZED HEALTH CARE EDUCATION/TRAINING PROGRAM

## 2025-03-18 PROCEDURE — 4010F ACE/ARB THERAPY RXD/TAKEN: CPT | Mod: CPTII,S$GLB,, | Performed by: STUDENT IN AN ORGANIZED HEALTH CARE EDUCATION/TRAINING PROGRAM

## 2025-03-18 PROCEDURE — G2211 COMPLEX E/M VISIT ADD ON: HCPCS | Mod: S$GLB,,, | Performed by: STUDENT IN AN ORGANIZED HEALTH CARE EDUCATION/TRAINING PROGRAM

## 2025-03-18 PROCEDURE — 99999 PR PBB SHADOW E&M-EST. PATIENT-LVL V: CPT | Mod: PBBFAC,,, | Performed by: STUDENT IN AN ORGANIZED HEALTH CARE EDUCATION/TRAINING PROGRAM

## 2025-03-18 PROCEDURE — 99215 OFFICE O/P EST HI 40 MIN: CPT | Mod: S$GLB,,, | Performed by: STUDENT IN AN ORGANIZED HEALTH CARE EDUCATION/TRAINING PROGRAM

## 2025-03-18 PROCEDURE — 2023F DILAT RTA XM W/O RTNOPTHY: CPT | Mod: CPTII,S$GLB,, | Performed by: STUDENT IN AN ORGANIZED HEALTH CARE EDUCATION/TRAINING PROGRAM

## 2025-03-18 RX ORDER — SPIRONOLACTONE 100 MG/1
100 TABLET, FILM COATED ORAL DAILY
Qty: 90 TABLET | Refills: 3 | Status: SHIPPED | OUTPATIENT
Start: 2025-03-18

## 2025-03-18 NOTE — PATIENT INSTRUCTIONS
Call this phone number to schedule an appointment with the [facility]: [phone].     I recommend monitoring your intake of potassium containing foods such as beans, potatoes, spinach, avocado, yogurt, raisins, squash, and bananas.

## 2025-03-18 NOTE — PROGRESS NOTES
Subjective:       Patient ID: Shu Mendoza is a 59 y.o. female.    Chief Complaint: Memory loss [R41.3]    Patient is established with me, here today for the following:    HTN, HLD, T2DM, invasive ductal carcinoma of the left breast (ER positive), meningioma, osteopenia, vitamin D deficiency, migraines, anxiety, insomnia, chemotherapy induced neuropathy, OAB, recurrent UTI's, NADER     History of Present Illness      BLOOD PRESSURE:  She reports good tolerance to spironolactone without any issues. Her blood pressure readings have improved since prior visit, with measurements in the 130s.    DIABETES:  She reports recent binge eating behaviors due to emotional stress related to grief over the loss of her son and grandson, expressing difficulty maintaining control over her eating habits.    SLEEP:  She has a CPAP machine for sleep apnea but is currently unable to use it due to equipment supplier issues. She reports interrupted sleep with frequent nighttime awakenings, including one instance of waking at 1:00 AM with inability to return to sleep.    COGNITIVE FUNCTION:  She reports short-term memory difficulties, specifically forgetting intended tasks when moving between rooms. At work, she requires written documentation to remember guest calls as information is forgotten if not immediately recorded. Long-term memory remains intact.    BREAST CANCER:  She is approaching the 5-year kevin of anastrozole treatment. She reports occasional internal breast itching sensations, without specific pattern or triggers.    ENT:  She experiences nasal congestion at night, effectively managed with nightly Flonase.    GI:  She reports intermittent nausea when fasting that typically resolves quickly. She takes anti-nausea medication when symptoms persist.    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      ROS:  General: +fatigue, +difficulty falling asleep,  +difficulty staying asleep, +sleep disturbances  ENT: +nasal congestion  Cardiovascular: -chest pain  Respiratory: -shortness of breath  Gastrointestinal: +nausea, +food binging  Neurological: +headache, +tingling, +memory problems, +forgetfulness  Psychiatric: +sleep difficulty  Breasts: +breast itching          Health maintenance -   Colonoscopy performed FEB2022, with Dr. Clayton. Told to repeat in 5 years.  Denies family history of colorectal cancer.  Denies family history of breast cancer.  Family history of ovarian cancer.  Hysterectomy due to AUB and ovarian cyst.   Seeing Dr. Oliva for gynecology.   Denies family history of osteoporosis.  Denies significant family history of cardiac disease.  UTD on Tdap, COVID, shingles, PCV20, influenza vaccinations.  Due for RSV vaccinations.  Never smoker.  Denies drug use.  Completed HIV and hepatitis C screening.     HLD -   Endorses taking atorvastatin as directed   Lab Results   Component Value Date    CHOL 158 01/09/2024     Lab Results   Component Value Date    TRIG 51 01/09/2024     Lab Results   Component Value Date    LDLCALC 93.8 01/09/2024     Lab Results   Component Value Date    HDL 54 01/09/2024              HTN -   Currently prescribed HCTZ, amlodipine-valsartan, carvedilol, spironolactone.  Enrolled in digital hypertension medicine program.   Log shows blood pressures at goal (<130/80) 0% of the time.   TSH WNL previously  Renin aldosterone ratio elevated   Lab Results   Component Value Date    MICALBCREAT 17.7 01/09/2024     BP Readings from Last 5 Encounters:   02/13/25 (!) 151/84   01/29/25 131/82   10/21/24 123/69   06/13/24 (!) 142/80   05/13/24 (!) 143/80      T2DM -   Currently taking metformin, Jardiance   Checking blood glucose infrequently  Enrolled in digital diabetes medicine program   Log shows blood sugars at goal () 100% of the time.   Denies blood sugars < 70 mg/dL  Normally sees Dr. Mccann for ophthalmology.  Due for foot exam.  UTD  "on eye exam.  Lab Results   Component Value Date    HGBA1C 7.1 (H) 06/13/2024    HGBA1C 7.1 (H) 01/09/2024    HGBA1C 6.7 (H) 08/15/2023     Lab Results   Component Value Date    MICALBCREAT 17.7 01/09/2024             Breast cancer -  Diagnosed 2019 with invasive ductal carcinoma of the left breast, ER positive  Following with Dr. Norris routinely for oncology  S/p mastectomy and completed Taxol treatment  Started on anastrozole APR2020  Mammogram BI-RADS 1 in APR2024     Osteopenia -   Vitamin D deficiency -   Completed DEXA in DEC2024.  Showed osteopenia.  "There is a 12.6% risk of a major osteoporotic fracture and a 0.9% risk of hip fracture in the next 10 years (FRAX). "  Currently taking vitamin D3 daily.  Currently taking calcium daily.  Lab Results   Component Value Date    ZYLAIAAQ26JP 56 12/23/2024    ERFQKJBX70KY 47 01/09/2024    [id number] 50 08/10/2022                       Following with Dr. Alaniz routinely for neurology.  Taking gabapentin nightly for neuropathy with good effect    Meningioma -   Followed with Dr. Conroy for neurosurgery  Plans to follow up in 1 year with repeat MRI     Taking venlafaxine for anxiety  No longer taking amitriptyline for insomnia    Taking oxybutynin for OAB   Following with Dr. Golden for urology.      NADER -   Followed with JALEEL Lane for sleep medicine     Current Outpatient Medications   Medication Instructions    amlodipine-valsartan (EXFORGE)  mg per tablet 1 tablet, Oral, Daily    ammonium lactate 12 % Crea APPLY TOPICALLY TO THE SKIN TWICE DAILY    anastrozole (ARIMIDEX) 1 mg, Oral    atorvastatin (LIPITOR) 80 mg, Oral, Daily    b complex vitamins tablet 1 tablet, Daily    B-complex with vitamin C (Z-BEC OR EQUIV) tablet No dose, route, or frequency recorded.    calcium carbonate 1,250 mg, 2 times daily with meals    carvediloL (COREG) 25 mg, Oral, 2 times daily    dorzolamide (TRUSOPT) 2 % ophthalmic solution 1 drop, 3 times daily    fluticasone " "propionate (FLONASE) 50 mcg/actuation nasal spray 1 spray, Nightly PRN    gabapentin (NEURONTIN) 300 MG capsule TAKE 2 CAPSULES(600 MG) BY MOUTH EVERY EVENING    hydroCHLOROthiazide (HYDRODIURIL) 25 mg, Oral, Daily    JARDIANCE 25 mg, Oral, Daily    latanoprost 0.005 % ophthalmic solution     LIDOcaine HCL 2% (XYLOCAINE) 2 % jelly Topical (Top), As needed (PRN), Apply topically once nightly to affected part of foot/feet.    loratadine (CLARITIN ORAL) Take by mouth.    metFORMIN (GLUCOPHAGE-XR) 1,000 mg, Oral, 2 times daily with meals    multivitamin (THERAGRAN) per tablet 1 tablet, Daily    ondansetron (ZOFRAN-ODT) 8 MG TbDL Dissolve 1 tablet (8 mg total) by mouth every 6 (six) hours as needed (nausea).    oxybutynin (DITROPAN-XL) 10 mg, Oral, Daily    prasterone, dhea, (INTRAROSA) 6.5 mg Inst PLACE 1 INSERT INTRAVAGINALLY AT BEDTIME AS DIRECTED    promethazine (PHENERGAN) 25 mg, Oral, Every 6 hours PRN    spironolactone (ALDACTONE) 50 mg, Oral, Daily    terconazole (TERAZOL 7) 0.4 % Crea 1 applicator, Vaginal, Nightly    venlafaxine (EFFEXOR-XR) 75 mg, Oral, Daily    vitamin D (VITAMIN D3) 1,000 Units, Daily     Objective:      Vitals:    03/18/25 0943   BP: 132/80   Pulse: 71   SpO2: 98%   Weight: 81.5 kg (179 lb 10.8 oz)   Height: 5' 4" (1.626 m)   PainSc: 0-No pain     Body mass index is 30.84 kg/m².    Physical Exam  Vitals reviewed.   Constitutional:       General: She is not in acute distress.     Appearance: Normal appearance. She is not ill-appearing or diaphoretic.   HENT:      Head: Normocephalic and atraumatic.      Right Ear: Tympanic membrane, ear canal and external ear normal. There is no impacted cerumen.      Left Ear: Tympanic membrane, ear canal and external ear normal. There is no impacted cerumen.      Nose: Nose normal. No rhinorrhea.      Mouth/Throat:      Mouth: Mucous membranes are moist.      Pharynx: Oropharynx is clear. No oropharyngeal exudate or posterior oropharyngeal erythema. "   Eyes:      General: No scleral icterus.        Right eye: No discharge.         Left eye: No discharge.      Conjunctiva/sclera: Conjunctivae normal.   Neck:      Thyroid: No thyromegaly or thyroid tenderness.      Trachea: Trachea normal.   Cardiovascular:      Rate and Rhythm: Normal rate and regular rhythm.      Heart sounds: Normal heart sounds. No murmur heard.     No friction rub. No gallop.   Pulmonary:      Effort: Pulmonary effort is normal. No respiratory distress.      Breath sounds: Normal breath sounds. No stridor. No wheezing, rhonchi or rales.   Musculoskeletal:      Cervical back: Neck supple.   Lymphadenopathy:      Head:      Right side of head: No submandibular or posterior auricular adenopathy.      Left side of head: No submandibular or posterior auricular adenopathy.      Cervical: No cervical adenopathy.      Right cervical: No superficial, deep or posterior cervical adenopathy.     Left cervical: No superficial, deep or posterior cervical adenopathy.      Upper Body:      Right upper body: No supraclavicular adenopathy.      Left upper body: No supraclavicular adenopathy.   Skin:     General: Skin is warm and dry.      Comments: Color WNL   Neurological:      Mental Status: She is alert. Mental status is at baseline.   Psychiatric:         Mood and Affect: Mood normal.         Behavior: Behavior normal.         Assessment:       1. Memory loss    2. Primary hypertension    3. Health maintenance examination    4. Type 2 diabetes mellitus without complication, without long-term current use of insulin    5. Other hyperlipidemia    6. NADER (obstructive sleep apnea)        Plan:   Memory loss  -     Ambulatory referral/consult to Adult Neuropsychology; Future; Expected date: 03/25/2025  -     Ambulatory referral/consult to Neuropsychology; Future; Expected date: 03/25/2025  -     Vitamin B1; Future; Expected date: 03/18/2025  -     Vitamin B12; Future; Expected date: 03/18/2025  -     Folate;  Future; Expected date: 03/18/2025  -     Treponema Pallidium Antibodies IgG, IgM; Future; Expected date: 03/18/2025    Primary hypertension  -     TSH; Future; Expected date: 03/18/2025  -     Comprehensive Metabolic Panel; Future; Expected date: 03/18/2025  -     Microalbumin/Creatinine Ratio, Urine; Future; Expected date: 03/18/2025  -     spironolactone (ALDACTONE) 100 MG tablet; Take 1 tablet (100 mg total) by mouth once daily.  Dispense: 90 tablet; Refill: 3  -     Basic Metabolic Panel; Future; Expected date: 04/08/2025    Health maintenance examination  -     TSH; Future; Expected date: 03/18/2025  -     CBC Auto Differential; Future; Expected date: 03/18/2025  -     Hemoglobin A1C; Future; Expected date: 03/18/2025  -     Comprehensive Metabolic Panel; Future; Expected date: 03/18/2025  -     Ambulatory referral/consult to Adult Neuropsychology; Future; Expected date: 03/25/2025  -     Ambulatory referral/consult to Neuropsychology; Future; Expected date: 03/25/2025  -     Vitamin B1; Future; Expected date: 03/18/2025  -     Vitamin B12; Future; Expected date: 03/18/2025  -     Folate; Future; Expected date: 03/18/2025  -     Treponema Pallidium Antibodies IgG, IgM; Future; Expected date: 03/18/2025    Type 2 diabetes mellitus without complication, without long-term current use of insulin  -     CBC Auto Differential; Future; Expected date: 03/18/2025  -     Hemoglobin A1C; Future; Expected date: 03/18/2025  -     Comprehensive Metabolic Panel; Future; Expected date: 03/18/2025  -     Microalbumin/Creatinine Ratio, Urine; Future; Expected date: 03/18/2025    Other hyperlipidemia  -     Lipid Panel; Future; Expected date: 03/18/2025    NADER (obstructive sleep apnea)  -     Ambulatory referral/consult to Sleep Disorders; Future; Expected date: 03/25/2025      Assessment & Plan    IMPRESSION:  - Assessed blood pressure control; increased spironolactone dose from 50 mg to 100 mg daily to achieve optimal range.    - Evaluated diabetes management; A1c slightly elevated at 7.1, discussed potential for GLP-1 agonist therapy but patient prefers to continue current regimen.   - Reviewed recent bone density results; no significant changes, continued current management with calcium and vitamin D supplementation.   - Discussed upcoming discontinuation of anastrozole with Dr. Norris in April, approaching 5-year kevin post-cancer treatment.    HYPERTENSION:  - Shu to monitor blood pressure at home 3-4 times per week for the next 3 weeks.  - Will review digital blood pressure logs in about 3 weeks.  - Increased spironolactone dose from 50 mg to 100 mg daily.  - Provided information on high-potassium foods to monitor with increased dose.  - Continued HCTZ, amlodipine, valsartan, and carvedilol.    DIABETES MELLITUS:  - Explained potential side effects of GLP-1 agonists, including nausea and impact on gastric emptying.  - Continued Metformin and Jardiance for diabetes management.    OSTEOPOROSIS:  - Discussed importance of weight-bearing exercise for bone health.  - Continued calcium supplementation; recommended switching to chewable or gummy form if current tablets are difficult to swallow.    CHRONIC SINUSITIS:  - Explained nasal irrigation technique using distilled water and saline solution for congestion relief.  - Recommend using nasal irrigation with distilled water before using Flonase for congestion.  - Continued Flonase for nasal congestion.    PRURITUS:  - Shu can try gabapentin for breast itching if it becomes bothersome.    LABS:  - Ordered comprehensive labs including thyroid function, A1c, cholesterol, blood counts, and metabolic panel.  - Additional labs ordered to evaluate memory concerns and rule out common causes.    MAMMOGRAPHY:  - Mammogram scheduled for next month.    MEMORY CONCERNS:  - Referred to neuropsychology for formal memory testing.    FOLLOW-UP:  - Follow up in 6 months if lab results and blood pressure  readings are WNL; if concerns arise, return in 3 months.  - Contact the office if nausea becomes frequent.         47 minutes were spent in chart review, documentation and review of results, and evaluation, treatment, and counseling of patient on the same day of service.    Temitope Grant MD  3/18/2025

## 2025-03-18 NOTE — Clinical Note
Please ask patient to check home blood pressure more often, I only see two readings since her office appointment and I would like more information to see if the higher dose of spironolactone is working. I also would like her to check her blood sugar more regularly, based on the measurement we have I would recommend that she consider adding the GLP-1 (once weekly injectable medication) that we discussed. Please let patient know, thank you!

## 2025-03-19 RX ORDER — METFORMIN HYDROCHLORIDE 500 MG/1
1000 TABLET, EXTENDED RELEASE ORAL 2 TIMES DAILY WITH MEALS
Qty: 360 TABLET | Refills: 3 | Status: SHIPPED | OUTPATIENT
Start: 2025-03-19

## 2025-03-23 DIAGNOSIS — E11.9 TYPE 2 DIABETES MELLITUS WITHOUT COMPLICATION, UNSPECIFIED WHETHER LONG TERM INSULIN USE: ICD-10-CM

## 2025-03-27 ENCOUNTER — PATIENT MESSAGE (OUTPATIENT)
Dept: ADMINISTRATIVE | Facility: OTHER | Age: 60
End: 2025-03-27
Payer: COMMERCIAL

## 2025-04-10 ENCOUNTER — LAB VISIT (OUTPATIENT)
Dept: LAB | Facility: OTHER | Age: 60
End: 2025-04-10
Attending: STUDENT IN AN ORGANIZED HEALTH CARE EDUCATION/TRAINING PROGRAM
Payer: COMMERCIAL

## 2025-04-10 DIAGNOSIS — E11.9 TYPE 2 DIABETES MELLITUS WITHOUT COMPLICATION, WITHOUT LONG-TERM CURRENT USE OF INSULIN: ICD-10-CM

## 2025-04-10 DIAGNOSIS — Z00.00 HEALTH MAINTENANCE EXAMINATION: ICD-10-CM

## 2025-04-10 DIAGNOSIS — I10 PRIMARY HYPERTENSION: ICD-10-CM

## 2025-04-10 DIAGNOSIS — E78.49 OTHER HYPERLIPIDEMIA: ICD-10-CM

## 2025-04-10 DIAGNOSIS — R41.3 MEMORY LOSS: ICD-10-CM

## 2025-04-10 LAB
ABSOLUTE EOSINOPHIL (OHS): 0.11 K/UL
ABSOLUTE MONOCYTE (OHS): 0.56 K/UL (ref 0.3–1)
ABSOLUTE NEUTROPHIL COUNT (OHS): 4.52 K/UL (ref 1.8–7.7)
ALBUMIN SERPL BCP-MCNC: 3.9 G/DL (ref 3.5–5.2)
ALP SERPL-CCNC: 78 UNIT/L (ref 40–150)
ALT SERPL W/O P-5'-P-CCNC: 18 UNIT/L (ref 10–44)
ANION GAP (OHS): 11 MMOL/L (ref 8–16)
AST SERPL-CCNC: 13 UNIT/L (ref 11–45)
BASOPHILS # BLD AUTO: 0.05 K/UL
BASOPHILS NFR BLD AUTO: 0.6 %
BILIRUB SERPL-MCNC: 0.2 MG/DL (ref 0.1–1)
BUN SERPL-MCNC: 29 MG/DL (ref 6–20)
CALCIUM SERPL-MCNC: 10.7 MG/DL (ref 8.7–10.5)
CHLORIDE SERPL-SCNC: 105 MMOL/L (ref 95–110)
CHOLEST SERPL-MCNC: 168 MG/DL (ref 120–199)
CHOLEST/HDLC SERPL: 4.1 {RATIO} (ref 2–5)
CO2 SERPL-SCNC: 23 MMOL/L (ref 23–29)
CREAT SERPL-MCNC: 1.1 MG/DL (ref 0.5–1.4)
EAG (OHS): 166 MG/DL (ref 68–131)
ERYTHROCYTE [DISTWIDTH] IN BLOOD BY AUTOMATED COUNT: 14.3 % (ref 11.5–14.5)
FOLATE SERPL-MCNC: 13.9 NG/ML (ref 4–24)
GFR SERPLBLD CREATININE-BSD FMLA CKD-EPI: 58 ML/MIN/1.73/M2
GLUCOSE SERPL-MCNC: 215 MG/DL (ref 70–110)
HBA1C MFR BLD: 7.4 % (ref 4–5.6)
HCT VFR BLD AUTO: 39.2 % (ref 37–48.5)
HDLC SERPL-MCNC: 41 MG/DL (ref 40–75)
HDLC SERPL: 24.4 % (ref 20–50)
HGB BLD-MCNC: 12.5 GM/DL (ref 12–16)
IMM GRANULOCYTES # BLD AUTO: 0.04 K/UL (ref 0–0.04)
IMM GRANULOCYTES NFR BLD AUTO: 0.5 % (ref 0–0.5)
LDLC SERPL CALC-MCNC: 101.8 MG/DL (ref 63–159)
LYMPHOCYTES # BLD AUTO: 2.85 K/UL (ref 1–4.8)
MCH RBC QN AUTO: 29.1 PG (ref 27–31)
MCHC RBC AUTO-ENTMCNC: 31.9 G/DL (ref 32–36)
MCV RBC AUTO: 91 FL (ref 82–98)
NONHDLC SERPL-MCNC: 127 MG/DL
NUCLEATED RBC (/100WBC) (OHS): 0 /100 WBC
PLATELET # BLD AUTO: 311 K/UL (ref 150–450)
PMV BLD AUTO: 9.4 FL (ref 9.2–12.9)
POTASSIUM SERPL-SCNC: 4.5 MMOL/L (ref 3.5–5.1)
PROT SERPL-MCNC: 8.1 GM/DL (ref 6–8.4)
RBC # BLD AUTO: 4.29 M/UL (ref 4–5.4)
RELATIVE EOSINOPHIL (OHS): 1.4 %
RELATIVE LYMPHOCYTE (OHS): 35.1 % (ref 18–48)
RELATIVE MONOCYTE (OHS): 6.9 % (ref 4–15)
RELATIVE NEUTROPHIL (OHS): 55.5 % (ref 38–73)
SODIUM SERPL-SCNC: 139 MMOL/L (ref 136–145)
T PALLIDUM IGG+IGM SER QL: NEGATIVE
TRIGL SERPL-MCNC: 126 MG/DL (ref 30–150)
TSH SERPL-ACNC: 1.71 UIU/ML (ref 0.4–4)
VIT B12 SERPL-MCNC: 1201 PG/ML (ref 210–950)
WBC # BLD AUTO: 8.13 K/UL (ref 3.9–12.7)

## 2025-04-10 PROCEDURE — 84443 ASSAY THYROID STIM HORMONE: CPT

## 2025-04-10 PROCEDURE — 83718 ASSAY OF LIPOPROTEIN: CPT

## 2025-04-10 PROCEDURE — 86593 SYPHILIS TEST NON-TREP QUANT: CPT

## 2025-04-10 PROCEDURE — 84425 ASSAY OF VITAMIN B-1: CPT

## 2025-04-10 PROCEDURE — 83036 HEMOGLOBIN GLYCOSYLATED A1C: CPT

## 2025-04-10 PROCEDURE — 82040 ASSAY OF SERUM ALBUMIN: CPT

## 2025-04-10 PROCEDURE — 85025 COMPLETE CBC W/AUTO DIFF WBC: CPT

## 2025-04-10 PROCEDURE — 36415 COLL VENOUS BLD VENIPUNCTURE: CPT

## 2025-04-10 PROCEDURE — 82607 VITAMIN B-12: CPT

## 2025-04-10 PROCEDURE — 82746 ASSAY OF FOLIC ACID SERUM: CPT

## 2025-04-24 ENCOUNTER — OFFICE VISIT (OUTPATIENT)
Dept: PODIATRY | Facility: CLINIC | Age: 60
End: 2025-04-24
Payer: COMMERCIAL

## 2025-04-24 VITALS
SYSTOLIC BLOOD PRESSURE: 122 MMHG | HEIGHT: 64 IN | BODY MASS INDEX: 30.41 KG/M2 | WEIGHT: 178.13 LBS | DIASTOLIC BLOOD PRESSURE: 75 MMHG | HEART RATE: 70 BPM

## 2025-04-24 DIAGNOSIS — B07.0 VERRUCA PLANTARIS: ICD-10-CM

## 2025-04-24 DIAGNOSIS — G62.9 NEUROPATHY: Primary | ICD-10-CM

## 2025-04-24 DIAGNOSIS — E11.9 TYPE 2 DIABETES MELLITUS WITHOUT COMPLICATION, WITHOUT LONG-TERM CURRENT USE OF INSULIN: ICD-10-CM

## 2025-04-24 DIAGNOSIS — G62.0 CHEMOTHERAPY-INDUCED NEUROPATHY: ICD-10-CM

## 2025-04-24 DIAGNOSIS — T45.1X5A CHEMOTHERAPY-INDUCED NEUROPATHY: ICD-10-CM

## 2025-04-24 PROCEDURE — 99999 PR PBB SHADOW E&M-EST. PATIENT-LVL IV: CPT | Mod: PBBFAC,,, | Performed by: PODIATRIST

## 2025-04-24 PROCEDURE — 3074F SYST BP LT 130 MM HG: CPT | Mod: CPTII,S$GLB,, | Performed by: PODIATRIST

## 2025-04-24 PROCEDURE — 1159F MED LIST DOCD IN RCRD: CPT | Mod: CPTII,S$GLB,, | Performed by: PODIATRIST

## 2025-04-24 PROCEDURE — 3051F HG A1C>EQUAL 7.0%<8.0%: CPT | Mod: CPTII,S$GLB,, | Performed by: PODIATRIST

## 2025-04-24 PROCEDURE — 3008F BODY MASS INDEX DOCD: CPT | Mod: CPTII,S$GLB,, | Performed by: PODIATRIST

## 2025-04-24 PROCEDURE — 4010F ACE/ARB THERAPY RXD/TAKEN: CPT | Mod: CPTII,S$GLB,, | Performed by: PODIATRIST

## 2025-04-24 PROCEDURE — 99213 OFFICE O/P EST LOW 20 MIN: CPT | Mod: 25,S$GLB,, | Performed by: PODIATRIST

## 2025-04-24 PROCEDURE — 3078F DIAST BP <80 MM HG: CPT | Mod: CPTII,S$GLB,, | Performed by: PODIATRIST

## 2025-04-25 NOTE — PROGRESS NOTES
Subjective:      Patient ID: Shu Mendoza is a 59 y.o. female.    Chief Complaint: Diabetic Foot Exam (Pcp Temitope Grant MD 03/18/2025)    Shu is a 59 y.o. female who presents to the clinic for evaluation and treatment of high risk feet. Shu has a past medical history of Anxiety, Breast cancer, Depression, Diabetes mellitus, Encounter for blood transfusion, Glaucoma (2012), psychiatric care, Hypertension, Malignant neoplasm of central portion of left breast in female, estrogen receptor positive (7/23/2019), Psychiatric problem, Renal cyst, Retinal tear of right eye, and Sleep difficulties. The patient's chief complaint is painful right foot lesion. This patient has documented high risk feet requiring routine maintenance secondary to peripheral neuropathy.    PCP: Temitope Grant MD    Date Last Seen by PCP:   Chief Complaint   Patient presents with    Diabetic Foot Exam     Pcp Temitope Grant MD 03/18/2025         Current shoe gear:  Affected Foot: Casual shoes     Unaffected Foot: Casual shoes    Hemoglobin A1C   Date Value Ref Range Status   06/13/2024 7.1 (H) 4.0 - 5.6 % Final     Comment:     ADA Screening Guidelines:  5.7-6.4%  Consistent with prediabetes  >or=6.5%  Consistent with diabetes    High levels of fetal hemoglobin interfere with the HbA1C  assay. Heterozygous hemoglobin variants (HbS, HgC, etc)do  not significantly interfere with this assay.   However, presence of multiple variants may affect accuracy.     01/09/2024 7.1 (H) 4.0 - 5.6 % Final     Comment:     ADA Screening Guidelines:  5.7-6.4%  Consistent with prediabetes  >or=6.5%  Consistent with diabetes    High levels of fetal hemoglobin interfere with the HbA1C  assay. Heterozygous hemoglobin variants (HbS, HgC, etc)do  not significantly interfere with this assay.   However, presence of multiple variants may affect accuracy.     08/15/2023 6.7 (H) 4.0 - 5.6 % Final     Comment:     ADA Screening Guidelines:  5.7-6.4%   Consistent with prediabetes  >or=6.5%  Consistent with diabetes    High levels of fetal hemoglobin interfere with the HbA1C  assay. Heterozygous hemoglobin variants (HbS, HgC, etc)do  not significantly interfere with this assay.   However, presence of multiple variants may affect accuracy.       Hemoglobin A1c   Date Value Ref Range Status   04/10/2025 7.4 (H) 4.0 - 5.6 % Final     Comment:     ADA Screening Guidelines:  5.7-6.4%  Consistent with prediabetes  >=6.5%  Consistent with diabetes    High levels of fetal hemoglobin interfere with the HbA1C  assay. Heterozygous hemoglobin variants (HbS, HgC, etc)do  not significantly interfere with this assay.   However, presence of multiple variants may affect accuracy.       Review of Systems   Constitutional: Negative for chills, decreased appetite, fever and malaise/fatigue.   HENT: Negative for congestion, hearing loss, nosebleeds and tinnitus.    Eyes: Negative for double vision, pain, photophobia and visual disturbance.   Cardiovascular: Negative for chest pain, claudication, cyanosis and leg swelling.   Respiratory: Negative for cough, hemoptysis, shortness of breath and wheezing.    Endocrine: Negative for cold intolerance and heat intolerance.   Hematologic/Lymphatic: Negative for adenopathy and bleeding problem.   Skin: Negative for color change, dry skin, itching, nail changes and suspicious lesions.   Musculoskeletal: Positive for arthritis. Negative for joint pain, myalgias and stiffness.   Gastrointestinal: Negative for abdominal pain, jaundice, nausea and vomiting.   Genitourinary: Negative for dysuria, frequency and hematuria.   Neurological: Positive for numbness, paresthesias and sensory change. Negative for difficulty with concentration and loss of balance.   Psychiatric/Behavioral: Negative for altered mental status, hallucinations and suicidal ideas. The patient is not nervous/anxious.    Allergic/Immunologic: Negative for environmental allergies and  persistent infections.           Objective:      Physical Exam  Vitals reviewed.   Constitutional:       Appearance: She is well-developed and well-nourished.   HENT:      Head: Normocephalic and atraumatic.   Cardiovascular:      Pulses:           Dorsalis pedis pulses are 2+ on the right side and 2+ on the left side.        Posterior tibial pulses are 2+ on the right side and 2+ on the left side.   Pulmonary:      Effort: Pulmonary effort is normal.   Musculoskeletal:         General: Normal range of motion.      Comments: Inspection and palpation of the muscles joints and bones of both lower extremities reveal that muscle strength for the anterior lateral and posterior muscle groups and intrinsic muscle groups of the foot are all 5 over 5 symmetrical.  Ankle subtalar midtarsal and digital joint range of motion are within normal limits, nonpainful, without crepitus or effusion.  Patient exhibits a normal angle and base of gait.  Palpation of the tendons reveal no defects.   Skin:     General: Skin is warm and dry.      Capillary Refill: Capillary refill takes 2 to 3 seconds.      Comments: Skin turgor is normal bilaterally.  Skin texture is well hydrated to both lower extremities.  No lesions or rashes or wounds appreciated bilaterally.  Nail plates 1 through 5 bilaterally are within normal limits for length and thickness.  No nail clubbing or incurvation noted.   Porokeratotic lesion noted to the plantar aspect of the plantar right foot with hyperkeratotic tissue, central nucleated core, and moderate to severe discomfort/tenderness with direct palpation.    Neurological:      Mental Status: She is alert and oriented to person, place, and time.      Comments: Sharp dull light touch vibratory proprioceptive sensation are intact bilaterally.  Deep tendon reflexes to patellar and Achilles tendon are symmetrical 2 over 4 bilaterally.    Positive tingling burning bilateral lower extremities specifically the bottoms of  both feet and worse at night.  Positive provocation sign bilateral tarsal tunnel.  Psychiatric:         Mood and Affect: Mood and affect normal.         Behavior: Behavior normal.               Assessment:       Encounter Diagnoses   Name Primary?    Neuropathy Yes    Chemotherapy-induced neuropathy     Type 2 diabetes mellitus without complication, without long-term current use of insulin     Verruca plantaris          Plan:       Shu was seen today for diabetic foot exam.    Diagnoses and all orders for this visit:    Neuropathy    Chemotherapy-induced neuropathy    Type 2 diabetes mellitus without complication, without long-term current use of insulin    Verruca plantaris      I counseled the patient on her conditions, their implications and medical management.      Discussed options for peripheral neuropathy/nerve entrapment syndrome including nerve block therapy, surgical nerve entrapment decompression procedures, and various vitamins and supplementation available shown to improve nerve function.    Lesion Excision     Performed by: Randy Batista DPM  Authorized by: Patient     Consent Done?:  Yes (Verbal)     Care Type:  Debride/Excise  Location(s):  Plantar right foot  Patient tolerance:  Patient tolerated the procedure well with no immediate complications      With patient's permission, the lesion(s) mentioned above were aggressively reduced and debrided using a 15 blade, tissue nipper, and curette to remove all lesion and associated debris. Topical trichloroacetic acid applied with a sterile cover. The patient will continue to monitor the areas daily, inspect the feet, wear protective shoe gear when ambulatory, and moisturizer to maintain skin integrity.          Shoe inspection. Diabetic Foot Education. Patient reminded of the importance of good nutrition and blood sugar control to help prevent podiatric complications of diabetes. Patient instructed on proper foot hygeine. We discussed wearing  proper shoe gear, daily foot inspections and Diabetic foot education in detail.    Follow up for punch biopsy.    .

## 2025-05-12 ENCOUNTER — TELEPHONE (OUTPATIENT)
Dept: PODIATRY | Facility: CLINIC | Age: 60
End: 2025-05-12
Payer: COMMERCIAL

## 2025-05-12 NOTE — TELEPHONE ENCOUNTER
----- Message from Kelin sent at 5/12/2025  8:49 AM CDT -----  Regarding: Cancel Biposy 05-  Contact: SHALINI DIANE [[id number]]  RESCHEDULE/APPTS Current Appt Date:05- Type of Appt:Biopsy Physician:Randy Batista Reason for Scheduling:Pt would like to cancel will reschedule for a later date Caller:SHALINI DIANE [[id number]] Contact Preference:[phone] (home)

## 2025-05-14 DIAGNOSIS — E78.49 OTHER HYPERLIPIDEMIA: ICD-10-CM

## 2025-05-14 RX ORDER — ATORVASTATIN CALCIUM 80 MG/1
80 TABLET, FILM COATED ORAL DAILY
Qty: 90 TABLET | Refills: 3 | Status: SHIPPED | OUTPATIENT
Start: 2025-05-14

## 2025-05-14 NOTE — TELEPHONE ENCOUNTER
No care due was identified.  [facility] Embedded Care Due Messages. Reference number: [id number].   5/14/2025 1:22:39 PM CDT

## 2025-06-03 ENCOUNTER — ON-DEMAND VIRTUAL (OUTPATIENT)
Dept: URGENT CARE | Facility: CLINIC | Age: 60
End: 2025-06-03
Payer: COMMERCIAL

## 2025-06-03 DIAGNOSIS — R11.0 NAUSEA: ICD-10-CM

## 2025-06-03 DIAGNOSIS — B34.9 VIRAL ILLNESS: Primary | ICD-10-CM

## 2025-06-03 DIAGNOSIS — K12.0 ULCER APHTHOUS ORAL: ICD-10-CM

## 2025-06-03 DIAGNOSIS — R51.9 ACUTE NONINTRACTABLE HEADACHE, UNSPECIFIED HEADACHE TYPE: ICD-10-CM

## 2025-06-03 PROCEDURE — 98005 SYNCH AUDIO-VIDEO EST LOW 20: CPT | Mod: 95,,, | Performed by: NURSE PRACTITIONER

## 2025-06-03 RX ORDER — LIDOCAINE HYDROCHLORIDE 20 MG/ML
SOLUTION OROPHARYNGEAL
Qty: 100 ML | Refills: 0 | Status: SHIPPED | OUTPATIENT
Start: 2025-06-03

## 2025-06-03 RX ORDER — ONDANSETRON 4 MG/1
4 TABLET, ORALLY DISINTEGRATING ORAL EVERY 6 HOURS PRN
Qty: 20 TABLET | Refills: 0 | Status: SHIPPED | OUTPATIENT
Start: 2025-06-03

## 2025-06-09 ENCOUNTER — HOSPITAL ENCOUNTER (OUTPATIENT)
Dept: RADIOLOGY | Facility: OTHER | Age: 60
Discharge: HOME | End: 2025-06-09
Attending: NEUROLOGICAL SURGERY
Payer: COMMERCIAL

## 2025-06-09 DIAGNOSIS — D32.9 MENINGIOMA: ICD-10-CM

## 2025-06-09 PROCEDURE — 70553 MRI BRAIN STEM W/O & W/DYE: CPT | Mod: TC

## 2025-06-09 PROCEDURE — 70553 MRI BRAIN STEM W/O & W/DYE: CPT | Mod: 26,,, | Performed by: STUDENT IN AN ORGANIZED HEALTH CARE EDUCATION/TRAINING PROGRAM

## 2025-06-09 PROCEDURE — 25500020 PHARM REV CODE 255: Performed by: NEUROLOGICAL SURGERY

## 2025-06-09 PROCEDURE — A9585 GADOBUTROL INJECTION: HCPCS | Performed by: NEUROLOGICAL SURGERY

## 2025-06-09 RX ORDER — GADOBUTROL 604.72 MG/ML
8 INJECTION INTRAVENOUS
Status: COMPLETED | OUTPATIENT
Start: 2025-06-09 | End: 2025-06-09

## 2025-06-09 RX ADMIN — GADOBUTROL 8 ML: 604.72 INJECTION INTRAVENOUS at 09:06

## 2025-06-10 ENCOUNTER — PATIENT MESSAGE (OUTPATIENT)
Dept: OBSTETRICS AND GYNECOLOGY | Facility: CLINIC | Age: 60
End: 2025-06-10
Payer: COMMERCIAL

## 2025-06-15 DIAGNOSIS — I10 PRIMARY HYPERTENSION: ICD-10-CM

## 2025-06-15 RX ORDER — AMLODIPINE AND VALSARTAN 10; 320 MG/1; MG/1
1 TABLET ORAL
Qty: 90 TABLET | Refills: 3 | Status: SHIPPED | OUTPATIENT
Start: 2025-06-15

## 2025-06-15 NOTE — TELEPHONE ENCOUNTER
Refill Decision Note   Shu Mendoza  is requesting a refill authorization.  Brief Assessment and Rationale for Refill:  Approve     Medication Therapy Plan:         Comments:     Note composed:5:19 PM 06/15/2025             Appointments     Last Visit   3/18/2025 Temitope Grant MD   Next Visit   Visit date not found Temitope Grant MD

## 2025-06-15 NOTE — TELEPHONE ENCOUNTER
No care due was identified.  [facility] Embedded Care Due Messages. Reference number: [id number].   6/15/2025 3:21:40 AM CDT

## 2025-06-24 NOTE — PROGRESS NOTES
NEUROPSYCHOLOGY CONSULT (TELEHEALTH)  Referral Information  Name: Shu Mendoza  MRN: [id number]  : 1965  Age: 60 y.o.  Race: Black or   Gender: female  REFERRAL SOURCE: Temitope Grant MD  DATE CONDUCTED: 2025  SOURCES OF INFORMATION:  The following was gathered from a clinical interview with Ms. Shu Mendoza and review of the available medical records. Ms. Mendoza expressed an understanding of the purpose of the evaluation and consented to all procedures. Total licensed billing psychologists professional time including clinical interview, test administration and interpretation of tests administered by the billing psychologist, integration of test results and other clinical data, preparing the final report, and personally reporting results to the patient   Billin - 60 minutes  Telemedicine:   The patient location is: Home  The provider location is: Home  The chief complaint/medical necessity leading to consultation/medical necessity is: cognitive decline  Visit type: Virtual visit with synchronous audio and video  Total time spent with patient: 35 minutes  Each patient to whom he or she provides medical services by telemedicine is:  (1) informed of the relationship between the physician and patient and the respective role of any other health care provider with respect to management of the patient; and (2) notified that he or she may decline to receive medical services by telemedicine and may withdraw from such care at any time.  Consent/Emergency Plan: The patient expressed an understanding of the purpose of the evaluation and consented to all procedures. I informed the patient of limits to confidentiality and discussed an emergency plan.    NEUROPSYCHOLOGICAL EVALUATION - CONFIDENTIAL    SUMMARY/TREATMENT PLAN   Ms. Mendoza is a 60 year old female with self-reported cognitive difficulties since starting treatment for breast cancer in  (Taxol, anastrozole). She completed  anastrozole in 2024, with no obvious benefit since discontinuing. She remains functionally independent, but is more reliant on compensatory mechanisms. She indicated that she is near termination at work due to errors, but these have improved with organizational systems. She is scheduled for neuropsychological testing on . Untreated NADER is another likely contributing factor to cognitive difficulties. History remarkable for left tentorial meningioma with no mass effect/vasogenic edema. Psychiatric history is remarkable for grief, with her son's death 3 years ago and grandson's death last year.     Diagnoses  1. Cognitive complaints  Assessment & Plan:  Testin/23      2. Brain mass    3. Adjustment disorder with mixed anxiety and depressed mood    4. Aromatase inhibitor use    Ms. Mendoza will be provided the results of the evaluation.     Thank you for allowing me to participate in Ms. Brower care.  If you have any questions, please contact me at [phone].    Pacheco Franco Psy.D., ABPP  Board Certified in Clinical Neuropsychology  Department of Neurology    HISTORY OF PRESENT ILLNESS: Ms. Shu Mendoza is a 60 y.o., right-handed, female with 12 years of education who was referred for a neuropsychological evaluation for self-reported cognitive decline in the setting of breast cancer. She began noticing changes during chemotherapy (Taxol) in , but also over the course of 5 year anastrozole treatment. She completed 5 years in 2025 with no obvious benefit in cognition since discontinuing. She notices cognitive issues both at home and at work. She can forget what she was going to get by the time she reaches the back of the house, but usually remembers once she returns to the front of the house. In conversation, she sometimes has to pause to remember what she wants to say. She can also forget what she was going to say if she doesn't say it when thinking it. She noted occasional word finding difficulties.  "    Ms. Mendoza are most problematic at work. In fact, she indicated that she is likely to be terminated if she makes 1 more mistake with guests. She is more reliant on several compensatory mechanisms, primarily note taking, which have been helpful. The primary issue is forgetting to follow-up with specific guest requests after check in. She has to communicate requests to hotel staff right away on a different system than their typical computer system. She was having trouble remembering whether or not she did this initially, but has improved with her system. Currently, the primary issue is remembering to follow-up with staff to ensure that the request has been completed. This is more challenging since she has to remember to do this a little after guest check-in while she is managing several other responsibilities simultaneously. Beyond this responsibility, she finds herself having to keep notes about guest phone calls or to remember what she wants to tell her supervisor at a later date. She indicated that her supervisor tells her that she doesn't seem focused.     Followed by neurosurgery for meningioma. Per Dr. Conroy's note from 2025: "Ms. Mendoza is a 59-year-old female with an incidentally found left tentorial meningioma as described above.  She has an MRI from approximately 3 years ago.  Since this last MRI 3 years ago, there has been a slight interval increase in size of the known meningioma.  However, there continues to be no surrounding vasogenic edema of the surrounding brain parenchyma.  Despite the small increase in size of the meningioma, there has been quite a long time span in between the interval scans.  Furthermore, there was no significant surrounding vasogenic edema or mass effect on the brain.  I believe the patient to be asymptomatic from this meningioma."    Neuropsychiatric Symptoms:  Hallucinations: Denied  Depression/Labile Mood: Endorsed, grief/depression, noting that her son  3 years ago and " grandson was killed 1 year ago.   Anxiety: Endorsed, primarily at work.     DAILY FUNCTIONING:  BASIC ADLS:  Feeding: independent, she denied anosmia.   Dressing: independent  Bathing: independent    IADLS:  Support System: Resides with longtime partner.   Appointment Management: independent, reliant on a calendar.   Medication Compliance: independent, she takes medications with breakfast, lunch, and dinner. No pillbox. She has a system where she has to move the bottle after taking them, but if it's not moved and she can't remember, she won't take it.    Financial Management: independent, most bills set to [employer]   Cooking: independent, no issues.   Driving: She never drove due to vision issues since childhood. She had to have corrective surgery at 7 years old due to dramatic vision decline.       BRAIN HEALTH RISK FACTORS:  Hearing Loss: Denied  Falls: Denied  Sleep: NADER, has a CPAP, but doesn't use it currently since she has a balance on her account and needs new supplies. She typically falls asleep 7:30-8-30pm she wakes up at 4am. She is not taking anything for sleep. Maintenance is an issue with frequent wake-ups. The only time she wakes up with restful sleep is from a nap, which might be from 12-3.pm.     MEDICAL HISTORY: Ms. Mendoza  has a past medical history of Anxiety, Breast cancer, Depression, Diabetes mellitus, Encounter for blood transfusion, Glaucoma (2012), psychiatric care, Hypertension, Malignant neoplasm of central portion of left breast in female, estrogen receptor positive (7/23/2019), Psychiatric problem, Renal cyst, Retinal tear of right eye, and Sleep difficulties. She has blurred vision, but has adjusted at home at work, uses glasses and adjusts computer screen.       Treatment Summary   Plan Name: OP PACLITAXEL QW  Treatment Goal: Curative  Status: Inactive  Start Date: 1/2/2020  End Date: 3/19/2020  Provider: Eliana Norris MD  Chemotherapy: PACLitaxel (TAXOL) 80 mg/m2 = 156 mg in sodium  "chloride 0.9% 250 mL chemo infusion, 80 mg/m2 = 156 mg, Intravenous, Clinic/HOD 1 time, 12 of 12 cycles  Administration: 156 mg (2020), 156 mg (2020), 156 mg (2020), 156 mg (2020), 156 mg (2020), 156 mg (2020), 156 mg (2020), 156 mg (2020), 156 mg (2020), 156 mg (3/5/2020), 156 mg (3/12/2020), 156 mg (3/19/2020)     NEUROIMAGIN2025 Brain MRI: "Overall stable size and appearance of a left tentorial extra-axial lesion, in keeping with meningioma.  Lesion is stable from comparison MRI 2024, noting slight interval growth from comparison imaging 2022.  No significant worsening in mass effect or underlying parenchymal signal change. No acute intracranial pathology."    SUBSTANCE USE: Ms. Mendoza  reports that she has never smoked. She has never used smokeless tobacco. She reports that she does not currently use alcohol, especially since cancer diagnosis. She reports that she does not use drugs. No history of substance abuse.     CURRENT MEDICATIONS:    Current Outpatient Medications:     amlodipine-valsartan (EXFORGE)  mg per tablet, TAKE 1 TABLET BY MOUTH DAILY, Disp: 90 tablet, Rfl: 3    ammonium lactate 12 % Crea, APPLY TOPICALLY TO THE SKIN TWICE DAILY, Disp: 280 g, Rfl: 6    atorvastatin (LIPITOR) 80 MG tablet, Take 1 tablet (80 mg total) by mouth once daily., Disp: 90 tablet, Rfl: 3    b complex vitamins tablet, Take 1 tablet by mouth once daily., Disp: , Rfl:     B-complex with vitamin C (Z-BEC OR EQUIV) tablet, , Disp: , Rfl:     calcium carbonate 1250 MG capsule, Take 1,250 mg by mouth 2 (two) times daily with meals., Disp: , Rfl:     carvediloL (COREG) 25 MG tablet, Take 1 tablet (25 mg total) by mouth 2 (two) times daily., Disp: 60 tablet, Rfl: 5    dorzolamide (TRUSOPT) 2 % ophthalmic solution, Place 1 drop into both eyes 3 (three) times daily. , Disp: , Rfl:     fluticasone propionate (FLONASE) 50 mcg/actuation nasal spray, 1 spray by Each Nostril " route nightly as needed for Rhinitis., Disp: , Rfl:     gabapentin (NEURONTIN) 300 MG capsule, TAKE 2 CAPSULES(600 MG) BY MOUTH EVERY EVENING, Disp: 180 capsule, Rfl: 2    hydroCHLOROthiazide (HYDRODIURIL) 25 MG tablet, Take 1 tablet (25 mg total) by mouth once daily., Disp: 90 tablet, Rfl: 0    JARDIANCE 25 mg tablet, Take 1 tablet (25 mg total) by mouth once daily., Disp: 90 tablet, Rfl: 1    latanoprost 0.005 % ophthalmic solution, , Disp: , Rfl: 11    LIDOcaine HCL 2% (XYLOCAINE) 2 % jelly, Apply topically as needed. Apply topically once nightly to affected part of foot/feet., Disp: 30 mL, Rfl: 2    LIDOcaine viscous HCl 2% (LIDOCAINE VISCOUS) 2 % Soln, by Mucous Membrane route every 3 (three) hours., Disp: 100 mL, Rfl: 0    loratadine (CLARITIN ORAL), Take by mouth., Disp: , Rfl:     metFORMIN (GLUCOPHAGE-XR) 500 MG ER 24hr tablet, Take 2 tablets (1,000 mg total) by mouth 2 (two) times daily with meals., Disp: 360 tablet, Rfl: 3    multivitamin (THERAGRAN) per tablet, Take 1 tablet by mouth once daily., Disp: , Rfl:     ondansetron (ZOFRAN-ODT) 4 MG TbDL, Take 1 tablet (4 mg total) by mouth every 6 (six) hours as needed (nausea)., Disp: 20 tablet, Rfl: 0    ondansetron (ZOFRAN-ODT) 8 MG TbDL, Dissolve 1 tablet (8 mg total) by mouth every 6 (six) hours as needed (nausea)., Disp: 30 tablet, Rfl: 2    oxybutynin (DITROPAN-XL) 10 MG 24 hr tablet, Take 1 tablet (10 mg total) by mouth once daily., Disp: 90 tablet, Rfl: 3    prasterone, dhea, (INTRAROSA) 6.5 mg Inst, PLACE 1 INSERT INTRAVAGINALLY AT BEDTIME AS DIRECTED, Disp: 28 each, Rfl: 1    promethazine (PHENERGAN) 25 MG tablet, Take 1 tablet (25 mg total) by mouth every 6 (six) hours as needed for Nausea., Disp: 30 tablet, Rfl: 0    spironolactone (ALDACTONE) 100 MG tablet, Take 1 tablet (100 mg total) by mouth once daily., Disp: 90 tablet, Rfl: 3    terconazole (TERAZOL 7) 0.4 % Crea, Place 1 applicator vaginally every evening., Disp: 7 g, Rfl: 1    venlafaxine  (EFFEXOR-XR) 75 MG 24 hr capsule, Take 1 capsule (75 mg total) by mouth once daily., Disp: 90 capsule, Rfl: 3    vitamin D (VITAMIN D3) 1000 units Tab, Take 1,000 Units by mouth once daily., Disp: , Rfl:   No current facility-administered medications for this visit.    Facility-Administered Medications Ordered in Other Visits:     lidocaine HCL 10 mg/ml (1%) 50 mL, EPINEPHrine 1,000 mcg in lactated Ringers 1,000 mL irrigation, , Irrigation, On Call Procedure, Derek Colin MD     PSYCHIATRIC HISTORY: Unremarkable pre-morbid psychiatric history with no treatment prior to cancer diagnosis. Followed by oncology psychologist, Dr. New, for several years during treatment. Not currently followed by a mental health provider.     SUICIDAL IDEATION:  History: Denied  Active Suicidal Ideation:Denied  Plan/Intent: Denied    FAMILY HISTORY: family history includes Diabetes in her maternal grandmother, mother, and sister; Drug abuse in her son; HIV in her sister; Nephrolithiasis in her mother; Ovarian cancer in an other family member; Schizophrenia in her son.    PSYCHOSOCIAL HISTORY:   Education:   Level Attained: High school graduate   Learning Difficulties: Denied   Repeated Grade: Denied     Vocation:   Highest Attained: Worked 19 years in multiple years at [employer], including , , and . She is currently  at [employer] for past 3 and a half years.      Relationship Status:   : No, but in a relationship for the past 43 years.    : No   Children: 3 (1  3 years ago)    MENTAL STATUS AND OBSERVATIONS:  APPEARANCE: Appropriately dressed and adequate grooming/hygiene.   ALERTNESS/ORIENTATION: Attentive and alert.   GAIT/MOTOR: Unable to assess.   SENSORY: Corrective lenses.   SPEECH/LANGUAGE: Normal in rate, rhythm, tone, and volume. Expressive and receptive language were grossly intact.  STATED MOOD/AFFECT: Mood was  euthymic.   INTERPERSONAL BEHAVIOR: Rapport was quickly and easily established   THOUGHT PROCESSES: Thoughts seemed logical and goal-directed.     PROPOSED TEST BATTERY:  YEARS OF ED: 12    MSVT  MOCA  TOPF  WAIS - SIM, DS, ARITH, SS, CODING  CVLT  WMS STORY  NGOC COPY  TRAILS  FAS/ANIMALS  NAB NAMING  PEGS  BDI, GAD7

## 2025-06-30 ENCOUNTER — OFFICE VISIT (OUTPATIENT)
Dept: NEUROLOGY | Facility: CLINIC | Age: 60
End: 2025-06-30
Payer: COMMERCIAL

## 2025-06-30 DIAGNOSIS — G93.89 BRAIN MASS: ICD-10-CM

## 2025-06-30 DIAGNOSIS — R41.9 COGNITIVE COMPLAINTS: Primary | ICD-10-CM

## 2025-06-30 DIAGNOSIS — F43.23 ADJUSTMENT DISORDER WITH MIXED ANXIETY AND DEPRESSED MOOD: ICD-10-CM

## 2025-06-30 DIAGNOSIS — Z79.811 AROMATASE INHIBITOR USE: ICD-10-CM

## 2025-06-30 PROCEDURE — 99499 UNLISTED E&M SERVICE: CPT | Mod: 95,,, | Performed by: PSYCHIATRY & NEUROLOGY

## 2025-06-30 PROCEDURE — 90791 PSYCH DIAGNOSTIC EVALUATION: CPT | Mod: 95,,, | Performed by: PSYCHIATRY & NEUROLOGY

## 2025-07-01 DIAGNOSIS — E11.9 TYPE 2 DIABETES MELLITUS WITHOUT COMPLICATION, WITHOUT LONG-TERM CURRENT USE OF INSULIN: ICD-10-CM

## 2025-07-01 RX ORDER — EMPAGLIFLOZIN 25 MG/1
TABLET, FILM COATED ORAL
Qty: 90 TABLET | Refills: 3 | Status: SHIPPED | OUTPATIENT
Start: 2025-07-01

## 2025-07-01 NOTE — TELEPHONE ENCOUNTER
No care due was identified.  [facility] [place] Embedded Care Due Messages. Reference number: [id number].   7/01/2025 10:34:57 AM CDT

## 2025-07-23 ENCOUNTER — OFFICE VISIT (OUTPATIENT)
Dept: NEUROLOGY | Facility: CLINIC | Age: 60
End: 2025-07-23
Payer: COMMERCIAL

## 2025-07-23 DIAGNOSIS — Z79.811 AROMATASE INHIBITOR USE: ICD-10-CM

## 2025-07-23 DIAGNOSIS — R41.9 COGNITIVE COMPLAINTS: Primary | ICD-10-CM

## 2025-07-23 DIAGNOSIS — F43.23 ADJUSTMENT DISORDER WITH MIXED ANXIETY AND DEPRESSED MOOD: ICD-10-CM

## 2025-07-23 DIAGNOSIS — G93.89 BRAIN MASS: ICD-10-CM

## 2025-07-23 PROCEDURE — 99499 UNLISTED E&M SERVICE: CPT | Mod: S$GLB,,, | Performed by: PSYCHIATRY & NEUROLOGY

## 2025-07-23 PROCEDURE — 96139 PSYCL/NRPSYC TST TECH EA: CPT | Mod: S$GLB,,, | Performed by: PSYCHIATRY & NEUROLOGY

## 2025-07-23 PROCEDURE — 96132 NRPSYC TST EVAL PHYS/QHP 1ST: CPT | Mod: S$GLB,,, | Performed by: PSYCHIATRY & NEUROLOGY

## 2025-07-23 PROCEDURE — 96133 NRPSYC TST EVAL PHYS/QHP EA: CPT | Mod: S$GLB,,, | Performed by: PSYCHIATRY & NEUROLOGY

## 2025-07-23 PROCEDURE — 96138 PSYCL/NRPSYC TECH 1ST: CPT | Mod: S$GLB,,, | Performed by: PSYCHIATRY & NEUROLOGY

## 2025-07-23 NOTE — PROGRESS NOTES
NEUROPSYCHOLOGY CONSULT  Referral Information  Name: Shu Mendoza  MRN: [id number]  : 1965  Age: 60 y.o.  Race: Black or   Gender: female  REFERRAL SOURCE: Temitope Grant MD  DATE CONDUCTED: 2025  SOURCES OF INFORMATION:  The following was gathered from a clinical interview with Ms. Shu Mendoza and review of the available medical records. Ms. Mendoza expressed an understanding of the purpose of the evaluation and consented to all procedures. Total licensed billing psychologists professional time including clinical interview, test administration and interpretation of tests administered by the billing psychologist, integration of test results and other clinical data, preparing the final report, and personally reporting results to the patient   [place] - 60 minutes (2025), [id number] - 120 minutes (2025), [id number] - 207 minutes (2025)    NEUROPSYCHOLOGICAL EVALUATION - CONFIDENTIAL    SUMMARY/TREATMENT PLAN   Ms. Mendoza is a 60 year old female with self-reported cognitive difficulties since starting treatment for breast cancer in  (Taxol, anastrozole). She completed anastrozole in 2025, with no obvious benefit since discontinuing. She remains functionally independent, but is more reliant on compensatory mechanisms. She indicated that she is near termination at work due to errors, but these have improved with organizational systems.     Neuropsychological testing revealed mild cognitive weaknesses, but not at the level to warrant a cognitive diagnosis. Cognitive profile was remarkable for mild executive dysfunction, which is non-specific and can be related to multiple etiologies (including cancer therapies and untreated NADER). This pattern may account for her work-related difficulties, but the actual work tasks themselves also sound challenging as they require multiple steps/follow-up. History is also remarkable for left tentorial meningioma, but with no mass  effect/vasogenic edema. Psychiatric history is remarkable for grief, with her son's death 3 years ago and grandson's death last year.     Diagnoses  1. Cognitive complaints  Assessment & Plan:  Work: She has successfully employed multiple compensatory mechanisms, which is encouraging. As noted, her responsibilities/tasks are challenging from a cognitive perspective, so systems will be necessary.     Neuropsych Follow-up: PRN      2. Adjustment disorder with mixed anxiety and depressed mood  Assessment & Plan:  Treatment: May benefit from re-establishing with therapist.       3. Brain mass    4. Aromatase inhibitor use      Ms. Mendoza will be provided the results of the evaluation.     Thank you for allowing me to participate in Ms. Brower care.  If you have any questions, please contact me at [phone].    Pacheco Franco Psy.D., SANGITAP  Board Certified in Clinical Neuropsychology  Department of Neurology    HISTORY OF PRESENT ILLNESS: Ms. Shu Mendoza is a 60 y.o., right-handed, female with 12 years of education who was referred for a neuropsychological evaluation for self-reported cognitive decline in the setting of breast cancer. She began noticing changes during chemotherapy (Taxol) in 2020, but also over the course of 5 year anastrozole treatment. She completed 5 years in 4/2025 with no obvious benefit in cognition since discontinuing. She notices cognitive issues both at home and at work. She can forget what she was going to get by the time she reaches the back of the house, but usually remembers once she returns to the front of the house. In conversation, she sometimes has to pause to remember what she wants to say. She can also forget what she was going to say if she doesn't say it when thinking it. She noted occasional word finding difficulties.     Ms. Mendoza' symptoms are most problematic at work. In fact, she indicated that she is likely to be terminated if she makes 1 more mistake with guests. She is more  "reliant on several compensatory mechanisms, primarily note taking, which have been helpful. The primary issue is forgetting to follow-up with specific guest requests after check in. She has to communicate requests to hotel staff right away on a different system than their typical computer system. She was having trouble remembering whether or not she did this initially, but has improved with her system. Currently, the primary issue is remembering to follow-up with staff to ensure that the request has been completed. This is more challenging since she has to remember to do this a little after guest check-in while she is managing several other responsibilities simultaneously. Beyond this responsibility, she finds herself having to keep notes about guest phone calls or to remember what she wants to tell her supervisor at a later date. She indicated that her supervisor tells her that she doesn't seem focused.     Followed by neurosurgery for meningioma. Per Dr. Conroy's note from 2025: "Ms. Mendoza is a 59-year-old female with an incidentally found left tentorial meningioma as described above.  She has an MRI from approximately 3 years ago.  Since this last MRI 3 years ago, there has been a slight interval increase in size of the known meningioma.  However, there continues to be no surrounding vasogenic edema of the surrounding brain parenchyma.  Despite the small increase in size of the meningioma, there has been quite a long time span in between the interval scans.  Furthermore, there was no significant surrounding vasogenic edema or mass effect on the brain.  I believe the patient to be asymptomatic from this meningioma."    Neuropsychiatric Symptoms:  Hallucinations: Denied  Depression/Labile Mood: Endorsed, grief/depression, noting that her son  3 years ago and grandson was killed 1 year ago.   Anxiety: Endorsed, primarily at work.     DAILY FUNCTIONING:  BASIC ADLS:  Feeding: independent, she denied anosmia. "   Dressing: independent  Bathing: independent    IADLS:  Support System: Resides with longtime partner.   Appointment Management: independent, reliant on a calendar.   Medication Compliance: independent, she takes medications with breakfast, lunch, and dinner. No pillbox. She has a system where she has to move the bottle after taking them, but if it's not moved and she can't remember, she won't take it.    Financial Management: independent, most bills set to autopay.   Cooking: independent, no issues.   Driving: She never drove due to vision issues since childhood. She had to have corrective surgery at 7 years old due to dramatic vision decline.       BRAIN HEALTH RISK FACTORS:  Hearing Loss: Denied  Falls: Denied  Sleep: NADER, has a CPAP, but doesn't use it currently since she has a balance on her account and needs new supplies. She typically falls asleep 7:30-8-30pm she wakes up at 4am. She is not taking anything for sleep. Maintenance is an issue with frequent wake-ups. The only time she wakes up with restful sleep is from a nap, which might be from 12-3.pm.     MEDICAL HISTORY: Ms. Mendoza  has a past medical history of Anxiety, Breast cancer, Depression, Diabetes mellitus, Encounter for blood transfusion, Glaucoma (2012), psychiatric care, Hypertension, Malignant neoplasm of central portion of left breast in female, estrogen receptor positive (7/23/2019), Psychiatric problem, Renal cyst, Retinal tear of right eye, and Sleep difficulties. She has blurred vision, but has adjusted at home at work, uses glasses and adjusts computer screen.       Treatment Summary   Plan Name: OP PACLITAXEL QW  Treatment Goal: Curative  Status: Inactive  Start Date: 1/2/2020  End Date: 3/19/2020  Provider: Eliana Norris MD  Chemotherapy: PACLitaxel (TAXOL) 80 mg/m2 = 156 mg in sodium chloride 0.9% 250 mL chemo infusion, 80 mg/m2 = 156 mg, Intravenous, Clinic/HOD 1 time, 12 of 12 cycles  Administration: 156 mg (1/2/2020), 156 mg  "(2020), 156 mg (2020), 156 mg (2020), 156 mg (2020), 156 mg (2020), 156 mg (2020), 156 mg (2020), 156 mg (2020), 156 mg (3/5/2020), 156 mg (3/12/2020), 156 mg (3/19/2020)     NEUROIMAGIN2025 Brain MRI: "Overall stable size and appearance of a left tentorial extra-axial lesion, in keeping with meningioma.  Lesion is stable from comparison MRI 2024, noting slight interval growth from comparison imaging 2022.  No significant worsening in mass effect or underlying parenchymal signal change. No acute intracranial pathology."    SUBSTANCE USE: Ms. Mendoza  reports that she has never smoked. She has never used smokeless tobacco. She reports that she does not currently use alcohol, especially since cancer diagnosis. She reports that she does not use drugs. No history of substance abuse.     CURRENT MEDICATIONS:    Current Outpatient Medications:     amlodipine-valsartan (EXFORGE)  mg per tablet, TAKE 1 TABLET BY MOUTH DAILY, Disp: 90 tablet, Rfl: 3    ammonium lactate 12 % Crea, APPLY TOPICALLY TO THE SKIN TWICE DAILY, Disp: 280 g, Rfl: 6    atorvastatin (LIPITOR) 80 MG tablet, Take 1 tablet (80 mg total) by mouth once daily., Disp: 90 tablet, Rfl: 3    b complex vitamins tablet, Take 1 tablet by mouth once daily., Disp: , Rfl:     B-complex with vitamin C (Z-BEC OR EQUIV) tablet, , Disp: , Rfl:     calcium carbonate 1250 MG capsule, Take 1,250 mg by mouth 2 (two) times daily with meals., Disp: , Rfl:     carvediloL (COREG) 25 MG tablet, Take 1 tablet (25 mg total) by mouth 2 (two) times daily., Disp: 60 tablet, Rfl: 5    dorzolamide (TRUSOPT) 2 % ophthalmic solution, Place 1 drop into both eyes 3 (three) times daily. , Disp: , Rfl:     fluticasone propionate (FLONASE) 50 mcg/actuation nasal spray, 1 spray by Each Nostril route nightly as needed for Rhinitis., Disp: , Rfl:     gabapentin (NEURONTIN) 300 MG capsule, TAKE 2 CAPSULES(600 MG) BY MOUTH EVERY EVENING, " Disp: 180 capsule, Rfl: 2    hydroCHLOROthiazide (HYDRODIURIL) 25 MG tablet, Take 1 tablet (25 mg total) by mouth once daily., Disp: 90 tablet, Rfl: 0    JARDIANCE 25 mg tablet, Take 1 tablet (25 mg total) by mouth once daily., Disp: 90 tablet, Rfl: 1    latanoprost 0.005 % ophthalmic solution, , Disp: , Rfl: 11    LIDOcaine HCL 2% (XYLOCAINE) 2 % jelly, Apply topically as needed. Apply topically once nightly to affected part of foot/feet., Disp: 30 mL, Rfl: 2    LIDOcaine viscous HCl 2% (LIDOCAINE VISCOUS) 2 % Soln, by Mucous Membrane route every 3 (three) hours., Disp: 100 mL, Rfl: 0    loratadine (CLARITIN ORAL), Take by mouth., Disp: , Rfl:     metFORMIN (GLUCOPHAGE-XR) 500 MG ER 24hr tablet, Take 2 tablets (1,000 mg total) by mouth 2 (two) times daily with meals., Disp: 360 tablet, Rfl: 3    multivitamin (THERAGRAN) per tablet, Take 1 tablet by mouth once daily., Disp: , Rfl:     ondansetron (ZOFRAN-ODT) 4 MG TbDL, Take 1 tablet (4 mg total) by mouth every 6 (six) hours as needed (nausea)., Disp: 20 tablet, Rfl: 0    ondansetron (ZOFRAN-ODT) 8 MG TbDL, Dissolve 1 tablet (8 mg total) by mouth every 6 (six) hours as needed (nausea)., Disp: 30 tablet, Rfl: 2    oxybutynin (DITROPAN-XL) 10 MG 24 hr tablet, Take 1 tablet (10 mg total) by mouth once daily., Disp: 90 tablet, Rfl: 3    prasterone, dhea, (INTRAROSA) 6.5 mg Inst, PLACE 1 INSERT INTRAVAGINALLY AT BEDTIME AS DIRECTED, Disp: 28 each, Rfl: 1    promethazine (PHENERGAN) 25 MG tablet, Take 1 tablet (25 mg total) by mouth every 6 (six) hours as needed for Nausea., Disp: 30 tablet, Rfl: 0    spironolactone (ALDACTONE) 100 MG tablet, Take 1 tablet (100 mg total) by mouth once daily., Disp: 90 tablet, Rfl: 3    terconazole (TERAZOL 7) 0.4 % Crea, Place 1 applicator vaginally every evening., Disp: 7 g, Rfl: 1    venlafaxine (EFFEXOR-XR) 75 MG 24 hr capsule, Take 1 capsule (75 mg total) by mouth once daily., Disp: 90 capsule, Rfl: 3    vitamin D (VITAMIN D3) 1000  units Tab, Take 1,000 Units by mouth once daily., Disp: , Rfl:   No current facility-administered medications for this visit.    Facility-Administered Medications Ordered in Other Visits:     lidocaine HCL 10 mg/ml (1%) 50 mL, EPINEPHrine 1,000 mcg in lactated Ringers 1,000 mL irrigation, , Irrigation, On Call Procedure, Derek Colin MD     PSYCHIATRIC HISTORY: Unremarkable pre-morbid psychiatric history with no treatment prior to cancer diagnosis. Followed by oncology psychologist, Dr. New, for several years during treatment. Not currently followed by a mental health provider.     SUICIDAL IDEATION:  History: Denied  Active Suicidal Ideation:Denied  Plan/Intent: Denied    FAMILY HISTORY: family history includes Diabetes in her maternal grandmother, mother, and sister; Drug abuse in her son; HIV in her sister; Nephrolithiasis in her mother; Ovarian cancer in an other family member; Schizophrenia in her son.    PSYCHOSOCIAL HISTORY:   Education:   Level Attained: High school graduate   Learning Difficulties: Denied   Repeated Grade: Denied     Vocation:   Highest Attained: Worked 19 years in multiple roles at [employer], including , , and . She is currently  at [employer] for past 3 and a half years.      Relationship Status:   : No, but in a relationship for the past 43 years.    : No   Children: 3 (1  3 years ago)    MENTAL STATUS AND OBSERVATIONS:  APPEARANCE: Appropriately dressed and adequate grooming/hygiene.   ALERTNESS/ORIENTATION: Attentive and alert.   GAIT/MOTOR: Ambulated independently.   SENSORY: Corrective lenses.   SPEECH/LANGUAGE: Normal in rate, rhythm, tone, and volume. Expressive and receptive language were grossly intact.  STATED MOOD/AFFECT: Mood was euthymic.   INTERPERSONAL BEHAVIOR: Rapport was quickly and easily established   THOUGHT PROCESSES: Thoughts seemed logical and  "goal-directed.   BEHAVIORAL OBSERVATIONS/VALIDITY:  Scores on standalone and embedded PVTs were WNL. Slow response style during testing with delayed response latency. She frequently apologized and stated "my memory is bad." She required encouragement to guess when unsure. There were times when she said that she knew what she wanted to say, but couldn't say it. She required repetition and clarification of test instructions. Scores appear to be a valid/reliable measure of her current cognitive abilities.      APPENDIX/TEST RESULTS:  TESTS ADMINISTERED:  Clinical Interview and Review of Records, Rigby Cognitive Assessment (MoCA), MSVT, Test of Premorbid Functioning (TOPF), selected subtests from the Wechsler Adult Intelligence Scale - 4th edition (WAIS-IV, [facility] Demographically Adjusted Norms), [place] Verbal Learning Test - second edition (CVLT-2), Logical Memory subtest from the Wechsler Memory Scale - 4 edition (WMS-IV, [facility] Demographically Adjusted Norms), Naming subtest from the [facility], Controlled Oral Word Association Test ([facility] Norms), Animal Fluency ([facility] Norms), Trailmaking Test ([facility] Norms), Tha Complex Figure (Copy Trial), Grooved Pegboard Test, Michaels Depression Inventory - second edition (BDI-2), and the Generalized Anxiety Disorder - 7 (RENITA-7).     Score Label T-Score Standard Score Z-Score Scaled Score %ile Rank   Exceptionally High > 70 > 130 > 2.0  > 16 > 98   Above Average 64-69 120-129 1.4-1.9 15 91-97   High Average 57-63 110-119 0.7-1.3 12-14 75-90   Average 44-56  0.6 to -0.6 8-11 25-74   Low Average 37-43 80-89 -1.3 to -0.7 6-7 9-24   Below Average 30-36 70-79 -2.0 to -1.4 4-5 2-8   Exceptionally Low < 30 < 70  < -2.0 < 4 < 2      Mental Status: Fully oriented to time and place.   7/2025 MoCA = 22/30     Pre-morbid/Baseline: Single word reading was below average while educational/vocational attainment was suggestive of low average range baseline abilities.      Language: Accurately " repeated 1/2 sentences verbatim. Average letter and semantic verbal fluency. Average confrontation naming.      Visuospatial: Poor number spacing on her drawing of a clock face, resulting in the 7 being in the 6 position and a large gap between the 11 and 12. The clock hands were placed at the correct time, but were the opposite length and did not meet in the center. She had difficulty determining how to approach copy of a complex figure. She first started drawing a portion of the middle of the figure and the details to the left and right of it in a piecemeal fashion. Possible that she is experiencing mild visuospatial dysfunction based on performance, but inefficient planning/organization seemed to be the primary issue.      Learning/Memory: Overall encoding of a supraspan word list was average as she recalled 6, 10, 12, 12, and 12 of 16 words. She then encoded 3 words from a second, distracter list. She freely recalled 13 words following exposure to the distracter list and 14 with categorical cueing. She freely recalled 13 words following a long delay and 12 with categorical cueing. Elevated number of repetition and intrusion errors across all trials (but mainly the same 3 intrusions). Recognition was high average (16/16 hits, 1 fp). She encoded 5/5 words after 2 trials on the MoCA, freely recalling 3 following a brief delay. She recalled the remaining 2 words with categorical cueing. Overall encoding of two short stories was high average. She retained 14/15 previously encoded details from the first story following a long delay and 8/12 from the second story. Overall recall was average. Responses to y/n questions was average.      Executive Functioning: One trial learning/encoding was WNL. 2/5 correct responses on a serial 7 subtraction task. Low average performance on tests of working memory. Low average performance on tests of processing speed. Average performance on a test of conceptual/abstract reasoning.  Average performance on a test requiring her to maintain a complex set.     Motor: Completed a test of fine motor abilities faster with her non-dominant hand.      Mood: She endorsed a moderate degree of clinical anxiety and minimal/mild degree of depression. Denied SI.       Raw Score Type of Standardized Score Standardized Score Percentile/CP   MSVT  - - -   MSVT  - - -   MSVT Cons 100 - - -   MSVT  - - -   MSVT FR 90 - - -   ACS LM II Rec 23 - - -   ACS RDS 7 - - -   CVLT-II FC 16 - - -   PREMORBID FUNCTIONING Raw Score Type of Standardized Score Standardized Score Percentile/CP   TOPF simple dem. eFSIQ - SS 88 21   TOPF pred. eFSIQ - SS 72 3   TOPF simple + pred. eFSIQ - SS 77 6   INTELLECTUAL FUNCTIONING Raw Score Type of Standardized Score Standardized Score Percentile/CP   WAIS-IV       WMI - T 38    PSI - T 40    Similarities 23 T 53    Digit Span 22 T 47          DS Forward 9 ss 9 37         DS Backward 5 ss 6 9         DS Sequence 8 ss 10 50         Longest Digit Forward 6 - - -         Longest Digit Backward 3 - - -         Longest Digit Sequence 5 - - -   Arithmetic 7 T 33    Symbol Search 17 T 37    Coding 49 T 46    COGNITIVE SCREENING Raw Score Type of Standardized Score Standardized Score Percentile/CP   MoCA 22 - - -   Orientation - Place 2/2 - - -   Orientation - Date 4/4 - - -   LANGUAGE FUNCTIONING Raw Score Type of Standardized Score Standardized Score Percentile/CP   WAIS-IV Similarities 23 ss 9 37   TOPF Word Reading 16 SS 75 5   NAB Naming 30 Tscore 56 73   NAB Naming Percent Correct After Semantic Cuing 0 - - 38   NAB Naming Percent Correct After Phonemic Cuing 0 - - 20   COWAT 33 Tscore 49 46   Letter F  10 Tscore 48 40   Animal Naming 16 Tscore 51 54   VISUOSPATIAL FUNCTIONING Raw Score Type of Standardized Score Standardized Score Percentile/CP   RCFT Copy 17 - - <1   RCFT Time to Copy 418 - - 2-5   LEARNING & MEMORY Raw Score Type of Standardized Score Standardized Score  Percentile/CP   CVLT-II       Trials 1-5 (T-Score) 52 Tscore 56 73   List A Trial 1 6 zscore 0 50.000   List A Trial 5 12 zscore 0 50   List B 3 zscore -1.5 7   SDFR 13 zscore 1 84   SDCR 14 zscore 1 84   LDFR 13 zscore 0.5 69   LDCR 12 zscore 0 50   Semantic Clustering 0.7 zscore 0 50   Learning [place] 1.4 zscore 0 50   Repetitions 15 zscore 2.5 99   Intrusions 15 zscore 2 98   Recognition Hits 16 zscore 0.5 69   False Positives 1 zscore -0.5 31   Discriminability 3.7 zscore 1 84   WMS-IV       Auditory Immediate (additional score) -  63   Auditory Delayed (additional score) -  58   Auditory Memory - T 58    WMS-IV Subtests       LM I 27 T 57    LM II 22 T 54    LM Recognition 23 - - 26-50   (CVLT-II Trials 1-5) 56 T 58    (CVLT-II Long Delay) 0.5 T 56    ATTENTION/WORKING MEMORY Raw Score Type of Standardized Score Standardized Score Percentile/CP   WAIS-IV WMI - SS 77 6   WAIS-IV Digit Span 22 ss 8 25         DS Forward 9 ss 9 37         DS Backward 5 ss 6 9         DS Sequence 8 ss 10 50         Longest Digit Forward 6 - - -         Longest Digit Backward 3 - - -         Longest Digit Sequence 5 - - -   WAIS-IV Arithmetic 7 ss 4 2   MENTAL PROCESSING SPEED Raw Score Type of Standardized Score Standardized Score Percentile/CP   WAIS-IV PSI - SS 81 10   WAIS-IV Symbol Search 17 ss 5 5   WAIS-IV Coding 49 ss 8 25   TMT A  39 Tscore 48 42   TMT A Total Err. 1 - - -   TMT A Seq. Err. 1 - - -   EXECUTIVE FUNCTIONING Raw Score Type of Standardized Score Standardized Score Percentile/CP   TMT B 104 Tscore 54 66   TMT B Total Err. 0 - - -   WAIS-IV Similarities 23 ss 9 37   FRONTOMOTOR  Raw Score Type of Standardized Score Standardized Score Percentile/CP   GPT       GPT NDH 93      MOOD & PERSONALITY Raw Score Type of Standardized Score Standardized Score Percentile/CP   BDI-2 12 - - -   RENITA-7 14 - - -

## 2025-07-28 NOTE — ASSESSMENT & PLAN NOTE
Work: She has successfully employed multiple compensatory mechanisms, which is encouraging. As noted, her responsibilities/tasks are challenging from a cognitive perspective, so systems will be necessary.     Neuropsych Follow-up: PRN

## 2025-08-07 ENCOUNTER — OFFICE VISIT (OUTPATIENT)
Dept: NEUROLOGY | Facility: CLINIC | Age: 60
End: 2025-08-07
Payer: COMMERCIAL

## 2025-08-07 DIAGNOSIS — R41.9 COGNITIVE COMPLAINTS: Primary | ICD-10-CM

## 2025-08-07 DIAGNOSIS — F43.23 ADJUSTMENT DISORDER WITH MIXED ANXIETY AND DEPRESSED MOOD: ICD-10-CM

## 2025-08-07 DIAGNOSIS — G93.89 BRAIN MASS: ICD-10-CM

## 2025-08-07 PROCEDURE — 99499 UNLISTED E&M SERVICE: CPT | Mod: S$GLB,,, | Performed by: PSYCHIATRY & NEUROLOGY

## 2025-08-07 NOTE — PROGRESS NOTES
NEUROPSYCHOLOGICAL EVALUATION - CONFIDENTIAL  FEEDBACK NOTE    On 8/7/2025, I provided Ms. Shu Mendoza and her mother the neuropsychological evaluation results. Please see the full report for a comprehensive overview of the findings. Ms. Mendoza was provided a copy of the report and invited to call with additional questions.      Pacheco Franco Psy.D., ABPP  Board Certified in Clinical Neuropsychology  [facility] - Department of Neurology

## 2025-08-12 ENCOUNTER — OFFICE VISIT (OUTPATIENT)
Dept: HEMATOLOGY/ONCOLOGY | Facility: CLINIC | Age: 60
End: 2025-08-12
Payer: COMMERCIAL

## 2025-08-12 VITALS
BODY MASS INDEX: 30.53 KG/M2 | HEART RATE: 74 BPM | SYSTOLIC BLOOD PRESSURE: 116 MMHG | HEIGHT: 64 IN | OXYGEN SATURATION: 97 % | RESPIRATION RATE: 15 BRPM | DIASTOLIC BLOOD PRESSURE: 74 MMHG | WEIGHT: 178.81 LBS | TEMPERATURE: 97 F

## 2025-08-12 DIAGNOSIS — M85.80 OSTEOPENIA, UNSPECIFIED LOCATION: ICD-10-CM

## 2025-08-12 DIAGNOSIS — C50.912 MALIGNANT NEOPLASM OF LEFT BREAST IN FEMALE, ESTROGEN RECEPTOR POSITIVE, UNSPECIFIED SITE OF BREAST: Primary | ICD-10-CM

## 2025-08-12 DIAGNOSIS — Z17.0 MALIGNANT NEOPLASM OF LEFT BREAST IN FEMALE, ESTROGEN RECEPTOR POSITIVE, UNSPECIFIED SITE OF BREAST: Primary | ICD-10-CM

## 2025-08-12 DIAGNOSIS — Z12.31 ENCOUNTER FOR SCREENING MAMMOGRAM FOR MALIGNANT NEOPLASM OF BREAST: ICD-10-CM

## 2025-08-12 DIAGNOSIS — I10 ESSENTIAL HYPERTENSION: ICD-10-CM

## 2025-08-12 DIAGNOSIS — E11.9 TYPE 2 DIABETES MELLITUS WITHOUT COMPLICATION, WITHOUT LONG-TERM CURRENT USE OF INSULIN: ICD-10-CM

## 2025-08-12 PROCEDURE — G2211 COMPLEX E/M VISIT ADD ON: HCPCS | Mod: S$GLB,,, | Performed by: INTERNAL MEDICINE

## 2025-08-12 PROCEDURE — 99214 OFFICE O/P EST MOD 30 MIN: CPT | Mod: S$GLB,,, | Performed by: INTERNAL MEDICINE

## 2025-08-12 PROCEDURE — 3078F DIAST BP <80 MM HG: CPT | Mod: CPTII,S$GLB,, | Performed by: INTERNAL MEDICINE

## 2025-08-12 PROCEDURE — 3051F HG A1C>EQUAL 7.0%<8.0%: CPT | Mod: CPTII,S$GLB,, | Performed by: INTERNAL MEDICINE

## 2025-08-12 PROCEDURE — 1160F RVW MEDS BY RX/DR IN RCRD: CPT | Mod: CPTII,S$GLB,, | Performed by: INTERNAL MEDICINE

## 2025-08-12 PROCEDURE — 4010F ACE/ARB THERAPY RXD/TAKEN: CPT | Mod: CPTII,S$GLB,, | Performed by: INTERNAL MEDICINE

## 2025-08-12 PROCEDURE — 3008F BODY MASS INDEX DOCD: CPT | Mod: CPTII,S$GLB,, | Performed by: INTERNAL MEDICINE

## 2025-08-12 PROCEDURE — 99999 PR PBB SHADOW E&M-EST. PATIENT-LVL IV: CPT | Mod: PBBFAC,,, | Performed by: INTERNAL MEDICINE

## 2025-08-12 PROCEDURE — 3074F SYST BP LT 130 MM HG: CPT | Mod: CPTII,S$GLB,, | Performed by: INTERNAL MEDICINE

## 2025-08-12 PROCEDURE — 1159F MED LIST DOCD IN RCRD: CPT | Mod: CPTII,S$GLB,, | Performed by: INTERNAL MEDICINE

## 2025-09-02 ENCOUNTER — TELEPHONE (OUTPATIENT)
Dept: PODIATRY | Facility: CLINIC | Age: 60
End: 2025-09-02
Payer: COMMERCIAL

## (undated) DEVICE — NDL 18GA X1 1/2 REG BEVEL

## (undated) DEVICE — GLOVE BIOGEL SKINSENSE PI 8.0

## (undated) DEVICE — SUT VICRYL 2-0 36 CT-1

## (undated) DEVICE — SUT STRATAFIX PGAPCL 3 FS-2

## (undated) DEVICE — GLOVE BIOGEL SKINSENSE PI 7.5

## (undated) DEVICE — DRESSING XEROFORM FOIL PK 1X8

## (undated) DEVICE — SCRUB 10% POVIDONE IODINE 4OZ

## (undated) DEVICE — SET DECANTER MEDICHOICE

## (undated) DEVICE — SEE MEDLINE ITEM 157110

## (undated) DEVICE — SUT VICRYL PLUS 2-0 CT1 18

## (undated) DEVICE — SYR B-D DISP CONTROL 10CC100/C

## (undated) DEVICE — SUT MONOCRYL 3-0 PS-2 UND

## (undated) DEVICE — COVER PROBE SHEATH SURG 6X3IN

## (undated) DEVICE — GLOVE BIOGEL SKINSENSE PI 7.0

## (undated) DEVICE — KIT PROBE COVER WITH GEL

## (undated) DEVICE — SUT 5/0 18IN PROLENE BL MO

## (undated) DEVICE — SYS PRINEO SKIN CLOSURE

## (undated) DEVICE — SUT 3-0 ETHILON 18 FS-1

## (undated) DEVICE — SUT MCRYL PLUS 4-0 PS2 27IN

## (undated) DEVICE — PAD ABD 8X10 STERILE

## (undated) DEVICE — PACK UNIV PROCEDURE

## (undated) DEVICE — NDL HYPO REG 25G X 1 1/2

## (undated) DEVICE — GOWN SMART IMP BREATHABLE XXLG

## (undated) DEVICE — GLOVE BIOGEL SKINSENSE PI 6.5

## (undated) DEVICE — DRAPE IOBAN 2 STERI

## (undated) DEVICE — POSITIONER IV ARMBOARD FOAM

## (undated) DEVICE — APPLICATOR CHLORAPREP ORN 26ML

## (undated) DEVICE — SEE MEDLINE ITEM 152742

## (undated) DEVICE — EVACUATOR WOUND BULB 100CC

## (undated) DEVICE — GLOVE BIOGEL SKINSENSE PI 6.0

## (undated) DEVICE — SYR 10CC LUER LOCK

## (undated) DEVICE — SPONGE LAP 18X18 PREWASHED

## (undated) DEVICE — SEE MEDLINE ITEM 157131

## (undated) DEVICE — STAPLER SKIN ROTATING HEAD

## (undated) DEVICE — NDL SAFETY 22G X 1.5 ECLIPSE

## (undated) DEVICE — SOL 9P NACL IRR PIC IL

## (undated) DEVICE — ELECTRODE BLD EXT INSUL 1

## (undated) DEVICE — PENCIL ELECTROSURG HOLST W/BLD

## (undated) DEVICE — PAD PREP 50/CA

## (undated) DEVICE — CLAMP SINGLE MICRO.

## (undated) DEVICE — ELECTRODE REM PLYHSV RETURN 9

## (undated) DEVICE — GAUZE SPONGE 4X4 12PLY

## (undated) DEVICE — SEE MEDLINE ITEM 156930

## (undated) DEVICE — SEE MEDLINE ITEM 153151

## (undated) DEVICE — DRAIN CHANNEL ROUND 19FR

## (undated) DEVICE — GLOVE BIOGEL SKINSENSE PI 8.5

## (undated) DEVICE — WAVEGUIDE BRITEFIELD DISP

## (undated) DEVICE — ADHESIVE MASTISOL VIAL 48/BX

## (undated) DEVICE — ADHESIVE DERMABOND ADVANCED

## (undated) DEVICE — UNDERGLOVES BIOGEL PI SZ 7 LF

## (undated) DEVICE — BLANKET HYPER ADULT 24X60IN

## (undated) DEVICE — APPLIER CLIP LIAGCLIP 9.375IN

## (undated) DEVICE — Device

## (undated) DEVICE — DRESSING TRANS 4X4 TEGADERM

## (undated) DEVICE — CLIPPER BLADE MOD 4406 (CAREF)

## (undated) DEVICE — POSITIONER HEAD DONUT 9IN FOAM

## (undated) DEVICE — SOL NS 1000CC

## (undated) DEVICE — BLADE PEAK PLASMA

## (undated) DEVICE — ELECTRODE BLADE INSULATED 1 IN

## (undated) DEVICE — DRESSING TELFA N ADH 3X8

## (undated) DEVICE — SEE MEDLINE ITEM 152622

## (undated) DEVICE — ELECTRODE BLADE TEFLON 6

## (undated) DEVICE — UNDERGLOVE BIOGEL PI SZ 6.5 LF

## (undated) DEVICE — CLIP DOUBLE MICRO.

## (undated) DEVICE — SUT STRATAFIX PGAPCL 3 FS-1

## (undated) DEVICE — SUT 9/0 5IN ETHILON BLK MON

## (undated) DEVICE — COVER SURG LIGHT HANDLE

## (undated) DEVICE — CONTAINER SPECIMEN STRL 4OZ

## (undated) DEVICE — COVER PROBE US 5.5X58L NON LTX

## (undated) DEVICE — BRA CLASSIC COMFORT 42BLACK

## (undated) DEVICE — DRAPE THYROID WITH ARMBOARD

## (undated) DEVICE — SOL NACL .9% 250ML INJ

## (undated) DEVICE — BLADE SURG STAINLESS STEEL #11

## (undated) DEVICE — SKIN MARKER DEVON 160

## (undated) DEVICE — DRAIN CHANNEL ROUND 15FR

## (undated) DEVICE — HOLDER DRAIN POUCH PINK

## (undated) DEVICE — SUT VICRYL PLUS 3-0 SH 18IN

## (undated) DEVICE — SUT ETHILON 2-0 FS 18IN BLK

## (undated) DEVICE — SEE MEDLINE ITEM 146417

## (undated) DEVICE — POWDER ARISTA AH 3G

## (undated) DEVICE — SUT 2/0 30IN SILK BLK BRAI

## (undated) DEVICE — SEE MEDLINE ITEM 152186

## (undated) DEVICE — WARMER DRAPE STERILE LF

## (undated) DEVICE — TRAY DRY SKIN SCRUB PREP

## (undated) DEVICE — SYS CLSR DERMABOND PRINEO 22CM